# Patient Record
Sex: MALE | Race: WHITE | NOT HISPANIC OR LATINO | Employment: OTHER | URBAN - METROPOLITAN AREA
[De-identification: names, ages, dates, MRNs, and addresses within clinical notes are randomized per-mention and may not be internally consistent; named-entity substitution may affect disease eponyms.]

---

## 2017-07-28 RX ORDER — LEVETIRACETAM 1000 MG/1
TABLET ORAL
Qty: 90 TABLET | Refills: 0 | Status: ON HOLD | OUTPATIENT
Start: 2017-07-28 | End: 2018-02-19

## 2018-02-18 ENCOUNTER — APPOINTMENT (OUTPATIENT)
Dept: GENERAL RADIOLOGY | Facility: HOSPITAL | Age: 28
End: 2018-02-18

## 2018-02-18 ENCOUNTER — APPOINTMENT (OUTPATIENT)
Dept: CT IMAGING | Facility: HOSPITAL | Age: 28
End: 2018-02-18

## 2018-02-18 ENCOUNTER — HOSPITAL ENCOUNTER (INPATIENT)
Facility: HOSPITAL | Age: 28
LOS: 4 days | Discharge: HOME | End: 2018-02-22
Attending: EMERGENCY MEDICINE | Admitting: INTERNAL MEDICINE

## 2018-02-18 DIAGNOSIS — R10.84 GENERALIZED ABDOMINAL PAIN: Primary | ICD-10-CM

## 2018-02-18 LAB
ALBUMIN SERPL-MCNC: 3.8 G/DL (ref 3.5–5.2)
ALBUMIN/GLOB SERPL: 1.2 G/DL
ALP SERPL-CCNC: 73 U/L (ref 39–117)
ALT SERPL W P-5'-P-CCNC: 18 U/L (ref 1–41)
ANION GAP SERPL CALCULATED.3IONS-SCNC: 10.4 MMOL/L
AST SERPL-CCNC: 15 U/L (ref 1–40)
BASOPHILS # BLD AUTO: 0.02 10*3/MM3 (ref 0–0.2)
BASOPHILS NFR BLD AUTO: 0.2 % (ref 0–1.5)
BILIRUB SERPL-MCNC: 0.2 MG/DL (ref 0.1–1.2)
BILIRUB UR QL STRIP: NEGATIVE
BUN BLD-MCNC: 9 MG/DL (ref 6–20)
BUN/CREAT SERPL: 15 (ref 7–25)
CALCIUM SPEC-SCNC: 9.1 MG/DL (ref 8.6–10.5)
CHLORIDE SERPL-SCNC: 101 MMOL/L (ref 98–107)
CLARITY UR: CLEAR
CO2 SERPL-SCNC: 27.6 MMOL/L (ref 22–29)
COLOR UR: YELLOW
CREAT BLD-MCNC: 0.6 MG/DL (ref 0.76–1.27)
D-LACTATE SERPL-SCNC: 0.8 MMOL/L (ref 0.5–2)
DEPRECATED RDW RBC AUTO: 47 FL (ref 37–54)
EOSINOPHIL # BLD AUTO: 0.22 10*3/MM3 (ref 0–0.7)
EOSINOPHIL NFR BLD AUTO: 1.7 % (ref 0.3–6.2)
ERYTHROCYTE [DISTWIDTH] IN BLOOD BY AUTOMATED COUNT: 13.7 % (ref 11.5–14.5)
FLUAV AG NPH QL: NEGATIVE
FLUBV AG NPH QL IA: NEGATIVE
GFR SERPL CREATININE-BSD FRML MDRD: >150 ML/MIN/1.73
GLOBULIN UR ELPH-MCNC: 3.1 GM/DL
GLUCOSE BLD-MCNC: 108 MG/DL (ref 65–99)
GLUCOSE UR STRIP-MCNC: NEGATIVE MG/DL
HCT VFR BLD AUTO: 40.9 % (ref 40.4–52.2)
HGB BLD-MCNC: 13.5 G/DL (ref 13.7–17.6)
HGB UR QL STRIP.AUTO: NEGATIVE
IMM GRANULOCYTES # BLD: 0.03 10*3/MM3 (ref 0–0.03)
IMM GRANULOCYTES NFR BLD: 0.2 % (ref 0–0.5)
KETONES UR QL STRIP: NEGATIVE
LEUKOCYTE ESTERASE UR QL STRIP.AUTO: NEGATIVE
LYMPHOCYTES # BLD AUTO: 2.18 10*3/MM3 (ref 0.9–4.8)
LYMPHOCYTES NFR BLD AUTO: 16.8 % (ref 19.6–45.3)
MCH RBC QN AUTO: 31 PG (ref 27–32.7)
MCHC RBC AUTO-ENTMCNC: 33 G/DL (ref 32.6–36.4)
MCV RBC AUTO: 93.8 FL (ref 79.8–96.2)
MONOCYTES # BLD AUTO: 0.88 10*3/MM3 (ref 0.2–1.2)
MONOCYTES NFR BLD AUTO: 6.8 % (ref 5–12)
NEUTROPHILS # BLD AUTO: 9.62 10*3/MM3 (ref 1.9–8.1)
NEUTROPHILS NFR BLD AUTO: 74.3 % (ref 42.7–76)
NITRITE UR QL STRIP: NEGATIVE
PH UR STRIP.AUTO: 6 [PH] (ref 5–8)
PLATELET # BLD AUTO: 279 10*3/MM3 (ref 140–500)
PMV BLD AUTO: 10.3 FL (ref 6–12)
POTASSIUM BLD-SCNC: 4 MMOL/L (ref 3.5–5.2)
PROT SERPL-MCNC: 6.9 G/DL (ref 6–8.5)
PROT UR QL STRIP: NEGATIVE
RBC # BLD AUTO: 4.36 10*6/MM3 (ref 4.6–6)
SODIUM BLD-SCNC: 139 MMOL/L (ref 136–145)
SP GR UR STRIP: 1.02 (ref 1–1.03)
UROBILINOGEN UR QL STRIP: NORMAL
WBC NRBC COR # BLD: 12.95 10*3/MM3 (ref 4.5–10.7)

## 2018-02-18 PROCEDURE — 25010000002 ONDANSETRON PER 1 MG: Performed by: NURSE PRACTITIONER

## 2018-02-18 PROCEDURE — 74177 CT ABD & PELVIS W/CONTRAST: CPT

## 2018-02-18 PROCEDURE — 81003 URINALYSIS AUTO W/O SCOPE: CPT | Performed by: NURSE PRACTITIONER

## 2018-02-18 PROCEDURE — 87804 INFLUENZA ASSAY W/OPTIC: CPT | Performed by: NURSE PRACTITIONER

## 2018-02-18 PROCEDURE — 84145 PROCALCITONIN (PCT): CPT | Performed by: INTERNAL MEDICINE

## 2018-02-18 PROCEDURE — 25010000002 MORPHINE PER 10 MG: Performed by: NURSE PRACTITIONER

## 2018-02-18 PROCEDURE — 80053 COMPREHEN METABOLIC PANEL: CPT | Performed by: NURSE PRACTITIONER

## 2018-02-18 PROCEDURE — 0 IOPAMIDOL 61 % SOLUTION: Performed by: EMERGENCY MEDICINE

## 2018-02-18 PROCEDURE — 99284 EMERGENCY DEPT VISIT MOD MDM: CPT

## 2018-02-18 PROCEDURE — 83605 ASSAY OF LACTIC ACID: CPT | Performed by: NURSE PRACTITIONER

## 2018-02-18 PROCEDURE — 71046 X-RAY EXAM CHEST 2 VIEWS: CPT

## 2018-02-18 PROCEDURE — 87040 BLOOD CULTURE FOR BACTERIA: CPT | Performed by: NURSE PRACTITIONER

## 2018-02-18 PROCEDURE — 85025 COMPLETE CBC W/AUTO DIFF WBC: CPT | Performed by: NURSE PRACTITIONER

## 2018-02-18 RX ORDER — MORPHINE SULFATE 2 MG/ML
1 INJECTION, SOLUTION INTRAMUSCULAR; INTRAVENOUS ONCE
Status: COMPLETED | OUTPATIENT
Start: 2018-02-18 | End: 2018-02-18

## 2018-02-18 RX ORDER — ONDANSETRON 2 MG/ML
4 INJECTION INTRAMUSCULAR; INTRAVENOUS EVERY 6 HOURS PRN
Status: DISCONTINUED | OUTPATIENT
Start: 2018-02-18 | End: 2018-02-22 | Stop reason: HOSPADM

## 2018-02-18 RX ORDER — CALCIUM CARBONATE 200(500)MG
2 TABLET,CHEWABLE ORAL 2 TIMES DAILY PRN
Status: DISCONTINUED | OUTPATIENT
Start: 2018-02-18 | End: 2018-02-22 | Stop reason: HOSPADM

## 2018-02-18 RX ORDER — ONDANSETRON 4 MG/1
4 TABLET, ORALLY DISINTEGRATING ORAL EVERY 6 HOURS PRN
Status: DISCONTINUED | OUTPATIENT
Start: 2018-02-18 | End: 2018-02-22 | Stop reason: HOSPADM

## 2018-02-18 RX ORDER — ACETAMINOPHEN 325 MG/1
650 TABLET ORAL EVERY 4 HOURS PRN
Status: DISCONTINUED | OUTPATIENT
Start: 2018-02-18 | End: 2018-02-22 | Stop reason: HOSPADM

## 2018-02-18 RX ORDER — SODIUM CHLORIDE 0.9 % (FLUSH) 0.9 %
1-10 SYRINGE (ML) INJECTION AS NEEDED
Status: DISCONTINUED | OUTPATIENT
Start: 2018-02-18 | End: 2018-02-22 | Stop reason: HOSPADM

## 2018-02-18 RX ORDER — NALOXONE HCL 0.4 MG/ML
0.4 VIAL (ML) INJECTION
Status: DISCONTINUED | OUTPATIENT
Start: 2018-02-18 | End: 2018-02-22 | Stop reason: HOSPADM

## 2018-02-18 RX ORDER — SODIUM CHLORIDE 9 MG/ML
75 INJECTION, SOLUTION INTRAVENOUS CONTINUOUS
Status: DISCONTINUED | OUTPATIENT
Start: 2018-02-18 | End: 2018-02-19

## 2018-02-18 RX ORDER — ONDANSETRON 2 MG/ML
4 INJECTION INTRAMUSCULAR; INTRAVENOUS ONCE
Status: COMPLETED | OUTPATIENT
Start: 2018-02-18 | End: 2018-02-18

## 2018-02-18 RX ORDER — ONDANSETRON 4 MG/1
4 TABLET, FILM COATED ORAL EVERY 6 HOURS PRN
Status: DISCONTINUED | OUTPATIENT
Start: 2018-02-18 | End: 2018-02-22 | Stop reason: HOSPADM

## 2018-02-18 RX ORDER — MORPHINE SULFATE 2 MG/ML
2 INJECTION, SOLUTION INTRAMUSCULAR; INTRAVENOUS
Status: DISCONTINUED | OUTPATIENT
Start: 2018-02-18 | End: 2018-02-22 | Stop reason: HOSPADM

## 2018-02-18 RX ADMIN — SODIUM CHLORIDE 1000 ML: 9 INJECTION, SOLUTION INTRAVENOUS at 18:32

## 2018-02-18 RX ADMIN — MORPHINE SULFATE 1 MG: 2 INJECTION, SOLUTION INTRAMUSCULAR; INTRAVENOUS at 18:34

## 2018-02-18 RX ADMIN — IOPAMIDOL 85 ML: 612 INJECTION, SOLUTION INTRAVENOUS at 19:41

## 2018-02-18 RX ADMIN — MORPHINE SULFATE 1 MG: 2 INJECTION, SOLUTION INTRAMUSCULAR; INTRAVENOUS at 20:39

## 2018-02-18 RX ADMIN — ONDANSETRON 4 MG: 2 INJECTION INTRAMUSCULAR; INTRAVENOUS at 18:32

## 2018-02-18 RX ADMIN — ACETAMINOPHEN 650 MG: 325 TABLET, FILM COATED ORAL at 23:55

## 2018-02-18 NOTE — ED TRIAGE NOTES
Pt with brain anoxia. Non-verbal, wheelchair bound. Has colostomy in 2012. Bowel resection in 2015. Today pt passed blood and mucus from abdomen. Pt reluctant to void and ques for not wanting to sit on his bottom. Abdomen tender. Info from parents.

## 2018-02-18 NOTE — ED PROVIDER NOTES
EMERGENCY DEPARTMENT ENCOUNTER    Room Number:  12/12  Date seen:  2/18/2018  Time seen: 6:09 PM  PCP: Kira Almaraz MD    HPI:  Chief complaint: diarrhea with blood present  Context:Alex Brown is a 27 y.o. male who presents to the ED with reports of dark red, foul smelling diarrhea since earlier today. Per the pt's family, the pt has had increasing abd tenderness and reluctance to urinate for the past several days, and appears to be in pain, per his mother. Pt's mother denies decreased urine volume, N/V, or fever. Pt's mother states a Hx of colostomy bag for ulcerative colitis, bowel resection, and rectal abscess. Pt's mother states the pt is chronically incontinent, and does still pass some material in BMs. Pt's Hx is limited due to a Hx of brain anoxia and that the pt is non-verbal. Pt's mother gave the pt tylenol at 1100 today for pain.     Timing:episodic   Duration: earlier today   Quality: diarrhea   Intensity/Severity: moderate   Associated Symptoms: blood in stool, abd tenderness, reluctance to urinate   Aggravating Factors: none stated   Alleviating Factors: none stated   Previous Episodes:Hx of bowel resection, ulcerative colitis, rectal abscess, and incontinence   Treatment before arrival: tylenol    MEDICAL RECORD REVIEW    ALLERGIES  Chlorpromazine; Dilaudid [hydromorphone hcl]; Diphenhydramine; Diphenhydramine hcl; Reglan [metoclopramide]; Cefaclor; and Hydromorphone    PAST MEDICAL HISTORY  Active Ambulatory Problems     Diagnosis Date Noted   • Perirectal cellulitis 04/08/2016   • Attack, epileptiform 11/07/2016     Resolved Ambulatory Problems     Diagnosis Date Noted   • No Resolved Ambulatory Problems     Past Medical History:   Diagnosis Date   • Anoxic brain damage    • Colitis    • GERD (gastroesophageal reflux disease)    • Seizures        PAST SURGICAL HISTORY  Past Surgical History:   Procedure Laterality Date   • NO PAST SURGERIES         FAMILY HISTORY  History reviewed.  No pertinent family history.    SOCIAL HISTORY  Social History     Social History   • Marital status: Single     Spouse name: N/A   • Number of children: N/A   • Years of education: N/A     Occupational History   • Not on file.     Social History Main Topics   • Smoking status: Never Smoker   • Smokeless tobacco: Not on file   • Alcohol use No   • Drug use: No   • Sexual activity: Defer     Other Topics Concern   • Not on file     Social History Narrative       REVIEW OF SYSTEMS  Review of Systems   Unable to perform ROS: Patient nonverbal       PHYSICAL EXAM  ED Triage Vitals   Temp Heart Rate Resp BP SpO2   02/18/18 1759 02/18/18 1754 02/18/18 1754 02/18/18 1754 02/18/18 1754   99.3 °F (37.4 °C) 107 14 109/68 99 %      Temp src Heart Rate Source Patient Position BP Location FiO2 (%)   02/18/18 1759 02/18/18 1754 02/18/18 1754 02/18/18 1754 --   Tympanic Monitor Sitting Right arm      Physical Exam   Constitutional: He is well-developed, well-nourished, and in no distress. No distress.   Pt's exam is limited to to Hx of anoxic brain injury.    HENT:   Head: Normocephalic and atraumatic.   Eyes: Pupils are equal, round, and reactive to light.   Neck: Normal range of motion. Neck supple.   Cardiovascular: S1 normal, S2 normal and normal heart sounds.  Tachycardia present.  Exam reveals no gallop and no friction rub.    No murmur heard.  Pulmonary/Chest: Effort normal. No respiratory distress. He has no wheezes. He has no rales. He exhibits no tenderness.   Abdominal: Soft. He exhibits no distension. Bowel sounds are hyperactive. There is tenderness (pt grimaces with palpation). There is no rebound and no guarding.   Ostomy bag in place   Musculoskeletal: Normal range of motion. He exhibits no deformity.   Lymphadenopathy:     He has no cervical adenopathy.   Neurological: He is alert.   Pt smiles and reacts to stimuli   Skin: Skin is warm and dry.   Psychiatric: Affect normal.   Nursing note and vitals  reviewed.      LAB RESULTS  Recent Results (from the past 24 hour(s))   Comprehensive Metabolic Panel    Collection Time: 02/18/18  6:29 PM   Result Value Ref Range    Glucose 108 (H) 65 - 99 mg/dL    BUN 9 6 - 20 mg/dL    Creatinine 0.60 (L) 0.76 - 1.27 mg/dL    Sodium 139 136 - 145 mmol/L    Potassium 4.0 3.5 - 5.2 mmol/L    Chloride 101 98 - 107 mmol/L    CO2 27.6 22.0 - 29.0 mmol/L    Calcium 9.1 8.6 - 10.5 mg/dL    Total Protein 6.9 6.0 - 8.5 g/dL    Albumin 3.80 3.50 - 5.20 g/dL    ALT (SGPT) 18 1 - 41 U/L    AST (SGOT) 15 1 - 40 U/L    Alkaline Phosphatase 73 39 - 117 U/L    Total Bilirubin 0.2 0.1 - 1.2 mg/dL    eGFR Non African Amer >150 >60 mL/min/1.73    Globulin 3.1 gm/dL    A/G Ratio 1.2 g/dL    BUN/Creatinine Ratio 15.0 7.0 - 25.0    Anion Gap 10.4 mmol/L   CBC Auto Differential    Collection Time: 02/18/18  6:29 PM   Result Value Ref Range    WBC 12.95 (H) 4.50 - 10.70 10*3/mm3    RBC 4.36 (L) 4.60 - 6.00 10*6/mm3    Hemoglobin 13.5 (L) 13.7 - 17.6 g/dL    Hematocrit 40.9 40.4 - 52.2 %    MCV 93.8 79.8 - 96.2 fL    MCH 31.0 27.0 - 32.7 pg    MCHC 33.0 32.6 - 36.4 g/dL    RDW 13.7 11.5 - 14.5 %    RDW-SD 47.0 37.0 - 54.0 fl    MPV 10.3 6.0 - 12.0 fL    Platelets 279 140 - 500 10*3/mm3    Neutrophil % 74.3 42.7 - 76.0 %    Lymphocyte % 16.8 (L) 19.6 - 45.3 %    Monocyte % 6.8 5.0 - 12.0 %    Eosinophil % 1.7 0.3 - 6.2 %    Basophil % 0.2 0.0 - 1.5 %    Immature Grans % 0.2 0.0 - 0.5 %    Neutrophils, Absolute 9.62 (H) 1.90 - 8.10 10*3/mm3    Lymphocytes, Absolute 2.18 0.90 - 4.80 10*3/mm3    Monocytes, Absolute 0.88 0.20 - 1.20 10*3/mm3    Eosinophils, Absolute 0.22 0.00 - 0.70 10*3/mm3    Basophils, Absolute 0.02 0.00 - 0.20 10*3/mm3    Immature Grans, Absolute 0.03 0.00 - 0.03 10*3/mm3   Urinalysis - Urine, Catheter    Collection Time: 02/18/18  6:52 PM   Result Value Ref Range    Color, UA Yellow Yellow, Straw    Appearance, UA Clear Clear    pH, UA 6.0 5.0 - 8.0    Specific Gravity, UA 1.023 1.005 -  1.030    Glucose, UA Negative Negative    Ketones, UA Negative Negative    Bilirubin, UA Negative Negative    Blood, UA Negative Negative    Protein, UA Negative Negative    Leuk Esterase, UA Negative Negative    Nitrite, UA Negative Negative    Urobilinogen, UA 0.2 E.U./dL 0.2 - 1.0 E.U./dL   Influenza Antigen, Rapid - Swab, Nasopharynx    Collection Time: 02/18/18  6:52 PM   Result Value Ref Range    Influenza A Ag, EIA Negative Negative    Influenza B Ag, EIA Negative Negative       I ordered the above labs and reviewed the results    RADIOLOGY  CT Abdomen Pelvis With Contrast   Final Result   IMPRESSION :    1. Stable small renal lesions, likely cysts.   2. Grossly unremarkable exam otherwise considering above-noted artifacts           This report was finalized on 2/18/2018 8:00 PM by Hong Soto MD.          XR Chest 2 View   Final Result   1. No active disease.       This report was finalized on 2/18/2018 7:33 PM by Hong Soto MD.              I ordered the above noted radiological studies and reviewed the images on the PACS system.  Spoke with Dr. Soto regarding CT/MRI scan results    MEDICATIONS GIVEN IN ER  Medications   morphine injection 1 mg (1 mg Intravenous Given 2/18/18 1834)   ondansetron (ZOFRAN) injection 4 mg (4 mg Intravenous Given 2/18/18 1832)   sodium chloride 0.9 % bolus 1,000 mL (0 mL Intravenous Stopped 2/18/18 1900)   iopamidol (ISOVUE-300) 61 % injection 100 mL (85 mL Intravenous Given 2/18/18 1941)   morphine injection 1 mg (1 mg Intravenous Given 2/18/18 2039)   morphine injection 1 mg (1 mg Intravenous Given 2/18/18 2039)       EKG  Interpreted by ED Physician    PROCEDURES  Procedures    COURSE & MEDICAL DECISION MAKING  Pertinent Labs and Imaging studies that were ordered and reviewed are noted above.  Results were reviewed/discussed with the patient and they were also made aware of online assess.  Pt also made aware that some labs, such as cultures, will not be resulted  "during ER visit and follow up with PMD is necessary.     PROGRESS AND CONSULTS    Progress Notes:    ED Course   1941  Discussed the pt's labs with the pt's mother, including an normal urinalysis and elevated WBC.    1950  Reviewed pt's history and workup with Dr. Jones.  After a bedside evaluation; Dr Jones agrees with the plan of care    2010   Rechecked pt, who is resting comfortably. Discussed the pts negative CT abd/pel. Plan to consult Dr. Zhu and admit the pt for further evaluation and management. Pt's family understands and agrees with the plan, and all questions were answered.     2016  Called Dr. Soto and discussed the pt's CT scan. Per Dr. Soto, the pt's CT abd/pel was negative acute.     2027  Received a call from Dr. Zhuand discussed pt's case. Dr. Zhu said to admit the pt to medicine, and will consult.    2045  Rechecked pt, who is resting comfortably. Discussed conversation with Dr. Zhu. Pt's family understands and agrees with the plan, and all questions were answered.    2057  Received a call from Dr. Juárez and discussed pt's case. Dr. Juárez agreed to admit the pt.    Based on the patient's lab findings and presenting symptoms, the doctor and I feel it is appropriate to admit the patient for further management, evaluation, and treatment.  I have discussed this with the admitting team.  I have also discussed this with the patient/family.  They are in agreement with admission.        Disposition vitals:  /59  Pulse 117  Temp 99.3 °F (37.4 °C) (Tympanic)   Resp 18  Ht 165.1 cm (65\")  Wt 45.4 kg (100 lb)  SpO2 96%  BMI 16.64 kg/m2      DIAGNOSIS  Final diagnoses:   Generalized abdominal pain       ADMISSION  ADMISSION by Dr. Juárez    Discussed treatment plan and reason for admission with pt/family and admitting physician.  Pt/family voiced understanding of the plan for admission for further testing/treatment as needed.     Documentation assistance provided by sharmaine " Derrick Brothers for [facility].  Information recorded by the scribe was done at my direction and has been verified and validated by me.  Electronically signed by Derrick Brothers on 2/18/2018 at time 9:19 PM          Derrick Brothers  02/18/18 2120       Zoe Krishna, [facility]  02/18/18 1197

## 2018-02-19 PROBLEM — D72.829 LEUKOCYTOSIS: Status: ACTIVE | Noted: 2018-02-19

## 2018-02-19 PROBLEM — K91.89: Status: ACTIVE | Noted: 2018-02-19

## 2018-02-19 PROBLEM — G40.909 SEIZURE DISORDER (HCC): Status: ACTIVE | Noted: 2018-02-19

## 2018-02-19 PROBLEM — G93.1 ANOXIC BRAIN DAMAGE: Status: ACTIVE | Noted: 2018-02-19

## 2018-02-19 LAB
ANION GAP SERPL CALCULATED.3IONS-SCNC: 10.8 MMOL/L
BASOPHILS # BLD AUTO: 0.02 10*3/MM3 (ref 0–0.2)
BASOPHILS NFR BLD AUTO: 0.2 % (ref 0–1.5)
BUN BLD-MCNC: 7 MG/DL (ref 6–20)
BUN/CREAT SERPL: 13.5 (ref 7–25)
CALCIUM SPEC-SCNC: 8.7 MG/DL (ref 8.6–10.5)
CHLORIDE SERPL-SCNC: 106 MMOL/L (ref 98–107)
CO2 SERPL-SCNC: 27.2 MMOL/L (ref 22–29)
CREAT BLD-MCNC: 0.52 MG/DL (ref 0.76–1.27)
DEPRECATED RDW RBC AUTO: 47.6 FL (ref 37–54)
EOSINOPHIL # BLD AUTO: 0.23 10*3/MM3 (ref 0–0.7)
EOSINOPHIL NFR BLD AUTO: 2.2 % (ref 0.3–6.2)
ERYTHROCYTE [DISTWIDTH] IN BLOOD BY AUTOMATED COUNT: 13.6 % (ref 11.5–14.5)
GFR SERPL CREATININE-BSD FRML MDRD: >150 ML/MIN/1.73
GLUCOSE BLD-MCNC: 84 MG/DL (ref 65–99)
HCT VFR BLD AUTO: 37.9 % (ref 40.4–52.2)
HGB BLD-MCNC: 12.1 G/DL (ref 13.7–17.6)
IMM GRANULOCYTES # BLD: 0.03 10*3/MM3 (ref 0–0.03)
IMM GRANULOCYTES NFR BLD: 0.3 % (ref 0–0.5)
LYMPHOCYTES # BLD AUTO: 2.41 10*3/MM3 (ref 0.9–4.8)
LYMPHOCYTES NFR BLD AUTO: 23 % (ref 19.6–45.3)
MCH RBC QN AUTO: 30.2 PG (ref 27–32.7)
MCHC RBC AUTO-ENTMCNC: 31.9 G/DL (ref 32.6–36.4)
MCV RBC AUTO: 94.5 FL (ref 79.8–96.2)
MONOCYTES # BLD AUTO: 1.16 10*3/MM3 (ref 0.2–1.2)
MONOCYTES NFR BLD AUTO: 11.1 % (ref 5–12)
NEUTROPHILS # BLD AUTO: 6.61 10*3/MM3 (ref 1.9–8.1)
NEUTROPHILS NFR BLD AUTO: 63.2 % (ref 42.7–76)
PLATELET # BLD AUTO: 261 10*3/MM3 (ref 140–500)
PMV BLD AUTO: 10.1 FL (ref 6–12)
POTASSIUM BLD-SCNC: 3.8 MMOL/L (ref 3.5–5.2)
PROCALCITONIN SERPL-MCNC: 0.05 NG/ML (ref 0.1–0.25)
RBC # BLD AUTO: 4.01 10*6/MM3 (ref 4.6–6)
SODIUM BLD-SCNC: 144 MMOL/L (ref 136–145)
WBC NRBC COR # BLD: 10.46 10*3/MM3 (ref 4.5–10.7)

## 2018-02-19 PROCEDURE — 80048 BASIC METABOLIC PNL TOTAL CA: CPT | Performed by: INTERNAL MEDICINE

## 2018-02-19 PROCEDURE — 85025 COMPLETE CBC W/AUTO DIFF WBC: CPT | Performed by: INTERNAL MEDICINE

## 2018-02-19 PROCEDURE — 25010000002 LEVOFLOXACIN PER 250 MG: Performed by: INTERNAL MEDICINE

## 2018-02-19 PROCEDURE — 99254 IP/OBS CNSLTJ NEW/EST MOD 60: CPT | Performed by: SURGERY

## 2018-02-19 PROCEDURE — 87070 CULTURE OTHR SPECIMN AEROBIC: CPT | Performed by: SURGERY

## 2018-02-19 PROCEDURE — 87076 CULTURE ANAEROBE IDENT EACH: CPT | Performed by: SURGERY

## 2018-02-19 PROCEDURE — 87205 SMEAR GRAM STAIN: CPT | Performed by: SURGERY

## 2018-02-19 RX ORDER — CETIRIZINE HYDROCHLORIDE, PSEUDOEPHEDRINE HYDROCHLORIDE 5; 120 MG/1; MG/1
1 TABLET, FILM COATED, EXTENDED RELEASE ORAL 2 TIMES DAILY
Status: DISCONTINUED | OUTPATIENT
Start: 2018-02-19 | End: 2018-02-19

## 2018-02-19 RX ORDER — LEVETIRACETAM 500 MG/1
1000 TABLET ORAL DAILY
Status: DISCONTINUED | OUTPATIENT
Start: 2018-02-19 | End: 2018-02-22 | Stop reason: HOSPADM

## 2018-02-19 RX ORDER — LEVOFLOXACIN 5 MG/ML
500 INJECTION, SOLUTION INTRAVENOUS EVERY 24 HOURS
Status: DISCONTINUED | OUTPATIENT
Start: 2018-02-19 | End: 2018-02-22 | Stop reason: HOSPADM

## 2018-02-19 RX ORDER — PANTOPRAZOLE SODIUM 40 MG/1
40 TABLET, DELAYED RELEASE ORAL
Status: DISCONTINUED | OUTPATIENT
Start: 2018-02-19 | End: 2018-02-22 | Stop reason: HOSPADM

## 2018-02-19 RX ORDER — CETIRIZINE HYDROCHLORIDE, PSEUDOEPHEDRINE HYDROCHLORIDE 5; 120 MG/1; MG/1
1 TABLET, FILM COATED, EXTENDED RELEASE ORAL 2 TIMES DAILY PRN
Status: DISCONTINUED | OUTPATIENT
Start: 2018-02-19 | End: 2018-02-22 | Stop reason: HOSPADM

## 2018-02-19 RX ADMIN — PANTOPRAZOLE SODIUM 40 MG: 40 TABLET, DELAYED RELEASE ORAL at 07:47

## 2018-02-19 RX ADMIN — METRONIDAZOLE 500 MG: 500 INJECTION, SOLUTION INTRAVENOUS at 18:11

## 2018-02-19 RX ADMIN — LEVETIRACETAM 1000 MG: 500 TABLET, FILM COATED ORAL at 20:44

## 2018-02-19 RX ADMIN — METRONIDAZOLE 500 MG: 500 INJECTION, SOLUTION INTRAVENOUS at 11:20

## 2018-02-19 RX ADMIN — METRONIDAZOLE 500 MG: 500 INJECTION, SOLUTION INTRAVENOUS at 02:58

## 2018-02-19 RX ADMIN — LEVOFLOXACIN 500 MG: 500 INJECTION, SOLUTION INTRAVENOUS at 04:13

## 2018-02-19 RX ADMIN — SODIUM CHLORIDE 75 ML/HR: 9 INJECTION, SOLUTION INTRAVENOUS at 02:57

## 2018-02-19 NOTE — CONSULTS
SUMMARY (A/P):    27-year-old gentleman with chronic pelvic abscesses and perineal drainage related to laparoscopic total proctocolectomy in 2012 for medically refractory ulcerative colitis.  Given that his abdominal pain has basically resolved and he is nontender on exam I would favor continued intravenous antibiotics of Levaquin and Flagyl and repeat CT scan of the pelvis in 2 days to reassess pelvic abscess.  Discussed with his parents options of treatment should significant fluid remain including CT-guided drainage, transperineal drainage and debridement, and transabdominal pelvic exploration and drainage.  Further recommendations will follow next CT scan.      CC:  Perineal drainage    HPI:  27-year-old gentleman with chronic perineal wound problems consisting of drainage and pelvic abscesses since laparoscopic total proctocolectomy with end ileostomy in January 2012 for medically refractory ulcerative colitis.  Mother reports that in the last 2 days he's had significant foul-smelling drainage from the perineal wound associated with mild to moderate lower abdominal pain and difficulty urinating.  Symptoms have improved since the more significant episodes of perineal drainage.    PHYSICAL EXAM:   Constitutional: Well-developed well-nourished, no acute distress  Vital signs: Afebrile since admission, tachycardia decreased, /63, height 65 inches, weight 102 pounds, BMI 17  Eyes: Conjunctiva normal, sclera nonicteric  ENMT: Hearing grossly normal, oral mucosa moist  Neck: Supple, no palpable mass, normal thyroid, trachea midline  Respiratory: Clear to auscultation, normal inspiratory effort  Cardiovascular: Regular rate, no murmur, no carotid bruit, no peripheral edema, no jugular venous distention  Gastrointestinal: Soft, nontender, no palpable mass, no hepatosplenomegaly, negative for hernia, bowel sounds normal  Lymphatics (palpable nodes):  cervical-negative, axillary-negative  Skin:  Warm, dry, no rash  on visualized skin surfaces  Musculoskeletal: Symmetric strength, normal gait  Psychiatric: Alert and oriented ×3, normal affect     ALLERGIES: reviewed, in Epic    MEDICATIONS: reviewed, in Epic    PMH:    Ulcerative colitis  Anoxic brain injury at birth  Seizure disorder  Cerebral palsy    PSH:    -Laparoscopic total proctocolectomy with end ileostomy 1/3/2012 for medically refractory ulcerative colitis, pathology consistent with ulcerative colitis  -Laparoscopic small bowel resection 7/17/2015 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  All other systems reviewed and negative other than presenting complaints.    RADIOLOGY/ENDOSCOPY:    CT abdomen pelvis 2/18/2018 read as no acute abnormality.  On my review of the images given his clinical history of laparoscopic total proctocolectomy the fluid collection in the pelvis is not his rectum but obviously a recurrent pelvic abscess    LABS:    BMP normal  CBC with white blood cell count of 10.46, hemoglobin 12.1, platelets 261  Blood cultures preliminarily negative    YOGI COATS M.D.

## 2018-02-19 NOTE — PLAN OF CARE
Problem: Patient Care Overview (Adult)  Goal: Plan of Care Review  Outcome: Ongoing (interventions implemented as appropriate)   02/19/18 2518   Coping/Psychosocial Response Interventions   Plan Of Care Reviewed With patient;father;mother   Patient Care Overview   Progress progress toward functional goals as expected   Outcome Evaluation   Outcome Summary/Follow up Plan ptn admitted for anal abscess, on IV flagyl and levaquin, culture taken, low grade fever, continue to monitor MD aware, ptn asymptomatic, no complaints of pain, ct on wed for followup, continue to monitor per POC      Goal: Adult Individualization and Mutuality  Outcome: Ongoing (interventions implemented as appropriate)    Goal: Discharge Needs Assessment  Outcome: Ongoing (interventions implemented as appropriate)      Problem: Pain, Acute (Adult)  Goal: Identify Related Risk Factors and Signs and Symptoms  Outcome: Ongoing (interventions implemented as appropriate)    Goal: Acceptable Pain Control/Comfort Level  Outcome: Ongoing (interventions implemented as appropriate)      Problem: Skin Integrity Impairment, Risk/Actual (Adult)  Goal: Identify Related Risk Factors and Signs and Symptoms  Outcome: Ongoing (interventions implemented as appropriate)    Goal: Skin Integrity/Wound Healing  Outcome: Ongoing (interventions implemented as appropriate)      Problem: Fall Risk (Adult)  Goal: Identify Related Risk Factors and Signs and Symptoms  Outcome: Ongoing (interventions implemented as appropriate)    Goal: Absence of Falls  Outcome: Ongoing (interventions implemented as appropriate)      Problem: Pressure Ulcer Risk (Enrico Scale) (Adult,Obstetrics,Pediatric)  Goal: Identify Related Risk Factors and Signs and Symptoms  Outcome: Ongoing (interventions implemented as appropriate)    Goal: Skin Integrity  Outcome: Ongoing (interventions implemented as appropriate)

## 2018-02-19 NOTE — PROGRESS NOTES
"Adult Nutrition  Assessment/PES    Patient Name:  Alex Brown  YOB: 1990  MRN: [id number]  Admit Date:  2/18/2018    Assessment Date:  2/19/2018    Comments: Identified at nutrition risk d/t low BMI 17.    Pt's weight is stable, he has a chronically low BMI. Skin intact. Diet just advanced from NPO - noted ground meats & lactose free needed. Will follow clinical course.           Reason for Assessment       02/19/18 1115    Reason for Assessment    Reason For Assessment/Visit identified at risk by screening criteria    Identified At Risk By Screening Criteria BMI    Gastrointestinal Ileostomy;Ulcerative colitis;Bowel resection    Neurological AMS;Seizure disorder;Other (comment)   brain injury as a child    Other diagnosis Leakage of rectal stump                Anthropometrics       02/19/18 1118    Anthropometrics    RD Documented Weight on Admission 46.3 kg (102 lb)    Anthropometrics (Special Considerations)    Height Used for Calculations 1.651 m (5' 5\")    RD Calculated BMI (kg/m2) 17    Usual Body Weight (UBW)    Usual Body Weight --   # over a 3 year period (stable)    Body Mass Index (BMI)    BMI Grade 17 - 19 low grade I            Labs/Tests/Procedures/Meds       02/19/18 1118    Labs/Tests/Procedures/Meds    Diagnostic Test/Procedure Review reviewed    Labs/Tests Review Reviewed;Hgb Hct    Procedure Review CT    Medication Review Reviewed, pertinent    Significant Vitals reviewed            Physical Findings       02/19/18 1120    Physical Findings/Assessment    Additional Documentation Physical Appearance (Group)    Physical Appearance    Gastrointestinal ileostomy;abdominal tenderness    Skin other (see comments)   no skin impairment, christ 14            Estimated/Assessed Needs       02/19/18 1121    Calculation Measurements    Weight Used For Calculations 46.3 kg (102 lb)    Estimated/Assessed Energy Needs    Energy Need Method Kcal/kg    kcal/kg 30    30 Kcal/Kg (kcal) " 1388.01    Estimated Kcal Range  9482-5329    Estimated/Assessed Protein Needs    Weight Used for Protein Calculation 46.3 kg (102 lb)    Protein (gm/kg) 1.0    1.0 Gm Protein (gm) 46.27    Estimated/Assessed Fluid Needs    Fluid Need Method RDA method    RDA Method (mL)  1388            Nutrition Prescription Ordered       02/19/18 1121    Nutrition Prescription PO    Current PO Diet Regular    Common Modifiers Lactose Restricted            Evaluation of Received Nutrient/Fluid Intake       02/19/18 1122    PO Evaluation    Number of Days PO Intake Evaluated Insufficient Data            Problem/Interventions:        Problem 1       02/19/18 1124    Nutrition Diagnoses Problem 1    Problem 1 Underweight    Signs/Symptoms (evidenced by) BMI    BMI 17 - 17.9                    Intervention Goal       02/19/18 1124    Intervention Goal    General Maintain nutrition    PO Establish PO;Tolerate PO;PO intake (%)    PO Intake % 75 %    Weight Maintain weight            Nutrition Intervention       02/19/18 1124    Nutrition Intervention    RD/Tech Action Follow Tx progress;Care plan reviewd;Interview for preference;Encourage intake            Nutrition Prescription       02/19/18 1128    Nutrition Prescription PO    PO Prescription Other (comment)   Lactaid milk TID already on file            Education/Evaluation       02/19/18 1128    Education    Education Will Instruct as appropriate    Monitor/Evaluation    Monitor Per protocol        Electronically signed by:  Mckayla Lee RD  02/19/18 11:29 AM

## 2018-02-19 NOTE — PLAN OF CARE
Problem: Patient Care Overview (Adult)  Goal: Plan of Care Review  Outcome: Ongoing (interventions implemented as appropriate)  Pt admitted tonight for bleeding from rectal stump and abdominal tenderness. Pt has hx of anoxic brain injury, CP since birth. He is non-verbal but gets restless when he is uncomfortable. Low grade temp <100.0, slightly elevated wbc, he is currently resting comfortably after admin of tylenol per his mother's request. Good output from ileostomy, some pink discharge noted in brief, skin intact. He has IVF infusing and is receiving antibiotics. NPO since midnight but had soup, crackers and juice around 11pm with no adverse effects. Mother does most of care. Will continue to monitor and report any changes as needed.  Goal: Adult Individualization and Mutuality  Outcome: Ongoing (interventions implemented as appropriate)    Goal: Discharge Needs Assessment  Outcome: Ongoing (interventions implemented as appropriate)      Problem: Pain, Acute (Adult)  Goal: Identify Related Risk Factors and Signs and Symptoms  Outcome: Ongoing (interventions implemented as appropriate)    Goal: Acceptable Pain Control/Comfort Level  Outcome: Ongoing (interventions implemented as appropriate)      Problem: Skin Integrity Impairment, Risk/Actual (Adult)  Goal: Identify Related Risk Factors and Signs and Symptoms  Outcome: Ongoing (interventions implemented as appropriate)    Goal: Skin Integrity/Wound Healing  Outcome: Ongoing (interventions implemented as appropriate)      Problem: Fall Risk (Adult)  Goal: Identify Related Risk Factors and Signs and Symptoms  Outcome: Ongoing (interventions implemented as appropriate)    Goal: Absence of Falls  Outcome: Ongoing (interventions implemented as appropriate)

## 2018-02-19 NOTE — ED PROVIDER NOTES
"Discussed pt's case with MONTEZ Krishna.   Pt presents to the ER c/o diarrhea with blood present in the stool that first began earlier today; family describes the stool as \"foul-smelling/ sour odor\". Pt also c/o abdominal pain. Plan to admit pt for IV antibiotics. Family states that pt has had previous abdominal abscesses and has previous abdominal surgeries. Pt has a hx of anoxic brain damage.     Exam:   Pt is resting comfortably and in no acute distress.   Abdomen is soft, non-tender.   There is purulent rectal drainage.   Pt is tachycardiac  No obvious abscess observed on CT  Plan to admit pt for IV antibiotics and Dr. Ballard will consult pt.    I supervised care provided by the midlevel provider.    We have discussed this patient's history, physical exam, and treatment plan.   I have reviewed the note and personally saw and examined the patient and agree with the plan of care.    Documentation assistance provided by sharmaine Alba for Dr. Jones.  Information recorded by the scribe was done at my direction and has been verified and validated by me.         Nathan Alba  02/18/18 7638       Valeriy Jones MD  02/18/18 2313    "

## 2018-02-19 NOTE — PROGRESS NOTES
"    DAILY PROGRESS NOTE  [facility]    Patient Identification:  Name: Alex Brown  Age: 27 y.o.  Sex: male  :  1990  MRN: [id number]         Primary Care Physician: Kira Almaraz MD    Subjective:  Interval History: no new issues and he is doing much better - no n/v/cp/sob and no longer tender but still far from baseline regarding activity. Good PO and Uop     Objective:d/w folks at      Scheduled Meds:  cetirizine-pseudoephedrine 1 tablet Oral BID   levETIRAcetam 1,000 mg Oral Daily   levoFLOXacin 500 mg Intravenous Q24H   metroNIDAZOLE 500 mg Intravenous Q8H   pantoprazole 40 mg Oral Q AM     Continuous Infusions:  sodium chloride 75 mL/hr Last Rate: 75 mL/hr (18 0257)       Vital signs in last 24 hours:  Temp:  [97.5 °F (36.4 °C)-99.6 °F (37.6 °C)] 98.4 °F (36.9 °C)  Heart Rate:  [] 99  Resp:  [14-18] 16  BP: (100-117)/(59-73) 111/63    Intake/Output:    Intake/Output Summary (Last 24 hours) at 18 1503  Last data filed at 18 0536   Gross per 24 hour   Intake             1240 ml   Output                0 ml   Net             1240 ml       Exam:  /63 (BP Location: Left arm, Patient Position: Lying)  Pulse 99  Temp 98.4 °F (36.9 °C) (Oral)   Resp 16  Ht 165.1 cm (65\")  Wt 46.4 kg (102 lb 6.4 oz)  SpO2 96%  BMI 17.04 kg/m2    General Appearance:    Alert, cooperative, no distress, not toxic appearing                          Throat:   Lips, tongue, gums normal; oral mucosa pink and moist                           Neck:   Supple, symmetrical, trachea midline, no JVD                         Lungs:    Clear to auscultation bilaterally, respirations unlabored                          Heart:    Regular rate and rhythm, S1 and S2 normal                  Abdomen:     Soft, non-tender, bowel sounds active, colostomy                 Extremities:   No cyanosis or edema                           Data Review:  Labs in chart were " reviewed.    Assessment:  Active Hospital Problems (** Indicates Principal Problem)    Diagnosis Date Noted   • **Leakage of rectal stump [K91.89] 02/19/2018   • Anoxic brain damage [G93.1] 02/19/2018   • Leukocytosis [D72.829] 02/19/2018   • Seizure disorder [G40.909] 02/19/2018   • Generalized abdominal pain [R10.84] 02/18/2018      Resolved Hospital Problems    Diagnosis Date Noted Date Resolved   No resolved problems to display.       Plan:  Agree w/ Dr Ballard - will continue IV LQN/Flagyl and reassess w/ another CT in a couple days (phx MRSA but responding well to current Tx so hold Vanc    -rectal swab pending    -BC NGTD   -SLIVF    Tachycardia resolving     SCDs for DVT prophylaxis     Santiago Abrams MD  2/19/2018  3:03 PM

## 2018-02-19 NOTE — ED NOTES
Reassurance given; call light in reach. Pts breathing even and unlabored. Pt appears in NAD at this time. Family at bedside.        Casandra Storey RN  02/18/18 2001

## 2018-02-19 NOTE — H&P
Name: Alex Brown ADMIT: 2018   : 1990  PCP: Kira Almaraz MD    MRN: [id number] LOS: 1 days   AGE/SEX: 27 y.o. male  ROOM: [facility]     Chief Complaint   Patient presents with   • Black or Bloody Stool       Subjective   Mr. Brown is a 27 y.o. male with a history of anoxic brain injury as a child and is mute as a result as well as ulcerative colitis s/p proctocolectomy and end ileostomy that presents to [facility] after being brought in by his parents with increased with dark red/brown, unusually foul smelling output from his rectum.  The parents have also noticed that he appears to grimace when having to sit upright, and has avoided sitting up in his wheelchair for the past 1-2 days.  They have additionally noticed that although he is able to void well, he seems to have been hesitating when urinating, seemingly trying to avoid this.  His eating habits have not changed, and his ostomy has had good output as usual.  His parents are his caretakers and healthcare surrogates, and are at bedside to help with the history as the patient himself cannot communicate.        History of Present Illness    Past Medical History:   Diagnosis Date   • Anoxic brain damage    • Colitis    • CP (cerebral palsy) 2018   • GERD (gastroesophageal reflux disease)    • Seizures      Past Surgical History:   Procedure Laterality Date   • ABDOMINAL SURGERY     • COLON SURGERY     • NO PAST SURGERIES       History reviewed. No pertinent family history.  Social History   Substance Use Topics   • Smoking status: Never Smoker   • Smokeless tobacco: None   • Alcohol use No     Prescriptions Prior to Admission   Medication Sig Dispense Refill Last Dose   • lansoprazole (PREVACID) 30 MG capsule Take 1 capsule by mouth Daily. (Patient taking differently: Take 15 mg by mouth Daily.) 90 capsule 0    • levETIRAcetam (KEPPRA) 1000 MG tablet Take 1 tablet by mouth Daily. 90 tablet 3    • loperamide  (IMODIUM) 1 MG/5ML solution Take  by mouth 4 (Four) Times a Day As Needed for diarrhea.   Taking   • Loratadine-Pseudoephedrine (CLARITIN-D 12 HOUR PO) Take  by mouth.   Taking   • MULTIPLE VITAMINS PO Take  by mouth daily.   Taking     Allergies:    Allergies   Allergen Reactions   • Chlorpromazine Other (See Comments)   • Dilaudid [Hydromorphone Hcl]    • Diphenhydramine    • Diphenhydramine Hcl Other (See Comments)   • Reglan [Metoclopramide]    • Cefaclor Rash   • Hydromorphone Rash       Review of Systems   Unable to perform ROS: Patient nonverbal    See HPI    Objective    Vital Signs  Temp:  [99.3 °F (37.4 °C)-99.6 °F (37.6 °C)] 99.6 °F (37.6 °C)  Heart Rate:  [101-118] 118  Resp:  [14-18] 18  BP: (100-117)/(59-73) 117/63  SpO2:  [93 %-99 %] 97 %  on   ;   O2 Device: (P) room air  Body mass index is 17.04 kg/(m^2).    Physical Exam   Constitutional: No distress.   HENT:   Head: Normocephalic and atraumatic.   Mouth/Throat: Oropharynx is clear and moist.   Eyes: Conjunctivae and EOM are normal. Pupils are equal, round, and reactive to light. No scleral icterus.   Neck: Normal range of motion. Neck supple. No JVD present.   Cardiovascular: Regular rhythm and intact distal pulses.  Tachycardia present.    Pulmonary/Chest: Effort normal and breath sounds normal.   Abdominal: Soft. Bowel sounds are normal.   Ileostomy in place, tissue appears healthy, has light-colored stool output of expected consistency    Genitourinary:   Genitourinary Comments: Dark brown output from rectal stump with mild erythema surrounding the rectum   Musculoskeletal: He exhibits no edema or tenderness.   Neurological: He is alert. No cranial nerve deficit.   Mute at baseline, makes eye contact and tracks  Moves all extremities   Skin: Skin is warm and dry. He is not diaphoretic.   Psychiatric: He has a normal mood and affect. His behavior is normal.   Nursing note and vitals reviewed.      Results Review:   I reviewed the patient's new  clinical results.    Lab Results (last 24 hours)     Procedure Component Value Units Date/Time    CBC & Differential [[id number]] Collected:  02/18/18 1829    Specimen:  Blood Updated:  02/18/18 1856    Narrative:       The following orders were created for panel order CBC & Differential.  Procedure                               Abnormality         Status                     ---------                               -----------         ------                     CBC Auto Differential[[id number]]        Abnormal            Final result                 Please view results for these tests on the individual orders.    Comprehensive Metabolic Panel [[id number]]  (Abnormal) Collected:  02/18/18 1829    Specimen:  Blood Updated:  02/18/18 1906     Glucose 108 (H) mg/dL      BUN 9 mg/dL      Creatinine 0.60 (L) mg/dL      Sodium 139 mmol/L      Potassium 4.0 mmol/L      Chloride 101 mmol/L      CO2 27.6 mmol/L      Calcium 9.1 mg/dL      Total Protein 6.9 g/dL      Albumin 3.80 g/dL      ALT (SGPT) 18 U/L      AST (SGOT) 15 U/L      Alkaline Phosphatase 73 U/L      Total Bilirubin 0.2 mg/dL      eGFR Non African Amer >150 mL/min/1.73      Globulin 3.1 gm/dL      A/G Ratio 1.2 g/dL      BUN/Creatinine Ratio 15.0     Anion Gap 10.4 mmol/L     CBC Auto Differential [[id number]]  (Abnormal) Collected:  02/18/18 1829    Specimen:  Blood Updated:  02/18/18 1856     WBC 12.95 (H) 10*3/mm3      RBC 4.36 (L) 10*6/mm3      Hemoglobin 13.5 (L) g/dL      Hematocrit 40.9 %      MCV 93.8 fL      MCH 31.0 pg      MCHC 33.0 g/dL      RDW 13.7 %      RDW-SD 47.0 fl      MPV 10.3 fL      Platelets 279 10*3/mm3      Neutrophil % 74.3 %      Lymphocyte % 16.8 (L) %      Monocyte % 6.8 %      Eosinophil % 1.7 %      Basophil % 0.2 %      Immature Grans % 0.2 %      Neutrophils, Absolute 9.62 (H) 10*3/mm3      Lymphocytes, Absolute 2.18 10*3/mm3      Monocytes, Absolute 0.88 10*3/mm3      Eosinophils, Absolute 0.22 10*3/mm3      Basophils, Absolute  0.02 10*3/mm3      Immature Grans, Absolute 0.03 10*3/mm3     Procalcitonin [[id number]] Collected:  02/18/18 1829    Specimen:  Blood Updated:  02/19/18 0203    Urinalysis - Urine, Catheter [[id number]]  (Normal) Collected:  02/18/18 1852    Specimen:  Urine from Urine, Catheter Updated:  02/18/18 1902     Color, UA Yellow     Appearance, UA Clear     pH, UA 6.0     Specific Gravity, UA 1.023     Glucose, UA Negative     Ketones, UA Negative     Bilirubin, UA Negative     Blood, UA Negative     Protein, UA Negative     Leuk Esterase, UA Negative     Nitrite, UA Negative     Urobilinogen, UA 0.2 E.U./dL    Influenza Antigen, Rapid - Swab, Nasopharynx [[id number]]  (Normal) Collected:  02/18/18 1852    Specimen:  Swab from Nasopharynx Updated:  02/18/18 1924     Influenza A Ag, EIA Negative     Influenza B Ag, EIA Negative    Blood Culture - Blood, [[id number]] Collected:  02/18/18 2053    Specimen:  Blood from Arm, Left Updated:  02/18/18 2058    Lactic Acid, Plasma [[id number]]  (Normal) Collected:  02/18/18 2053    Specimen:  Blood from Arm, Left Updated:  02/18/18 2132     Lactate 0.8 mmol/L     Blood Culture - Blood, [[id number]] Collected:  02/18/18 2121    Specimen:  Blood from Arm, Left Updated:  02/18/18 2125          CT Abdomen Pelvis With Contrast   Final Result   IMPRESSION :    1. Stable small renal lesions, likely cysts.   2. Grossly unremarkable exam otherwise considering above-noted artifacts           This report was finalized on 2/18/2018 8:00 PM by Hong Soto MD.          XR Chest 2 View   Final Result   1. No active disease.       This report was finalized on 2/18/2018 7:33 PM by Hong Soto MD.            Assessment/Plan   Active Hospital Problems (** Indicates Principal Problem)    Diagnosis Date Noted   • **Leakage of rectal stump [K91.89] 02/19/2018   • Anoxic brain damage [G93.1] 02/19/2018   • Leukocytosis [D72.829] 02/19/2018   • Seizure disorder [G40.909] 02/19/2018   • Generalized  abdominal pain [R10.84] 02/18/2018      Resolved Hospital Problems    Diagnosis Date Noted Date Resolved   No resolved problems to display.   Discomfort/rectal stump output  - patient has had history of perirectal abscess, cellulitis, and enteropelvic fistula but I see no definite evidence of these on CT scan, UA also looks completely normal  - with his tachycardia, leukocytosis and small amount of perirectal erythema I will go ahead and cover him with antibiotics-could have a developing proctitis or perirectal cellulitis but again physical exam and CT are not that impressive  - blood cx's sent  - will ask general surgery to evaluate    Anoxic Brain Damage  - occurred in childhood, mute  - at neurologic baseline per parents    DVT Prophylaxis  - SCDs      I discussed the patients findings and my recommendations with patient, family and nursing staff.      Hong Juárez MD  [facility]  02/19/18  2:19 AM     This patient was seen before midnight on 2/18/18, though this note was completed after midnight on 2/19 due to patient care issues.

## 2018-02-19 NOTE — PROGRESS NOTES
Discharge Planning Assessment  [facility]     Patient Name: Alex Brown  MRN: [id number]  Today's Date: 2/19/2018    Admit Date: 2/18/2018          Discharge Needs Assessment       02/19/18 1329    Living Environment    Lives With parent(s)    Living Arrangements house    Home Accessibility no concerns;bed and bath on same level;ramps present at home    Stair Railings at Home none    Type of Financial/Environmental Concern none    Transportation Available car;family or friend will provide    Living Environment    Primary Care Provided By parent(s)    Quality Of Family Relationships supportive    Able to Return to Prior Living Arrangements yes    Discharge Needs Assessment    Concerns To Be Addressed no discharge needs identified;denies needs/concerns at this time    Equipment Currently Used at Home shower chair;wheelchair;slide board;lift device    Equipment Needed After Discharge none            Discharge Plan       02/19/18 1330    Case Management/Social Work Plan    Plan Home with parents     Patient/Family In Agreement With Plan yes    Additional Comments CCP met with patient and patient's parents; Rosy and Jung Brown ([phone] and [phone]). CCP role explained and discharge planning discussed. Face sheet verified. Patient has CP and is nonverbal. Patient lives with his parents, in a house with a ramp into the entrance. Patient uses a wheelchair for mobility and has shower bench. Patient's parents transport him and have wheelchair lift device on their van.  Patient uses [facility] pharmacy in [place] and does not have trouble affording his medications. Patient has a casework through [facility] Waiver program. Waiver program provides in home caregivers through [employer] agency. Patient receives personal care 6 days a week and attends a day program Tuesday, Wednesday and Thursday. Patient has not been to SNF or had home health. Patient's PCP is Dr. Almaraz. Patient's parents deny any  discharge needs at this time. CCP will follow for IV antibiotics and assist as needed. Leanna Moreno CSW         Discharge Placement     No information found                Demographic Summary       02/19/18 1328    Referral Information    Admission Type inpatient    Arrived From admitted as an inpatient    Referral Source admission list    Record Reviewed history and physical;patient profile    Primary Care Physician Information    Name Kira Almaraz             Functional Status       02/19/18 1329    Functional Status Current    Ambulation 4-->completely dependent    Transferring 4-->completely dependent    Toileting 4-->completely dependent    Bathing 4-->completely dependent    Dressing 4-->completely dependent    Eating 4-->completely dependent    Functional Status Prior    Ambulation 4-->completely dependent    Transferring 4-->completely dependent    Toileting 4-->completely dependent    Bathing 4-->completely dependent    Dressing 4-->completely dependent    Eating 4-->completely dependent    IADL    Medications completely dependent    Meal Preparation completely dependent    Housekeeping completely dependent    Laundry completely dependent    Shopping completely dependent    Oral Care completely dependent    Cognitive/Perceptual/Developmental    Recent Changes in Mental Status/Cognitive Functioning no changes    Employment/Financial    Financial Concerns none            Psychosocial     None            Abuse/Neglect     None            Legal     None            Substance Abuse     None            Patient Forms     None          Yuliana Moreno, MELISSA

## 2018-02-20 PROCEDURE — 25010000002 LEVOFLOXACIN PER 250 MG: Performed by: INTERNAL MEDICINE

## 2018-02-20 RX ADMIN — METRONIDAZOLE 500 MG: 500 INJECTION, SOLUTION INTRAVENOUS at 10:35

## 2018-02-20 RX ADMIN — LEVOFLOXACIN 500 MG: 500 INJECTION, SOLUTION INTRAVENOUS at 02:06

## 2018-02-20 RX ADMIN — PANTOPRAZOLE SODIUM 40 MG: 40 TABLET, DELAYED RELEASE ORAL at 06:30

## 2018-02-20 RX ADMIN — LEVETIRACETAM 1000 MG: 500 TABLET, FILM COATED ORAL at 20:19

## 2018-02-20 RX ADMIN — METRONIDAZOLE 500 MG: 500 INJECTION, SOLUTION INTRAVENOUS at 02:05

## 2018-02-20 RX ADMIN — METRONIDAZOLE 500 MG: 500 INJECTION, SOLUTION INTRAVENOUS at 18:57

## 2018-02-20 NOTE — PLAN OF CARE
Problem: Patient Care Overview (Adult)  Goal: Plan of Care Review  Outcome: Ongoing (interventions implemented as appropriate)   02/20/18 0666   Coping/Psychosocial Response Interventions   Plan Of Care Reviewed With patient;family   Patient Care Overview   Progress improving   Outcome Evaluation   Outcome Summary/Follow up Plan No significant changes today. No fevers. Pt up in wheelchair in hallway with family. Will continue to monitor.     Goal: Adult Individualization and Mutuality  Outcome: Ongoing (interventions implemented as appropriate)    Goal: Discharge Needs Assessment  Outcome: Ongoing (interventions implemented as appropriate)      Problem: Pain, Acute (Adult)  Goal: Identify Related Risk Factors and Signs and Symptoms  Outcome: Outcome(s) achieved Date Met: 02/20/18    Goal: Acceptable Pain Control/Comfort Level  Outcome: Ongoing (interventions implemented as appropriate)      Problem: Skin Integrity Impairment, Risk/Actual (Adult)  Goal: Identify Related Risk Factors and Signs and Symptoms  Outcome: Outcome(s) achieved Date Met: 02/20/18    Goal: Skin Integrity/Wound Healing  Outcome: Ongoing (interventions implemented as appropriate)      Problem: Fall Risk (Adult)  Goal: Identify Related Risk Factors and Signs and Symptoms  Outcome: Outcome(s) achieved Date Met: 02/20/18    Goal: Absence of Falls  Outcome: Ongoing (interventions implemented as appropriate)      Problem: Pressure Ulcer Risk (Enrico Scale) (Adult,Obstetrics,Pediatric)  Goal: Identify Related Risk Factors and Signs and Symptoms  Outcome: Outcome(s) achieved Date Met: 02/20/18    Goal: Skin Integrity  Outcome: Ongoing (interventions implemented as appropriate)

## 2018-02-20 NOTE — PLAN OF CARE
Problem: Patient Care Overview (Adult)  Goal: Plan of Care Review  Outcome: Ongoing (interventions implemented as appropriate)  Continue symptom management and comfort care.   02/19/18 1749 02/19/18 2333 02/20/18 0417   Coping/Psychosocial Response Interventions   Plan Of Care Reviewed With --  patient;caregiver --    Patient Care Overview   Progress progress toward functional goals as expected --  --    Outcome Evaluation   Outcome Summary/Follow up Plan --  --  Patient rested well overnight. IV ABX continued. Afebrile with no S&S of pain. Caregiver at bedside.     Goal: Adult Individualization and Mutuality  Outcome: Ongoing (interventions implemented as appropriate)    Goal: Discharge Needs Assessment  Outcome: Ongoing (interventions implemented as appropriate)      Problem: Pain, Acute (Adult)  Goal: Identify Related Risk Factors and Signs and Symptoms  Outcome: Ongoing (interventions implemented as appropriate)    Goal: Acceptable Pain Control/Comfort Level  Outcome: Ongoing (interventions implemented as appropriate)      Problem: Skin Integrity Impairment, Risk/Actual (Adult)  Goal: Identify Related Risk Factors and Signs and Symptoms  Outcome: Ongoing (interventions implemented as appropriate)    Goal: Skin Integrity/Wound Healing  Outcome: Ongoing (interventions implemented as appropriate)      Problem: Fall Risk (Adult)  Goal: Identify Related Risk Factors and Signs and Symptoms  Outcome: Ongoing (interventions implemented as appropriate)    Goal: Absence of Falls  Outcome: Ongoing (interventions implemented as appropriate)      Problem: Pressure Ulcer Risk (Enrico Scale) (Adult,Obstetrics,Pediatric)  Goal: Identify Related Risk Factors and Signs and Symptoms  Outcome: Ongoing (interventions implemented as appropriate)    Goal: Skin Integrity  Outcome: Ongoing (interventions implemented as appropriate)

## 2018-02-20 NOTE — PROGRESS NOTES
"    DAILY PROGRESS NOTE  [facility]    Patient Identification:  Name: Alex Brown  Age: 27 y.o.  Sex: male  :  1990  MRN: [id number]         Primary Care Physician: Kira Almaraz MD    Subjective:  Interval History: acting back to baseline - good PO and no noted issues w./ abd discomfort. Discharge today resolved     Objective:parents at      Scheduled Meds:  levETIRAcetam 1,000 mg Oral Daily   levoFLOXacin 500 mg Intravenous Q24H   metroNIDAZOLE 500 mg Intravenous Q8H   pantoprazole 40 mg Oral Q AM     Continuous Infusions:     Vital signs in last 24 hours:  Temp:  [98 °F (36.7 °C)-100.8 °F (38.2 °C)] 98.1 °F (36.7 °C)  Heart Rate:  [] 99  Resp:  [16] 16  BP: ()/(57-68) 101/59    Intake/Output:    Intake/Output Summary (Last 24 hours) at 18 1415  Last data filed at 18 0335   Gross per 24 hour   Intake              340 ml   Output                0 ml   Net              340 ml       Exam:  /59 (BP Location: Left arm, Patient Position: Lying)  Pulse 99  Temp 98.1 °F (36.7 °C) (Oral)   Resp 16  Ht 165.1 cm (65\")  Wt 46.4 kg (102 lb 6.4 oz)  SpO2 99%  BMI 17.04 kg/m2    General Appearance:    Alert, cooperative, no distress, smiling and playful                          Throat:   Lips, tongue, gums normal; oral mucosa pink and moist                         Lungs:    Clear to auscultation bilaterally, respirations unlabored                          Heart:    Regular rate and rhythm, S1 and S2 normal                  Abdomen:     Soft, non-tender, bowel sounds active                 Extremities:   No cyanosis or edema                         Data Review:  Labs in chart were reviewed.    Assessment:  Active Hospital Problems (** Indicates Principal Problem)    Diagnosis Date Noted   • **Leakage of rectal stump [K91.89] 2018   • Anoxic brain damage [G93.1] 2018   • Leukocytosis [D72.829] 2018   • Seizure disorder [G40.909] " 02/19/2018   • Generalized abdominal pain [R10.84] 02/18/2018      Resolved Hospital Problems    Diagnosis Date Noted Date Resolved   No resolved problems to display.       Plan:  Clinically improving on current regimen - Cx grew Coryne    Await surgical repeat of CT and if further improved then ok by for GERMAINE Abrams MD  2/20/2018  2:15 PM

## 2018-02-21 ENCOUNTER — APPOINTMENT (OUTPATIENT)
Dept: CT IMAGING | Facility: HOSPITAL | Age: 28
End: 2018-02-21

## 2018-02-21 LAB
BACTERIA SPEC AEROBE CULT: ABNORMAL
GRAM STN SPEC: ABNORMAL
GRAM STN SPEC: ABNORMAL

## 2018-02-21 PROCEDURE — 72192 CT PELVIS W/O DYE: CPT

## 2018-02-21 PROCEDURE — 25010000002 LEVOFLOXACIN PER 250 MG: Performed by: INTERNAL MEDICINE

## 2018-02-21 RX ORDER — LOPERAMIDE HYDROCHLORIDE 2 MG/1
2 CAPSULE ORAL 4 TIMES DAILY PRN
Status: DISCONTINUED | OUTPATIENT
Start: 2018-02-21 | End: 2018-02-22 | Stop reason: HOSPADM

## 2018-02-21 RX ADMIN — METRONIDAZOLE 500 MG: 500 INJECTION, SOLUTION INTRAVENOUS at 01:58

## 2018-02-21 RX ADMIN — LOPERAMIDE HYDROCHLORIDE 2 MG: 2 CAPSULE ORAL at 21:33

## 2018-02-21 RX ADMIN — LEVOFLOXACIN 500 MG: 500 INJECTION, SOLUTION INTRAVENOUS at 03:10

## 2018-02-21 RX ADMIN — PANTOPRAZOLE SODIUM 40 MG: 40 TABLET, DELAYED RELEASE ORAL at 08:47

## 2018-02-21 RX ADMIN — ACETAMINOPHEN 650 MG: 325 TABLET, FILM COATED ORAL at 17:53

## 2018-02-21 RX ADMIN — METRONIDAZOLE 500 MG: 500 INJECTION, SOLUTION INTRAVENOUS at 10:50

## 2018-02-21 RX ADMIN — LEVETIRACETAM 1000 MG: 500 TABLET, FILM COATED ORAL at 21:25

## 2018-02-21 NOTE — PROGRESS NOTES
Adult Nutrition  Assessment/PES    Patient Name:  Alex Brown  YOB: 1990  MRN: [id number]  Admit Date:  2/18/2018    Assessment Date:  2/21/2018    Comments:  Tolerating current diet with adequate intake (50-75%). Will cont to monitor.           Reason for Assessment       02/21/18 1206    Reason for Assessment    Reason For Assessment/Visit follow up protocol              Nutrition/Diet History       02/21/18 1208    Nutrition/Diet History    Other may dc to home either today or tomorrow              Labs/Tests/Procedures/Meds       02/21/18 1207    Labs/Tests/Procedures/Meds    Diagnostic Test/Procedure Review reviewed    Labs/Tests Review Reviewed    Medication Review Reviewed, pertinent            Physical Findings       02/21/18 1207    Physical Appearance    Gastrointestinal other (see comments)   noted to have bright red discharge per rectum              Nutrition Prescription Ordered       02/21/18 1207    Nutrition Prescription PO    Current PO Diet Soft Texture    Texture Ground    Fluid Consistency Thin            Evaluation of Received Nutrient/Fluid Intake       02/21/18 1207    PO Evaluation    Number of Days PO Intake Evaluated 1 day    Number of Meals 3    % PO Intake 50-75%            Problem/Interventions:                  Intervention Goal       02/21/18 1208    Intervention Goal    General Maintain nutrition    PO Maintain intake;PO intake (%)    PO Intake % 75 %            Nutrition Intervention       02/21/18 1208    Nutrition Intervention    RD/Tech Action Follow Tx progress;Interview for preference              Education/Evaluation       02/21/18 1208    Monitor/Evaluation    Monitor Per protocol        Electronically signed by:  Mckayla Lee RD  02/21/18 12:08 PM

## 2018-02-21 NOTE — PLAN OF CARE
Problem: Patient Care Overview (Adult)  Goal: Plan of Care Review  Outcome: Ongoing (interventions implemented as appropriate)   02/21/18 5381   Coping/Psychosocial Response Interventions   Plan Of Care Reviewed With mother;father   Patient Care Overview   Progress no change   Outcome Evaluation   Outcome Summary/Follow up Plan abcess still present on CT scan. Dr Abrams informed Dr Ballard. possible DC home tomorrow. no surgical interventions at this time. will continue to monitor.       Problem: Pain, Acute (Adult)  Goal: Acceptable Pain Control/Comfort Level  Outcome: Ongoing (interventions implemented as appropriate)      Problem: Fall Risk (Adult)  Goal: Absence of Falls  Outcome: Ongoing (interventions implemented as appropriate)      Problem: Pressure Ulcer Risk (Enrico Scale) (Adult,Obstetrics,Pediatric)  Goal: Skin Integrity  Outcome: Ongoing (interventions implemented as appropriate)

## 2018-02-21 NOTE — PLAN OF CARE
Problem: Patient Care Overview (Adult)  Goal: Plan of Care Review  Outcome: Ongoing (interventions implemented as appropriate)   02/21/18 0427   Coping/Psychosocial Response Interventions   Plan Of Care Reviewed With mother   Patient Care Overview   Progress no change   Outcome Evaluation   Outcome Summary/Follow up Plan IV abx per orders. CT scan scheduled for am today for follow up. Mother at bsd through NOC. Continue to monitor and tx per POC and MD orders.      Goal: Adult Individualization and Mutuality  Outcome: Ongoing (interventions implemented as appropriate)    Goal: Discharge Needs Assessment  Outcome: Ongoing (interventions implemented as appropriate)      Problem: Pain, Acute (Adult)  Goal: Acceptable Pain Control/Comfort Level  Outcome: Ongoing (interventions implemented as appropriate)      Problem: Fall Risk (Adult)  Goal: Absence of Falls  Outcome: Ongoing (interventions implemented as appropriate)      Problem: Pressure Ulcer Risk (Enrico Scale) (Adult,Obstetrics,Pediatric)  Goal: Skin Integrity  Outcome: Ongoing (interventions implemented as appropriate)

## 2018-02-21 NOTE — PROGRESS NOTES
Continued Stay Note  [facility]     Patient Name: Alex Brown  MRN: [id number]  Today's Date: 2/21/2018    Admit Date: 2/18/2018          Discharge Plan       02/21/18 1101    Case Management/Social Work Plan    Plan Home with parents    Patient/Family In Agreement With Plan --    Additional Comments Met with patient and Mom Rosy Brown [phone] and Dad Jung Brown [phone] at bedside.  Per parents discharge plans is to return home with waiver program and adult day care services.  Parents will be able to transport.  Parents state that the plan is to go home on PO antibiotics.  Will continue to monitor for discharge needs.                Discharge Codes     None            Jaylyn Lynch RN

## 2018-02-21 NOTE — PROGRESS NOTES
"    DAILY PROGRESS NOTE  [facility]    Patient Identification:  Name: Alex Brown  Age: 27 y.o.  Sex: male  :  1990  MRN: [id number]         Primary Care Physician: Kira Almaraz MD    Subjective:  Interval History: additional discharge rectally that was bright red in color - o/w still feeling better clinically w/ good PO and no obvious c/o abd pains      Objective:d/w mother     Scheduled Meds:  levETIRAcetam 1,000 mg Oral Daily   levoFLOXacin 500 mg Intravenous Q24H   metroNIDAZOLE 500 mg Intravenous Q8H   pantoprazole 40 mg Oral Q AM     Continuous Infusions:     Vital signs in last 24 hours:  Temp:  [97.2 °F (36.2 °C)-98.7 °F (37.1 °C)] 97.2 °F (36.2 °C)  Heart Rate:  [] 92  Resp:  [16] 16  BP: ()/(54-66) 92/54    Intake/Output:    Intake/Output Summary (Last 24 hours) at 18 1156  Last data filed at 18 0158   Gross per 24 hour   Intake              100 ml   Output                0 ml   Net              100 ml       Exam:  BP 92/54 (BP Location: Left arm, Patient Position: Lying)  Pulse 92  Temp 97.2 °F (36.2 °C) (Oral)   Resp 16  Ht 165.1 cm (65\")  Wt 46.4 kg (102 lb 6.4 oz)  SpO2 96%  BMI 17.04 kg/m2    General Appearance:    Alert, cooperative, no distress                         Throat:   Lips, tongue, gums normal; oral mucosa pink and moist                         Lungs:    Clear to auscultation bilaterally, respirations unlabored                          Heart:    Regular rate and rhythm, S1 and S2 normal                  Abdomen:     Soft, non-tender, bowel sounds active                 Extremities:   No cyanosis or edema                             Data Review:  Labs in chart were reviewed.    Assessment:  Active Hospital Problems (** Indicates Principal Problem)    Diagnosis Date Noted   • **Leakage of rectal stump [K91.89] 2018   • Anoxic brain damage [G93.1] 2018   • Leukocytosis [D72.829] 2018   • Seizure disorder " [G40.909] 02/19/2018   • Generalized abdominal pain [R10.84] 02/18/2018      Resolved Hospital Problems    Diagnosis Date Noted Date Resolved   No resolved problems to display.       Plan:  D/w Dr Ballard - plans for extended LQN only and further surgical intervention as outpt pending repeat CT     Will be glad to discharge later today if mother would like to take home o/w plan for tomorrow      Santiago Abrams MD  2/21/2018  11:56 AM

## 2018-02-22 VITALS
HEIGHT: 65 IN | BODY MASS INDEX: 17.06 KG/M2 | TEMPERATURE: 98.6 F | RESPIRATION RATE: 16 BRPM | OXYGEN SATURATION: 95 % | HEART RATE: 105 BPM | DIASTOLIC BLOOD PRESSURE: 52 MMHG | WEIGHT: 102.4 LBS | SYSTOLIC BLOOD PRESSURE: 87 MMHG

## 2018-02-22 PROCEDURE — 25010000002 LEVOFLOXACIN PER 250 MG: Performed by: INTERNAL MEDICINE

## 2018-02-22 RX ORDER — LEVOFLOXACIN 500 MG/1
500 TABLET, FILM COATED ORAL DAILY
Qty: 14 TABLET | Refills: 0 | Status: SHIPPED | OUTPATIENT
Start: 2018-02-22 | End: 2018-03-12

## 2018-02-22 RX ORDER — ACETAMINOPHEN 325 MG/1
650 TABLET ORAL EVERY 4 HOURS PRN
Start: 2018-02-22 | End: 2018-03-12

## 2018-02-22 RX ADMIN — ACETAMINOPHEN 650 MG: 325 TABLET, FILM COATED ORAL at 08:27

## 2018-02-22 RX ADMIN — PANTOPRAZOLE SODIUM 40 MG: 40 TABLET, DELAYED RELEASE ORAL at 07:33

## 2018-02-22 RX ADMIN — LEVOFLOXACIN 500 MG: 500 INJECTION, SOLUTION INTRAVENOUS at 02:39

## 2018-02-22 RX ADMIN — LOPERAMIDE HYDROCHLORIDE 2 MG: 2 CAPSULE ORAL at 10:33

## 2018-02-22 NOTE — PROGRESS NOTES
Case Management Discharge Note    Final Note: Discharged to home on 2/22/18 by family yumiko Yi RN, CCP.    Discharge Placement     No information found             Discharge Codes: 01  Discharge to home

## 2018-02-22 NOTE — DISCHARGE SUMMARY
[facility]    Date of Discharge:  2/22/2018    PCP: Kira Almaraz MD    Discharge Diagnosis:   Active Hospital Problems (** Indicates Principal Problem)    Diagnosis Date Noted   • **Leakage of rectal stump [K91.89] 02/19/2018   • Anoxic brain damage [G93.1] 02/19/2018   • Leukocytosis [D72.829] 02/19/2018   • Seizure disorder [G40.909] 02/19/2018   • Generalized abdominal pain [R10.84] 02/18/2018      Resolved Hospital Problems    Diagnosis Date Noted Date Resolved   No resolved problems to display.     Procedures Performed       Consulting Physician(s)     Provider Relationship Specialty    Vinh Ballard MD Consulting Physician General Surgery        Hospital Course  Please see history and physical for details. Patient is a 27 y.o. male whose care is made a little more difficult secondary to past history of cerebral palsy and resulting mental retardation.  He has an extensive abdominal surgical history and also there were concerns about abscess formation around his rectal stump.  Patient had presented with some discharge as well as complaints of abdominal pain.  Patient was placed on Levaquin and Flagyl IV and has shown clinical improvement.  He did have a couple more bloody BM's though it has resolved over the last 24 hours.  At this juncture the case has been discussed with Dr. Ballard with surgery and the plan at this juncture is to do a prolonged anabiotic course over the next 2 weeks and then a repeat CT of the pelvis would be obtained Dr. Ballard is likely going to proceed with more of an anterior approach to hopefully eradicate this recurrent theme for this patient.  The parents are quite involved in the care and I have stayed in close contact with them on a daily basis.  They're very much amenable to the above plan.  Vital signs are stable as well as afebrile and the patient is significantly clinically improved at this juncture.  He is  behaving more close to his baseline.    Condition on Discharge: Improved.     Temp:  [97.1 °F (36.2 °C)-98.6 °F (37 °C)] 98.6 °F (37 °C)  Heart Rate:  [102-113] 105  Resp:  [16-18] 16  BP: ()/(52-76) 87/52  Body mass index is 17.04 kg/(m^2).    Physical Exam   Constitutional:   Back to baseline as he is smiling and does not appear in any discomfort   Cardiovascular: Normal rate and regular rhythm.    Pulmonary/Chest: Effort normal and breath sounds normal. No respiratory distress.   Abdominal: Soft. Bowel sounds are normal. He exhibits no distension. There is no tenderness. There is no rebound and no guarding.   Musculoskeletal: He exhibits no edema.   Neurological: He is alert.     Discharge Medications   Alex Brown   Home Medication Instructions ZAYRA:[id number]    Printed on:02/22/18 8228   Medication Information                      acetaminophen (TYLENOL) 325 MG tablet  Take 2 tablets by mouth Every 4 (Four) Hours As Needed for Mild Pain , Headache or Fever.             lansoprazole (PREVACID) 30 MG capsule  Take 1 capsule by mouth Daily.             levETIRAcetam (KEPPRA) 1000 MG tablet  Take 1 tablet by mouth Daily.             levoFLOXacin (LEVAQUIN) 500 MG tablet  Take 1 tablet by mouth Daily.             loperamide (IMODIUM) 1 MG/5ML solution  Take  by mouth 4 (Four) Times a Day As Needed for diarrhea.             Loratadine-Pseudoephedrine (CLARITIN-D 12 HOUR PO)  Take  by mouth.             MULTIPLE VITAMINS PO  Take  by mouth daily.                  Additional Instructions for the Follow-ups that You Need to Schedule     Discharge Follow-up with PCP    As directed    Follow Up Details:  pcp prn. have ccp setup repeat CT pelvis w/out contrast (PO or IV) in 2 weeks and have DR Ballard f/up scheduled afterwards                 Follow-up Information     Follow up with Kira Almaraz MD .    Specialty:  Family Medicine    Why:  pcp prn. have ccp setup repeat CT pelvis w/out contrast  (PO or IV) in 2 weeks and have DR Ballard f/up scheduled afterwards    Contact information:    [address]  [place] [address]  [phone]          Test Results Pending at Discharge   Order Current Status    Blood Culture - Blood, Preliminary result    Blood Culture - Blood, Preliminary result         Santiago Abrams MD  02/22/18  11:46 AM    Discharge time spent greater than 30 minutes.

## 2018-02-22 NOTE — PLAN OF CARE
Problem: Patient Care Overview (Adult)  Goal: Plan of Care Review  Outcome: Ongoing (interventions implemented as appropriate)  Continue symptom management and comfort care.   02/21/18 1659 02/21/18 2125 02/22/18 0442   Coping/Psychosocial Response Interventions   Plan Of Care Reviewed With --  mother --    Patient Care Overview   Progress no change --  --    Outcome Evaluation   Outcome Summary/Follow up Plan --  --  Patient rested well this evening. No S&S of pain or anxiety. Mother at bedside. Possible discharge today.     Goal: Adult Individualization and Mutuality  Outcome: Ongoing (interventions implemented as appropriate)    Goal: Discharge Needs Assessment  Outcome: Ongoing (interventions implemented as appropriate)      Problem: Pain, Acute (Adult)  Goal: Acceptable Pain Control/Comfort Level  Outcome: Ongoing (interventions implemented as appropriate)      Problem: Fall Risk (Adult)  Goal: Absence of Falls  Outcome: Ongoing (interventions implemented as appropriate)      Problem: Pressure Ulcer Risk (Enrico Scale) (Adult,Obstetrics,Pediatric)  Goal: Skin Integrity  Outcome: Ongoing (interventions implemented as appropriate)

## 2018-02-23 DIAGNOSIS — K61.1 PERIRECTAL ABSCESS: Primary | ICD-10-CM

## 2018-02-23 LAB
BACTERIA SPEC AEROBE CULT: NORMAL
BACTERIA SPEC AEROBE CULT: NORMAL

## 2018-03-07 ENCOUNTER — HOSPITAL ENCOUNTER (OUTPATIENT)
Dept: CT IMAGING | Facility: HOSPITAL | Age: 28
Discharge: HOME | End: 2018-03-07
Attending: SURGERY | Admitting: SURGERY

## 2018-03-07 DIAGNOSIS — K61.1 PERIRECTAL ABSCESS: ICD-10-CM

## 2018-03-07 PROCEDURE — 72192 CT PELVIS W/O DYE: CPT

## 2018-03-12 ENCOUNTER — OFFICE VISIT (OUTPATIENT)
Dept: SURGERY | Facility: CLINIC | Age: 28
End: 2018-03-12

## 2018-03-12 ENCOUNTER — OFFICE VISIT (OUTPATIENT)
Dept: NEUROLOGY | Facility: CLINIC | Age: 28
End: 2018-03-12

## 2018-03-12 VITALS — OXYGEN SATURATION: 98 % | WEIGHT: 100 LBS | BODY MASS INDEX: 16.07 KG/M2 | HEART RATE: 103 BPM | HEIGHT: 66 IN

## 2018-03-12 VITALS — WEIGHT: 103 LBS | HEIGHT: 66 IN | BODY MASS INDEX: 16.55 KG/M2

## 2018-03-12 DIAGNOSIS — K65.1 PELVIC ABSCESS IN MALE (HCC): Primary | ICD-10-CM

## 2018-03-12 DIAGNOSIS — R56.9 CONVULSIONS, UNSPECIFIED CONVULSION TYPE (HCC): ICD-10-CM

## 2018-03-12 DIAGNOSIS — G40.909 SEIZURE DISORDER (HCC): Primary | ICD-10-CM

## 2018-03-12 PROCEDURE — 99213 OFFICE O/P EST LOW 20 MIN: CPT | Performed by: PSYCHIATRY & NEUROLOGY

## 2018-03-12 PROCEDURE — 99213 OFFICE O/P EST LOW 20 MIN: CPT | Performed by: SURGERY

## 2018-03-12 RX ORDER — LEVETIRACETAM 500 MG/1
500 TABLET ORAL DAILY
Qty: 90 TABLET | Refills: 3 | Status: SHIPPED | OUTPATIENT
Start: 2018-03-12 | End: 2018-03-12 | Stop reason: SDUPTHER

## 2018-03-12 RX ORDER — LEVOFLOXACIN 500 MG/1
500 TABLET, FILM COATED ORAL DAILY
Qty: 21 TABLET | Refills: 0 | Status: SHIPPED | OUTPATIENT
Start: 2018-03-12 | End: 2018-04-02

## 2018-03-12 RX ORDER — LEVETIRACETAM 500 MG/1
500 TABLET ORAL DAILY
Qty: 90 TABLET | Refills: 3 | Status: SHIPPED | OUTPATIENT
Start: 2018-03-12 | End: 2019-03-11 | Stop reason: SDUPTHER

## 2018-03-12 NOTE — PROGRESS NOTES
Subjective:     Patient ID: Alex Brown is a 27 y.o. male.    History of Present Illness     Patient is had no seizures since last year.  He was having some posturing of his hand which is resolved since some other lower the dose of Keppra to 500 mg at night.  He still had no seizures.  No apparent side effects at this point he did have a video EEG done back in 2009 which was abnormal.  This was read by Dr. Rios.  History was also taken from both parents.  Video was reviewed.  The following portions of the patient's history were reviewed and updated as appropriate: allergies, current medications, past family history, past medical history, past social history, past surgical history and problem list.      Current Outpatient Prescriptions:   •  lansoprazole (PREVACID) 30 MG capsule, Take 1 capsule by mouth Daily. (Patient taking differently: Take 15 mg by mouth Daily.), Disp: 90 capsule, Rfl: 0  •  levETIRAcetam (KEPPRA) 500 MG tablet, Take 1 tablet by mouth Daily., Disp: 90 tablet, Rfl: 3  •  levoFLOXacin (LEVAQUIN) 500 MG tablet, Take 1 tablet by mouth Daily for 21 days., Disp: 21 tablet, Rfl: 0  •  loperamide (IMODIUM) 1 MG/5ML solution, Take  by mouth 4 (Four) Times a Day As Needed for diarrhea., Disp: , Rfl:   •  Loratadine-Pseudoephedrine (CLARITIN-D 12 HOUR PO), Take 1 tablet by mouth As Needed., Disp: , Rfl:   •  MULTIPLE VITAMINS PO, Take 1 tablet by mouth Daily., Disp: , Rfl:     Review of Systems   Constitutional: Negative.    Neurological: Negative.    Psychiatric/Behavioral: Negative.         Objective:    Neurologic Exam  The patient is mentally handicapped.   Funduscopy, eye movements and pupillary reflexes were normal.  Visual fields were symmetric to opticokinetic nystagmus tape  No facial weakness noted.  No pattern of focal weakness.  All chair bound.  Nonverbal.  Physical Exam    Assessment/Plan:     Alex was seen today for seizures.    Diagnoses and all orders for this visit:    Seizure  disorder    Convulsions, unspecified convulsion type    Other orders  -     Discontinue: levETIRAcetam (KEPPRA) 500 MG tablet; Take 1 tablet by mouth Daily.  -     levETIRAcetam (KEPPRA) 500 MG tablet; Take 1 tablet by mouth Daily.         Prescription drug management - meds as above    Follow-up in the office in one year. Thank you for allowing me to share in the care of this patient.  Chan Rosen M.D.

## 2018-03-13 NOTE — PROGRESS NOTES
SUMMARY (A/P):    27-year-old gentleman with chronic pelvic abscesses and perineal drainage related to laparoscopic total proctocolectomy in 2012 for medically refractory ulcerative colitis.  He was recently admitted to the hospital for significant perineal wound drainage and was treated with intravenous antibiotics.  Symptoms rapidly resolved and follow-up CT scans have shown improvement.  It is unlikely that this area will completely resolve without surgical intervention but given his overall situation his parents obviously would prefer to avoid this and I think it certainly reasonable to treat him with a more prolonged course of antibiotics and avoid surgical intervention as long as possible.  Along those lines I prescribed him Levaquin 500 mg by mouth daily for 3 more weeks and they will follow-up as needed.        CC:  Follow up on hospitalization for pelvic abscess     HPI:  27-year-old gentleman with chronic perineal wound problems consisting of drainage and pelvic abscesses since laparoscopic total proctocolectomy with end ileostomy in January 2012 for medically refractory ulcerative colitis.   he was admitted on 2/18/2018 with 2 day history of foul-smelling drainage from the perineal wound associated with mild to moderate lower abdominal pain and difficulty urinating.  Follow-up CT scans showed progressive improvement as did his clinical symptomatology and he was discharged on Levaquin which she recently finished.  He is currently back to baseline with minimal drainage and no significant abdominal symptoms     PHYSICAL EXAM:   Constitutional: Well-developed well-nourished, no acute distress  Eyes: Conjunctiva normal, sclera nonicteric  ENMT: Oral mucosa moist  Gastrointestinal: Soft, nontender  Skin:  Warm, dry, no rash on visualized skin surfaces  Musculoskeletal: Confined to wheelchair  Psychiatric: Alert and awake     ALLERGIES: reviewed, in Epic     MEDICATIONS: reviewed, in Epic     PMH:    Ulcerative  colitis  Anoxic brain injury at birth  Seizure disorder  Cerebral palsy     PSH:    -Laparoscopic total proctocolectomy with end ileostomy 1/3/2012 for medically refractory ulcerative colitis, pathology consistent with ulcerative colitis  -Laparoscopic small bowel resection 7/17/2015 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  On my review of the images given his clinical history of laparoscopic total proctocolectomy the fluid collection in the pelvis is not his rectum but obviously a recurrent pelvic abscess  -CT pelvis 3/7/2018 demonstrated decrease in size of the pre-coccygeal and presacral thick-walled fluid collection, I reviewed the images and concur     LABS:    Wound culture from perineum on 2/19/2018 showed light growth corynebacterium.     YOGI COATS M.D.

## 2019-03-08 RX ORDER — LEVETIRACETAM 500 MG/1
500 TABLET ORAL DAILY
Qty: 90 TABLET | Refills: 0 | Status: CANCELLED | OUTPATIENT
Start: 2019-03-08

## 2019-03-11 ENCOUNTER — OFFICE VISIT (OUTPATIENT)
Dept: NEUROLOGY | Facility: CLINIC | Age: 29
End: 2019-03-11

## 2019-03-11 VITALS — BODY MASS INDEX: 16.55 KG/M2 | WEIGHT: 103 LBS | HEIGHT: 66 IN

## 2019-03-11 DIAGNOSIS — G40.909 SEIZURE DISORDER (HCC): Primary | ICD-10-CM

## 2019-03-11 DIAGNOSIS — G93.1 ANOXIC BRAIN DAMAGE (HCC): ICD-10-CM

## 2019-03-11 PROCEDURE — 99213 OFFICE O/P EST LOW 20 MIN: CPT | Performed by: PSYCHIATRY & NEUROLOGY

## 2019-03-11 RX ORDER — LEVETIRACETAM 500 MG/1
500 TABLET ORAL NIGHTLY
Qty: 90 TABLET | Refills: 3 | Status: SHIPPED | OUTPATIENT
Start: 2019-03-11 | End: 2020-03-09 | Stop reason: SDUPTHER

## 2019-03-11 NOTE — PROGRESS NOTES
Subjective:     Patient ID: Alex Brown is a 28 y.o. male.    History of Present Illness    Seen for follow-up of seizures.  No seizures since his visit a year ago.  On Keppra 500 mg at night.  No side effects.  History taken from the parents.    The following portions of the patient's history were reviewed and updated as appropriate: allergies, current medications, past family history, past medical history, past social history, past surgical history and problem list.      Current Outpatient Medications:   •  lansoprazole (PREVACID) 30 MG capsule, Take 1 capsule by mouth Daily. (Patient taking differently: Take 15 mg by mouth Daily.), Disp: 90 capsule, Rfl: 0  •  levETIRAcetam (KEPPRA) 500 MG tablet, Take 1 tablet by mouth Every Night., Disp: 90 tablet, Rfl: 3  •  loperamide (IMODIUM) 1 MG/5ML solution, Take  by mouth 4 (Four) Times a Day As Needed for diarrhea., Disp: , Rfl:   •  Loratadine-Pseudoephedrine (CLARITIN-D 12 HOUR PO), Take 1 tablet by mouth As Needed., Disp: , Rfl:   •  MULTIPLE VITAMINS PO, Take 1 tablet by mouth Daily., Disp: , Rfl:     Review of Systems   Constitutional: Negative.    Neurological: Negative.    Psychiatric/Behavioral: Negative.           I have reviewed ROS completed by medical assistant.     Objective:    Neurologic Exam  The patient is mentally handicapped.   Funduscopy, eye movements and pupillary reflexes were normal.  Visual fields were symmetric to opticokinetic nystagmus tape  No facial weakness noted.  No pattern of focal weakness.  Gait was unremarkable.  Physical Exam    Assessment/Plan:     Alex was seen today for seizures.    Diagnoses and all orders for this visit:    Seizure disorder (CMS/HCC)    Anoxic brain damage (CMS/HCC)    Other orders  -     levETIRAcetam (KEPPRA) 500 MG tablet; Take 1 tablet by mouth Every Night.         Prescription drug management - meds as above    Follow-up in the office in one year. Thank you for allowing me to share in the care of  this patient.  Chan Rosen M.D.

## 2019-06-19 ENCOUNTER — TRANSCRIBE ORDERS (OUTPATIENT)
Dept: ADMINISTRATIVE | Facility: HOSPITAL | Age: 29
End: 2019-06-19

## 2019-06-19 DIAGNOSIS — R19.8 RECTAL DISCHARGE: Primary | ICD-10-CM

## 2019-06-21 ENCOUNTER — HOSPITAL ENCOUNTER (OUTPATIENT)
Dept: CT IMAGING | Facility: HOSPITAL | Age: 29
Discharge: HOME | End: 2019-06-21
Admitting: FAMILY MEDICINE

## 2019-06-21 DIAGNOSIS — R19.8 RECTAL DISCHARGE: ICD-10-CM

## 2019-06-21 PROCEDURE — 0 DIATRIZOATE MEGLUMINE & SODIUM PER 1 ML: Performed by: FAMILY MEDICINE

## 2019-06-21 PROCEDURE — 74177 CT ABD & PELVIS W/CONTRAST: CPT

## 2019-06-21 PROCEDURE — 25010000002 IOPAMIDOL 61 % SOLUTION: Performed by: FAMILY MEDICINE

## 2019-06-21 RX ADMIN — IOPAMIDOL 85 ML: 612 INJECTION, SOLUTION INTRAVENOUS at 10:12

## 2019-06-21 RX ADMIN — DIATRIZOATE MEGLUMINE AND DIATRIZOATE SODIUM 30 ML: 660; 100 LIQUID ORAL; RECTAL at 10:12

## 2019-06-27 ENCOUNTER — OFFICE VISIT (OUTPATIENT)
Dept: SURGERY | Facility: CLINIC | Age: 29
End: 2019-06-27

## 2019-06-27 VITALS — OXYGEN SATURATION: 98 % | HEART RATE: 89 BPM | HEIGHT: 66 IN | WEIGHT: 105 LBS | BODY MASS INDEX: 16.88 KG/M2

## 2019-06-27 DIAGNOSIS — T81.43XA POSTPROCEDURAL INTRAABDOMINAL ABSCESS: Primary | ICD-10-CM

## 2019-06-27 PROBLEM — K65.1 POSTPROCEDURAL INTRAABDOMINAL ABSCESS: Status: ACTIVE | Noted: 2019-06-27

## 2019-06-27 PROCEDURE — 87070 CULTURE OTHR SPECIMN AEROBIC: CPT | Performed by: SURGERY

## 2019-06-27 PROCEDURE — 99214 OFFICE O/P EST MOD 30 MIN: CPT | Performed by: SURGERY

## 2019-06-27 PROCEDURE — 87186 SC STD MICRODIL/AGAR DIL: CPT | Performed by: SURGERY

## 2019-06-27 PROCEDURE — 87205 SMEAR GRAM STAIN: CPT | Performed by: SURGERY

## 2019-06-27 RX ORDER — LEVOFLOXACIN 500 MG/1
500 TABLET, FILM COATED ORAL DAILY
Qty: 12 TABLET | Refills: 0 | Status: SHIPPED | OUTPATIENT
Start: 2019-06-27 | End: 2019-07-01 | Stop reason: ALTCHOICE

## 2019-06-27 NOTE — PROGRESS NOTES
SUMMARY (A/P):    29-year-old gentleman with recurrent perineal drainage related to recurrent pelvic abscess.  Discussed options with patient's parents regarding transperineal drainage versus transabdominal drainage.  I feel that the transabdominal approach would be safer and more effective at long-term resolution of the problem as well as more likely to avoid chronic nonhealing perineal wound.  They understand the nature of the procedure and the risks and wished to proceed as outlined.  I have prescribed oral Levaquin to last up until the day of the surgery.      CC:    Perineal drainage    HPI:    29-year-old gentleman with history of ulcerative colitis ultimately requiring total proctocolectomy with end ileostomy in 2012.  He developed a pelvic abscess with entero-pelvic fistula requiring surgery in 2015.  He presents now with increasing moderate foul-smelling drainage from the perineal wound since May of this year.  He was placed on a course of Levaquin which initially helped but now does not seem to be clearing up his symptoms.    PSH:    -Laparoscopic small bowel resection 7/17/2015 for entero-pelvic fistula with pelvic abscess, pathology consistent with small intestine full-thickness defect fistula site  -Laparoscopic total proctocolectomy with end ileostomy 1/3/2012 for medically refractory ulcerative colitis, pathology consistent with ulcerative colitis    PMH:    Ulcerative colitis  Anoxic brain injury at birth  Seizure disorder  Cerebral palsy    FAMILY HISTORY:    Reviewed and noncontributory to current presentation    SOCIAL HISTORY:   No tobacco use  No alcohol use    ALLERGIES: reviewed, in Epic    MEDICATIONS: reviewed, in Epic    ROS: Not obtainable from patient due to cognitive issues    RADIOLOGY/ENDOSCOPY:    CT abdomen pelvis 6/21/2019 with significant interval increase in complex thick-walled pre-coccygeal/presacral fluid collection measuring up to 9 cm.  Previously measured up to 5.5 cm.  I  reviewed the images from the current and previous CT scans and concur.  There is no obvious communication with the small bowel.    PHYSICAL EXAM:   Constitutional: Thin gentleman appearing younger than stated age with upper and lower extremity contractures  Vital signs: HR 89, weight 105 pounds, height 66 inches, BMI 17  Eyes: Conjunctiva normal, sclera nonicteric  ENMT: oral mucosa moist  Neck: Supple, no palpable mass, trachea midline  Respiratory: Clear to auscultation, normal inspiratory effort  Cardiovascular: Regular rate, no murmur, no jugular venous distention  Gastrointestinal: Soft, nontender, no palpable mass, no hepatosplenomegaly, ileostomy in good working order  Lymphatics (palpable nodes):  cervical-negative, inguinal-negative  Skin:  Warm, dry, no rash on visualized skin surfaces  Psychiatric: Awake, not verbally communicative    YOGI COATS M.D.

## 2019-06-29 LAB
BACTERIA SPEC AEROBE CULT: ABNORMAL
GRAM STN SPEC: ABNORMAL
GRAM STN SPEC: ABNORMAL

## 2019-07-01 ENCOUNTER — TELEPHONE (OUTPATIENT)
Dept: SURGERY | Facility: CLINIC | Age: 29
End: 2019-07-01

## 2019-07-01 DIAGNOSIS — Z22.322 MRSA (METHICILLIN RESISTANT STAPH AUREUS) CULTURE POSITIVE: Primary | ICD-10-CM

## 2019-07-01 RX ORDER — SULFAMETHOXAZOLE AND TRIMETHOPRIM 800; 160 MG/1; MG/1
1 TABLET ORAL 2 TIMES DAILY
Qty: 24 TABLET | Refills: 0 | Status: SHIPPED | OUTPATIENT
Start: 2019-07-01 | End: 2019-07-13

## 2019-07-01 NOTE — TELEPHONE ENCOUNTER
----- Message from Vinh Ballard MD sent at 6/30/2019  6:59 AM EDT -----  Please have him stop levaquin and start Bactrim DS 1 po bid, # [phone], no refill

## 2019-07-03 ENCOUNTER — APPOINTMENT (OUTPATIENT)
Dept: PREADMISSION TESTING | Facility: HOSPITAL | Age: 29
End: 2019-07-03

## 2019-07-03 VITALS
BODY MASS INDEX: 16.88 KG/M2 | WEIGHT: 105 LBS | OXYGEN SATURATION: 95 % | TEMPERATURE: 97.8 F | HEIGHT: 66 IN | HEART RATE: 112 BPM | SYSTOLIC BLOOD PRESSURE: 107 MMHG | RESPIRATION RATE: 20 BRPM | DIASTOLIC BLOOD PRESSURE: 73 MMHG

## 2019-07-03 LAB
ANION GAP SERPL CALCULATED.3IONS-SCNC: 15 MMOL/L (ref 5–15)
BUN BLD-MCNC: 15 MG/DL (ref 6–20)
BUN/CREAT SERPL: 19.5 (ref 7–25)
CALCIUM SPEC-SCNC: 10 MG/DL (ref 8.6–10.5)
CHLORIDE SERPL-SCNC: 103 MMOL/L (ref 98–107)
CO2 SERPL-SCNC: 23 MMOL/L (ref 22–29)
CREAT BLD-MCNC: 0.77 MG/DL (ref 0.76–1.27)
DEPRECATED RDW RBC AUTO: 44.5 FL (ref 37–54)
ERYTHROCYTE [DISTWIDTH] IN BLOOD BY AUTOMATED COUNT: 12.9 % (ref 12.3–15.4)
GFR SERPL CREATININE-BSD FRML MDRD: 118 ML/MIN/1.73
GLUCOSE BLD-MCNC: 95 MG/DL (ref 65–99)
HCT VFR BLD AUTO: 47.3 % (ref 37.5–51)
HGB BLD-MCNC: 15.2 G/DL (ref 13–17.7)
MCH RBC QN AUTO: 30.5 PG (ref 26.6–33)
MCHC RBC AUTO-ENTMCNC: 32.1 G/DL (ref 31.5–35.7)
MCV RBC AUTO: 94.8 FL (ref 79–97)
PLATELET # BLD AUTO: 300 10*3/MM3 (ref 140–450)
PMV BLD AUTO: 10.6 FL (ref 6–12)
POTASSIUM BLD-SCNC: 4.3 MMOL/L (ref 3.5–5.2)
RBC # BLD AUTO: 4.99 10*6/MM3 (ref 4.14–5.8)
SODIUM BLD-SCNC: 141 MMOL/L (ref 136–145)
WBC NRBC COR # BLD: 10.36 10*3/MM3 (ref 3.4–10.8)

## 2019-07-03 PROCEDURE — 85027 COMPLETE CBC AUTOMATED: CPT | Performed by: SURGERY

## 2019-07-03 PROCEDURE — 36415 COLL VENOUS BLD VENIPUNCTURE: CPT

## 2019-07-03 PROCEDURE — 80048 BASIC METABOLIC PNL TOTAL CA: CPT | Performed by: SURGERY

## 2019-07-03 NOTE — DISCHARGE INSTRUCTIONS
PLEASE ARRIVE AT 8:30AM ON 7/9/2019      Take the following medications the morning of surgery with a small sip of water:  PREVACID      General Instructions:  • Do not eat solid food after midnight the night before surgery.  • You may drink clear liquids day of surgery but must stop at least one hour before your hospital arrival time.  **STOP FLUIDS AT 7:30AM ON DAY OF SURGERY**  • It is beneficial for you to have a clear drink that contains carbohydrates the day of surgery.  We suggest a 12 to 20 ounce bottle of Gatorade or Powerade for non-diabetic patients or a 12 to 20 ounce bottle of G2 or Powerade Zero for diabetic patients. (Pediatric patients, are not advised to drink a 12 to 20 ounce carbohydrate drink)    Clear liquids are liquids you can see through.  Nothing red in color.     Plain water                               Sports drinks  Sodas                                   Gelatin (Jell-O)  Fruit juices without pulp such as white grape juice and apple juice  Popsicles that contain no fruit or yogurt  Tea or coffee (no cream or milk added)  Gatorade / Powerade  G2 / Powerade Zero    • Infants may have breast milk up to four hours before surgery.  • Infants drinking formula may drink formula up to six hours before surgery.   • Patients who avoid smoking, chewing tobacco and alcohol for 4 weeks prior to surgery have a reduced risk of post-operative complications.  Quit smoking as many days before surgery as you can.  • Do not smoke, use chewing tobacco or drink alcohol the day of surgery.   • If applicable bring your C-PAP/ BI-PAP machine.  • Bring any papers given to you in the doctor’s office.  • Wear clean comfortable clothes and socks.  • Do not wear contact lenses, false eyelashes or make-up.  Bring a case for your glasses.   • Bring crutches or walker if applicable.  • Remove all piercings.  Leave jewelry and any other valuables at home.  • Hair extensions with metal clips must be removed prior to  surgery.  • The Pre-Admission Testing nurse will instruct you to bring medications if unable to obtain an accurate list in Pre-Admission Testing.            Preventing a Surgical Site Infection:  • For 2 to 3 days before surgery, avoid shaving with a razor because the razor can irritate skin and make it easier to develop an infection.    • Any areas of open skin can increase the risk of a post-operative wound infection by allowing bacteria to enter and travel throughout the body.  Notify your surgeon if you have any skin wounds / rashes even if it is not near the expected surgical site.  The area will need assessed to determine if surgery should be delayed until it is healed.  • The night prior to surgery sleep in a clean bed with clean clothing.  Do not allow pets to sleep with you.  • Shower on the morning of surgery using a fresh bar of anti-bacterial soap (such as Dial) and clean washcloth.  Dry with a clean towel and dress in clean clothing.  • Ask your surgeon if you will be receiving antibiotics prior to surgery.  • Make sure you, your family, and all healthcare providers clean their hands with soap and water or an alcohol based hand  before caring for you or your wound.    Day of surgery:  Upon arrival, a Pre-op nurse and Anesthesiologist will review your health history, obtain vital signs, and answer questions you may have.  The only belongings needed at this time will be a list of your home medications and if applicable your C-PAP/BI-PAP machine.  If you are staying overnight your family can leave the rest of your belongings in the car and bring them to your room later.  A Pre-op nurse will start an IV and you may receive medication in preparation for surgery, including something to help you relax.  Your family will be able to see you in the Pre-op area.  While you are in surgery your family should notify the waiting room  if they leave the waiting room area and provide a contact phone  number.    Please be aware that surgery does come with discomfort.  We want to make every effort to control your discomfort so please discuss any uncontrolled symptoms with your nurse.   Your doctor will most likely have prescribed pain medications.      If you are going home after surgery you will receive individualized written care instructions before being discharged.  A responsible adult must drive you to and from the hospital on the day of your surgery and stay with you for 24 hours.    If you are staying overnight following surgery, you will be transported to your hospital room following the recovery period.  [facility] has all private rooms.    You have received a list of surgical assistants for your reference.  If you have any questions please call Pre-Admission Testing at [phone].  Deductibles and co-payments are collected on the day of service. Please be prepared to pay the required co-pay, deductible or deposit on the day of service as defined by your plan.

## 2019-07-09 ENCOUNTER — ANESTHESIA (OUTPATIENT)
Dept: PERIOP | Facility: HOSPITAL | Age: 29
End: 2019-07-09

## 2019-07-09 ENCOUNTER — ANESTHESIA EVENT (OUTPATIENT)
Dept: PERIOP | Facility: HOSPITAL | Age: 29
End: 2019-07-09

## 2019-07-09 ENCOUNTER — HOSPITAL ENCOUNTER (INPATIENT)
Facility: HOSPITAL | Age: 29
LOS: 4 days | Discharge: HOME | End: 2019-07-13
Attending: SURGERY | Admitting: SURGERY

## 2019-07-09 DIAGNOSIS — T81.43XA POSTPROCEDURAL INTRAABDOMINAL ABSCESS: ICD-10-CM

## 2019-07-09 PROCEDURE — 25010000002 PHENYLEPHRINE PER 1 ML: Performed by: ANESTHESIOLOGY

## 2019-07-09 PROCEDURE — 25010000002 ONDANSETRON PER 1 MG: Performed by: ANESTHESIOLOGY

## 2019-07-09 PROCEDURE — C9290 INJ, BUPIVACAINE LIPOSOME: HCPCS | Performed by: SURGERY

## 2019-07-09 PROCEDURE — 87075 CULTR BACTERIA EXCEPT BLOOD: CPT | Performed by: SURGERY

## 2019-07-09 PROCEDURE — 25010000003 BUPIVACAINE LIPOSOME 1.3 % SUSPENSION 20 ML VIAL: Performed by: SURGERY

## 2019-07-09 PROCEDURE — 87015 SPECIMEN INFECT AGNT CONCNTJ: CPT | Performed by: SURGERY

## 2019-07-09 PROCEDURE — 0J9C00Z DRAINAGE OF PELVIC REGION SUBCUTANEOUS TISSUE AND FASCIA WITH DRAINAGE DEVICE, OPEN APPROACH: ICD-10-PCS | Performed by: SURGERY

## 2019-07-09 PROCEDURE — P9041 ALBUMIN (HUMAN),5%, 50ML: HCPCS | Performed by: ANESTHESIOLOGY

## 2019-07-09 PROCEDURE — 87102 FUNGUS ISOLATION CULTURE: CPT | Performed by: SURGERY

## 2019-07-09 PROCEDURE — 25010000002 ALBUMIN HUMAN 5% PER 50 ML: Performed by: ANESTHESIOLOGY

## 2019-07-09 PROCEDURE — 87070 CULTURE OTHR SPECIMN AEROBIC: CPT | Performed by: SURGERY

## 2019-07-09 PROCEDURE — 25010000002 MIDAZOLAM PER 1 MG: Performed by: ANESTHESIOLOGY

## 2019-07-09 PROCEDURE — 49020 DRAINAGE ABDOM ABSCESS OPEN: CPT | Performed by: SPECIALIST/TECHNOLOGIST, OTHER

## 2019-07-09 PROCEDURE — 25010000002 PROPOFOL 10 MG/ML EMULSION: Performed by: ANESTHESIOLOGY

## 2019-07-09 PROCEDURE — 25010000002 FENTANYL CITRATE (PF) 100 MCG/2ML SOLUTION: Performed by: ANESTHESIOLOGY

## 2019-07-09 PROCEDURE — 87205 SMEAR GRAM STAIN: CPT | Performed by: SURGERY

## 2019-07-09 PROCEDURE — 49020 DRAINAGE ABDOM ABSCESS OPEN: CPT | Performed by: SURGERY

## 2019-07-09 PROCEDURE — 25010000002 VANCOMYCIN PER 500 MG: Performed by: SURGERY

## 2019-07-09 RX ORDER — NAPROXEN SODIUM 220 MG
220 TABLET ORAL 2 TIMES DAILY PRN
COMMUNITY
End: 2021-03-09

## 2019-07-09 RX ORDER — LEVETIRACETAM 500 MG/1
500 TABLET ORAL NIGHTLY
Status: DISCONTINUED | OUTPATIENT
Start: 2019-07-09 | End: 2019-07-13 | Stop reason: HOSPADM

## 2019-07-09 RX ORDER — HYDROCODONE BITARTRATE AND ACETAMINOPHEN 5; 325 MG/1; MG/1
1 TABLET ORAL EVERY 4 HOURS PRN
Status: DISCONTINUED | OUTPATIENT
Start: 2019-07-09 | End: 2019-07-12

## 2019-07-09 RX ORDER — SODIUM CHLORIDE 9 MG/ML
50 INJECTION, SOLUTION INTRAVENOUS CONTINUOUS
Status: DISCONTINUED | OUTPATIENT
Start: 2019-07-09 | End: 2019-07-13

## 2019-07-09 RX ORDER — SODIUM CHLORIDE 0.9 % (FLUSH) 0.9 %
1-10 SYRINGE (ML) INJECTION AS NEEDED
Status: DISCONTINUED | OUTPATIENT
Start: 2019-07-09 | End: 2019-07-09 | Stop reason: HOSPADM

## 2019-07-09 RX ORDER — EPHEDRINE SULFATE 50 MG/ML
5 INJECTION, SOLUTION INTRAVENOUS ONCE AS NEEDED
Status: DISCONTINUED | OUTPATIENT
Start: 2019-07-09 | End: 2019-07-09 | Stop reason: HOSPADM

## 2019-07-09 RX ORDER — ALBUMIN, HUMAN INJ 5% 5 %
SOLUTION INTRAVENOUS CONTINUOUS PRN
Status: DISCONTINUED | OUTPATIENT
Start: 2019-07-09 | End: 2019-07-09 | Stop reason: SURG

## 2019-07-09 RX ORDER — LANSOPRAZOLE 15 MG/1
15 CAPSULE, DELAYED RELEASE ORAL DAILY
COMMUNITY

## 2019-07-09 RX ORDER — HYDRALAZINE HYDROCHLORIDE 20 MG/ML
5 INJECTION INTRAMUSCULAR; INTRAVENOUS
Status: DISCONTINUED | OUTPATIENT
Start: 2019-07-09 | End: 2019-07-09 | Stop reason: HOSPADM

## 2019-07-09 RX ORDER — CEFAZOLIN SODIUM 2 G/100ML
2 INJECTION, SOLUTION INTRAVENOUS ONCE
Status: DISCONTINUED | OUTPATIENT
Start: 2019-07-09 | End: 2019-07-09

## 2019-07-09 RX ORDER — FENTANYL CITRATE 50 UG/ML
50 INJECTION, SOLUTION INTRAMUSCULAR; INTRAVENOUS
Status: DISCONTINUED | OUTPATIENT
Start: 2019-07-09 | End: 2019-07-09 | Stop reason: HOSPADM

## 2019-07-09 RX ORDER — FAMOTIDINE 10 MG/ML
20 INJECTION, SOLUTION INTRAVENOUS ONCE
Status: COMPLETED | OUTPATIENT
Start: 2019-07-09 | End: 2019-07-09

## 2019-07-09 RX ORDER — PANTOPRAZOLE SODIUM 40 MG/1
40 TABLET, DELAYED RELEASE ORAL EVERY MORNING
Status: DISCONTINUED | OUTPATIENT
Start: 2019-07-10 | End: 2019-07-13 | Stop reason: HOSPADM

## 2019-07-09 RX ORDER — GLYCOPYRROLATE 0.2 MG/ML
INJECTION INTRAMUSCULAR; INTRAVENOUS AS NEEDED
Status: DISCONTINUED | OUTPATIENT
Start: 2019-07-09 | End: 2019-07-09 | Stop reason: SURG

## 2019-07-09 RX ORDER — NALOXONE HCL 0.4 MG/ML
0.2 VIAL (ML) INJECTION AS NEEDED
Status: DISCONTINUED | OUTPATIENT
Start: 2019-07-09 | End: 2019-07-09 | Stop reason: HOSPADM

## 2019-07-09 RX ORDER — LIDOCAINE HYDROCHLORIDE 10 MG/ML
0.5 INJECTION, SOLUTION EPIDURAL; INFILTRATION; INTRACAUDAL; PERINEURAL ONCE AS NEEDED
Status: DISCONTINUED | OUTPATIENT
Start: 2019-07-09 | End: 2019-07-09 | Stop reason: HOSPADM

## 2019-07-09 RX ORDER — ROCURONIUM BROMIDE 10 MG/ML
INJECTION, SOLUTION INTRAVENOUS AS NEEDED
Status: DISCONTINUED | OUTPATIENT
Start: 2019-07-09 | End: 2019-07-09 | Stop reason: SURG

## 2019-07-09 RX ORDER — FENTANYL CITRATE 50 UG/ML
INJECTION, SOLUTION INTRAMUSCULAR; INTRAVENOUS AS NEEDED
Status: DISCONTINUED | OUTPATIENT
Start: 2019-07-09 | End: 2019-07-09 | Stop reason: SURG

## 2019-07-09 RX ORDER — BUPIVACAINE HYDROCHLORIDE AND EPINEPHRINE 5; 5 MG/ML; UG/ML
INJECTION, SOLUTION PERINEURAL AS NEEDED
Status: DISCONTINUED | OUTPATIENT
Start: 2019-07-09 | End: 2019-07-09 | Stop reason: HOSPADM

## 2019-07-09 RX ORDER — LIDOCAINE HYDROCHLORIDE 20 MG/ML
INJECTION, SOLUTION INFILTRATION; PERINEURAL AS NEEDED
Status: DISCONTINUED | OUTPATIENT
Start: 2019-07-09 | End: 2019-07-09 | Stop reason: SURG

## 2019-07-09 RX ORDER — SODIUM CHLORIDE, SODIUM LACTATE, POTASSIUM CHLORIDE, CALCIUM CHLORIDE 600; 310; 30; 20 MG/100ML; MG/100ML; MG/100ML; MG/100ML
9 INJECTION, SOLUTION INTRAVENOUS CONTINUOUS
Status: DISCONTINUED | OUTPATIENT
Start: 2019-07-09 | End: 2019-07-09 | Stop reason: HOSPADM

## 2019-07-09 RX ORDER — MAGNESIUM HYDROXIDE 1200 MG/15ML
LIQUID ORAL AS NEEDED
Status: DISCONTINUED | OUTPATIENT
Start: 2019-07-09 | End: 2019-07-09 | Stop reason: HOSPADM

## 2019-07-09 RX ORDER — ACETAMINOPHEN 325 MG/1
650 TABLET ORAL ONCE AS NEEDED
Status: DISCONTINUED | OUTPATIENT
Start: 2019-07-09 | End: 2019-07-09 | Stop reason: HOSPADM

## 2019-07-09 RX ORDER — MIDAZOLAM HYDROCHLORIDE 1 MG/ML
1 INJECTION INTRAMUSCULAR; INTRAVENOUS
Status: DISCONTINUED | OUTPATIENT
Start: 2019-07-09 | End: 2019-07-09 | Stop reason: HOSPADM

## 2019-07-09 RX ORDER — MIDAZOLAM HYDROCHLORIDE 1 MG/ML
2 INJECTION INTRAMUSCULAR; INTRAVENOUS
Status: DISCONTINUED | OUTPATIENT
Start: 2019-07-09 | End: 2019-07-09 | Stop reason: HOSPADM

## 2019-07-09 RX ORDER — VANCOMYCIN HYDROCHLORIDE 1 G/200ML
1000 INJECTION, SOLUTION INTRAVENOUS ONCE
Status: COMPLETED | OUTPATIENT
Start: 2019-07-09 | End: 2019-07-09

## 2019-07-09 RX ORDER — KETAMINE HYDROCHLORIDE 10 MG/ML
INJECTION INTRAMUSCULAR; INTRAVENOUS AS NEEDED
Status: DISCONTINUED | OUTPATIENT
Start: 2019-07-09 | End: 2019-07-09 | Stop reason: SURG

## 2019-07-09 RX ORDER — ONDANSETRON 2 MG/ML
4 INJECTION INTRAMUSCULAR; INTRAVENOUS EVERY 6 HOURS PRN
Status: DISCONTINUED | OUTPATIENT
Start: 2019-07-09 | End: 2019-07-13 | Stop reason: HOSPADM

## 2019-07-09 RX ORDER — PROPOFOL 10 MG/ML
VIAL (ML) INTRAVENOUS AS NEEDED
Status: DISCONTINUED | OUTPATIENT
Start: 2019-07-09 | End: 2019-07-09 | Stop reason: SURG

## 2019-07-09 RX ORDER — HYDROMORPHONE HYDROCHLORIDE 1 MG/ML
0.5 INJECTION, SOLUTION INTRAMUSCULAR; INTRAVENOUS; SUBCUTANEOUS
Status: CANCELLED | OUTPATIENT
Start: 2019-07-09

## 2019-07-09 RX ORDER — LABETALOL HYDROCHLORIDE 5 MG/ML
5 INJECTION, SOLUTION INTRAVENOUS
Status: DISCONTINUED | OUTPATIENT
Start: 2019-07-09 | End: 2019-07-09 | Stop reason: HOSPADM

## 2019-07-09 RX ORDER — ONDANSETRON 2 MG/ML
4 INJECTION INTRAMUSCULAR; INTRAVENOUS ONCE AS NEEDED
Status: COMPLETED | OUTPATIENT
Start: 2019-07-09 | End: 2019-07-09

## 2019-07-09 RX ORDER — FLUMAZENIL 0.1 MG/ML
0.2 INJECTION INTRAVENOUS AS NEEDED
Status: DISCONTINUED | OUTPATIENT
Start: 2019-07-09 | End: 2019-07-09 | Stop reason: HOSPADM

## 2019-07-09 RX ADMIN — SUGAMMADEX 100 MG: 100 INJECTION, SOLUTION INTRAVENOUS at 15:47

## 2019-07-09 RX ADMIN — MIDAZOLAM 1 MG: 1 INJECTION INTRAMUSCULAR; INTRAVENOUS at 14:06

## 2019-07-09 RX ADMIN — METRONIDAZOLE 500 MG: 500 INJECTION, SOLUTION INTRAVENOUS at 14:43

## 2019-07-09 RX ADMIN — FENTANYL CITRATE 50 MCG: 50 INJECTION INTRAMUSCULAR; INTRAVENOUS at 14:55

## 2019-07-09 RX ADMIN — ONDANSETRON 4 MG: 2 INJECTION INTRAMUSCULAR; INTRAVENOUS at 16:23

## 2019-07-09 RX ADMIN — MIDAZOLAM 1 MG: 1 INJECTION INTRAMUSCULAR; INTRAVENOUS at 13:00

## 2019-07-09 RX ADMIN — SODIUM CHLORIDE, POTASSIUM CHLORIDE, SODIUM LACTATE AND CALCIUM CHLORIDE: 600; 310; 30; 20 INJECTION, SOLUTION INTRAVENOUS at 15:12

## 2019-07-09 RX ADMIN — ROCURONIUM BROMIDE 50 MG: 10 INJECTION INTRAVENOUS at 14:58

## 2019-07-09 RX ADMIN — VANCOMYCIN HYDROCHLORIDE 1000 MG: 1 INJECTION, SOLUTION INTRAVENOUS at 14:47

## 2019-07-09 RX ADMIN — HYDROCODONE BITARTRATE AND ACETAMINOPHEN 1 TABLET: 5; 325 TABLET ORAL at 19:38

## 2019-07-09 RX ADMIN — SODIUM CHLORIDE, POTASSIUM CHLORIDE, SODIUM LACTATE AND CALCIUM CHLORIDE 9 ML/HR: 600; 310; 30; 20 INJECTION, SOLUTION INTRAVENOUS at 12:47

## 2019-07-09 RX ADMIN — FAMOTIDINE 20 MG: 10 INJECTION INTRAVENOUS at 13:00

## 2019-07-09 RX ADMIN — PHENYLEPHRINE HYDROCHLORIDE 200 MCG: 10 INJECTION INTRAVENOUS at 15:05

## 2019-07-09 RX ADMIN — PROPOFOL 150 MG: 10 INJECTION, EMULSION INTRAVENOUS at 14:58

## 2019-07-09 RX ADMIN — SODIUM CHLORIDE 75 ML/HR: 9 INJECTION, SOLUTION INTRAVENOUS at 18:42

## 2019-07-09 RX ADMIN — LIDOCAINE HYDROCHLORIDE 75 MG: 20 INJECTION, SOLUTION INFILTRATION; PERINEURAL at 14:58

## 2019-07-09 RX ADMIN — FENTANYL CITRATE 100 MCG: 50 INJECTION INTRAMUSCULAR; INTRAVENOUS at 15:24

## 2019-07-09 RX ADMIN — GLYCOPYRROLATE 0.2 MG: 0.2 INJECTION INTRAMUSCULAR; INTRAVENOUS at 14:55

## 2019-07-09 RX ADMIN — LEVETIRACETAM 500 MG: 500 TABLET, FILM COATED ORAL at 21:13

## 2019-07-09 RX ADMIN — ALBUMIN (HUMAN): 0.05 SOLUTION INTRAVENOUS at 15:28

## 2019-07-09 RX ADMIN — ALBUMIN (HUMAN): 0.05 SOLUTION INTRAVENOUS at 15:16

## 2019-07-09 RX ADMIN — KETAMINE HYDROCHLORIDE 25 MG: 10 INJECTION INTRAMUSCULAR; INTRAVENOUS at 15:28

## 2019-07-09 NOTE — ANESTHESIA POSTPROCEDURE EVALUATION
"Patient: Alex Brown    Procedure Summary     Date:  07/09/19 Room / Location:  [facility] OR  / [facility] MAIN OR    Anesthesia Start:  1452 Anesthesia Stop:  1616    Procedure:  LAPAROTOMY EXPLORATORY WITH DRAINAGE OF PELVIC ABSCESS (N/A Abdomen) Diagnosis:       Postprocedural intraabdominal abscess      (Postprocedural intraabdominal abscess [T81.49XA])    Surgeon:  Vinh Ballard MD Provider:  Mariano Hector MD    Anesthesia Type:  general ASA Status:  2          Anesthesia Type: general  Last vitals  BP   124/85 (07/09/19 1630)   Temp   36.4 °C (97.6 °F) (07/09/19 1610)   Pulse   98 (07/09/19 1630)   Resp   18 (07/09/19 1630)     SpO2   99 % (07/09/19 1630)     Post Anesthesia Care and Evaluation    Patient location during evaluation: bedside  Patient participation: complete - patient participated  Level of consciousness: awake and alert  Pain management: adequate  Airway patency: patent  Anesthetic complications: No anesthetic complications    Cardiovascular status: acceptable  Respiratory status: acceptable  Hydration status: acceptable    Comments: /85   Pulse 98   Temp 36.4 °C (97.6 °F) (Oral)   Resp 18   Ht 167.6 cm (66\")   Wt 44.5 kg (98 lb)   SpO2 99%   BMI 15.82 kg/m²       "

## 2019-07-09 NOTE — ANESTHESIA POSTPROCEDURE EVALUATION
"Patient: Alex Brown    Procedure Summary     Date:  07/09/19 Room / Location:  [facility] OR  / [facility] MAIN OR    Anesthesia Start:  1452 Anesthesia Stop:  1616    Procedure:  LAPAROTOMY EXPLORATORY WITH DRAINAGE OF PELVIC ABSCESS (N/A Abdomen) Diagnosis:       Postprocedural intraabdominal abscess      (Postprocedural intraabdominal abscess [T81.49XA])    Surgeon:  Vinh Ballard MD Provider:  Mariano Hector MD    Anesthesia Type:  general ASA Status:  2          Anesthesia Type: general  Last vitals  BP   105/65 (07/09/19 1720)   Temp   36 °C (96.8 °F)(inside cheek, pt refused to place under tongue) (07/09/19 1720)   Pulse   96 (07/09/19 1720)   Resp   16 (07/09/19 1720)     SpO2   96 % (07/09/19 1720)     Post Anesthesia Care and Evaluation    Patient location during evaluation: bedside  Patient participation: complete - patient participated  Level of consciousness: awake and alert  Pain management: adequate  Airway patency: patent  Anesthetic complications: No anesthetic complications    Cardiovascular status: acceptable  Respiratory status: acceptable  Hydration status: acceptable    Comments: /65 (BP Location: Left arm, Patient Position: Lying)   Pulse 96   Temp 36 °C (96.8 °F) (Oral) Comment: inside cheek, pt refused to place under tongue  Resp 16   Ht 167.6 cm (66\")   Wt 44.5 kg (98 lb)   SpO2 96%   BMI 15.82 kg/m²       "

## 2019-07-09 NOTE — ANESTHESIA PROCEDURE NOTES
Airway  Urgency: elective    Airway not difficult    General Information and Staff    Patient location during procedure: OR  Anesthesiologist: Juan Bond MD    Indications and Patient Condition  Indications for airway management: airway protection    Preoxygenated: yes  MILS not maintained throughout  Mask difficulty assessment: 2 - vent by mask + OA or adjuvant +/- NMBA (easy mask)    Final Airway Details  Final airway type: endotracheal airway      Successful airway: ETT  Cuffed: yes   Successful intubation technique: direct laryngoscopy  Endotracheal tube insertion site: oral  Blade: Heidy  Blade size: 4  ETT size (mm): 7.5  Cormack-Lehane Classification: grade I - full view of glottis  Placement verified by: chest auscultation and capnometry   Measured from: teeth  ETT to teeth (cm): 22  Number of attempts at approach: 1

## 2019-07-09 NOTE — PERIOPERATIVE NURSING NOTE
"DR COATS AT BEDSIDE. INFORMED OF PTS ALLERGY TO CECLOR AND KEFZOL ORDERED (PLUS PT HAS MRSA). KEFZOL DISCONTUNED AND VANCOMYCIN ORDERED.     PTS MOTHER IS CONCERNED WITH POSITIONING PT IN THE OR--STATED \"HIS LEGS DOES NOT GO STRIGHT.\" DR COATS AND ELI OR NURSE AT BEDSIDE AND ARE AWARE.   "

## 2019-07-09 NOTE — OP NOTE
PREOPERATIVE DIAGNOSIS:  Intra-abdominal/pelvic abscess    POSTOPERATIVE DIAGNOSIS (FINDINGS):  Same    PROCEDURE:  Open drainage pelvic abscess    SURGEON:  Vinh Ballard MD    ASSISTANT:  Sushila Lorenzo    ANESTHESIA:  General    EBL:  Minimal    SPECIMEN(S):  Culture    DESCRIPTION:  In supine position under general anesthetic prepped and draped usual sterile manner.  Midline laparotomy incision made below the umbilicus and peritoneal cavity entered.  20 mL of Exparel mixed with 30 mL half percent Marcaine with epinephrine were infiltrated in the subcutaneous and preperitoneal planes.  Large wound protector was inserted and small bowel was pulled from the pelvis.  There is no evidence of intrapelvic fistula as noted previously in 2015.  Using a Linnea clamp I followed the presacral plane and opened an abscess cavity.  Approximately 40 mL of purulent fluid were aspirated after culturing.  Irrigated with bacitracin solution, good hemostasis noted.  19 Upper sorbian Kyle drain placed in the abscess cavity and brought out through a separate stab incision.  Again, good hemostasis noted.  Fascia closed with running 0 PDS.  Skin with staples.  Tolerated well, stable to recovery room.    Vinh Ballard M.D.

## 2019-07-09 NOTE — PROGRESS NOTES
"Pharmacy to Dose Vancomycin    Patient: Alex Brown (P480/1, [id number]  LOS: 0 days )  Relevant clinical data and objective history reviewed:  29 y.o. male 167.6 cm (66\") 44.5 kg (98 lb)  Body mass index is 15.82 kg/m².  he has a past medical history of Anemia (), Anoxic encephalopathy (CMS/HCC), CP (cerebral palsy) (CMS/HCC) (2018), Epilepsy (CMS/HCC), GERD (gastroesophageal reflux disease), H/O ulcerative colitis (2011), History of aspiration pneumonia, History of MRSA infection (), Iron deficiency anemia, PONV (postoperative nausea and vomiting), Rectal bleeding (Ulcerative Colitis), Recurrent sinus infections, Seizures (CMS/HCC), and Seizures (CMS/HCC) ().    Day #1  Duration: 7 days  Consult for Dr Ballard  Indication: intra-abdominal infection (recurrent pelvic abscess)    Cultures:  Lab Results (last 72 hours)     Procedure Component Value Units Date/Time    Body Fluid Culture - Body Fluid, Pelvis [[id number]] Collected:  19 152    Specimen:  Body Fluid from Pelvis Updated:  19 1612    Anaerobic Culture - Body Fluid, Pelvis [[id number]] Collected:  19 152    Specimen:  Body Fluid from Pelvis Updated:  19 1539    Fungus Culture - Body Fluid, Pelvis [[id number]] Collected:  19 152    Specimen:  Body Fluid from Pelvis Updated:  19 1539        Other Abx: none    Vancomycin Dosing history/levels:   1447 vanc 1000 mg IV x1    Results from last 7 days   Lab Units 19  1500   WBC 10*3/mm3 10.36   HEMOGLOBIN g/dL 15.2   HEMATOCRIT % 47.3   PLATELETS 10*3/mm3 300     Temp (24hrs), Av.7 °F (36.5 °C), Min:96.8 °F (36 °C), Max:98.6 °F (37 °C)    Serum creatinine: 0.77 mg/dL 19 1500  Estimated creatinine clearance: 87.5 mL/min    Assessment/Plan:   Creatinine Clearance (CrCl)  Recommended Dose & Interval    70-99 mL/min  15-20 mg/kg IV Q12hr (30-40 mg/kg/day)      - Vancomycin 750 mg IV q12h.  - Trough prior to the 5th dose ( 1330). " Predicted 15-20.    - CBC/BMP in AM    Kaiser Santana, PharmD  07/09/19 5:42 PM

## 2019-07-09 NOTE — ANESTHESIA PREPROCEDURE EVALUATION
Anesthesia Evaluation     Patient summary reviewed and Nursing notes reviewed   history of anesthetic complications: PONV  NPO Solid Status: > 8 hours  NPO Liquid Status: > 4 hours           Airway   Dental      Pulmonary - negative pulmonary ROS and normal exam    breath sounds clear to auscultation  Cardiovascular - negative cardio ROS and normal exam        Neuro/Psych  (+) seizures,     GI/Hepatic/Renal/Endo    (+)  GERD,      Musculoskeletal (-) negative ROS    Abdominal    Substance History - negative use     OB/GYN          Other - negative ROS       ROS/Med Hx Other: Non verbal, anoxic brain injury                  Anesthesia Plan    ASA 2     general     intravenous induction   Anesthetic plan, all risks, benefits, and alternatives have been provided, discussed and informed consent has been obtained with: patient, mother and father.

## 2019-07-09 NOTE — PERIOPERATIVE NURSING NOTE
Call placed to PACU, spoke with Kiana, notified pt is nonverbal and mother requests to be present at bedside and pt is in isolation.

## 2019-07-10 LAB
ANION GAP SERPL CALCULATED.3IONS-SCNC: 12.4 MMOL/L (ref 5–15)
BASOPHILS # BLD AUTO: 0.01 10*3/MM3 (ref 0–0.2)
BASOPHILS NFR BLD AUTO: 0.1 % (ref 0–1.5)
BUN BLD-MCNC: 8 MG/DL (ref 6–20)
BUN/CREAT SERPL: 15.7 (ref 7–25)
CALCIUM SPEC-SCNC: 8.9 MG/DL (ref 8.6–10.5)
CHLORIDE SERPL-SCNC: 100 MMOL/L (ref 98–107)
CO2 SERPL-SCNC: 23.6 MMOL/L (ref 22–29)
CREAT BLD-MCNC: 0.51 MG/DL (ref 0.76–1.27)
DEPRECATED RDW RBC AUTO: 43.3 FL (ref 37–54)
EOSINOPHIL # BLD AUTO: 0 10*3/MM3 (ref 0–0.4)
EOSINOPHIL NFR BLD AUTO: 0 % (ref 0.3–6.2)
ERYTHROCYTE [DISTWIDTH] IN BLOOD BY AUTOMATED COUNT: 12.9 % (ref 12.3–15.4)
GFR SERPL CREATININE-BSD FRML MDRD: >150 ML/MIN/1.73
GLUCOSE BLD-MCNC: 107 MG/DL (ref 65–99)
HCT VFR BLD AUTO: 35.8 % (ref 37.5–51)
HGB BLD-MCNC: 11.8 G/DL (ref 13–17.7)
IMM GRANULOCYTES # BLD AUTO: 0.06 10*3/MM3 (ref 0–0.05)
IMM GRANULOCYTES NFR BLD AUTO: 0.5 % (ref 0–0.5)
LYMPHOCYTES # BLD AUTO: 1.32 10*3/MM3 (ref 0.7–3.1)
LYMPHOCYTES NFR BLD AUTO: 10.3 % (ref 19.6–45.3)
MCH RBC QN AUTO: 30.5 PG (ref 26.6–33)
MCHC RBC AUTO-ENTMCNC: 33 G/DL (ref 31.5–35.7)
MCV RBC AUTO: 92.5 FL (ref 79–97)
MONOCYTES # BLD AUTO: 0.67 10*3/MM3 (ref 0.1–0.9)
MONOCYTES NFR BLD AUTO: 5.2 % (ref 5–12)
NEUTROPHILS # BLD AUTO: 10.76 10*3/MM3 (ref 1.7–7)
NEUTROPHILS NFR BLD AUTO: 83.9 % (ref 42.7–76)
NRBC BLD AUTO-RTO: 0 /100 WBC (ref 0–0.2)
PLATELET # BLD AUTO: 212 10*3/MM3 (ref 140–450)
PMV BLD AUTO: 10.9 FL (ref 6–12)
POTASSIUM BLD-SCNC: 4.1 MMOL/L (ref 3.5–5.2)
RBC # BLD AUTO: 3.87 10*6/MM3 (ref 4.14–5.8)
SODIUM BLD-SCNC: 136 MMOL/L (ref 136–145)
WBC NRBC COR # BLD: 12.82 10*3/MM3 (ref 3.4–10.8)

## 2019-07-10 PROCEDURE — 25010000002 ENOXAPARIN PER 10 MG: Performed by: SURGERY

## 2019-07-10 PROCEDURE — 80048 BASIC METABOLIC PNL TOTAL CA: CPT | Performed by: SURGERY

## 2019-07-10 PROCEDURE — 85025 COMPLETE CBC W/AUTO DIFF WBC: CPT | Performed by: SURGERY

## 2019-07-10 PROCEDURE — 25010000002 VANCOMYCIN 750 MG RECONSTITUTED SOLUTION: Performed by: SURGERY

## 2019-07-10 RX ADMIN — ENOXAPARIN SODIUM 30 MG: 30 INJECTION SUBCUTANEOUS at 08:49

## 2019-07-10 RX ADMIN — LEVETIRACETAM 500 MG: 500 TABLET, FILM COATED ORAL at 20:39

## 2019-07-10 RX ADMIN — HYDROCODONE BITARTRATE AND ACETAMINOPHEN 1 TABLET: 5; 325 TABLET ORAL at 08:48

## 2019-07-10 RX ADMIN — VANCOMYCIN HYDROCHLORIDE 750 MG: 750 INJECTION, POWDER, LYOPHILIZED, FOR SOLUTION INTRAVENOUS at 01:52

## 2019-07-10 RX ADMIN — VANCOMYCIN HYDROCHLORIDE 750 MG: 750 INJECTION, POWDER, LYOPHILIZED, FOR SOLUTION INTRAVENOUS at 13:22

## 2019-07-10 RX ADMIN — VANCOMYCIN HYDROCHLORIDE 750 MG: 750 INJECTION, POWDER, LYOPHILIZED, FOR SOLUTION INTRAVENOUS at 19:53

## 2019-07-10 RX ADMIN — SODIUM CHLORIDE 75 ML/HR: 9 INJECTION, SOLUTION INTRAVENOUS at 10:08

## 2019-07-10 RX ADMIN — PANTOPRAZOLE SODIUM 40 MG: 40 TABLET, DELAYED RELEASE ORAL at 07:23

## 2019-07-10 RX ADMIN — HYDROCODONE BITARTRATE AND ACETAMINOPHEN 1 TABLET: 5; 325 TABLET ORAL at 00:20

## 2019-07-10 RX ADMIN — HYDROCODONE BITARTRATE AND ACETAMINOPHEN 1 TABLET: 5; 325 TABLET ORAL at 19:35

## 2019-07-10 NOTE — PROGRESS NOTES
Postoperative day 1 pelvic abscess drainage    Afebrile vital signs stable  Dressing dry, RITIKA serous    -Continue vancomycin while awaiting culture results  -Advance diet as tolerated

## 2019-07-10 NOTE — PROGRESS NOTES
"Pharmacokinetic Evaluation - Vancomycin    Alex Brown is a 29 y.o. male on vancomycin pharmacy to dose.  MRN: [id number]  : 1990    Day of vancomycin therapy: 2  Indication: intra-abdominal infection (recurrent pelvic abscess)  Consulted by: Dr. Ballard  Goal trough: 15-20    Current dose: 750 mg Q12  Other antimicrobials: None    Blood pressure 91/53, pulse 93, temperature 98.1 °F (36.7 °C), temperature source Oral, resp. rate 16, height 167.6 cm (66\"), weight 44.5 kg (98 lb), SpO2 95 %.  Results from last 7 days   Lab Units 07/10/19  0617 19  1500   CREATININE mg/dL 0.51* 0.77     Estimated Creatinine Clearance: 132.1 mL/min (A) (by C-G formula based on SCr of 0.51 mg/dL (L)).  Results from last 7 days   Lab Units 07/10/19  0617 19  1500   WBC 10*3/mm3 12.82* 10.36   HEMOGLOBIN g/dL 11.8* 15.2   HEMATOCRIT % 35.8* 47.3   PLATELETS 10*3/mm3 212 300     Other relevant labs/chart info: 30 YO M with hx of ulcerative colitis and cerebral palsy ultimately required total proctocolectomy with end ileostomy in . Pt now with recurrent perineal drainage s/p open drainage of abscess on .    Radiology:    CT Abd/Pel = \"Significant interval increase in the complex thick-walled precoccygeal/presacral fluid collection, currently measuring 9 x 4.5 x 4 cm.\"    Cultures:   : Wound cx (abdominal cavity) = MRSA (vanc COURTNEY = 1)  : Body fluid cx (pelvis) = pending      Dosing hx (include troughs if drawn):  : Vancomycin 1000 mg load at 1447  7/10: Vancomycin 750 mg Q12 (16.8 mg/kg) started at 0152.     Patient historically had been on vancomycin on  - 2016 dosed at 750 q8 with trough = 12.4    Assessment:  Based on historical dosing and young age, will increase to 750 q8. Next dose due now. Will check level on  at 0330 after 6 total doses.    Plan:  1) Increase vancomycin to 750 mg every 8 hours.  2) Next trough on  at 0330 after 6 total doses.  3) Monitor for " s/sxns of vancomycin toxicity including changes in UOP/SCr; encourage hydration as tolerated to decrease risk of toxic accumulation.    Bertha Koenig Pharm.D., [facility]  Oncology Pharmacy Specialist  [phone]

## 2019-07-10 NOTE — PROGRESS NOTES
Discharge Planning Assessment  [facility]     Patient Name: Alex Brown  MRN: [id number]  Today's Date: 7/10/2019    Admit Date: 7/9/2019    Discharge Needs Assessment     Row Name 07/10/19 1415       Living Environment    Lives With  parent(s)    Current Living Arrangements  home/apartment/condo    Primary Care Provided by  parent(s)    Provides Primary Care For  no one    Family Caregiver if Needed  parent(s)    Family Caregiver Names  Jung and Rosy Brown    Quality of Family Relationships  helpful;involved;supportive    Able to Return to Prior Arrangements  yes       Resource/Environmental Concerns    Resource/Environmental Concerns  none       Transition Planning    Patient/Family Anticipates Transition to  home with family    Patient/Family Anticipated Services at Transition  none    Transportation Anticipated  family or friend will provide       Discharge Needs Assessment    Readmission Within the Last 30 Days  no previous admission in last 30 days    Concerns to be Addressed  no discharge needs identified    Equipment Currently Used at Home  wheelchair;shower chair;ramp    Anticipated Changes Related to Illness  none    Equipment Needed After Discharge  none    Outpatient/Agency/Support Group Needs  adult     Offered/Gave Vendor List  no    Patient's Choice of Community Agency(s)  [facility]    Discharge Coordination/Progress  pt plans home with parents        Discharge Plan     Row Name 07/10/19 1417       Plan    Plan  home with parents    Patient/Family in Agreement with Plan  yes    Plan Comments  Checked IMM. Met with pt's mother and father Jung and Rosy [phone] bedside. Confirmed facesheet correct. Explained role of CCP. Pt lives with his parents who are his primary caretakers. Pt is wheelchair bound and uses a shower bench and ramp at home. Confirmed PCP and pharmacy ([facility]) correct. Edith mercado is pt's   with [employer]. Pt gets personal care 6 days a  week and attends day program 3 days a week. Pt has used [facility] in the past. Has no SNF history. Pt's mother reports they have all needed services set up at this time and denies needs. CCP will continue to follow….MELISSA Ronquillo        Destination      No service coordination in this encounter.      Durable Medical Equipment      No service coordination in this encounter.      Dialysis/Infusion      No service coordination in this encounter.      Home Medical Care      No service coordination in this encounter.      Therapy      No service coordination in this encounter.      Community Resources      No service coordination in this encounter.          Demographic Summary     Row Name 07/10/19 1414       General Information    Admission Type  inpatient    Arrived From  home    Required Notices Provided  Important Message from Medicare    Reason for Consult  discharge planning    Preferred Language  English     Used During This Interaction  no       Contact Information    Permission Granted to Share Info With  facility ;family/designee        Functional Status     Row Name 07/10/19 1418       Functional Status    Usual Activity Tolerance  fair    Current Activity Tolerance  fair       Functional Status, IADL    Medications  completely dependent    Meal Preparation  completely dependent    Housekeeping  completely dependent    Laundry  completely dependent    Shopping  completely dependent        Psychosocial    No documentation.       Abuse/Neglect    No documentation.       Legal    No documentation.       Substance Abuse    No documentation.       Patient Forms    No documentation.           Esperanza Zuniga

## 2019-07-10 NOTE — PLAN OF CARE
Problem: Patient Care Overview  Goal: Plan of Care Review  Outcome: Ongoing (interventions implemented as appropriate)   07/10/19 6447   Coping/Psychosocial   Plan of Care Reviewed With family;mother   Plan of Care Review   Progress no change   OTHER   Outcome Summary 1 Dose pain meds given today, discussed vanc dosing with mother. Mother provides care and declined staff assistance when offered. Pt appears comfortable, will monitor.      Goal: Individualization and Mutuality  Outcome: Ongoing (interventions implemented as appropriate)      Problem: Skin Injury Risk (Adult)  Goal: Identify Related Risk Factors and Signs and Symptoms  Outcome: Outcome(s) achieved Date Met: 07/10/19    Goal: Skin Health and Integrity  Outcome: Ongoing (interventions implemented as appropriate)      Problem: Pain, Acute (Adult)  Goal: Acceptable Pain Control/Comfort Level  Outcome: Ongoing (interventions implemented as appropriate)

## 2019-07-10 NOTE — PLAN OF CARE
Problem: Patient Care Overview  Goal: Plan of Care Review  Outcome: Ongoing (interventions implemented as appropriate)   07/10/19 0556   Coping/Psychosocial   Plan of Care Reviewed With patient   Plan of Care Review   Progress no change   OTHER   Outcome Summary Lortab x2 for pain. 25 cc out in selma. VSS. IVF and abx per orders. Mother refused staff turns, has done herself and also provided ostomy care. Will conitnue to monitor for needs       Problem: Skin Injury Risk (Adult)  Goal: Identify Related Risk Factors and Signs and Symptoms  Outcome: Ongoing (interventions implemented as appropriate)    Goal: Skin Health and Integrity  Outcome: Ongoing (interventions implemented as appropriate)      Problem: Pain, Acute (Adult)  Goal: Identify Related Risk Factors and Signs and Symptoms  Outcome: Outcome(s) achieved Date Met: 07/10/19   07/10/19 0556   Pain, Acute (Adult)   Related Risk Factors (Acute Pain) persistent pain;positioning;surgery   Signs and Symptoms (Acute Pain) facial mask of pain/grimace;sleep pattern alteration     Goal: Acceptable Pain Control/Comfort Level  Outcome: Ongoing (interventions implemented as appropriate)

## 2019-07-10 NOTE — PROGRESS NOTES
Adult Nutrition  Assessment/PES    Patient Name:  Alex Brown  YOB: 1990  MRN: [id number]  Admit Date:  7/9/2019    Assessment Date:  7/10/2019    Comments:  Assessment triggered by MST 2, indication of chew/swallow difficulty and low BMI.     Pt seen, assessed; spoke with his parents at bedside. Pt will need ground meats, soft cooked vegetables due to dysphagia & choking risk. Mother indicates they limit fresh produce & lactose also because of gas formation & his ostomy. He has a chronic low BMI - no recent weight change of significance. Skin intact except for abd incision (POD #1 pelvic abscess drainage). Will continue to follow. Will arrange for  to get meal selections via phone given pt's isolation status.     Reason for Assessment     Row Name 07/10/19 1005          Reason for Assessment    Reason For Assessment  identified at risk by screening criteria     Diagnosis  neurologic conditions;infection/sepsis;gastrointestinal disease Postprocedural intraabdominal abscess; hx anoxic brain injury, seizure disorder, pelvic abscess; total proctocolectomy with end ileostomy in 2012.      Identified At Risk by Screening Criteria  MST SCORE 2+;BMI;difficulty chewing/swallowing         Nutrition/Diet History     Row Name 07/10/19 1013          Nutrition/Diet History    Typical Food/Fluid Intake  Per mom and dad, they minimize a lot of fruit & veg due to gas formation (has ostomy). He needs ground meats, soft cooked veg and lactose free diet.      Factors Affecting Nutritional Intake  chewing difficulties/inability to chew food         Anthropometrics     Row Name 07/10/19 1016          Admit Weight    Admit Weight  44.5 kg (98 lb)        Usual Body Weight (UBW)    Usual Body Weight  -- # range        Body Mass Index (BMI)    BMI Assessment  BMI less than 16: protein-energy malnutrition grade III         Labs/Tests/Procedures/Meds     Row Name 07/10/19 1007           Labs/Procedures/Meds    Lab Results Reviewed  reviewed, pertinent     Lab Results Comments  wbc, h/h        Diagnostic Tests/Procedures    Diagnostic Test/Procedure Reviewed  reviewed, pertinent     Diagnostic Test/Procedures Comments  LAPAROTOMY EXPLORATORY WITH DRAINAGE OF PELVIC ABSCESS 7/9/19        Medications    Pertinent Medications Reviewed  reviewed, pertinent     Pertinent Medications Comments  abx, anticonvulsant, ppi         Physical Findings     Row Name 07/10/19 1016          Physical Findings    Overall Physical Appearance  underweight     Gastrointestinal  other (see comments) ileostomy     Skin  other (see comments) abd incision         Estimated/Assessed Needs     Row Name 07/10/19 1017          Calculation Measurements    Weight Used For Calculations  44.5 kg (98 lb 1.7 oz)        Estimated/Assessed Needs    Additional Documentation  Protein Requirements (Group);Fluid Requirements (Group);KCAL/KG (Group)        KCAL/KG    KCAL/KG  30 Kcal/Kg (kcal);35 Kcal/Kg (kcal)     30 Kcal/Kg (kcal)  1335     35 Kcal/Kg (kcal)  1557.5        Protein Requirements    Est Protein Requirement Amount (gms/kg)  1.2 gm protein     Estimated Protein Requirements (gms/day)  53.4        Fluid Requirements    Estimated Fluid Requirements (mL/day)  1557     Estimated Fluid Requirement Method  RDA Method     RDA Method (mL)  1557               Nutrition Prescription Ordered     Row Name 07/10/19 1017          Nutrition Prescription PO    Current PO Diet  Regular         Evaluation of Received Nutrient/Fluid Intake     Row Name 07/10/19 1017          Calculation Measurements    Weight Used For Calculations  44.5 kg (98 lb 1.7 oz)        PO Evaluation    Number of Days PO Intake Evaluated  Insufficient Data           Problem/Interventions:  Problem 1     Row Name 07/10/19 1018          Nutrition Diagnoses Problem 1    Problem 1  Biting/Chewing Difficulty     Etiology (related to)  Medical Diagnosis     Neurological  Anoxic  brain injury;Dysphagia     Signs/Symptoms (evidenced by)  Other (comment) per parents info         Problem 2     Row Name 07/10/19 1018          Nutrition Diagnoses Problem 2    Problem 2  Underweight     Signs/Symptoms (evidenced by)  BMI     BMI  Less than 16               Intervention Goal     Row Name 07/10/19 1019          Intervention Goal    General  Maintain nutrition;Disease management/therapy;Meet nutritional needs for age/condition;Reduce/improve symptoms     PO  Tolerate PO;Establish PO;PO intake (%)     PO Intake %  75 %     Weight  Maintain weight         Nutrition Intervention     Row Name 07/10/19 1019          Nutrition Intervention    RD/Tech Action  Follow Tx progress;Care plan reviewd;Advise alternate selection;Interview for preference;Menu adjusted         Nutrition Prescription     Row Name 07/10/19 1020          Nutrition Prescription PO    PO Prescription  Begin/change diet     Begin/Change Diet to  Soft Texture     Texture  Ground     Fluid Consistency  Thin     Common Modifiers  Lactose Restricted     New PO Prescription Ordered?  Yes         Education/Evaluation     Row Name 07/10/19 1020          Education    Education  Advised regarding habits/behavior     Advised Regarding Habits/Behavior  Food choices        Monitor/Evaluation    Monitor  Per protocol           Electronically signed by:  Mckayla Lee RD  07/10/19 10:21 AM

## 2019-07-11 LAB — VANCOMYCIN TROUGH SERPL-MCNC: 10.4 MCG/ML (ref 5–20)

## 2019-07-11 PROCEDURE — 80202 ASSAY OF VANCOMYCIN: CPT | Performed by: SURGERY

## 2019-07-11 PROCEDURE — 25010000002 ONDANSETRON PER 1 MG: Performed by: SURGERY

## 2019-07-11 PROCEDURE — 25010000002 ENOXAPARIN PER 10 MG: Performed by: SURGERY

## 2019-07-11 PROCEDURE — 25010000002 VANCOMYCIN 750 MG RECONSTITUTED SOLUTION: Performed by: SURGERY

## 2019-07-11 RX ORDER — ACETAMINOPHEN 325 MG/1
650 TABLET ORAL EVERY 6 HOURS PRN
Status: DISCONTINUED | OUTPATIENT
Start: 2019-07-11 | End: 2019-07-13 | Stop reason: HOSPADM

## 2019-07-11 RX ORDER — VANCOMYCIN HYDROCHLORIDE 1 G/200ML
1000 INJECTION, SOLUTION INTRAVENOUS EVERY 8 HOURS
Status: DISCONTINUED | OUTPATIENT
Start: 2019-07-12 | End: 2019-07-13 | Stop reason: HOSPADM

## 2019-07-11 RX ADMIN — ENOXAPARIN SODIUM 30 MG: 30 INJECTION SUBCUTANEOUS at 08:28

## 2019-07-11 RX ADMIN — SODIUM CHLORIDE 75 ML/HR: 9 INJECTION, SOLUTION INTRAVENOUS at 06:18

## 2019-07-11 RX ADMIN — VANCOMYCIN HYDROCHLORIDE 750 MG: 750 INJECTION, POWDER, LYOPHILIZED, FOR SOLUTION INTRAVENOUS at 13:26

## 2019-07-11 RX ADMIN — ONDANSETRON 4 MG: 2 INJECTION INTRAMUSCULAR; INTRAVENOUS at 17:38

## 2019-07-11 RX ADMIN — HYDROCODONE BITARTRATE AND ACETAMINOPHEN 1 TABLET: 5; 325 TABLET ORAL at 20:15

## 2019-07-11 RX ADMIN — HYDROCODONE BITARTRATE AND ACETAMINOPHEN 1 TABLET: 5; 325 TABLET ORAL at 13:25

## 2019-07-11 RX ADMIN — SODIUM CHLORIDE 75 ML/HR: 9 INJECTION, SOLUTION INTRAVENOUS at 04:10

## 2019-07-11 RX ADMIN — VANCOMYCIN HYDROCHLORIDE 750 MG: 750 INJECTION, POWDER, LYOPHILIZED, FOR SOLUTION INTRAVENOUS at 04:10

## 2019-07-11 RX ADMIN — LEVETIRACETAM 500 MG: 500 TABLET, FILM COATED ORAL at 20:15

## 2019-07-11 RX ADMIN — SODIUM CHLORIDE 50 ML/HR: 9 INJECTION, SOLUTION INTRAVENOUS at 23:02

## 2019-07-11 RX ADMIN — VANCOMYCIN HYDROCHLORIDE 750 MG: 750 INJECTION, POWDER, LYOPHILIZED, FOR SOLUTION INTRAVENOUS at 20:15

## 2019-07-11 RX ADMIN — PANTOPRAZOLE SODIUM 40 MG: 40 TABLET, DELAYED RELEASE ORAL at 08:28

## 2019-07-11 RX ADMIN — HYDROCODONE BITARTRATE AND ACETAMINOPHEN 1 TABLET: 5; 325 TABLET ORAL at 08:28

## 2019-07-11 NOTE — PROGRESS NOTES
"Pharmacokinetic Evaluation - Vancomycin    Alex Brown is a 29 y.o. male on vancomycin pharmacy to dose.  MRN: [id number]  : 1990    Day of vancomycin therapy: 3  Indication: intra-abdominal infection (recurrent pelvic abscess)  Consulted by: Dr. Ballard  Goal trough: 15-20    Current dose: 750 mg Q8H  Other antimicrobials: None    Blood pressure 103/59, pulse 90, temperature 98.6 °F (37 °C), temperature source Oral, resp. rate 16, height 167.6 cm (66\"), weight 44.5 kg (98 lb), SpO2 96 %.  Results from last 7 days   Lab Units 07/10/19  0617   CREATININE mg/dL 0.51*     Estimated Creatinine Clearance: 132.1 mL/min (A) (by C-G formula based on SCr of 0.51 mg/dL (L)).  Results from last 7 days   Lab Units 07/10/19  0617   WBC 10*3/mm3 12.82*   HEMOGLOBIN g/dL 11.8*   HEMATOCRIT % 35.8*   PLATELETS 10*3/mm3 212     Other relevant labs/chart info: 28 YO M with hx of ulcerative colitis and cerebral palsy ultimately required total proctocolectomy with end ileostomy in . Pt now with recurrent perineal drainage s/p open drainage of abscess on .    Radiology:    CT Abd/Pel = \"Significant interval increase in the complex thick-walled precoccygeal/presacral fluid collection, currently measuring 9 x 4.5 x 4 cm.\"    Cultures:   : Wound cx (abdominal cavity) = MRSA (vanc COURTNEY = 1)  : Body fluid cx (pelvis) = pending      Dosing hx (include troughs if drawn):  : Vancomycin 1000 mg load at 1447  7/10: Vancomycin 750 mg Q12 (16.8 mg/kg) started at 0152.   7/10: Vancomycin 750 mg Q8 (16.8 mg/kg) started at 1322, 1953  : Vancomycin 750 mg Q8 at 0410    Patient historically had been on vancomycin on  - 2016 dosed at 750 q8 with trough = 12.4    Assessment:  Based on historical dosing and young age, will continue 750 q8. Will check level on  at 2030 after 4 total doses on current dosing regimen.    Plan:  1) Continue vancomycin 750 mg every 8 hours.  2) Next trough on  at 1930 " after 4 total doses on the current dosing regimen.  3) Monitor for s/sxns of vancomycin toxicity including changes in UOP/SCr; encourage hydration as tolerated to decrease risk of toxic accumulation.    Nick Quiros, PharmD, BCPS  Clinical Staff Pharmacist  Ext. [phone]

## 2019-07-11 NOTE — PROGRESS NOTES
Postoperative day 2 pelvic abscess drainage    Afebrile vital signs stable  Incision clean, RITIKA serous  Ostomy functioning well    -Operative culture pending  -Diet as tolerated

## 2019-07-11 NOTE — PLAN OF CARE
Problem: Patient Care Overview  Goal: Plan of Care Review  Outcome: Ongoing (interventions implemented as appropriate)   07/11/19 1946   Coping/Psychosocial   Plan of Care Reviewed With family;father;mother   Plan of Care Review   Progress no change   OTHER   Outcome Summary Pain meds given 2x today, zofran 1x today. [place] removed per order. mom and dad at bedside consistently throughout day. mom is caregiver and performs all of patient care. RITIKA dressing changed and abd binder removed. patient appears to be comfortable and will continue to monitor.      Goal: Individualization and Mutuality  Outcome: Ongoing (interventions implemented as appropriate)      Problem: Skin Injury Risk (Adult)  Goal: Skin Health and Integrity  Outcome: Ongoing (interventions implemented as appropriate)      Problem: Pain, Acute (Adult)  Goal: Acceptable Pain Control/Comfort Level  Outcome: Ongoing (interventions implemented as appropriate)

## 2019-07-12 LAB
BACTERIA FLD CULT: NORMAL
GRAM STN SPEC: NORMAL
GRAM STN SPEC: NORMAL

## 2019-07-12 PROCEDURE — 25010000002 ENOXAPARIN PER 10 MG: Performed by: SURGERY

## 2019-07-12 PROCEDURE — 25010000002 ONDANSETRON PER 1 MG: Performed by: SURGERY

## 2019-07-12 PROCEDURE — 25010000002 VANCOMYCIN PER 500 MG: Performed by: SURGERY

## 2019-07-12 RX ORDER — HYDROCODONE BITARTRATE AND ACETAMINOPHEN 5; 325 MG/1; MG/1
1 TABLET ORAL EVERY 4 HOURS PRN
Status: DISCONTINUED | OUTPATIENT
Start: 2019-07-12 | End: 2019-07-13 | Stop reason: HOSPADM

## 2019-07-12 RX ADMIN — ENOXAPARIN SODIUM 30 MG: 30 INJECTION SUBCUTANEOUS at 09:42

## 2019-07-12 RX ADMIN — PANTOPRAZOLE SODIUM 40 MG: 40 TABLET, DELAYED RELEASE ORAL at 06:36

## 2019-07-12 RX ADMIN — HYDROCODONE BITARTRATE AND ACETAMINOPHEN 10 ML: 7.5; 325 SOLUTION ORAL at 21:17

## 2019-07-12 RX ADMIN — ONDANSETRON 4 MG: 2 INJECTION INTRAMUSCULAR; INTRAVENOUS at 18:29

## 2019-07-12 RX ADMIN — ACETAMINOPHEN 650 MG: 325 TABLET, FILM COATED ORAL at 06:36

## 2019-07-12 RX ADMIN — LEVETIRACETAM 500 MG: 500 TABLET, FILM COATED ORAL at 21:17

## 2019-07-12 RX ADMIN — ACETAMINOPHEN 650 MG: 325 TABLET, FILM COATED ORAL at 17:24

## 2019-07-12 RX ADMIN — VANCOMYCIN HYDROCHLORIDE 1000 MG: 1 INJECTION, SOLUTION INTRAVENOUS at 12:15

## 2019-07-12 RX ADMIN — VANCOMYCIN HYDROCHLORIDE 1000 MG: 1 INJECTION, SOLUTION INTRAVENOUS at 21:17

## 2019-07-12 RX ADMIN — VANCOMYCIN HYDROCHLORIDE 1000 MG: 1 INJECTION, SOLUTION INTRAVENOUS at 04:27

## 2019-07-12 RX ADMIN — HYDROCODONE BITARTRATE AND ACETAMINOPHEN 1 TABLET: 5; 325 TABLET ORAL at 09:42

## 2019-07-12 NOTE — PROGRESS NOTES
Continued Stay Note  [facility]     Patient Name: Alex Brown  MRN: [id number]  Today's Date: 7/12/2019    Admit Date: 7/9/2019    Discharge Plan     Row Name 07/12/19 1214       Plan    Plan  Home with parents     Patient/Family in Agreement with Plan  yes    Plan Comments  CCP met with patient and patient's parents at bedside; confirmed plan is to reutrn home. Patient's mother provides all personal care to patient. Patient's mother states they have needed DME at home and services in place. Patient's mother denies any discharge needs. CCP will follow for any needs to arise. Leanna TORRES         Discharge Codes    No documentation.             MELISSA Tavera

## 2019-07-12 NOTE — PLAN OF CARE
Problem: Patient Care Overview  Goal: Plan of Care Review  Outcome: Ongoing (interventions implemented as appropriate)   07/11/19 1946 07/11/19 2251 07/12/19 4818   Coping/Psychosocial   Plan of Care Reviewed With --  patient;mother --    Plan of Care Review   Progress no change --  --    OTHER   Outcome Summary --  --  Pt rested well with mother at bedside. Medicated for pain X1. Pt had large wet brief this AM. RITIKA drained 30ml's tonight. Mother denies staff for turns and turns herself. Continue IV fluids, antibiotics and monitor.      Goal: Individualization and Mutuality  Outcome: Ongoing (interventions implemented as appropriate)    Goal: Discharge Needs Assessment  Outcome: Ongoing (interventions implemented as appropriate)      Problem: Skin Injury Risk (Adult)  Goal: Skin Health and Integrity  Outcome: Ongoing (interventions implemented as appropriate)      Problem: Pain, Acute (Adult)  Goal: Acceptable Pain Control/Comfort Level  Outcome: Ongoing (interventions implemented as appropriate)

## 2019-07-12 NOTE — PROGRESS NOTES
Postoperative day 3 pelvic abscess drainage    Afebrile vital signs stable  Incision healing well  RITIKA serous  Ostomy functioning well    Continue IV vancomycin  Home soon on Bactrim

## 2019-07-12 NOTE — PROGRESS NOTES
"Pharmacokinetic Consult - Vancomycin Dosing (Follow-up Note)    Alex Brown is on day 3 pharmacy to dose vancomycin for intra-abdominal infection.  Pharmacy dosing vancomycin per Dr. Ballard's request.   Goal trough: 15-20 mg/L     Relevant clinical data and objective history reviewed:  29 y.o. male 167.6 cm (66\") 44.5 kg (98 lb)      Creatinine   Date Value Ref Range Status   07/10/2019 0.51 (L) 0.76 - 1.27 mg/dL Final     BUN   Date Value Ref Range Status   07/10/2019 8 6 - 20 mg/dL Final     Estimated Creatinine Clearance: 132.1 mL/min (A) (by C-G formula based on SCr of 0.51 mg/dL (L)).    Lab Results   Component Value Date    WBC 12.82 (H) 07/10/2019     Temp Readings from Last 3 Encounters:   07/11/19 98.2 °F (36.8 °C) (Oral)   07/03/19 97.8 °F (36.6 °C) (Axillary)   02/22/18 98.6 °F (37 °C) (Axillary)        Lab Results   Component Value Date    VANCOTROUGH 10.40 07/11/2019     No results found for: VANCORANDOM    Assessment/Plan  Trough 10.4 mcg/ml. Adjusting dose to 1gm IV q8h. Vancomycin level 0330 7/13. Pharmacy will continue to follow daily while on vancomycin and adjust as needed.     Esperanza oMrse, [facility]  "

## 2019-07-12 NOTE — PROGRESS NOTES
"Pharmacokinetic Evaluation - Vancomycin    Alex Brown is a 29 y.o. male on vancomycin pharmacy to dose.  MRN: [id number]  : 1990    Day of vancomycin therapy: 2  Indication: intra-abdominal infection (recurrent pelvic abscess)  Consulted by: Dr. Ballard  Goal trough: 15-20    Current dose: 750 mg Q12  Other antimicrobials: None    Blood pressure 115/72, pulse 105, temperature 97.8 °F (36.6 °C), temperature source Oral, resp. rate 18, height 167.6 cm (66\"), weight 44.5 kg (98 lb), SpO2 94 %.  Results from last 7 days   Lab Units 07/10/19  0617   CREATININE mg/dL 0.51*     Estimated Creatinine Clearance: 132.1 mL/min (A) (by C-G formula based on SCr of 0.51 mg/dL (L)).  Results from last 7 days   Lab Units 07/10/19  0617   WBC 10*3/mm3 12.82*   HEMOGLOBIN g/dL 11.8*   HEMATOCRIT % 35.8*   PLATELETS 10*3/mm3 212     Other relevant labs/chart info: 28 YO M with hx of ulcerative colitis and cerebral palsy ultimately required total proctocolectomy with end ileostomy in . Pt now with recurrent perineal drainage s/p open drainage of abscess on .    Radiology:    CT Abd/Pel = \"Significant interval increase in the complex thick-walled precoccygeal/presacral fluid collection, currently measuring 9 x 4.5 x 4 cm.\"    Cultures:   : Wound cx (abdominal cavity) = MRSA (vanc COURTNEY = 1)  : Body fluid cx (pelvis) = No organisms seen    Dosing hx (include troughs if drawn):  : Vancomycin 1000 mg load at 1447  7/10: Vancomycin 750 mg Q12 (16.8 mg/kg) started at 0152; changed to 750 q8 at 1322.  : Vancomycin level = 10.4 @ 1929 (drawn after 5 total doses).  : Vancomycin 1000 mg IV q8 started at 0427    Patient historically had been on vancomycin on  - 2016 dosed at 750 q8 with trough = 12.4    Assessment:  Dose was increased to 1000 mg q8 - agree with increase. Will continue with current dose and plan to recheck level on Sat  at 1130 after 4 total doses (changed to accommodate " staffing)     Plan:  1) Continue with vancomycin to 1000 mg every 8 hours.  2) Next trough on Saturday 7/13 at 1130 after 4 total doses.  3) Monitor for s/sxns of vancomycin toxicity including changes in UOP/SCr; encourage hydration as tolerated to decrease risk of toxic accumulation.    Bertha Koenig, Pharm.D., [facility]  Oncology Pharmacy Specialist  [phone]

## 2019-07-12 NOTE — PLAN OF CARE
Problem: Patient Care Overview  Goal: Plan of Care Review  Outcome: Ongoing (interventions implemented as appropriate)   07/12/19 7576   Coping/Psychosocial   Plan of Care Reviewed With patient;father;mother   Plan of Care Review   Progress improving   OTHER   Outcome Summary Pt has denied nausea but complained of pain x1, medicated with norco. IV abx and IVF continued. Eating most of meals. Staples c/d/i sergei. Encouraged to sit up in chair, mother and father wants to move pt to chair later. Mother and father wish to handle most of care (turning, feeding, placing pills in mouth). Possible d/c home tomorrow per MD. WIll continue to monitor.      Goal: Individualization and Mutuality  Outcome: Ongoing (interventions implemented as appropriate)    Goal: Discharge Needs Assessment  Outcome: Ongoing (interventions implemented as appropriate)      Problem: Skin Injury Risk (Adult)  Goal: Skin Health and Integrity  Outcome: Ongoing (interventions implemented as appropriate)      Problem: Pain, Acute (Adult)  Goal: Acceptable Pain Control/Comfort Level  Outcome: Ongoing (interventions implemented as appropriate)

## 2019-07-13 VITALS
WEIGHT: 98 LBS | HEIGHT: 66 IN | TEMPERATURE: 98.2 F | HEART RATE: 104 BPM | OXYGEN SATURATION: 95 % | RESPIRATION RATE: 20 BRPM | DIASTOLIC BLOOD PRESSURE: 64 MMHG | SYSTOLIC BLOOD PRESSURE: 113 MMHG | BODY MASS INDEX: 15.75 KG/M2

## 2019-07-13 LAB
ANION GAP SERPL CALCULATED.3IONS-SCNC: 9.4 MMOL/L (ref 5–15)
BUN BLD-MCNC: 7 MG/DL (ref 6–20)
BUN/CREAT SERPL: 14 (ref 7–25)
CALCIUM SPEC-SCNC: 9.4 MG/DL (ref 8.6–10.5)
CHLORIDE SERPL-SCNC: 101 MMOL/L (ref 98–107)
CO2 SERPL-SCNC: 26.6 MMOL/L (ref 22–29)
CREAT BLD-MCNC: 0.5 MG/DL (ref 0.76–1.27)
DEPRECATED RDW RBC AUTO: 42.3 FL (ref 37–54)
ERYTHROCYTE [DISTWIDTH] IN BLOOD BY AUTOMATED COUNT: 12.8 % (ref 12.3–15.4)
GFR SERPL CREATININE-BSD FRML MDRD: >150 ML/MIN/1.73
GLUCOSE BLD-MCNC: 113 MG/DL (ref 65–99)
HCT VFR BLD AUTO: 41.2 % (ref 37.5–51)
HGB BLD-MCNC: 13.7 G/DL (ref 13–17.7)
MCH RBC QN AUTO: 30.4 PG (ref 26.6–33)
MCHC RBC AUTO-ENTMCNC: 33.3 G/DL (ref 31.5–35.7)
MCV RBC AUTO: 91.4 FL (ref 79–97)
PLATELET # BLD AUTO: 222 10*3/MM3 (ref 140–450)
PMV BLD AUTO: 11 FL (ref 6–12)
POTASSIUM BLD-SCNC: 3.9 MMOL/L (ref 3.5–5.2)
RBC # BLD AUTO: 4.51 10*6/MM3 (ref 4.14–5.8)
SODIUM BLD-SCNC: 137 MMOL/L (ref 136–145)
WBC NRBC COR # BLD: 9.02 10*3/MM3 (ref 3.4–10.8)

## 2019-07-13 PROCEDURE — 80048 BASIC METABOLIC PNL TOTAL CA: CPT | Performed by: SURGERY

## 2019-07-13 PROCEDURE — 25010000002 VANCOMYCIN PER 500 MG: Performed by: SURGERY

## 2019-07-13 PROCEDURE — 25010000002 ONDANSETRON PER 1 MG: Performed by: SURGERY

## 2019-07-13 PROCEDURE — 85027 COMPLETE CBC AUTOMATED: CPT | Performed by: SURGERY

## 2019-07-13 PROCEDURE — 25010000002 ENOXAPARIN PER 10 MG: Performed by: SURGERY

## 2019-07-13 RX ORDER — SULFAMETHOXAZOLE AND TRIMETHOPRIM 800; 160 MG/1; MG/1
1 TABLET ORAL 2 TIMES DAILY
Qty: 14 TABLET | Refills: 0 | Status: SHIPPED | OUTPATIENT
Start: 2019-07-13 | End: 2019-07-20

## 2019-07-13 RX ORDER — ONDANSETRON 4 MG/1
4 TABLET, ORALLY DISINTEGRATING ORAL
Qty: 30 TABLET | Refills: 1 | Status: SHIPPED | OUTPATIENT
Start: 2019-07-13 | End: 2019-07-20

## 2019-07-13 RX ADMIN — ENOXAPARIN SODIUM 30 MG: 30 INJECTION SUBCUTANEOUS at 08:24

## 2019-07-13 RX ADMIN — HYDROCODONE BITARTRATE AND ACETAMINOPHEN 10 ML: 7.5; 325 SOLUTION ORAL at 04:07

## 2019-07-13 RX ADMIN — PANTOPRAZOLE SODIUM 40 MG: 40 TABLET, DELAYED RELEASE ORAL at 08:24

## 2019-07-13 RX ADMIN — HYDROCODONE BITARTRATE AND ACETAMINOPHEN 10 ML: 7.5; 325 SOLUTION ORAL at 10:54

## 2019-07-13 RX ADMIN — ONDANSETRON 4 MG: 2 INJECTION INTRAMUSCULAR; INTRAVENOUS at 08:23

## 2019-07-13 RX ADMIN — SODIUM CHLORIDE 50 ML/HR: 9 INJECTION, SOLUTION INTRAVENOUS at 00:39

## 2019-07-13 RX ADMIN — ONDANSETRON 4 MG: 2 INJECTION INTRAMUSCULAR; INTRAVENOUS at 00:39

## 2019-07-13 RX ADMIN — VANCOMYCIN HYDROCHLORIDE 1000 MG: 1 INJECTION, SOLUTION INTRAVENOUS at 04:06

## 2019-07-13 NOTE — PLAN OF CARE
Problem: Patient Care Overview  Goal: Plan of Care Review  Outcome: Ongoing (interventions implemented as appropriate)   07/12/19 2255 07/13/19 0536   Coping/Psychosocial   Plan of Care Reviewed With patient;mother;family --    Plan of Care Review   Progress --  no change   OTHER   Outcome Summary --  Pt rested much better after changing to liquid Hycet (given X2). Barron had 100ml's total output tonight. Waiting on AM lab results. Continue IV fluids, antibiotics, pain control and monitor.     Goal: Individualization and Mutuality  Outcome: Ongoing (interventions implemented as appropriate)    Goal: Discharge Needs Assessment  Outcome: Ongoing (interventions implemented as appropriate)      Problem: Skin Injury Risk (Adult)  Goal: Skin Health and Integrity  Outcome: Ongoing (interventions implemented as appropriate)      Problem: Pain, Acute (Adult)  Goal: Acceptable Pain Control/Comfort Level  Outcome: Ongoing (interventions implemented as appropriate)

## 2019-07-13 NOTE — DISCHARGE SUMMARY
DATE OF ADMIT: 7/9/2019    DATE OF DISCHARGE: 7/13/2019    DIAGNOSIS: Pelvic abscess    PROCEDURES: Exploratory laparotomy with drainage of pelvic abscess 7/9/2019    SUMMARY OF HOSPITAL COURSE:   Uneventful postop course. Tolerating diet, incision in good order and afebrile with stable vital signs at discharge.  RITIKA drain was putting out serous fluid.  Operative cultures demonstrated no anaerobes and scant growth of normal urogenital sepideh.  Previous perineal wound cultures from 6/27/2019 demonstrated MRSA.  Prescribed hydrocodone for pain.  Prescribed Bactrim for 1 more week.    DIET: Regular    ACTIVITY: Return to normal    MEDICATIONS: Refer to MAR    FOLLOW-UP: To call office and schedule 1 week follow-up appointment    Vinh Ballard M.D.

## 2019-07-14 ENCOUNTER — READMISSION MANAGEMENT (OUTPATIENT)
Dept: CALL CENTER | Facility: HOSPITAL | Age: 29
End: 2019-07-14

## 2019-07-14 LAB — BACTERIA SPEC ANAEROBE CULT: NORMAL

## 2019-07-14 NOTE — OUTREACH NOTE
Prep Survey      Responses   Facility patient discharged from?  [facility]   Is patient eligible?  Yes   Discharge diagnosis  Postprocedural intraabdominal abscess, S/P exploratory laparotomy with drainage of pelvic abscess   Does the patient have one of the following disease processes/diagnoses(primary or secondary)?  General Surgery   Does the patient have Home health ordered?  No   Is there a DME ordered?  No   Comments regarding appointments  Pt to schedule follow up appointments   Prep survey completed?  Yes          Cassie Gomez RN

## 2019-07-15 ENCOUNTER — READMISSION MANAGEMENT (OUTPATIENT)
Dept: CALL CENTER | Facility: HOSPITAL | Age: 29
End: 2019-07-15

## 2019-07-15 NOTE — PROGRESS NOTES
Case Management Discharge Note    Final Note: Discharged to home on 7/13/19 @ 10:46. B. SHAYY Yi, CCP.     Destination      No service has been selected for the patient.      Durable Medical Equipment      No service has been selected for the patient.      Dialysis/Infusion      No service has been selected for the patient.      Home Medical Care      No service has been selected for the patient.      Therapy      No service has been selected for the patient.      Community Resources      No service has been selected for the patient.             Final Discharge Disposition Code: 01 - home or self-care

## 2019-07-15 NOTE — OUTREACH NOTE
General Surgery Week 1 Survey      Responses   Facility patient discharged from?  [place]   Does the patient have one of the following disease processes/diagnoses(primary or secondary)?  General Surgery   Is there a successful TCM telephone encounter documented?  No   Week 1 attempt successful?  Yes   Call start time  1645   Call end time  1651   Discharge diagnosis  Postprocedural intraabdominal abscess, S/P exploratory laparotomy with drainage of pelvic abscess   Person spoke with today (if not patient) and relationship  niecy Gallegos   Meds reviewed with patient/caregiver?  Yes   Is the patient having any side effects they believe may be caused by any medication additions or changes?  No   Does the patient have all medications related to this admission filled (includes all antibiotics, pain medications, etc.)  Yes   Is the patient taking all medications as directed (includes completed medication regime)?  Yes   Does the patient have a follow up appointment scheduled with their surgeon?  Yes   Has the patient kept scheduled appointments due by today?  N/A   Has home health visited the patient within 72 hours of discharge?  N/A   Psychosocial issues?  No   Comments  Rosy states RITIKA drain patent, draining approx 1 ounce/day, pt is special needs    Did the patient receive a copy of their discharge instructions?  Yes   Nursing interventions  Reviewed instructions with patient   What is the patient's perception of their health status since discharge?  Improving   Nursing interventions  Nurse provided patient education   Is the patient /caregiver able to teach back basic post-op care?  Drive as instructed by MD in discharge instructions, Take showers only when approved by MD-sponge bathe until then, No tub bath, swimming, or hot tub until instructed by MD, Keep incision areas clean,dry and protected, Do not remove steri-strips, Lifting as instructed by MD in discharge instructions [patient not capable of using  incentive spirometer due to mental capacity]   Is the patient/caregiver able to teach back signs and symptoms of incisional infection?  Increased redness, swelling or pain at the incisonal site, Increased drainage or bleeding, Incisional warmth, Pus or odor from incision, Fever   Is the patient/caregiver able to teach back steps to recovery at home?  Set small, achievable goals for return to baseline health, Rest and rebuild strength, gradually increase activity   Is the patient/caregiver able to teach back the hierarchy of who to call/visit for symptoms/problems? PCP, Specialist, Home health nurse, Urgent Care, ED, 911  Yes   Week 1 call completed?  Yes          Delma Wright RN

## 2019-07-22 ENCOUNTER — OFFICE VISIT (OUTPATIENT)
Dept: SURGERY | Facility: CLINIC | Age: 29
End: 2019-07-22

## 2019-07-22 DIAGNOSIS — Z09 FOLLOW UP: Primary | ICD-10-CM

## 2019-07-22 PROCEDURE — 99024 POSTOP FOLLOW-UP VISIT: CPT | Performed by: SURGERY

## 2019-07-23 ENCOUNTER — READMISSION MANAGEMENT (OUTPATIENT)
Dept: CALL CENTER | Facility: HOSPITAL | Age: 29
End: 2019-07-23

## 2019-07-23 NOTE — OUTREACH NOTE
General Surgery Week 2 Survey      Responses   Facility patient discharged from?  [place]   Does the patient have one of the following disease processes/diagnoses(primary or secondary)?  General Surgery   Week 2 attempt successful?  Yes   Call start time  0916   Call end time  0918   Discharge diagnosis  Postprocedural intraabdominal abscess, S/P exploratory laparotomy with drainage of pelvic abscess   Is patient permission given to speak with other caregiver?  Yes   List who call center can speak with  mother   Person spoke with today (if not patient) and relationship  Rosy, guardian   Meds reviewed with patient/caregiver?  Yes   Is the patient taking all medications as directed (includes completed medication regime)?  Yes   Does the patient have a follow up appointment scheduled with their surgeon?  Yes   Has the patient kept scheduled appointments due by today?  Yes   Psychosocial issues?  No   Comments  Drain and staples removed yesterday. Appetite is slowly returning. Denies issues.    What is the patient's perception of their health status since discharge?  Improving   Nursing interventions  Nurse provided patient education   Is the patient /caregiver able to teach back basic post-op care?  Drive as instructed by MD in discharge instructions, Continue use of incentive spirometry at least 1 week post discharge, Do not remove steri-strips, Keep incision areas clean,dry and protected   Is the patient/caregiver able to teach back signs and symptoms of incisional infection?  Increased redness, swelling or pain at the incisonal site, Incisional warmth   Is the patient/caregiver able to teach back steps to recovery at home?  Weigh daily, Rest and rebuild strength, gradually increase activity   Is the patient/caregiver able to teach back the hierarchy of who to call/visit for symptoms/problems? PCP, Specialist, Home health nurse, Urgent Care, ED, 911  Yes   Week 2 call completed?  Yes   Revoked  No further  contact(revokes)-requires comment   Graduated/Revoked comments  feels no further calls necessary          Armida Jenkins RN

## 2019-07-24 NOTE — PROGRESS NOTES
Postoperative visit    Exploratory laparotomy with drainage of pelvic abscess 7/9/2019    Office visit: Incision is healing well and RITIKA drain is putting out minimal serous fluid.  RITIKA drain was removed today.  They will follow-up as needed.

## 2019-08-06 LAB — FUNGUS WND CULT: NORMAL

## 2019-11-20 ENCOUNTER — APPOINTMENT (OUTPATIENT)
Dept: CT IMAGING | Facility: HOSPITAL | Age: 29
End: 2019-11-20

## 2019-11-20 ENCOUNTER — HOSPITAL ENCOUNTER (EMERGENCY)
Facility: HOSPITAL | Age: 29
Discharge: HOME | End: 2019-11-20
Attending: EMERGENCY MEDICINE | Admitting: EMERGENCY MEDICINE

## 2019-11-20 VITALS
WEIGHT: 94.2 LBS | SYSTOLIC BLOOD PRESSURE: 102 MMHG | TEMPERATURE: 97.8 F | DIASTOLIC BLOOD PRESSURE: 66 MMHG | HEIGHT: 66 IN | OXYGEN SATURATION: 97 % | BODY MASS INDEX: 15.14 KG/M2 | HEART RATE: 106 BPM | RESPIRATION RATE: 16 BRPM

## 2019-11-20 DIAGNOSIS — R19.7 DIARRHEA, UNSPECIFIED TYPE: Primary | ICD-10-CM

## 2019-11-20 LAB
ALBUMIN SERPL-MCNC: 5.8 G/DL (ref 3.5–5.2)
ALBUMIN/GLOB SERPL: 1.4 G/DL
ALP SERPL-CCNC: 96 U/L (ref 39–117)
ALT SERPL W P-5'-P-CCNC: 19 U/L (ref 1–41)
ANION GAP SERPL CALCULATED.3IONS-SCNC: 19.4 MMOL/L (ref 5–15)
AST SERPL-CCNC: 17 U/L (ref 1–40)
BACTERIA UR QL AUTO: NORMAL /HPF
BILIRUB SERPL-MCNC: 0.6 MG/DL (ref 0.2–1.2)
BILIRUB UR QL STRIP: NEGATIVE
BUN BLD-MCNC: 23 MG/DL (ref 6–20)
BUN/CREAT SERPL: 19.8 (ref 7–25)
CALCIUM SPEC-SCNC: 10.9 MG/DL (ref 8.6–10.5)
CHLORIDE SERPL-SCNC: 101 MMOL/L (ref 98–107)
CLARITY UR: CLEAR
CO2 SERPL-SCNC: 21.6 MMOL/L (ref 22–29)
COLOR UR: YELLOW
CREAT BLD-MCNC: 1.16 MG/DL (ref 0.76–1.27)
D-LACTATE SERPL-SCNC: 1.8 MMOL/L (ref 0.5–2)
DEPRECATED RDW RBC AUTO: 45 FL (ref 37–54)
ERYTHROCYTE [DISTWIDTH] IN BLOOD BY AUTOMATED COUNT: 13.6 % (ref 12.3–15.4)
GFR SERPL CREATININE-BSD FRML MDRD: 73 ML/MIN/1.73
GLOBULIN UR ELPH-MCNC: 4.1 GM/DL
GLUCOSE BLD-MCNC: 147 MG/DL (ref 65–99)
GLUCOSE UR STRIP-MCNC: NEGATIVE MG/DL
HCT VFR BLD AUTO: 56.9 % (ref 37.5–51)
HGB BLD-MCNC: 19.2 G/DL (ref 13–17.7)
HGB UR QL STRIP.AUTO: NEGATIVE
HOLD SPECIMEN: NORMAL
HOLD SPECIMEN: NORMAL
HYALINE CASTS UR QL AUTO: NORMAL /LPF
KETONES UR QL STRIP: ABNORMAL
LEUKOCYTE ESTERASE UR QL STRIP.AUTO: NEGATIVE
LIPASE SERPL-CCNC: 10 U/L (ref 13–60)
LYMPHOCYTES # BLD MANUAL: 0.82 10*3/MM3 (ref 0.7–3.1)
LYMPHOCYTES NFR BLD MANUAL: 10 % (ref 19.6–45.3)
LYMPHOCYTES NFR BLD MANUAL: 11 % (ref 5–12)
MCH RBC QN AUTO: 31 PG (ref 26.6–33)
MCHC RBC AUTO-ENTMCNC: 33.7 G/DL (ref 31.5–35.7)
MCV RBC AUTO: 91.9 FL (ref 79–97)
MONOCYTES # BLD AUTO: 0.9 10*3/MM3 (ref 0.1–0.9)
NEUTROPHILS # BLD AUTO: 6.44 10*3/MM3 (ref 1.7–7)
NEUTROPHILS NFR BLD MANUAL: 79 % (ref 42.7–76)
NITRITE UR QL STRIP: NEGATIVE
PH UR STRIP.AUTO: 5.5 [PH] (ref 5–8)
PLAT MORPH BLD: NORMAL
PLATELET # BLD AUTO: 252 10*3/MM3 (ref 140–450)
PMV BLD AUTO: 10.9 FL (ref 6–12)
POLYCHROMASIA BLD QL SMEAR: ABNORMAL
POTASSIUM BLD-SCNC: 4.9 MMOL/L (ref 3.5–5.2)
PROT SERPL-MCNC: 9.9 G/DL (ref 6–8.5)
PROT UR QL STRIP: ABNORMAL
RBC # BLD AUTO: 6.19 10*6/MM3 (ref 4.14–5.8)
RBC # UR: NORMAL /HPF
REF LAB TEST METHOD: NORMAL
SODIUM BLD-SCNC: 142 MMOL/L (ref 136–145)
SP GR UR STRIP: >=1.03 (ref 1–1.03)
SQUAMOUS #/AREA URNS HPF: NORMAL /HPF
UROBILINOGEN UR QL STRIP: ABNORMAL
WBC MORPH BLD: NORMAL
WBC NRBC COR # BLD: 8.15 10*3/MM3 (ref 3.4–10.8)
WBC UR QL AUTO: NORMAL /HPF
WHOLE BLOOD HOLD SPECIMEN: NORMAL
WHOLE BLOOD HOLD SPECIMEN: NORMAL

## 2019-11-20 PROCEDURE — 80053 COMPREHEN METABOLIC PANEL: CPT | Performed by: PHYSICIAN ASSISTANT

## 2019-11-20 PROCEDURE — 85007 BL SMEAR W/DIFF WBC COUNT: CPT | Performed by: PHYSICIAN ASSISTANT

## 2019-11-20 PROCEDURE — 83605 ASSAY OF LACTIC ACID: CPT | Performed by: PHYSICIAN ASSISTANT

## 2019-11-20 PROCEDURE — 25010000002 IOPAMIDOL 61 % SOLUTION: Performed by: EMERGENCY MEDICINE

## 2019-11-20 PROCEDURE — 83690 ASSAY OF LIPASE: CPT | Performed by: PHYSICIAN ASSISTANT

## 2019-11-20 PROCEDURE — 81001 URINALYSIS AUTO W/SCOPE: CPT | Performed by: PHYSICIAN ASSISTANT

## 2019-11-20 PROCEDURE — 74177 CT ABD & PELVIS W/CONTRAST: CPT

## 2019-11-20 PROCEDURE — 99283 EMERGENCY DEPT VISIT LOW MDM: CPT

## 2019-11-20 PROCEDURE — 85025 COMPLETE CBC W/AUTO DIFF WBC: CPT | Performed by: PHYSICIAN ASSISTANT

## 2019-11-20 RX ORDER — SODIUM CHLORIDE 0.9 % (FLUSH) 0.9 %
10 SYRINGE (ML) INJECTION AS NEEDED
Status: DISCONTINUED | OUTPATIENT
Start: 2019-11-20 | End: 2019-11-20 | Stop reason: HOSPADM

## 2019-11-20 RX ADMIN — SODIUM CHLORIDE 1000 ML: 9 INJECTION, SOLUTION INTRAVENOUS at 10:18

## 2019-11-20 RX ADMIN — IOPAMIDOL 85 ML: 612 INJECTION, SOLUTION INTRAVENOUS at 10:50

## 2019-11-20 RX ADMIN — SODIUM CHLORIDE 1000 ML: 9 INJECTION, SOLUTION INTRAVENOUS at 12:19

## 2019-11-20 NOTE — ED PROVIDER NOTES
EMERGENCY DEPARTMENT ENCOUNTER    Room Number:  20/20  PCP: Kira Almaraz MD  Historian: parents  History/ROS limited: pt is nonverbal       HPI:  Chief Complaint: Diarrhea   Context: Alex Brown is a 29 y.o. male who presents to the ED with report of liquid stool output in his colostomy bag for 3 days. Pt's mother states appears uncomfortable and in pain. Pt has had decreased appetite, but has not had vomiting, fever, or hematochezia. He has received Zofran and Imodium PTA.  Pt is s/p colectomy and has a h/o ileus. Pt has a h/o cerebral palsy, anoxic encephalopahty at birth, and epilepsy.     Location: stool  Character: liquid stool output   Duration: 3 days  Severity: moderate   Progression: worsening         MEDICAL RECORD REVIEW    Pt was admitted from 7/9-7/13/19 for a postprocedural intraabdominal abscess.       ALLERGIES  Benadryl [diphenhydramine]; Ceclor [cefaclor]; Dilaudid [hydromorphone hcl]; Lactose intolerance (gi); Milk-related compounds; Reglan [metoclopramide]; and Thorazine [chlorpromazine]    PAST MEDICAL HISTORY  Active Ambulatory Problems     Diagnosis Date Noted   • Perirectal cellulitis 04/08/2016   • Attack, epileptiform (CMS/[facility]) 11/07/2016   • Generalized abdominal pain 02/18/2018   • Anoxic brain damage (CMS/[facility]) 02/19/2018   • Leukocytosis 02/19/2018   • Seizure disorder (CMS/[facility]) 02/19/2018   • Pelvic abscess in male (CMS/[facility]) 02/19/2018   • Postprocedural intraabdominal abscess 06/27/2019     Resolved Ambulatory Problems     Diagnosis Date Noted   • No Resolved Ambulatory Problems     Past Medical History:   Diagnosis Date   • Anemia 2011   • Anoxic encephalopathy (CMS/[facility])    • CP (cerebral palsy) (CMS/[facility]) 02/18/2018   • Epilepsy (CMS/[facility])    • GERD (gastroesophageal reflux disease)    • H/O ulcerative colitis 12/01/2011   • History of aspiration pneumonia    • History of MRSA infection 2012   • Iron deficiency anemia    • PONV (postoperative nausea and vomiting)    •  Rectal bleeding Ulcerative Colitis   • Recurrent sinus infections    • Seizures (CMS/HCC)    • Seizures (CMS/HCC) 2009       PAST SURGICAL HISTORY  Past Surgical History:   Procedure Laterality Date   • ABDOMINOPERINEAL PROCTOCOLECTOMY N/A 01/03/2012    Laparoscopic total proctocolectomy with end ileostomy-Dr. Vinh Ballard [facility]   • CIRCUMCISION N/A    • COLON RESECTION SMALL BOWEL N/A 07/17/2015    Dr. Vinh Ballard [facility]   • COLONOSCOPY N/A 12/01/2011    Severe ulcerative colitits to the mid tranverse colon-Dr. Pop Baez [facility]   • CYSTIC HYGROMA EXCISION     • ENDOSCOPY N/A 02/27/2015    No gross lesions in esophagus, erythematous mucosa in the stomach: biopsied, no gross lesions in the duodenum: biopsied-Dr. Jsoé Manuel Mayberry [facility]   • EXPLORATORY LAPAROTOMY N/A 7/9/2019    Procedure: LAPAROTOMY EXPLORATORY WITH DRAINAGE OF PELVIC ABSCESS;  Surgeon: Vinh Ballard MD;  Location: [facility];  Service: General   • ILEOSCOPY N/A 02/27/2015    Normal ileoscopy with the exception of some erosions at the distal ileum: biopsied-Dr. José Manuel Mayberry [facility]   • MOLE REMOVAL     • PELVIC ABCESS DRAINAGE N/A 07/11/2015    Irrigation and drainage of a pelvic abscess-Dr. Kali Wilcox [facility]   • TESTICLE UNDESCENDED REPAIR         FAMILY HISTORY  Family History   Problem Relation Age of Onset   • Prostate cancer Father    • Malig Hyperthermia Neg Hx        SOCIAL HISTORY  Social History     Socioeconomic History   • Marital status: Single     Spouse name: Not on file   • Number of children: Not on file   • Years of education: Not on file   • Highest education level: Not on file   Tobacco Use   • Smoking status: Never Smoker   • Smokeless tobacco: Never Used   Substance and Sexual Activity   • Alcohol use: No   • Drug use: No   • Sexual activity: No           REVIEW OF SYSTEMS  Review of Systems   Unable to perform ROS: Patient nonverbal   Constitutional: Positive for appetite change. Negative for fever.   Gastrointestinal:  Positive for diarrhea. Negative for blood in stool and nausea.           PHYSICAL EXAM  ED Triage Vitals [11/20/19 0937]   Temp Heart Rate Resp BP SpO2   97.8 °F (36.6 °C) 120 16 -- 96 %      Temp src Heart Rate Source Patient Position BP Location FiO2 (%)   Tympanic -- -- -- --     Physical Exam   Constitutional: No distress.   HENT:   Head: Normocephalic and atraumatic.   Eyes: EOM are normal. Pupils are equal, round, and reactive to light.   Neck: Normal range of motion. Neck supple.   Cardiovascular: Normal rate, regular rhythm and normal heart sounds.   Pulmonary/Chest: Effort normal and breath sounds normal. No respiratory distress.   Abdominal: Soft. There is no tenderness. There is no rebound and no guarding.   Ostomy bag in LLG filled with watery diarrhea    Musculoskeletal: Normal range of motion. He exhibits no edema.   all 4 extremities are contracted    Neurological: He is alert. He has normal sensation and normal strength.   Pt is nonverbal   Skin: Skin is warm and dry.   Nursing note and vitals reviewed.          LAB RESULTS  Recent Results (from the past 24 hour(s))   Light Blue Top    Collection Time: 11/20/19  9:59 AM   Result Value Ref Range    Extra Tube hold for add-on    Green Top (Gel)    Collection Time: 11/20/19  9:59 AM   Result Value Ref Range    Extra Tube Hold for add-ons.    Lavender Top    Collection Time: 11/20/19  9:59 AM   Result Value Ref Range    Extra Tube hold for add-on    Gold Top - SST    Collection Time: 11/20/19  9:59 AM   Result Value Ref Range    Extra Tube Hold for add-ons.    Comprehensive Metabolic Panel    Collection Time: 11/20/19  9:59 AM   Result Value Ref Range    Glucose 147 (H) 65 - 99 mg/dL    BUN 23 (H) 6 - 20 mg/dL    Creatinine 1.16 0.76 - 1.27 mg/dL    Sodium 142 136 - 145 mmol/L    Potassium 4.9 3.5 - 5.2 mmol/L    Chloride 101 98 - 107 mmol/L    CO2 21.6 (L) 22.0 - 29.0 mmol/L    Calcium 10.9 (H) 8.6 - 10.5 mg/dL    Total Protein 9.9 (H) 6.0 - 8.5 g/dL     Albumin 5.80 (H) 3.50 - 5.20 g/dL    ALT (SGPT) 19 1 - 41 U/L    AST (SGOT) 17 1 - 40 U/L    Alkaline Phosphatase 96 39 - 117 U/L    Total Bilirubin 0.6 0.2 - 1.2 mg/dL    eGFR Non African Amer 73 >60 mL/min/1.73    Globulin 4.1 gm/dL    A/G Ratio 1.4 g/dL    BUN/Creatinine Ratio 19.8 7.0 - 25.0    Anion Gap 19.4 (H) 5.0 - 15.0 mmol/L   Lipase    Collection Time: 11/20/19  9:59 AM   Result Value Ref Range    Lipase 10 (L) 13 - 60 U/L   CBC Auto Differential    Collection Time: 11/20/19  9:59 AM   Result Value Ref Range    WBC 8.15 3.40 - 10.80 10*3/mm3    RBC 6.19 (H) 4.14 - 5.80 10*6/mm3    Hemoglobin 19.2 (H) 13.0 - 17.7 g/dL    Hematocrit 56.9 (H) 37.5 - 51.0 %    MCV 91.9 79.0 - 97.0 fL    MCH 31.0 26.6 - 33.0 pg    MCHC 33.7 31.5 - 35.7 g/dL    RDW 13.6 12.3 - 15.4 %    RDW-SD 45.0 37.0 - 54.0 fl    MPV 10.9 6.0 - 12.0 fL    Platelets 252 140 - 450 10*3/mm3   Manual Differential    Collection Time: 11/20/19  9:59 AM   Result Value Ref Range    Neutrophil % 79.0 (H) 42.7 - 76.0 %    Lymphocyte % 10.0 (L) 19.6 - 45.3 %    Monocyte % 11.0 5.0 - 12.0 %    Neutrophils Absolute 6.44 1.70 - 7.00 10*3/mm3    Lymphocytes Absolute 0.82 0.70 - 3.10 10*3/mm3    Monocytes Absolute 0.90 0.10 - 0.90 10*3/mm3    Polychromasia Slight/1+ None Seen    WBC Morphology Normal Normal    Platelet Morphology Normal Normal   Lactic Acid, Plasma    Collection Time: 11/20/19 10:22 AM   Result Value Ref Range    Lactate 1.8 0.5 - 2.0 mmol/L   Urinalysis With Microscopic If Indicated (No Culture) - Urine, Catheter    Collection Time: 11/20/19 11:09 AM   Result Value Ref Range    Color, UA Yellow Yellow, Straw    Appearance, UA Clear Clear    pH, UA 5.5 5.0 - 8.0    Specific Gravity, UA >=1.030 1.005 - 1.030    Glucose, UA Negative Negative    Ketones, UA Trace (A) Negative    Bilirubin, UA Negative Negative    Blood, UA Negative Negative    Protein, UA 30 mg/dL (1+) (A) Negative    Leuk Esterase, UA Negative Negative    Nitrite, UA Negative  Negative    Urobilinogen, UA 0.2 E.U./dL 0.2 - 1.0 E.U./dL   Urinalysis, Microscopic Only - Urine, Catheter    Collection Time: 11/20/19 11:09 AM   Result Value Ref Range    RBC, UA 0-2 None Seen, 0-2 /HPF    WBC, UA 0-2 None Seen, 0-2 /HPF    Bacteria, UA None Seen None Seen /HPF    Squamous Epithelial Cells, UA 0-2 None Seen, 0-2 /HPF    Hyaline Casts, UA 3-6 None Seen /LPF    Methodology Automated Microscopy        I ordered the above labs and reviewed the results        RADIOLOGY  CT Abdomen Pelvis With Contrast   Preliminary Result   1. Marked interval decrease in the size of the presacral fluid   collection, currently approximately 4.6 x 1.5 cm.   2. No acute bowel abnormality is seen.       Discussed with MINDY Branch.              I ordered the above noted radiological studies and reviewed the images on the PACS system.  Spoke with Dr. Perry (radiologist) regarding CT/MRI scan results        MEDICATIONS GIVEN IN ER  Medications   sodium chloride 0.9 % flush 10 mL (not administered)   sodium chloride 0.9 % bolus 1,000 mL (0 mL Intravenous Stopped 11/20/19 1200)   iopamidol (ISOVUE-300) 61 % injection 100 mL (85 mL Intravenous Given by Other 11/20/19 1050)   sodium chloride 0.9 % bolus 1,000 mL (1,000 mL Intravenous New Bag 11/20/19 1219)       PROCEDURES  Procedures          PROGRESS AND CONSULTS    Progress Notes:       11:27 AM  Dr. Perry, radiology, reports CT abd pelvis shows no ileus or obstruction.     11:35 AM  Reviewed pt's history and workup with Dr. Luque (ER physician). After a bedside evaluation, Dr. Luque agrees with the plan of care.  Second bag of IV fluids ordered.     12:49 PM  Rechecked pt who is resting in NAD. Informed pt and parents that CT abd pelvis shows nothing acute. Pt has Zofran at home. Pt's mother is concerned about caring for pt at home since he has not been eating and is having large amounts of watery diarrhea. I suggested she continue to try giving Imodium and  "multiple small snacks instead or large meals. Pt understands and agrees with the plan, all questions answered.        Disposition vitals:  /72 (BP Location: Left arm, Patient Position: Lying)   Pulse 113   Temp 97.8 °F (36.6 °C) (Tympanic)   Resp 16   Ht 167.6 cm (66\")   Wt 42.7 kg (94 lb 3.2 oz)   SpO2 95%   BMI 15.20 kg/m²           DIAGNOSIS  Final diagnoses:   Diarrhea, unspecified type           DISPOSITION  DISCHARGE    Patient discharged in stable condition.    Reviewed implications of results, diagnosis, meds, responsibility to follow up, warning signs and symptoms of possible worsening, potential complications and reasons to return to ER.    Patient/Family voiced understanding of above instructions.    Discussed plan for discharge, as there is no emergent indication for admission. Patient referred to primary care provider for BP management due to today's BP. Pt/family is agreeable and understands need for follow up and repeat testing.  Pt is aware that discharge does not mean that nothing is wrong but it indicates no emergency is present that requires admission and they must continue care with follow-up as given below or physician of their choice.     FOLLOW-UP  Kira Almaraz MD  [address]  [place] [address]  [phone]    Schedule an appointment as soon as possible for a visit       [facility] Emergency Department  [address] [place] [id number] [phone]    As needed, If symptoms worsen         Medication List      No changes were made to your prescriptions during this visit.               Documentation assistance provided by sharmaine Mendez for Ms. Sena Mcgrath PA-C.  Information recorded by the sharmaine was done at my direction and has been verified and validated by me.            Gali Mendez  11/20/19 1312       Sena Mcgrath PA-C  11/20/19 1619    "

## 2019-11-20 NOTE — ED PROVIDER NOTES
"Pt presents to ED with family at bedside who state pt has had \"watery\" diarrhea in his colostomy bag that began 3 days ago. Family states he has had a decrease in appetite and became nauseous. Pt has hx of ileus and family states concern for another ileus. No other complaints at this time.     Upon exam, pt is awake and non-distressed  Mild tachycardia  Lungs CTAB  L sided ostomy, bag filled with fluid and small particulate matter    Labs were reviewed   CT did not show any evidence or obstruction     Discussed with pt plan to administer IV fluids for further treatment. Pt understands and agrees with the plan, all questions answered.    MD ATTESTATION NOTE    The ELENA and I have discussed this patient's history, physical exam, and treatment plan.  I have reviewed the documentation and personally had a face to face interaction with the patient. I affirm the documentation and agree with the treatment and plan.  The attached note describes my personal findings.    Documentation assistance provided by sharmaine Gandhi for Dr. Luque.  Information recorded by the scribe was done at my direction and has been verified and validated by me.     Jessica Gandhi  11/20/19 1201       Mariano Luque MD  11/20/19 1615    "

## 2019-11-20 NOTE — DISCHARGE INSTRUCTIONS
Please follow up with Dr. Martin if symptoms continue. Return to the ER with any concerning or worsening symptoms.

## 2020-03-09 ENCOUNTER — OFFICE VISIT (OUTPATIENT)
Dept: NEUROLOGY | Facility: CLINIC | Age: 30
End: 2020-03-09

## 2020-03-09 VITALS — BODY MASS INDEX: 15.2 KG/M2 | HEIGHT: 66 IN

## 2020-03-09 DIAGNOSIS — R56.9 CONVULSIONS, UNSPECIFIED CONVULSION TYPE (HCC): Primary | ICD-10-CM

## 2020-03-09 DIAGNOSIS — G93.1 ANOXIC BRAIN DAMAGE (HCC): ICD-10-CM

## 2020-03-09 PROCEDURE — 99213 OFFICE O/P EST LOW 20 MIN: CPT | Performed by: PSYCHIATRY & NEUROLOGY

## 2020-03-09 RX ORDER — LEVETIRACETAM 500 MG/1
500 TABLET ORAL NIGHTLY
Qty: 90 TABLET | Refills: 3 | Status: SHIPPED | OUTPATIENT
Start: 2020-03-09 | End: 2021-03-09

## 2020-03-09 NOTE — PROGRESS NOTES
Subjective:     Patient ID: Alex Brown is a 29 y.o. male.    History of Present Illness  The patient has had no seizures over the past year.  He is on a low-dose of generic Keppra 500 mg at night.  History taken with patient's parents.  No side effects from the medication.    The following portions of the patient's history were reviewed and updated as appropriate: allergies, current medications, past family history, past medical history, past social history, past surgical history and problem list.      Current Outpatient Medications:   •  lansoprazole (PREVACID) 15 MG capsule, Take 15 mg by mouth Daily., Disp: , Rfl:   •  levETIRAcetam (KEPPRA) 500 MG tablet, Take 1 tablet by mouth Every Night., Disp: 90 tablet, Rfl: 3  •  Loperamide HCl (IMODIUM PO), Take 1 tablet by mouth 2 (Two) Times a Day As Needed., Disp: , Rfl:   •  Loratadine-Pseudoephedrine (CLARITIN-D 12 HOUR PO), Take 1 tablet by mouth As Needed., Disp: , Rfl:   •  MULTIPLE VITAMINS PO, Take 1 tablet by mouth Daily., Disp: , Rfl:   •  naproxen sodium (ALEVE) 220 MG tablet, Take 220 mg by mouth 2 (Two) Times a Day As Needed., Disp: , Rfl:   •  HYDROcodone-acetaminophen (HYCET) 7.5-325 MG/15ML solution, Take 15 mL by mouth Every 4 (Four) Hours As Needed for Moderate Pain ., Disp: 473 mL, Rfl: 0    Review of Systems   Musculoskeletal: Positive for gait problem. Negative for back pain and neck pain.   Skin: Negative for color change, rash and wound.   Allergic/Immunologic: Negative for environmental allergies, food allergies and immunocompromised state.   Neurological: Negative for dizziness, tremors, seizures, syncope, facial asymmetry, speech difficulty, weakness, light-headedness, numbness and headaches.   Hematological: Negative for adenopathy. Does not bruise/bleed easily.   Psychiatric/Behavioral: Negative for confusion and sleep disturbance. The patient is not nervous/anxious.           I have reviewed ROS completed by medical assistant.      Objective:    Neurologic Exam  The patient is mentally handicapped.   Funduscopy, eye movements and pupillary reflexes were normal.  Visual fields were symmetric to opticokinetic nystagmus tape  No facial weakness noted.  No pattern of focal weakness.  Wheelchair-bound.  Physical Exam    Assessment/Plan:     Alex was seen today for seizures.    Diagnoses and all orders for this visit:    Convulsions, unspecified convulsion type (CMS/HCC)    Anoxic brain damage (CMS/HCC)    Other orders  -     levETIRAcetam (KEPPRA) 500 MG tablet; Take 1 tablet by mouth Every Night.         Prescription drug management - meds as above    Follow-up in the office in one year. Thank you for allowing me to share in the care of this patient.  Chan Rosen M.D.

## 2020-03-25 ENCOUNTER — ANESTHESIA (OUTPATIENT)
Dept: PERIOP | Facility: HOSPITAL | Age: 30
End: 2020-03-25

## 2020-03-25 ENCOUNTER — ANESTHESIA EVENT (OUTPATIENT)
Dept: PERIOP | Facility: HOSPITAL | Age: 30
End: 2020-03-25

## 2020-03-25 ENCOUNTER — APPOINTMENT (OUTPATIENT)
Dept: CT IMAGING | Facility: HOSPITAL | Age: 30
End: 2020-03-25

## 2020-03-25 ENCOUNTER — HOSPITAL ENCOUNTER (INPATIENT)
Facility: HOSPITAL | Age: 30
LOS: 4 days | Discharge: HOME | End: 2020-03-29
Attending: EMERGENCY MEDICINE | Admitting: INTERNAL MEDICINE

## 2020-03-25 DIAGNOSIS — K56.609 SBO (SMALL BOWEL OBSTRUCTION) (HCC): Primary | ICD-10-CM

## 2020-03-25 LAB
ALBUMIN SERPL-MCNC: 5.2 G/DL (ref 3.5–5.2)
ALBUMIN/GLOB SERPL: 1.7 G/DL
ALP SERPL-CCNC: 77 U/L (ref 39–117)
ALT SERPL W P-5'-P-CCNC: 17 U/L (ref 1–41)
ANION GAP SERPL CALCULATED.3IONS-SCNC: 16.9 MMOL/L (ref 5–15)
AST SERPL-CCNC: 16 U/L (ref 1–40)
BASOPHILS # BLD AUTO: 0.04 10*3/MM3 (ref 0–0.2)
BASOPHILS NFR BLD AUTO: 0.3 % (ref 0–1.5)
BILIRUB SERPL-MCNC: 0.5 MG/DL (ref 0.2–1.2)
BUN BLD-MCNC: 18 MG/DL (ref 6–20)
BUN/CREAT SERPL: 20.9 (ref 7–25)
CALCIUM SPEC-SCNC: 10.3 MG/DL (ref 8.6–10.5)
CHLORIDE SERPL-SCNC: 97 MMOL/L (ref 98–107)
CO2 SERPL-SCNC: 27.1 MMOL/L (ref 22–29)
CREAT BLD-MCNC: 0.86 MG/DL (ref 0.76–1.27)
D-LACTATE SERPL-SCNC: 2.4 MMOL/L (ref 0.5–2)
DEPRECATED RDW RBC AUTO: 42.1 FL (ref 37–54)
EOSINOPHIL # BLD AUTO: 0 10*3/MM3 (ref 0–0.4)
EOSINOPHIL NFR BLD AUTO: 0 % (ref 0.3–6.2)
ERYTHROCYTE [DISTWIDTH] IN BLOOD BY AUTOMATED COUNT: 12.6 % (ref 12.3–15.4)
GFR SERPL CREATININE-BSD FRML MDRD: 104 ML/MIN/1.73
GLOBULIN UR ELPH-MCNC: 3.1 GM/DL
GLUCOSE BLD-MCNC: 143 MG/DL (ref 65–99)
HCT VFR BLD AUTO: 50.7 % (ref 37.5–51)
HGB BLD-MCNC: 17.2 G/DL (ref 13–17.7)
HOLD SPECIMEN: NORMAL
HOLD SPECIMEN: NORMAL
IMM GRANULOCYTES # BLD AUTO: 0.03 10*3/MM3 (ref 0–0.05)
IMM GRANULOCYTES NFR BLD AUTO: 0.2 % (ref 0–0.5)
LACTATE HOLD SPECIMEN: NORMAL
LIPASE SERPL-CCNC: 11 U/L (ref 13–60)
LYMPHOCYTES # BLD AUTO: 0.84 10*3/MM3 (ref 0.7–3.1)
LYMPHOCYTES NFR BLD AUTO: 6.7 % (ref 19.6–45.3)
MCH RBC QN AUTO: 31.1 PG (ref 26.6–33)
MCHC RBC AUTO-ENTMCNC: 33.9 G/DL (ref 31.5–35.7)
MCV RBC AUTO: 91.7 FL (ref 79–97)
MONOCYTES # BLD AUTO: 0.97 10*3/MM3 (ref 0.1–0.9)
MONOCYTES NFR BLD AUTO: 7.8 % (ref 5–12)
NEUTROPHILS # BLD AUTO: 10.61 10*3/MM3 (ref 1.7–7)
NEUTROPHILS NFR BLD AUTO: 85 % (ref 42.7–76)
NRBC BLD AUTO-RTO: 0 /100 WBC (ref 0–0.2)
PLATELET # BLD AUTO: 254 10*3/MM3 (ref 140–450)
PMV BLD AUTO: 10.4 FL (ref 6–12)
POTASSIUM BLD-SCNC: 4.4 MMOL/L (ref 3.5–5.2)
PROT SERPL-MCNC: 8.3 G/DL (ref 6–8.5)
RBC # BLD AUTO: 5.53 10*6/MM3 (ref 4.14–5.8)
SODIUM BLD-SCNC: 141 MMOL/L (ref 136–145)
WBC NRBC COR # BLD: 12.49 10*3/MM3 (ref 3.4–10.8)
WHOLE BLOOD HOLD SPECIMEN: NORMAL
WHOLE BLOOD HOLD SPECIMEN: NORMAL

## 2020-03-25 PROCEDURE — 25010000003 BUPIVACAINE LIPOSOME 1.3 % SUSPENSION 20 ML VIAL: Performed by: SURGERY

## 2020-03-25 PROCEDURE — 99223 1ST HOSP IP/OBS HIGH 75: CPT | Performed by: SURGERY

## 2020-03-25 PROCEDURE — 0WQF0ZZ REPAIR ABDOMINAL WALL, OPEN APPROACH: ICD-10-PCS | Performed by: SURGERY

## 2020-03-25 PROCEDURE — 0DN80ZZ RELEASE SMALL INTESTINE, OPEN APPROACH: ICD-10-PCS | Performed by: SURGERY

## 2020-03-25 PROCEDURE — 25010000002 MORPHINE PER 10 MG: Performed by: EMERGENCY MEDICINE

## 2020-03-25 PROCEDURE — 25010000002 NEOSTIGMINE PER 0.5 MG: Performed by: ANESTHESIOLOGY

## 2020-03-25 PROCEDURE — C9290 INJ, BUPIVACAINE LIPOSOME: HCPCS | Performed by: SURGERY

## 2020-03-25 PROCEDURE — 25010000002 ONDANSETRON PER 1 MG: Performed by: EMERGENCY MEDICINE

## 2020-03-25 PROCEDURE — 25010000002 ONDANSETRON PER 1 MG: Performed by: NURSE PRACTITIONER

## 2020-03-25 PROCEDURE — 80053 COMPREHEN METABOLIC PANEL: CPT | Performed by: EMERGENCY MEDICINE

## 2020-03-25 PROCEDURE — 83605 ASSAY OF LACTIC ACID: CPT | Performed by: NURSE PRACTITIONER

## 2020-03-25 PROCEDURE — 85025 COMPLETE CBC W/AUTO DIFF WBC: CPT

## 2020-03-25 PROCEDURE — 25010000002 VANCOMYCIN: Performed by: SURGERY

## 2020-03-25 PROCEDURE — 99284 EMERGENCY DEPT VISIT MOD MDM: CPT

## 2020-03-25 PROCEDURE — 44050 REDUCE BOWEL OBSTRUCTION: CPT | Performed by: SURGERY

## 2020-03-25 PROCEDURE — 74177 CT ABD & PELVIS W/CONTRAST: CPT

## 2020-03-25 PROCEDURE — 25010000002 IOPAMIDOL 61 % SOLUTION: Performed by: EMERGENCY MEDICINE

## 2020-03-25 PROCEDURE — 83690 ASSAY OF LIPASE: CPT | Performed by: EMERGENCY MEDICINE

## 2020-03-25 PROCEDURE — 25010000002 PROPOFOL 10 MG/ML EMULSION: Performed by: ANESTHESIOLOGY

## 2020-03-25 PROCEDURE — 25010000002 FENTANYL CITRATE (PF) 100 MCG/2ML SOLUTION: Performed by: ANESTHESIOLOGY

## 2020-03-25 RX ORDER — CLINDAMYCIN PHOSPHATE 900 MG/50ML
900 INJECTION INTRAVENOUS ONCE
Status: COMPLETED | OUTPATIENT
Start: 2020-03-25 | End: 2020-03-25

## 2020-03-25 RX ORDER — LABETALOL HYDROCHLORIDE 5 MG/ML
5 INJECTION, SOLUTION INTRAVENOUS
Status: DISCONTINUED | OUTPATIENT
Start: 2020-03-25 | End: 2020-03-26 | Stop reason: HOSPADM

## 2020-03-25 RX ORDER — NALOXONE HCL 0.4 MG/ML
0.2 VIAL (ML) INJECTION AS NEEDED
Status: DISCONTINUED | OUTPATIENT
Start: 2020-03-25 | End: 2020-03-26 | Stop reason: HOSPADM

## 2020-03-25 RX ORDER — PROMETHAZINE HYDROCHLORIDE 25 MG/ML
6.25 INJECTION, SOLUTION INTRAMUSCULAR; INTRAVENOUS
Status: DISCONTINUED | OUTPATIENT
Start: 2020-03-25 | End: 2020-03-26 | Stop reason: HOSPADM

## 2020-03-25 RX ORDER — ONDANSETRON 2 MG/ML
4 INJECTION INTRAMUSCULAR; INTRAVENOUS ONCE AS NEEDED
Status: DISCONTINUED | OUTPATIENT
Start: 2020-03-25 | End: 2020-03-26 | Stop reason: HOSPADM

## 2020-03-25 RX ORDER — SODIUM CHLORIDE, SODIUM LACTATE, POTASSIUM CHLORIDE, CALCIUM CHLORIDE 600; 310; 30; 20 MG/100ML; MG/100ML; MG/100ML; MG/100ML
9 INJECTION, SOLUTION INTRAVENOUS CONTINUOUS
Status: DISCONTINUED | OUTPATIENT
Start: 2020-03-25 | End: 2020-03-26

## 2020-03-25 RX ORDER — FENTANYL CITRATE 50 UG/ML
INJECTION, SOLUTION INTRAMUSCULAR; INTRAVENOUS AS NEEDED
Status: DISCONTINUED | OUTPATIENT
Start: 2020-03-25 | End: 2020-03-25 | Stop reason: SURG

## 2020-03-25 RX ORDER — PROMETHAZINE HYDROCHLORIDE 25 MG/1
25 SUPPOSITORY RECTAL ONCE AS NEEDED
Status: DISCONTINUED | OUTPATIENT
Start: 2020-03-25 | End: 2020-03-26 | Stop reason: HOSPADM

## 2020-03-25 RX ORDER — MAGNESIUM HYDROXIDE 1200 MG/15ML
LIQUID ORAL AS NEEDED
Status: DISCONTINUED | OUTPATIENT
Start: 2020-03-25 | End: 2020-03-25 | Stop reason: HOSPADM

## 2020-03-25 RX ORDER — HYDRALAZINE HYDROCHLORIDE 20 MG/ML
5 INJECTION INTRAMUSCULAR; INTRAVENOUS
Status: DISCONTINUED | OUTPATIENT
Start: 2020-03-25 | End: 2020-03-26 | Stop reason: HOSPADM

## 2020-03-25 RX ORDER — ONDANSETRON 2 MG/ML
4 INJECTION INTRAMUSCULAR; INTRAVENOUS ONCE
Status: COMPLETED | OUTPATIENT
Start: 2020-03-25 | End: 2020-03-25

## 2020-03-25 RX ORDER — LIDOCAINE HYDROCHLORIDE 10 MG/ML
0.5 INJECTION, SOLUTION EPIDURAL; INFILTRATION; INTRACAUDAL; PERINEURAL ONCE AS NEEDED
Status: DISCONTINUED | OUTPATIENT
Start: 2020-03-25 | End: 2020-03-26 | Stop reason: HOSPADM

## 2020-03-25 RX ORDER — MORPHINE SULFATE 2 MG/ML
2 INJECTION, SOLUTION INTRAMUSCULAR; INTRAVENOUS ONCE
Status: COMPLETED | OUTPATIENT
Start: 2020-03-25 | End: 2020-03-25

## 2020-03-25 RX ORDER — FLUMAZENIL 0.1 MG/ML
0.2 INJECTION INTRAVENOUS AS NEEDED
Status: DISCONTINUED | OUTPATIENT
Start: 2020-03-25 | End: 2020-03-26 | Stop reason: HOSPADM

## 2020-03-25 RX ORDER — FAMOTIDINE 10 MG/ML
20 INJECTION, SOLUTION INTRAVENOUS ONCE
Status: COMPLETED | OUTPATIENT
Start: 2020-03-25 | End: 2020-03-25

## 2020-03-25 RX ORDER — PROPOFOL 10 MG/ML
VIAL (ML) INTRAVENOUS AS NEEDED
Status: DISCONTINUED | OUTPATIENT
Start: 2020-03-25 | End: 2020-03-25 | Stop reason: SURG

## 2020-03-25 RX ORDER — OXYCODONE AND ACETAMINOPHEN 7.5; 325 MG/1; MG/1
1 TABLET ORAL ONCE AS NEEDED
Status: DISCONTINUED | OUTPATIENT
Start: 2020-03-25 | End: 2020-03-26 | Stop reason: HOSPADM

## 2020-03-25 RX ORDER — DIPHENHYDRAMINE HYDROCHLORIDE 50 MG/ML
12.5 INJECTION INTRAMUSCULAR; INTRAVENOUS
Status: DISCONTINUED | OUTPATIENT
Start: 2020-03-25 | End: 2020-03-26 | Stop reason: HOSPADM

## 2020-03-25 RX ORDER — SODIUM CHLORIDE 9 MG/ML
INJECTION, SOLUTION INTRAVENOUS CONTINUOUS PRN
Status: DISCONTINUED | OUTPATIENT
Start: 2020-03-25 | End: 2020-03-25 | Stop reason: SURG

## 2020-03-25 RX ORDER — HYDROMORPHONE HYDROCHLORIDE 1 MG/ML
0.5 INJECTION, SOLUTION INTRAMUSCULAR; INTRAVENOUS; SUBCUTANEOUS
Status: DISCONTINUED | OUTPATIENT
Start: 2020-03-25 | End: 2020-03-25

## 2020-03-25 RX ORDER — ACETAMINOPHEN 325 MG/1
650 TABLET ORAL ONCE AS NEEDED
Status: DISCONTINUED | OUTPATIENT
Start: 2020-03-25 | End: 2020-03-26 | Stop reason: HOSPADM

## 2020-03-25 RX ORDER — ROCURONIUM BROMIDE 10 MG/ML
INJECTION, SOLUTION INTRAVENOUS AS NEEDED
Status: DISCONTINUED | OUTPATIENT
Start: 2020-03-25 | End: 2020-03-25 | Stop reason: SURG

## 2020-03-25 RX ORDER — SODIUM CHLORIDE 0.9 % (FLUSH) 0.9 %
3-10 SYRINGE (ML) INJECTION AS NEEDED
Status: DISCONTINUED | OUTPATIENT
Start: 2020-03-25 | End: 2020-03-26 | Stop reason: HOSPADM

## 2020-03-25 RX ORDER — FENTANYL CITRATE 50 UG/ML
50 INJECTION, SOLUTION INTRAMUSCULAR; INTRAVENOUS
Status: DISCONTINUED | OUTPATIENT
Start: 2020-03-25 | End: 2020-03-26 | Stop reason: HOSPADM

## 2020-03-25 RX ORDER — DIPHENHYDRAMINE HCL 25 MG
25 CAPSULE ORAL
Status: DISCONTINUED | OUTPATIENT
Start: 2020-03-25 | End: 2020-03-26 | Stop reason: HOSPADM

## 2020-03-25 RX ORDER — HYDROCODONE BITARTRATE AND ACETAMINOPHEN 7.5; 325 MG/1; MG/1
1 TABLET ORAL ONCE AS NEEDED
Status: DISCONTINUED | OUTPATIENT
Start: 2020-03-25 | End: 2020-03-26 | Stop reason: HOSPADM

## 2020-03-25 RX ORDER — GLYCOPYRROLATE 0.2 MG/ML
INJECTION INTRAMUSCULAR; INTRAVENOUS AS NEEDED
Status: DISCONTINUED | OUTPATIENT
Start: 2020-03-25 | End: 2020-03-25 | Stop reason: SURG

## 2020-03-25 RX ORDER — LIDOCAINE HYDROCHLORIDE 20 MG/ML
INJECTION, SOLUTION INFILTRATION; PERINEURAL AS NEEDED
Status: DISCONTINUED | OUTPATIENT
Start: 2020-03-25 | End: 2020-03-25 | Stop reason: SURG

## 2020-03-25 RX ORDER — SODIUM CHLORIDE 0.9 % (FLUSH) 0.9 %
3 SYRINGE (ML) INJECTION EVERY 12 HOURS SCHEDULED
Status: DISCONTINUED | OUTPATIENT
Start: 2020-03-25 | End: 2020-03-26 | Stop reason: HOSPADM

## 2020-03-25 RX ORDER — EPHEDRINE SULFATE 50 MG/ML
5 INJECTION, SOLUTION INTRAVENOUS ONCE AS NEEDED
Status: DISCONTINUED | OUTPATIENT
Start: 2020-03-25 | End: 2020-03-26 | Stop reason: HOSPADM

## 2020-03-25 RX ORDER — PROMETHAZINE HYDROCHLORIDE 25 MG/ML
12.5 INJECTION, SOLUTION INTRAMUSCULAR; INTRAVENOUS ONCE AS NEEDED
Status: DISCONTINUED | OUTPATIENT
Start: 2020-03-25 | End: 2020-03-26 | Stop reason: HOSPADM

## 2020-03-25 RX ORDER — SODIUM CHLORIDE 0.9 % (FLUSH) 0.9 %
10 SYRINGE (ML) INJECTION AS NEEDED
Status: DISCONTINUED | OUTPATIENT
Start: 2020-03-25 | End: 2020-03-29 | Stop reason: HOSPADM

## 2020-03-25 RX ORDER — PROMETHAZINE HYDROCHLORIDE 25 MG/1
25 TABLET ORAL ONCE AS NEEDED
Status: DISCONTINUED | OUTPATIENT
Start: 2020-03-25 | End: 2020-03-26 | Stop reason: HOSPADM

## 2020-03-25 RX ADMIN — SODIUM CHLORIDE: 9 INJECTION, SOLUTION INTRAVENOUS at 23:21

## 2020-03-25 RX ADMIN — ONDANSETRON 4 MG: 2 INJECTION INTRAMUSCULAR; INTRAVENOUS at 20:57

## 2020-03-25 RX ADMIN — PROPOFOL 100 MG: 10 INJECTION, EMULSION INTRAVENOUS at 22:03

## 2020-03-25 RX ADMIN — FENTANYL CITRATE 50 MCG: 50 INJECTION, SOLUTION INTRAMUSCULAR; INTRAVENOUS at 22:34

## 2020-03-25 RX ADMIN — GLYCOPYRROLATE 0.6 MG: 0.2 INJECTION INTRAMUSCULAR; INTRAVENOUS at 23:22

## 2020-03-25 RX ADMIN — IOPAMIDOL 85 ML: 612 INJECTION, SOLUTION INTRAVENOUS at 19:11

## 2020-03-25 RX ADMIN — SODIUM CHLORIDE 1000 ML: 9 INJECTION, SOLUTION INTRAVENOUS at 20:57

## 2020-03-25 RX ADMIN — ROCURONIUM BROMIDE 50 MG: 10 INJECTION, SOLUTION INTRAVENOUS at 22:03

## 2020-03-25 RX ADMIN — LIDOCAINE HYDROCHLORIDE 50 MG: 20 INJECTION, SOLUTION INFILTRATION; PERINEURAL at 22:03

## 2020-03-25 RX ADMIN — ONDANSETRON 4 MG: 2 INJECTION INTRAMUSCULAR; INTRAVENOUS at 18:37

## 2020-03-25 RX ADMIN — FAMOTIDINE 20 MG: 10 INJECTION INTRAVENOUS at 21:47

## 2020-03-25 RX ADMIN — CLINDAMYCIN PHOSPHATE 900 MG: 900 INJECTION, SOLUTION INTRAVENOUS at 21:47

## 2020-03-25 RX ADMIN — SODIUM CHLORIDE 500 ML: 9 INJECTION, SOLUTION INTRAVENOUS at 18:36

## 2020-03-25 RX ADMIN — NEOSTIGMINE METHYLSULFATE 3 MG: 1 INJECTION INTRAMUSCULAR; INTRAVENOUS; SUBCUTANEOUS at 23:22

## 2020-03-25 RX ADMIN — VANCOMYCIN HYDROCHLORIDE 750 MG: 1 INJECTION, POWDER, LYOPHILIZED, FOR SOLUTION INTRAVENOUS at 22:33

## 2020-03-25 RX ADMIN — MORPHINE SULFATE 2 MG: 2 INJECTION, SOLUTION INTRAMUSCULAR; INTRAVENOUS at 20:57

## 2020-03-25 RX ADMIN — SODIUM CHLORIDE: 9 INJECTION, SOLUTION INTRAVENOUS at 21:55

## 2020-03-25 RX ADMIN — FENTANYL CITRATE 50 MCG: 50 INJECTION, SOLUTION INTRAMUSCULAR; INTRAVENOUS at 22:07

## 2020-03-25 RX ADMIN — FENTANYL CITRATE 50 MCG: 50 INJECTION, SOLUTION INTRAMUSCULAR; INTRAVENOUS at 23:06

## 2020-03-25 RX ADMIN — FENTANYL CITRATE 50 MCG: 50 INJECTION, SOLUTION INTRAMUSCULAR; INTRAVENOUS at 22:48

## 2020-03-25 RX ADMIN — FENTANYL CITRATE 50 MCG: 50 INJECTION, SOLUTION INTRAMUSCULAR; INTRAVENOUS at 23:21

## 2020-03-26 ENCOUNTER — APPOINTMENT (OUTPATIENT)
Dept: CARDIOLOGY | Facility: HOSPITAL | Age: 30
End: 2020-03-26

## 2020-03-26 PROBLEM — S35.338A: Status: ACTIVE | Noted: 2020-03-26

## 2020-03-26 PROBLEM — D50.9 IDA (IRON DEFICIENCY ANEMIA): Status: ACTIVE | Noted: 2020-03-26

## 2020-03-26 PROBLEM — G80.9 CP (CEREBRAL PALSY): Status: ACTIVE | Noted: 2020-03-26

## 2020-03-26 LAB
ANION GAP SERPL CALCULATED.3IONS-SCNC: 12.2 MMOL/L (ref 5–15)
APTT PPP: 29.6 SECONDS (ref 22.7–35.4)
BH CV VAS SMA 1ST PP TIME: 15 MIN
BH CV VAS SMA 2ND PP TIME: 30 MIN
BH CV VAS SMA 3RD PP TIME: 45 MIN
BH CV VAS SMA AORTA PSV: 105 CM/S
BH CV VAS SMA CELIAC DIST EDV: 24.2 CM/S
BH CV VAS SMA CELIAC DIST PSV: 98 CM/S
BH CV VAS SMA CELIAC PROX EDV: 28 CM/S
BH CV VAS SMA CELIAC PROX PSV: 103 CM/S
BH CV VAS SMA HEPATIC EDV: 23.3 CM/S
BH CV VAS SMA HEPATIC PSV: 80.1 CM/S
BH CV VAS SMA IMA EDV: 17.1 CM/S
BH CV VAS SMA IMA PSV: 73.4 CM/S
BH CV VAS SMA ORIGIN PSV: 87.1 CM/S
BH CV VAS SMA SMA DIST EDV: 16.1 CM/S
BH CV VAS SMA SMA DIST PSV: 81 CM/S
BH CV VAS SMA SMA MID EDV: 16.4 CM/S
BH CV VAS SMA SMA MID PSV: 96.4 CM/S
BH CV VAS SMA SMA PROX EDV: 25 CM/S
BH CV VAS SMA SMA PROX PSV: 151 CM/S
BH CV VAS SMA SPLENIC EDV: 20 CM/S
BH CV VAS SMA SPLENIC PSV: 75 CM/S
BUN BLD-MCNC: 15 MG/DL (ref 6–20)
BUN/CREAT SERPL: 23.1 (ref 7–25)
CALCIUM SPEC-SCNC: 8.2 MG/DL (ref 8.6–10.5)
CHLORIDE SERPL-SCNC: 107 MMOL/L (ref 98–107)
CO2 SERPL-SCNC: 21.8 MMOL/L (ref 22–29)
CREAT BLD-MCNC: 0.65 MG/DL (ref 0.76–1.27)
D-LACTATE SERPL-SCNC: 1.1 MMOL/L (ref 0.5–2)
DEPRECATED RDW RBC AUTO: 43 FL (ref 37–54)
ERYTHROCYTE [DISTWIDTH] IN BLOOD BY AUTOMATED COUNT: 12.8 % (ref 12.3–15.4)
GFR SERPL CREATININE-BSD FRML MDRD: 143 ML/MIN/1.73
GLUCOSE BLD-MCNC: 134 MG/DL (ref 65–99)
HCT VFR BLD AUTO: 44.2 % (ref 37.5–51)
HGB BLD-MCNC: 15 G/DL (ref 13–17.7)
INR PPP: 1.21 (ref 0.9–1.1)
LYMPHOCYTES # BLD MANUAL: 0.32 10*3/MM3 (ref 0.7–3.1)
LYMPHOCYTES NFR BLD MANUAL: 3 % (ref 19.6–45.3)
LYMPHOCYTES NFR BLD MANUAL: 4 % (ref 5–12)
MCH RBC QN AUTO: 31.5 PG (ref 26.6–33)
MCHC RBC AUTO-ENTMCNC: 33.9 G/DL (ref 31.5–35.7)
MCV RBC AUTO: 92.9 FL (ref 79–97)
MONOCYTES # BLD AUTO: 0.43 10*3/MM3 (ref 0.1–0.9)
NEUTROPHILS # BLD AUTO: 9.96 10*3/MM3 (ref 1.7–7)
NEUTROPHILS NFR BLD MANUAL: 92.9 % (ref 42.7–76)
PLAT MORPH BLD: NORMAL
PLATELET # BLD AUTO: 222 10*3/MM3 (ref 140–450)
PMV BLD AUTO: 10.8 FL (ref 6–12)
POTASSIUM BLD-SCNC: 4 MMOL/L (ref 3.5–5.2)
PROTHROMBIN TIME: 15 SECONDS (ref 11.7–14.2)
RBC # BLD AUTO: 4.76 10*6/MM3 (ref 4.14–5.8)
RBC MORPH BLD: NORMAL
SODIUM BLD-SCNC: 141 MMOL/L (ref 136–145)
WBC MORPH BLD: NORMAL
WBC NRBC COR # BLD: 10.72 10*3/MM3 (ref 3.4–10.8)

## 2020-03-26 PROCEDURE — 85025 COMPLETE CBC W/AUTO DIFF WBC: CPT | Performed by: SURGERY

## 2020-03-26 PROCEDURE — 85730 THROMBOPLASTIN TIME PARTIAL: CPT | Performed by: INTERNAL MEDICINE

## 2020-03-26 PROCEDURE — 83605 ASSAY OF LACTIC ACID: CPT | Performed by: NURSE PRACTITIONER

## 2020-03-26 PROCEDURE — 25010000002 MORPHINE PER 10 MG: Performed by: SURGERY

## 2020-03-26 PROCEDURE — 85610 PROTHROMBIN TIME: CPT | Performed by: INTERNAL MEDICINE

## 2020-03-26 PROCEDURE — 25010000002 LEVETIRACETAM IN NACL 0.82% 500 MG/100ML SOLUTION: Performed by: INTERNAL MEDICINE

## 2020-03-26 PROCEDURE — 25010000002 VANCOMYCIN 750 MG RECONSTITUTED SOLUTION: Performed by: SURGERY

## 2020-03-26 PROCEDURE — 25010000002 KETOROLAC TROMETHAMINE PER 15 MG: Performed by: SURGERY

## 2020-03-26 PROCEDURE — 99024 POSTOP FOLLOW-UP VISIT: CPT | Performed by: SURGERY

## 2020-03-26 PROCEDURE — 25010000002 FENTANYL CITRATE (PF) 100 MCG/2ML SOLUTION: Performed by: ANESTHESIOLOGY

## 2020-03-26 PROCEDURE — 25010000002 ENOXAPARIN PER 10 MG: Performed by: SURGERY

## 2020-03-26 PROCEDURE — 80048 BASIC METABOLIC PNL TOTAL CA: CPT | Performed by: SURGERY

## 2020-03-26 PROCEDURE — 85007 BL SMEAR W/DIFF WBC COUNT: CPT | Performed by: SURGERY

## 2020-03-26 PROCEDURE — 25010000002 ONDANSETRON PER 1 MG: Performed by: SURGERY

## 2020-03-26 PROCEDURE — 93975 VASCULAR STUDY: CPT

## 2020-03-26 RX ORDER — MORPHINE SULFATE 2 MG/ML
2 INJECTION, SOLUTION INTRAMUSCULAR; INTRAVENOUS
Status: DISCONTINUED | OUTPATIENT
Start: 2020-03-26 | End: 2020-03-29 | Stop reason: HOSPADM

## 2020-03-26 RX ORDER — ACETAMINOPHEN 650 MG/1
650 SUPPOSITORY RECTAL EVERY 4 HOURS PRN
Status: DISCONTINUED | OUTPATIENT
Start: 2020-03-26 | End: 2020-03-29 | Stop reason: HOSPADM

## 2020-03-26 RX ORDER — ACETAMINOPHEN 325 MG/1
650 TABLET ORAL EVERY 4 HOURS PRN
Status: DISCONTINUED | OUTPATIENT
Start: 2020-03-26 | End: 2020-03-29 | Stop reason: HOSPADM

## 2020-03-26 RX ORDER — LEVETIRACETAM 5 MG/ML
500 INJECTION INTRAVASCULAR NIGHTLY
Status: DISCONTINUED | OUTPATIENT
Start: 2020-03-26 | End: 2020-03-29

## 2020-03-26 RX ORDER — FAMOTIDINE 10 MG/ML
20 INJECTION, SOLUTION INTRAVENOUS EVERY 12 HOURS SCHEDULED
Status: DISCONTINUED | OUTPATIENT
Start: 2020-03-26 | End: 2020-03-29

## 2020-03-26 RX ORDER — ACETAMINOPHEN 160 MG/5ML
650 SOLUTION ORAL EVERY 4 HOURS PRN
Status: DISCONTINUED | OUTPATIENT
Start: 2020-03-26 | End: 2020-03-29 | Stop reason: HOSPADM

## 2020-03-26 RX ORDER — KETOROLAC TROMETHAMINE 30 MG/ML
15 INJECTION, SOLUTION INTRAMUSCULAR; INTRAVENOUS EVERY 6 HOURS
Status: DISCONTINUED | OUTPATIENT
Start: 2020-03-26 | End: 2020-03-29 | Stop reason: HOSPADM

## 2020-03-26 RX ORDER — LEVETIRACETAM 5 MG/ML
500 INJECTION INTRAVASCULAR EVERY 24 HOURS
Status: DISCONTINUED | OUTPATIENT
Start: 2020-03-26 | End: 2020-03-26

## 2020-03-26 RX ORDER — NALOXONE HCL 0.4 MG/ML
0.4 VIAL (ML) INJECTION
Status: DISCONTINUED | OUTPATIENT
Start: 2020-03-26 | End: 2020-03-29 | Stop reason: HOSPADM

## 2020-03-26 RX ORDER — SODIUM CHLORIDE 9 MG/ML
100 INJECTION, SOLUTION INTRAVENOUS CONTINUOUS
Status: DISCONTINUED | OUTPATIENT
Start: 2020-03-26 | End: 2020-03-27

## 2020-03-26 RX ORDER — ONDANSETRON 2 MG/ML
4 INJECTION INTRAMUSCULAR; INTRAVENOUS EVERY 6 HOURS PRN
Status: DISCONTINUED | OUTPATIENT
Start: 2020-03-26 | End: 2020-03-29 | Stop reason: HOSPADM

## 2020-03-26 RX ADMIN — KETOROLAC TROMETHAMINE 15 MG: 30 INJECTION, SOLUTION INTRAMUSCULAR at 20:44

## 2020-03-26 RX ADMIN — FENTANYL CITRATE 50 MCG: 50 INJECTION, SOLUTION INTRAMUSCULAR; INTRAVENOUS at 00:33

## 2020-03-26 RX ADMIN — FAMOTIDINE 20 MG: 10 INJECTION INTRAVENOUS at 09:01

## 2020-03-26 RX ADMIN — MORPHINE SULFATE 2 MG: 2 INJECTION, SOLUTION INTRAMUSCULAR; INTRAVENOUS at 06:32

## 2020-03-26 RX ADMIN — SODIUM CHLORIDE 750 MG: 900 INJECTION, SOLUTION INTRAVENOUS at 16:31

## 2020-03-26 RX ADMIN — ENOXAPARIN SODIUM 40 MG: 40 INJECTION SUBCUTANEOUS at 20:07

## 2020-03-26 RX ADMIN — KETOROLAC TROMETHAMINE 15 MG: 30 INJECTION, SOLUTION INTRAMUSCULAR at 15:26

## 2020-03-26 RX ADMIN — SODIUM CHLORIDE 750 MG: 900 INJECTION, SOLUTION INTRAVENOUS at 06:32

## 2020-03-26 RX ADMIN — SODIUM CHLORIDE 100 ML/HR: 9 INJECTION, SOLUTION INTRAVENOUS at 05:16

## 2020-03-26 RX ADMIN — FAMOTIDINE 20 MG: 10 INJECTION INTRAVENOUS at 20:04

## 2020-03-26 RX ADMIN — LEVETIRACETAM 500 MG: 5 INJECTION INTRAVENOUS at 20:02

## 2020-03-26 RX ADMIN — ONDANSETRON 4 MG: 2 INJECTION INTRAMUSCULAR; INTRAVENOUS at 15:27

## 2020-03-26 RX ADMIN — MORPHINE SULFATE 2 MG: 2 INJECTION, SOLUTION INTRAMUSCULAR; INTRAVENOUS at 09:15

## 2020-03-26 RX ADMIN — SODIUM CHLORIDE 750 MG: 900 INJECTION, SOLUTION INTRAVENOUS at 23:50

## 2020-03-26 NOTE — ANESTHESIA PREPROCEDURE EVALUATION
Anesthesia Evaluation     Patient summary reviewed and Nursing notes reviewed   history of anesthetic complications: PONV  NPO Solid Status: > 8 hours  NPO Liquid Status: > 4 hours           Airway   Dental      Pulmonary - negative pulmonary ROS and normal exam    breath sounds clear to auscultation  Cardiovascular - negative cardio ROS and normal exam        Neuro/Psych  (+) seizures,     GI/Hepatic/Renal/Endo    (+)  GERD,      Musculoskeletal (-) negative ROS    Abdominal    Substance History - negative use     OB/GYN          Other - negative ROS       ROS/Med Hx Other: Non verbal, anoxic brain injury                    Anesthesia Plan    ASA 2 - emergent     general     intravenous induction     Anesthetic plan, all risks, benefits, and alternatives have been provided, discussed and informed consent has been obtained with: patient, mother and father.

## 2020-03-26 NOTE — ANESTHESIA PROCEDURE NOTES
Airway  Urgency: elective    Date/Time: 3/25/2020 10:05 PM  End Time:3/25/2020 10:05 PM  Airway not difficult    General Information and Staff    Patient location during procedure: OR  Anesthesiologist: Sabino Guy MD    Indications and Patient Condition  Indications for airway management: airway protection    Preoxygenated: yes  MILS maintained throughout  Mask difficulty assessment: 1 - vent by mask    Final Airway Details  Final airway type: endotracheal airway      Successful airway: ETT  Cuffed: yes   Successful intubation technique: direct laryngoscopy  Facilitating devices/methods: cricoid pressure  Endotracheal tube insertion site: oral  Blade: Heidy  Blade size: 3  ETT size (mm): 7.5  Cormack-Lehane Classification: grade IIa - partial view of glottis  Placement verified by: chest auscultation and capnometry   Inital cuff pressure (cm H2O): 5  Cuff volume (mL): 5  Measured from: gums  ETT/EBT to gums (cm): 22  Number of attempts at approach: 1

## 2020-03-27 LAB
ALBUMIN SERPL-MCNC: 3.1 G/DL (ref 3.5–5.2)
ALBUMIN/GLOB SERPL: 1.4 G/DL
ALP SERPL-CCNC: 43 U/L (ref 39–117)
ALT SERPL W P-5'-P-CCNC: 10 U/L (ref 1–41)
ANION GAP SERPL CALCULATED.3IONS-SCNC: 9.6 MMOL/L (ref 5–15)
AST SERPL-CCNC: 20 U/L (ref 1–40)
BASOPHILS # BLD AUTO: 0.02 10*3/MM3 (ref 0–0.2)
BASOPHILS NFR BLD AUTO: 0.3 % (ref 0–1.5)
BILIRUB SERPL-MCNC: 0.5 MG/DL (ref 0.2–1.2)
BUN BLD-MCNC: 11 MG/DL (ref 6–20)
BUN/CREAT SERPL: 20.8 (ref 7–25)
CALCIUM SPEC-SCNC: 7.9 MG/DL (ref 8.6–10.5)
CHLORIDE SERPL-SCNC: 112 MMOL/L (ref 98–107)
CO2 SERPL-SCNC: 22.4 MMOL/L (ref 22–29)
CREAT BLD-MCNC: 0.53 MG/DL (ref 0.76–1.27)
DEPRECATED RDW RBC AUTO: 43.3 FL (ref 37–54)
EOSINOPHIL # BLD AUTO: 0.08 10*3/MM3 (ref 0–0.4)
EOSINOPHIL NFR BLD AUTO: 1.3 % (ref 0.3–6.2)
ERYTHROCYTE [DISTWIDTH] IN BLOOD BY AUTOMATED COUNT: 12.7 % (ref 12.3–15.4)
GFR SERPL CREATININE-BSD FRML MDRD: >150 ML/MIN/1.73
GLOBULIN UR ELPH-MCNC: 2.2 GM/DL
GLUCOSE BLD-MCNC: 93 MG/DL (ref 65–99)
HCT VFR BLD AUTO: 34.6 % (ref 37.5–51)
HGB BLD-MCNC: 11.8 G/DL (ref 13–17.7)
IMM GRANULOCYTES # BLD AUTO: 0.01 10*3/MM3 (ref 0–0.05)
IMM GRANULOCYTES NFR BLD AUTO: 0.2 % (ref 0–0.5)
LYMPHOCYTES # BLD AUTO: 1.29 10*3/MM3 (ref 0.7–3.1)
LYMPHOCYTES NFR BLD AUTO: 20.5 % (ref 19.6–45.3)
MAGNESIUM SERPL-MCNC: 1.7 MG/DL (ref 1.6–2.6)
MCH RBC QN AUTO: 31.8 PG (ref 26.6–33)
MCHC RBC AUTO-ENTMCNC: 34.1 G/DL (ref 31.5–35.7)
MCV RBC AUTO: 93.3 FL (ref 79–97)
MONOCYTES # BLD AUTO: 0.78 10*3/MM3 (ref 0.1–0.9)
MONOCYTES NFR BLD AUTO: 12.4 % (ref 5–12)
NEUTROPHILS # BLD AUTO: 4.12 10*3/MM3 (ref 1.7–7)
NEUTROPHILS NFR BLD AUTO: 65.3 % (ref 42.7–76)
NRBC BLD AUTO-RTO: 0 /100 WBC (ref 0–0.2)
PLATELET # BLD AUTO: 162 10*3/MM3 (ref 140–450)
PMV BLD AUTO: 10.9 FL (ref 6–12)
POTASSIUM BLD-SCNC: 3.4 MMOL/L (ref 3.5–5.2)
PROT SERPL-MCNC: 5.3 G/DL (ref 6–8.5)
RBC # BLD AUTO: 3.71 10*6/MM3 (ref 4.14–5.8)
SODIUM BLD-SCNC: 144 MMOL/L (ref 136–145)
WBC NRBC COR # BLD: 6.3 10*3/MM3 (ref 3.4–10.8)

## 2020-03-27 PROCEDURE — 25010000003 POTASSIUM CHLORIDE 10 MEQ/100ML SOLUTION: Performed by: SURGERY

## 2020-03-27 PROCEDURE — 80053 COMPREHEN METABOLIC PANEL: CPT | Performed by: INTERNAL MEDICINE

## 2020-03-27 PROCEDURE — 25010000002 LEVETIRACETAM IN NACL 0.82% 500 MG/100ML SOLUTION: Performed by: INTERNAL MEDICINE

## 2020-03-27 PROCEDURE — 25010000002 ENOXAPARIN PER 10 MG: Performed by: SURGERY

## 2020-03-27 PROCEDURE — 25010000003 POTASSIUM CHLORIDE 10 MEQ/100ML SOLUTION: Performed by: INTERNAL MEDICINE

## 2020-03-27 PROCEDURE — 25810000003 SODIUM CHLORIDE 0.9 % WITH KCL 20 MEQ 20-0.9 MEQ/L-% SOLUTION: Performed by: INTERNAL MEDICINE

## 2020-03-27 PROCEDURE — 25010000002 KETOROLAC TROMETHAMINE PER 15 MG: Performed by: SURGERY

## 2020-03-27 PROCEDURE — 85025 COMPLETE CBC W/AUTO DIFF WBC: CPT | Performed by: INTERNAL MEDICINE

## 2020-03-27 PROCEDURE — 99024 POSTOP FOLLOW-UP VISIT: CPT | Performed by: SURGERY

## 2020-03-27 PROCEDURE — 83735 ASSAY OF MAGNESIUM: CPT | Performed by: INTERNAL MEDICINE

## 2020-03-27 PROCEDURE — 25010000002 ONDANSETRON PER 1 MG: Performed by: SURGERY

## 2020-03-27 RX ORDER — MAGNESIUM SULFATE HEPTAHYDRATE 40 MG/ML
4 INJECTION, SOLUTION INTRAVENOUS AS NEEDED
Status: DISCONTINUED | OUTPATIENT
Start: 2020-03-27 | End: 2020-03-29 | Stop reason: HOSPADM

## 2020-03-27 RX ORDER — MAGNESIUM SULFATE HEPTAHYDRATE 40 MG/ML
2 INJECTION, SOLUTION INTRAVENOUS AS NEEDED
Status: DISCONTINUED | OUTPATIENT
Start: 2020-03-27 | End: 2020-03-29 | Stop reason: HOSPADM

## 2020-03-27 RX ORDER — SODIUM CHLORIDE AND POTASSIUM CHLORIDE 150; 900 MG/100ML; MG/100ML
50 INJECTION, SOLUTION INTRAVENOUS CONTINUOUS
Status: DISCONTINUED | OUTPATIENT
Start: 2020-03-27 | End: 2020-03-28

## 2020-03-27 RX ORDER — POTASSIUM CHLORIDE 1.5 G/1.77G
40 POWDER, FOR SOLUTION ORAL AS NEEDED
Status: DISCONTINUED | OUTPATIENT
Start: 2020-03-27 | End: 2020-03-29 | Stop reason: HOSPADM

## 2020-03-27 RX ORDER — POTASSIUM CHLORIDE 7.45 MG/ML
10 INJECTION INTRAVENOUS
Status: DISCONTINUED | OUTPATIENT
Start: 2020-03-27 | End: 2020-03-29 | Stop reason: HOSPADM

## 2020-03-27 RX ORDER — POTASSIUM CHLORIDE 750 MG/1
40 CAPSULE, EXTENDED RELEASE ORAL AS NEEDED
Status: DISCONTINUED | OUTPATIENT
Start: 2020-03-27 | End: 2020-03-29 | Stop reason: HOSPADM

## 2020-03-27 RX ADMIN — KETOROLAC TROMETHAMINE 15 MG: 30 INJECTION, SOLUTION INTRAMUSCULAR at 08:46

## 2020-03-27 RX ADMIN — KETOROLAC TROMETHAMINE 15 MG: 30 INJECTION, SOLUTION INTRAMUSCULAR at 03:47

## 2020-03-27 RX ADMIN — POTASSIUM CHLORIDE 10 MEQ: 7.46 INJECTION, SOLUTION INTRAVENOUS at 15:19

## 2020-03-27 RX ADMIN — POTASSIUM CHLORIDE 10 MEQ: 7.46 INJECTION, SOLUTION INTRAVENOUS at 12:41

## 2020-03-27 RX ADMIN — ONDANSETRON 4 MG: 2 INJECTION INTRAMUSCULAR; INTRAVENOUS at 06:52

## 2020-03-27 RX ADMIN — FAMOTIDINE 20 MG: 10 INJECTION INTRAVENOUS at 21:28

## 2020-03-27 RX ADMIN — KETOROLAC TROMETHAMINE 15 MG: 30 INJECTION, SOLUTION INTRAMUSCULAR at 21:28

## 2020-03-27 RX ADMIN — ENOXAPARIN SODIUM 40 MG: 40 INJECTION SUBCUTANEOUS at 21:27

## 2020-03-27 RX ADMIN — FAMOTIDINE 20 MG: 10 INJECTION INTRAVENOUS at 08:46

## 2020-03-27 RX ADMIN — POTASSIUM CHLORIDE 10 MEQ: 7.46 INJECTION, SOLUTION INTRAVENOUS at 08:46

## 2020-03-27 RX ADMIN — POTASSIUM CHLORIDE 10 MEQ: 7.46 INJECTION, SOLUTION INTRAVENOUS at 10:35

## 2020-03-27 RX ADMIN — KETOROLAC TROMETHAMINE 15 MG: 30 INJECTION, SOLUTION INTRAMUSCULAR at 15:19

## 2020-03-27 RX ADMIN — POTASSIUM CHLORIDE AND SODIUM CHLORIDE 50 ML/HR: 900; 150 INJECTION, SOLUTION INTRAVENOUS at 10:35

## 2020-03-27 RX ADMIN — SODIUM CHLORIDE 100 ML/HR: 9 INJECTION, SOLUTION INTRAVENOUS at 06:02

## 2020-03-27 RX ADMIN — LEVETIRACETAM 500 MG: 5 INJECTION INTRAVENOUS at 21:53

## 2020-03-28 LAB
ANION GAP SERPL CALCULATED.3IONS-SCNC: 15.3 MMOL/L (ref 5–15)
BUN BLD-MCNC: 9 MG/DL (ref 6–20)
BUN/CREAT SERPL: 19.1 (ref 7–25)
CALCIUM SPEC-SCNC: 8.4 MG/DL (ref 8.6–10.5)
CHLORIDE SERPL-SCNC: 106 MMOL/L (ref 98–107)
CO2 SERPL-SCNC: 18.7 MMOL/L (ref 22–29)
CREAT BLD-MCNC: 0.47 MG/DL (ref 0.76–1.27)
GFR SERPL CREATININE-BSD FRML MDRD: >150 ML/MIN/1.73
GLUCOSE BLD-MCNC: 66 MG/DL (ref 65–99)
HCT VFR BLD AUTO: 39.1 % (ref 37.5–51)
HGB BLD-MCNC: 12.4 G/DL (ref 13–17.7)
POTASSIUM BLD-SCNC: 4.3 MMOL/L (ref 3.5–5.2)
SODIUM BLD-SCNC: 140 MMOL/L (ref 136–145)

## 2020-03-28 PROCEDURE — 80048 BASIC METABOLIC PNL TOTAL CA: CPT | Performed by: INTERNAL MEDICINE

## 2020-03-28 PROCEDURE — 85018 HEMOGLOBIN: CPT | Performed by: INTERNAL MEDICINE

## 2020-03-28 PROCEDURE — 85014 HEMATOCRIT: CPT | Performed by: INTERNAL MEDICINE

## 2020-03-28 PROCEDURE — 25010000002 ENOXAPARIN PER 10 MG: Performed by: INTERNAL MEDICINE

## 2020-03-28 PROCEDURE — 25010000002 LEVETIRACETAM IN NACL 0.82% 500 MG/100ML SOLUTION: Performed by: INTERNAL MEDICINE

## 2020-03-28 PROCEDURE — 25810000003 SODIUM CHLORIDE 0.9 % WITH KCL 20 MEQ 20-0.9 MEQ/L-% SOLUTION: Performed by: INTERNAL MEDICINE

## 2020-03-28 PROCEDURE — 99024 POSTOP FOLLOW-UP VISIT: CPT | Performed by: SURGERY

## 2020-03-28 PROCEDURE — 25010000002 KETOROLAC TROMETHAMINE PER 15 MG: Performed by: SURGERY

## 2020-03-28 RX ADMIN — KETOROLAC TROMETHAMINE 15 MG: 30 INJECTION, SOLUTION INTRAMUSCULAR at 20:55

## 2020-03-28 RX ADMIN — FAMOTIDINE 20 MG: 10 INJECTION INTRAVENOUS at 20:55

## 2020-03-28 RX ADMIN — SODIUM CHLORIDE, PRESERVATIVE FREE 10 ML: 5 INJECTION INTRAVENOUS at 20:55

## 2020-03-28 RX ADMIN — FAMOTIDINE 20 MG: 10 INJECTION INTRAVENOUS at 08:58

## 2020-03-28 RX ADMIN — POTASSIUM CHLORIDE AND SODIUM CHLORIDE 50 ML/HR: 900; 150 INJECTION, SOLUTION INTRAVENOUS at 06:23

## 2020-03-28 RX ADMIN — KETOROLAC TROMETHAMINE 15 MG: 30 INJECTION, SOLUTION INTRAMUSCULAR at 15:13

## 2020-03-28 RX ADMIN — LEVETIRACETAM 500 MG: 5 INJECTION INTRAVENOUS at 20:55

## 2020-03-28 RX ADMIN — KETOROLAC TROMETHAMINE 15 MG: 30 INJECTION, SOLUTION INTRAMUSCULAR at 08:58

## 2020-03-28 RX ADMIN — KETOROLAC TROMETHAMINE 15 MG: 30 INJECTION, SOLUTION INTRAMUSCULAR at 03:09

## 2020-03-28 RX ADMIN — ENOXAPARIN SODIUM 30 MG: 30 INJECTION SUBCUTANEOUS at 20:55

## 2020-03-29 VITALS
WEIGHT: 99 LBS | HEIGHT: 66 IN | SYSTOLIC BLOOD PRESSURE: 112 MMHG | DIASTOLIC BLOOD PRESSURE: 68 MMHG | BODY MASS INDEX: 15.91 KG/M2 | HEART RATE: 112 BPM | TEMPERATURE: 98.9 F | RESPIRATION RATE: 16 BRPM | OXYGEN SATURATION: 92 %

## 2020-03-29 PROBLEM — K56.609 SBO (SMALL BOWEL OBSTRUCTION): Status: RESOLVED | Noted: 2020-03-25 | Resolved: 2020-03-29

## 2020-03-29 PROBLEM — S35.338A: Status: RESOLVED | Noted: 2020-03-26 | Resolved: 2020-03-29

## 2020-03-29 LAB
ANION GAP SERPL CALCULATED.3IONS-SCNC: 18.2 MMOL/L (ref 5–15)
BASOPHILS # BLD AUTO: 0.01 10*3/MM3 (ref 0–0.2)
BASOPHILS NFR BLD AUTO: 0.1 % (ref 0–1.5)
BUN BLD-MCNC: 7 MG/DL (ref 6–20)
BUN/CREAT SERPL: 13.7 (ref 7–25)
CALCIUM SPEC-SCNC: 8.5 MG/DL (ref 8.6–10.5)
CHLORIDE SERPL-SCNC: 101 MMOL/L (ref 98–107)
CO2 SERPL-SCNC: 14.8 MMOL/L (ref 22–29)
CREAT BLD-MCNC: 0.51 MG/DL (ref 0.76–1.27)
DEPRECATED RDW RBC AUTO: 42.1 FL (ref 37–54)
EOSINOPHIL # BLD AUTO: 0.13 10*3/MM3 (ref 0–0.4)
EOSINOPHIL NFR BLD AUTO: 1.6 % (ref 0.3–6.2)
ERYTHROCYTE [DISTWIDTH] IN BLOOD BY AUTOMATED COUNT: 12.3 % (ref 12.3–15.4)
GFR SERPL CREATININE-BSD FRML MDRD: >150 ML/MIN/1.73
GLUCOSE BLD-MCNC: 96 MG/DL (ref 65–99)
HCT VFR BLD AUTO: 38.1 % (ref 37.5–51)
HGB BLD-MCNC: 12.7 G/DL (ref 13–17.7)
IMM GRANULOCYTES # BLD AUTO: 0.04 10*3/MM3 (ref 0–0.05)
IMM GRANULOCYTES NFR BLD AUTO: 0.5 % (ref 0–0.5)
LYMPHOCYTES # BLD AUTO: 1.1 10*3/MM3 (ref 0.7–3.1)
LYMPHOCYTES NFR BLD AUTO: 13.7 % (ref 19.6–45.3)
MCH RBC QN AUTO: 31 PG (ref 26.6–33)
MCHC RBC AUTO-ENTMCNC: 33.3 G/DL (ref 31.5–35.7)
MCV RBC AUTO: 92.9 FL (ref 79–97)
MONOCYTES # BLD AUTO: 0.59 10*3/MM3 (ref 0.1–0.9)
MONOCYTES NFR BLD AUTO: 7.3 % (ref 5–12)
NEUTROPHILS # BLD AUTO: 6.16 10*3/MM3 (ref 1.7–7)
NEUTROPHILS NFR BLD AUTO: 76.8 % (ref 42.7–76)
NRBC BLD AUTO-RTO: 0 /100 WBC (ref 0–0.2)
PLATELET # BLD AUTO: 163 10*3/MM3 (ref 140–450)
PMV BLD AUTO: 10.7 FL (ref 6–12)
POTASSIUM BLD-SCNC: 3.6 MMOL/L (ref 3.5–5.2)
RBC # BLD AUTO: 4.1 10*6/MM3 (ref 4.14–5.8)
SODIUM BLD-SCNC: 134 MMOL/L (ref 136–145)
WBC NRBC COR # BLD: 8.03 10*3/MM3 (ref 3.4–10.8)

## 2020-03-29 PROCEDURE — 80048 BASIC METABOLIC PNL TOTAL CA: CPT | Performed by: INTERNAL MEDICINE

## 2020-03-29 PROCEDURE — 99024 POSTOP FOLLOW-UP VISIT: CPT | Performed by: SURGERY

## 2020-03-29 PROCEDURE — 85025 COMPLETE CBC W/AUTO DIFF WBC: CPT | Performed by: INTERNAL MEDICINE

## 2020-03-29 PROCEDURE — 25010000002 KETOROLAC TROMETHAMINE PER 15 MG: Performed by: SURGERY

## 2020-03-29 RX ORDER — FAMOTIDINE 20 MG/1
20 TABLET, FILM COATED ORAL
Status: DISCONTINUED | OUTPATIENT
Start: 2020-03-29 | End: 2020-03-29 | Stop reason: HOSPADM

## 2020-03-29 RX ORDER — LEVETIRACETAM 500 MG/1
500 TABLET ORAL DAILY
Status: DISCONTINUED | OUTPATIENT
Start: 2020-03-29 | End: 2020-03-29 | Stop reason: HOSPADM

## 2020-03-29 RX ORDER — ONDANSETRON 4 MG/1
4 TABLET, ORALLY DISINTEGRATING ORAL EVERY 8 HOURS PRN
Qty: 60 TABLET | Refills: 0 | Status: SHIPPED | OUTPATIENT
Start: 2020-03-29 | End: 2021-03-09

## 2020-03-29 RX ADMIN — FAMOTIDINE 20 MG: 10 INJECTION INTRAVENOUS at 08:45

## 2020-03-29 RX ADMIN — KETOROLAC TROMETHAMINE 15 MG: 30 INJECTION, SOLUTION INTRAMUSCULAR at 08:45

## 2020-03-29 RX ADMIN — KETOROLAC TROMETHAMINE 15 MG: 30 INJECTION, SOLUTION INTRAMUSCULAR at 04:37

## 2020-03-30 ENCOUNTER — APPOINTMENT (OUTPATIENT)
Dept: GENERAL RADIOLOGY | Facility: HOSPITAL | Age: 30
End: 2020-03-30

## 2020-03-30 ENCOUNTER — READMISSION MANAGEMENT (OUTPATIENT)
Dept: CALL CENTER | Facility: HOSPITAL | Age: 30
End: 2020-03-30

## 2020-03-30 ENCOUNTER — APPOINTMENT (OUTPATIENT)
Dept: CT IMAGING | Facility: HOSPITAL | Age: 30
End: 2020-03-30

## 2020-03-30 ENCOUNTER — TELEPHONE (OUTPATIENT)
Dept: SURGERY | Facility: CLINIC | Age: 30
End: 2020-03-30

## 2020-03-30 ENCOUNTER — HOSPITAL ENCOUNTER (INPATIENT)
Facility: HOSPITAL | Age: 30
LOS: 5 days | Discharge: HOME | End: 2020-04-06
Attending: EMERGENCY MEDICINE | Admitting: SURGERY

## 2020-03-30 ENCOUNTER — NURSE TRIAGE (OUTPATIENT)
Dept: CALL CENTER | Facility: HOSPITAL | Age: 30
End: 2020-03-30

## 2020-03-30 DIAGNOSIS — K56.600 PARTIAL SMALL BOWEL OBSTRUCTION (HCC): Primary | ICD-10-CM

## 2020-03-30 LAB
ALBUMIN SERPL-MCNC: 4.4 G/DL (ref 3.5–5.2)
ALBUMIN/GLOB SERPL: 1.4 G/DL
ALP SERPL-CCNC: 64 U/L (ref 39–117)
ALT SERPL W P-5'-P-CCNC: 15 U/L (ref 1–41)
ANION GAP SERPL CALCULATED.3IONS-SCNC: 19.1 MMOL/L (ref 5–15)
AST SERPL-CCNC: 16 U/L (ref 1–40)
BASOPHILS # BLD AUTO: 0.02 10*3/MM3 (ref 0–0.2)
BASOPHILS NFR BLD AUTO: 0.2 % (ref 0–1.5)
BILIRUB SERPL-MCNC: 0.4 MG/DL (ref 0.2–1.2)
BUN BLD-MCNC: 10 MG/DL (ref 6–20)
BUN/CREAT SERPL: 18.2 (ref 7–25)
CALCIUM SPEC-SCNC: 9.7 MG/DL (ref 8.6–10.5)
CHLORIDE SERPL-SCNC: 100 MMOL/L (ref 98–107)
CO2 SERPL-SCNC: 21.9 MMOL/L (ref 22–29)
CREAT BLD-MCNC: 0.55 MG/DL (ref 0.76–1.27)
DEPRECATED RDW RBC AUTO: 43.4 FL (ref 37–54)
EOSINOPHIL # BLD AUTO: 0.04 10*3/MM3 (ref 0–0.4)
EOSINOPHIL NFR BLD AUTO: 0.4 % (ref 0.3–6.2)
ERYTHROCYTE [DISTWIDTH] IN BLOOD BY AUTOMATED COUNT: 12.5 % (ref 12.3–15.4)
GFR SERPL CREATININE-BSD FRML MDRD: >150 ML/MIN/1.73
GLOBULIN UR ELPH-MCNC: 3.2 GM/DL
GLUCOSE BLD-MCNC: 119 MG/DL (ref 65–99)
HCT VFR BLD AUTO: 48.3 % (ref 37.5–51)
HGB BLD-MCNC: 15.8 G/DL (ref 13–17.7)
IMM GRANULOCYTES # BLD AUTO: 0.05 10*3/MM3 (ref 0–0.05)
IMM GRANULOCYTES NFR BLD AUTO: 0.5 % (ref 0–0.5)
LIPASE SERPL-CCNC: 19 U/L (ref 13–60)
LYMPHOCYTES # BLD AUTO: 1.07 10*3/MM3 (ref 0.7–3.1)
LYMPHOCYTES NFR BLD AUTO: 10.6 % (ref 19.6–45.3)
MCH RBC QN AUTO: 30.7 PG (ref 26.6–33)
MCHC RBC AUTO-ENTMCNC: 32.7 G/DL (ref 31.5–35.7)
MCV RBC AUTO: 93.8 FL (ref 79–97)
MONOCYTES # BLD AUTO: 1.04 10*3/MM3 (ref 0.1–0.9)
MONOCYTES NFR BLD AUTO: 10.3 % (ref 5–12)
NEUTROPHILS # BLD AUTO: 7.83 10*3/MM3 (ref 1.7–7)
NEUTROPHILS NFR BLD AUTO: 78 % (ref 42.7–76)
NRBC BLD AUTO-RTO: 0 /100 WBC (ref 0–0.2)
PLATELET # BLD AUTO: 228 10*3/MM3 (ref 140–450)
PMV BLD AUTO: 10.4 FL (ref 6–12)
POTASSIUM BLD-SCNC: 4 MMOL/L (ref 3.5–5.2)
PROT SERPL-MCNC: 7.6 G/DL (ref 6–8.5)
RBC # BLD AUTO: 5.15 10*6/MM3 (ref 4.14–5.8)
SODIUM BLD-SCNC: 141 MMOL/L (ref 136–145)
WBC NRBC COR # BLD: 10.05 10*3/MM3 (ref 3.4–10.8)

## 2020-03-30 PROCEDURE — G0378 HOSPITAL OBSERVATION PER HR: HCPCS

## 2020-03-30 PROCEDURE — 80053 COMPREHEN METABOLIC PANEL: CPT | Performed by: NURSE PRACTITIONER

## 2020-03-30 PROCEDURE — 74018 RADEX ABDOMEN 1 VIEW: CPT

## 2020-03-30 PROCEDURE — 25010000002 IOPAMIDOL 61 % SOLUTION: Performed by: EMERGENCY MEDICINE

## 2020-03-30 PROCEDURE — 74177 CT ABD & PELVIS W/CONTRAST: CPT

## 2020-03-30 PROCEDURE — 25010000002 LEVETIRACETAM IN NACL 0.82% 500 MG/100ML SOLUTION: Performed by: SURGERY

## 2020-03-30 PROCEDURE — 25010000002 ENOXAPARIN PER 10 MG: Performed by: SURGERY

## 2020-03-30 PROCEDURE — 83690 ASSAY OF LIPASE: CPT | Performed by: EMERGENCY MEDICINE

## 2020-03-30 PROCEDURE — 25010000002 ONDANSETRON PER 1 MG: Performed by: NURSE PRACTITIONER

## 2020-03-30 PROCEDURE — 85025 COMPLETE CBC W/AUTO DIFF WBC: CPT | Performed by: NURSE PRACTITIONER

## 2020-03-30 PROCEDURE — 99284 EMERGENCY DEPT VISIT MOD MDM: CPT

## 2020-03-30 PROCEDURE — 71045 X-RAY EXAM CHEST 1 VIEW: CPT

## 2020-03-30 RX ORDER — ONDANSETRON 2 MG/ML
4 INJECTION INTRAMUSCULAR; INTRAVENOUS EVERY 6 HOURS PRN
Status: DISCONTINUED | OUTPATIENT
Start: 2020-03-30 | End: 2020-04-06 | Stop reason: HOSPADM

## 2020-03-30 RX ORDER — LEVETIRACETAM 5 MG/ML
500 INJECTION INTRAVASCULAR NIGHTLY
Status: DISCONTINUED | OUTPATIENT
Start: 2020-03-30 | End: 2020-04-05

## 2020-03-30 RX ORDER — SODIUM CHLORIDE 0.9 % (FLUSH) 0.9 %
10 SYRINGE (ML) INJECTION AS NEEDED
Status: DISCONTINUED | OUTPATIENT
Start: 2020-03-30 | End: 2020-04-06 | Stop reason: HOSPADM

## 2020-03-30 RX ORDER — NALOXONE HCL 0.4 MG/ML
0.4 VIAL (ML) INJECTION
Status: DISCONTINUED | OUTPATIENT
Start: 2020-03-30 | End: 2020-04-06 | Stop reason: HOSPADM

## 2020-03-30 RX ORDER — SODIUM CHLORIDE 0.9 % (FLUSH) 0.9 %
10 SYRINGE (ML) INJECTION EVERY 12 HOURS SCHEDULED
Status: DISCONTINUED | OUTPATIENT
Start: 2020-03-30 | End: 2020-04-06 | Stop reason: HOSPADM

## 2020-03-30 RX ORDER — MORPHINE SULFATE 2 MG/ML
1 INJECTION, SOLUTION INTRAMUSCULAR; INTRAVENOUS EVERY 4 HOURS PRN
Status: DISCONTINUED | OUTPATIENT
Start: 2020-03-30 | End: 2020-04-06 | Stop reason: HOSPADM

## 2020-03-30 RX ORDER — DEXTROSE, SODIUM CHLORIDE, AND POTASSIUM CHLORIDE 5; .9; .15 G/100ML; G/100ML; G/100ML
50 INJECTION INTRAVENOUS CONTINUOUS
Status: DISCONTINUED | OUTPATIENT
Start: 2020-03-30 | End: 2020-04-05

## 2020-03-30 RX ORDER — LEVETIRACETAM 500 MG/1
500 TABLET ORAL NIGHTLY
Status: DISCONTINUED | OUTPATIENT
Start: 2020-03-30 | End: 2020-03-30

## 2020-03-30 RX ORDER — LEVETIRACETAM 5 MG/ML
500 INJECTION INTRAVASCULAR EVERY 12 HOURS SCHEDULED
Status: DISCONTINUED | OUTPATIENT
Start: 2020-03-30 | End: 2020-03-30

## 2020-03-30 RX ORDER — ACETAMINOPHEN 160 MG/5ML
650 SOLUTION ORAL EVERY 4 HOURS PRN
COMMUNITY
End: 2021-03-09

## 2020-03-30 RX ORDER — PROMETHAZINE HYDROCHLORIDE 25 MG/ML
6.25 INJECTION, SOLUTION INTRAMUSCULAR; INTRAVENOUS EVERY 4 HOURS PRN
Status: DISCONTINUED | OUTPATIENT
Start: 2020-03-30 | End: 2020-04-06 | Stop reason: HOSPADM

## 2020-03-30 RX ORDER — ONDANSETRON 2 MG/ML
4 INJECTION INTRAMUSCULAR; INTRAVENOUS ONCE
Status: COMPLETED | OUTPATIENT
Start: 2020-03-30 | End: 2020-03-30

## 2020-03-30 RX ADMIN — SODIUM CHLORIDE 1000 ML: 9 INJECTION, SOLUTION INTRAVENOUS at 13:24

## 2020-03-30 RX ADMIN — ENOXAPARIN SODIUM 30 MG: 30 INJECTION SUBCUTANEOUS at 20:41

## 2020-03-30 RX ADMIN — IOPAMIDOL 85 ML: 612 INJECTION, SOLUTION INTRAVENOUS at 15:04

## 2020-03-30 RX ADMIN — LEVETIRACETAM 500 MG: 5 INJECTION INTRAVENOUS at 23:40

## 2020-03-30 RX ADMIN — ONDANSETRON 4 MG: 2 INJECTION INTRAMUSCULAR; INTRAVENOUS at 13:23

## 2020-03-30 RX ADMIN — POTASSIUM CHLORIDE, DEXTROSE MONOHYDRATE AND SODIUM CHLORIDE 100 ML/HR: 150; 5; 900 INJECTION, SOLUTION INTRAVENOUS at 17:24

## 2020-03-30 NOTE — TELEPHONE ENCOUNTER
"Toan states that her son is special needs.  He was discharged yesterday from [facility] after having an Exploratory Lap and lysis of adhesions.  She states that he was doing well prior to discharge but began vomiting around 5 pm.  He refuses anything po but did have a wet brief this am.  He has not had anything po since 5 pm.  He has a hx of multiple illeus.  He has continued to vomit during the night and this am.    Reason for Disposition  • [1] Vomiting AND [2] persists > 4 hours    Additional Information  • Negative: Sounds like a life-threatening emergency to the triager  • Negative: Chest pain  • Negative: Difficulty breathing  • Negative: Surgical incision symptoms and questions  • Negative: [1] Discomfort (pain, burning or stinging) when passing urine AND [2] male  • Negative: [1] Discomfort (pain, burning or stinging) when passing urine AND [2] female  • Negative: Constipation  • Negative: New or worsening leg (calf, thigh) pain  • Negative: New or worsening leg swelling  • Negative: Dizziness is severe, or persists > 24 hours after surgery  • Negative: Pain, redness, swelling, or pus at IV Site  • Negative: Symptoms arising from use of a urinary catheter (Trevino or Coude)  • Negative: Cast problems or questions  • Negative: Medication question  • Negative: [1] Widespread rash AND [2] bright red, sunburn-like  • Negative: [1] SEVERE headache AND [2] after spinal (epidural) anesthesia  • Negative: [1] Vomiting AND [2] abdomen looks much more swollen than usual  • Negative: [1] Drinking very little AND [2] dehydration suspected (e.g., no urine > 12 hours, very dry mouth, very lightheaded)  • Negative: Patient sounds very sick or weak to the triager  • Negative: Sounds like a serious complication to the triager    Answer Assessment - Initial Assessment Questions  1. SYMPTOM: \"What's the main symptom you're concerned about?\" (e.g., pain, fever, vomiting)      vomiting  2. ONSET: \"When did vomiting  start?\"      " "yesterday afternoon  3. SURGERY: \"What surgery was performed?\"      *No Answer*exp lap  4. DATE of SURGERY: \"When was surgery performed?\"       03/25  5. ANESTHESIA: \" What type of anesthesia did you have?\" (e.g., general, spinal, epidural, local)      general  6. PAIN: \"Is there any pain?\" If so, ask: \"How bad is it?\"  (Scale 1-10; or mild, moderate, severe)      mild  7. FEVER: \"Do you have a fever?\" If so, ask: \"What is your temperature, how was it measured, and when did it start?\"      no  8. VOMITING: \"Is there any vomiting?\" If yes, ask: \"How many times?\"      Yes at least 6 times  9. BLEEDING: \"Is there any bleeding?\" If so, ask: \"How much?\" and \"Where?\"      no  10. OTHER SYMPTOMS: \"Do you have any other symptoms?\" (e.g., drainage from wound, painful urination, constipation)        no    Protocols used: POST-OP SYMPTOMS AND QUESTIONS-ADULT-      "

## 2020-03-30 NOTE — TELEPHONE ENCOUNTER
Patient's mother and guardian, Rosy, called the office and left a vm on the clinical line reporting that the patient has developed N/V s/p lysis of adhesions, repair of internal hernia on 3/25/20 by Dr. Wilcox due to SBO. Called Rosy back. Reports patient began vomiting last night and has lost interest in any oral/fluid intake. Ostomy output has decreased significantly. They have tried taking the Zofran which seemed to help for a short time, but Rosy is concerned that he is becoming severely dehydrated and having the same issue that initially caused his recent admission/surgery. She has already spoken with one of the outreach RNs at [facility] regarding her concerns and is currently driving Alex to the [facility] ED. Informed Rosy that Dr. Wilcox is currently out of the office today, but I will send to the [facility] MD Dr. Ballard to let him know that the patient is headed to the ED.

## 2020-03-30 NOTE — OUTREACH NOTE
Prep Survey      Responses   [facility] facility patient discharged from?  [place]   Is LACE score < 7 ?  No   Eligibility  Readm Mgmt   Discharge diagnosis  (small bowel obstruction   Does the patient have one of the following disease processes/diagnoses(primary or secondary)?  Other   Does the patient have Home health ordered?  No   Is there a DME ordered?  No   Prep survey completed?  Yes          Jaylyn Chapman RN

## 2020-03-31 ENCOUNTER — APPOINTMENT (OUTPATIENT)
Dept: GENERAL RADIOLOGY | Facility: HOSPITAL | Age: 30
End: 2020-03-31

## 2020-03-31 PROCEDURE — 25010000002 ENOXAPARIN PER 10 MG: Performed by: SURGERY

## 2020-03-31 PROCEDURE — 25010000002 LEVETIRACETAM IN NACL 0.82% 500 MG/100ML SOLUTION: Performed by: SURGERY

## 2020-03-31 PROCEDURE — G0378 HOSPITAL OBSERVATION PER HR: HCPCS

## 2020-03-31 PROCEDURE — 74018 RADEX ABDOMEN 1 VIEW: CPT

## 2020-03-31 PROCEDURE — 99024 POSTOP FOLLOW-UP VISIT: CPT | Performed by: SURGERY

## 2020-03-31 RX ADMIN — ENOXAPARIN SODIUM 30 MG: 30 INJECTION SUBCUTANEOUS at 21:13

## 2020-03-31 RX ADMIN — POTASSIUM CHLORIDE, DEXTROSE MONOHYDRATE AND SODIUM CHLORIDE 100 ML/HR: 150; 5; 900 INJECTION, SOLUTION INTRAVENOUS at 13:32

## 2020-03-31 RX ADMIN — SODIUM CHLORIDE, PRESERVATIVE FREE 10 ML: 5 INJECTION INTRAVENOUS at 09:12

## 2020-03-31 RX ADMIN — POTASSIUM CHLORIDE, DEXTROSE MONOHYDRATE AND SODIUM CHLORIDE 100 ML/HR: 150; 5; 900 INJECTION, SOLUTION INTRAVENOUS at 23:35

## 2020-03-31 RX ADMIN — POTASSIUM CHLORIDE, DEXTROSE MONOHYDRATE AND SODIUM CHLORIDE 100 ML/HR: 150; 5; 900 INJECTION, SOLUTION INTRAVENOUS at 03:46

## 2020-03-31 RX ADMIN — LEVETIRACETAM 500 MG: 5 INJECTION INTRAVENOUS at 21:13

## 2020-03-31 RX ADMIN — SODIUM CHLORIDE, PRESERVATIVE FREE 10 ML: 5 INJECTION INTRAVENOUS at 21:13

## 2020-03-31 NOTE — OUTREACH NOTE
Medical Week 1 Survey      Responses   [facility] patient discharged from?  [place]   Does the patient have one of the following disease processes/diagnoses(primary or secondary)?  Other   Is there a successful TCM telephone encounter documented?  No   Week 1 attempt successful?  No   Revoke  Readmitted          Marcy Mckinley RN

## 2020-04-01 ENCOUNTER — APPOINTMENT (OUTPATIENT)
Dept: GENERAL RADIOLOGY | Facility: HOSPITAL | Age: 30
End: 2020-04-01

## 2020-04-01 LAB
ANION GAP SERPL CALCULATED.3IONS-SCNC: 11.2 MMOL/L (ref 5–15)
BUN BLD-MCNC: 5 MG/DL (ref 6–20)
BUN/CREAT SERPL: 11.1 (ref 7–25)
CALCIUM SPEC-SCNC: 9.2 MG/DL (ref 8.6–10.5)
CHLORIDE SERPL-SCNC: 108 MMOL/L (ref 98–107)
CO2 SERPL-SCNC: 26.8 MMOL/L (ref 22–29)
CREAT BLD-MCNC: 0.45 MG/DL (ref 0.76–1.27)
GFR SERPL CREATININE-BSD FRML MDRD: >150 ML/MIN/1.73
GLUCOSE BLD-MCNC: 130 MG/DL (ref 65–99)
POTASSIUM BLD-SCNC: 3.5 MMOL/L (ref 3.5–5.2)
SODIUM BLD-SCNC: 146 MMOL/L (ref 136–145)

## 2020-04-01 PROCEDURE — 25010000002 ENOXAPARIN PER 10 MG: Performed by: SURGERY

## 2020-04-01 PROCEDURE — 74018 RADEX ABDOMEN 1 VIEW: CPT

## 2020-04-01 PROCEDURE — 80048 BASIC METABOLIC PNL TOTAL CA: CPT | Performed by: SURGERY

## 2020-04-01 PROCEDURE — 25010000002 LEVETIRACETAM IN NACL 0.82% 500 MG/100ML SOLUTION: Performed by: SURGERY

## 2020-04-01 PROCEDURE — 99024 POSTOP FOLLOW-UP VISIT: CPT | Performed by: SURGERY

## 2020-04-01 RX ADMIN — SODIUM CHLORIDE, PRESERVATIVE FREE 10 ML: 5 INJECTION INTRAVENOUS at 09:14

## 2020-04-01 RX ADMIN — POTASSIUM CHLORIDE, DEXTROSE MONOHYDRATE AND SODIUM CHLORIDE 100 ML/HR: 150; 5; 900 INJECTION, SOLUTION INTRAVENOUS at 09:13

## 2020-04-01 RX ADMIN — LEVETIRACETAM 500 MG: 5 INJECTION INTRAVENOUS at 21:32

## 2020-04-01 RX ADMIN — ENOXAPARIN SODIUM 30 MG: 30 INJECTION SUBCUTANEOUS at 21:17

## 2020-04-01 RX ADMIN — POTASSIUM CHLORIDE, DEXTROSE MONOHYDRATE AND SODIUM CHLORIDE 100 ML/HR: 150; 5; 900 INJECTION, SOLUTION INTRAVENOUS at 19:08

## 2020-04-01 NOTE — PROGRESS NOTES
Discharge Planning Assessment  [facility]     Patient Name: Alex Brown  MRN: [id number]  Today's Date: 4/1/2020    Admit Date: 3/30/2020    Discharge Needs Assessment     Row Name 04/01/20 1502       Discharge Needs Assessment    Patient's Choice of Community Agency(s)  Current with [employer] waiver program/[employer] ([place], [place]) and [employer] day program.     Discharge Coordination/Progress  Spoke with the mother to verify the facesheet    Row Name 04/01/20 7922       Living Environment    Lives With  parent(s)    Name(s) of Who Lives With Patient  Nora Brown/parents [phone]    Current Living Arrangements  home/apartment/condo    Primary Care Provided by  parent(s)    Family Caregiver if Needed  parent(s)    Family Caregiver Names  Nora Brown/parents [phone]    Able to Return to Prior Arrangements  yes       Resource/Environmental Concerns    Resource/Environmental Concerns  none       Transition Planning    Patient/Family Anticipates Transition to  home with family;home    Patient/Family Anticipated Services at Transition  none       Discharge Needs Assessment    Concerns to be Addressed  no discharge needs identified    Equipment Currently Used at Home  shower chair;slide board;wheelchair;ramp    Anticipated Changes Related to Illness  none    Equipment Needed After Discharge  none    Outpatient/Agency/Support Group Needs  adult     Provided Post Acute Provider List?  N/A    N/A Provider List Comment  The patient's mother currently denies any D/c needs therefore was not provided with a printout of HH/Nursing home compare lists from medicare.gov.         Discharge Plan     Row Name 04/01/20 1500       Plan    Plan Comments  Spoke with the patient's mother by phone to review facesheet and discharge needs. The patient's mother at this time denies needing any DME or discharge planning. The patient is current with [employer] waiver program/[employer]  ([place], [place]) personal assistants and [employer] [facility] day program. Discharge plan is home and the family will transport per private auto(w/c adapted van). CCP will follow for any needs that may arise. GURINDER Yi RN, CCP.     Row Name 04/01/20 1507       Plan    Plan  home with family to assist and provide the transportation     Provided Post Acute Provider Quality & Resource List?  N/A    N/A Quality & Resource List Comment  The patient's mother currently denies any D/c needs therefore was not provided with a printout of HH/Nursing home compare lists from medicare.gov.     Patient/Family in Agreement with Plan  yes        Destination      Coordination has not been started for this encounter.      Durable Medical Equipment      Coordination has not been started for this encounter.      Dialysis/Infusion      Coordination has not been started for this encounter.      Home Medical Care      Coordination has not been started for this encounter.      Therapy      Coordination has not been started for this encounter.      Community Resources      Coordination has not been started for this encounter.          Demographic Summary     Row Name 04/01/20 0215       General Information    Admission Type  inpatient    Arrived From  emergency department    Referral Source  admission list    Reason for Consult  discharge planning    Preferred Language  English     Used During This Interaction  no       Contact Information    Permission Granted to Share Info With      Contact Information Obtained for          Functional Status     Row Name 04/01/20 1502       Functional Status    Usual Activity Tolerance  fair    Current Activity Tolerance  fair       Functional Status, IADL    Medications  completely dependent    Meal Preparation  completely dependent    Housekeeping  completely dependent    Laundry  completely dependent    Shopping  completely dependent       Mental Status Summary     Recent Changes in Mental Status/Cognitive Functioning  unable to assess       Employment/    Employment Status  disabled        Psychosocial    No documentation.       Abuse/Neglect    No documentation.       Legal    No documentation.       Substance Abuse    No documentation.       Patient Forms    No documentation.           Teresa Yi RN

## 2020-04-01 NOTE — PLAN OF CARE
Problem: Patient Care Overview  Goal: Plan of Care Review  Outcome: Ongoing (interventions implemented as appropriate)  Flowsheets  Taken 4/1/2020 9691  Progress: improving  Outcome Summary: NGT removed by MD, pt appears more alert and comfortable since removed. Turns q2 and incontinence/daily care performed by mother. She declined specialty bed and heel offloading boots. IVF per MAR. So far no s/sx of pain or nausea this shift.  Taken 4/1/2020 0916  Plan of Care Reviewed With: patient;mother

## 2020-04-01 NOTE — PROGRESS NOTES
Chief Complaint:    POD 7, S/P adhesiolysis    Subjective:    Looks more comfortable today.    Objective:    Vitals:    03/31/20 1406 03/31/20 2218 04/01/20 0448 04/01/20 1046   BP: 100/65 103/68 100/65 117/72   BP Location: Right arm Right arm Right arm Left arm   Patient Position: Lying Lying Lying Sitting   Pulse: 76 92 68 110   Resp: 16 18 18 20   Temp: 97.1 °F (36.2 °C) 97.7 °F (36.5 °C)  98.9 °F (37.2 °C)   TempSrc: Oral Oral Oral Oral   SpO2:  100% 95% 93%   Weight:       Height:         Nasogastric tube output 300 mL yesterday.    Lungs: Clear  Heart: Regular  Abdomen: Incision looks okay. BS present. Gas and liquid stool in the ileostomy appliance.   Extremities: Warm    Labs reviewed.  Creat 0.45    I reviewed the images and report of a KUB performed today.  I compared the images to studies performed yesterday and on 3/30/2020.  There is improvement in bowel gas pattern.  There is reduction in small bowel gas.    Assessment:    POD 7, S/P adhesiolysis  Small bowel obstruction  Prior total proctocolectomy and end ileostomy for ulcerative colitis.  Prior surgeries for pelvic abscess.  Cerebral palsy    Plan:    I remove the nasogastric tube today.  Output was somewhat reduced yesterday.  I think he will fare better from the standpoint of his mental status without the NG tube.  Continue n.p.o.  Plan for KUB tomorrow morning.  Enoxaparin for DVT prophylaxis

## 2020-04-01 NOTE — PLAN OF CARE
Pt has been calm and appeared comfortable in bed this shift. Sleeping at intervals. Mother remains at bedside and is very active in pt care. Mother states that she feels pt is comfortable as he has not displayed any of his typical signs of pain. No n/v. NG tube in place, draining brown liquid. Colostomy. IVF. Midline abd incision intact with staples, open to air. Remains NPO. Will monitor

## 2020-04-02 ENCOUNTER — APPOINTMENT (OUTPATIENT)
Dept: GENERAL RADIOLOGY | Facility: HOSPITAL | Age: 30
End: 2020-04-02

## 2020-04-02 PROCEDURE — 25010000002 ENOXAPARIN PER 10 MG: Performed by: SURGERY

## 2020-04-02 PROCEDURE — 74018 RADEX ABDOMEN 1 VIEW: CPT

## 2020-04-02 PROCEDURE — 99024 POSTOP FOLLOW-UP VISIT: CPT | Performed by: SURGERY

## 2020-04-02 PROCEDURE — 25010000002 LEVETIRACETAM IN NACL 0.82% 500 MG/100ML SOLUTION: Performed by: SURGERY

## 2020-04-02 RX ADMIN — ENOXAPARIN SODIUM 30 MG: 30 INJECTION SUBCUTANEOUS at 20:56

## 2020-04-02 RX ADMIN — LEVETIRACETAM 500 MG: 5 INJECTION INTRAVENOUS at 20:55

## 2020-04-02 RX ADMIN — SODIUM CHLORIDE, PRESERVATIVE FREE 10 ML: 5 INJECTION INTRAVENOUS at 20:56

## 2020-04-02 RX ADMIN — POTASSIUM CHLORIDE, DEXTROSE MONOHYDRATE AND SODIUM CHLORIDE 100 ML/HR: 150; 5; 900 INJECTION, SOLUTION INTRAVENOUS at 05:23

## 2020-04-02 NOTE — PLAN OF CARE
Problem: Patient Care Overview  Goal: Plan of Care Review  Outcome: Ongoing (interventions implemented as appropriate)  Flowsheets  Taken 4/1/2020 2042  Plan of Care Reviewed With: patient  Taken 4/2/2020 7332  Outcome Summary: Pt has done well without presence of NG tube-slept most of the night- no s/s of pain and no episodes of emesis. Mother provides incontinence care and turns him- refuses offers of help. Colostomy producing stool-midline incision with approximated edges. Will continue to monitor and follow current POC.

## 2020-04-02 NOTE — PLAN OF CARE
Problem: Patient Care Overview  Goal: Plan of Care Review  Flowsheets  Taken 4/1/2020 1559 by Lupe Alarcon, RN  Progress: improving  Taken 4/1/2020 2042 by Jaylyn Meadows RN  Plan of Care Reviewed With: patient  Note:   Pt resting comfortably no s/s of pain or vomiting. Mother at bedside providing total care for patient, refuses help at this time. Will continue to monitor

## 2020-04-02 NOTE — PROGRESS NOTES
"Adult Nutrition  Assessment/PES    Patient Name:  Alex Brown  YOB: 1990  MRN: [id number]  Admit Date:  3/30/2020    Assessment Date:  4/2/2020    Comments:  EMR reviewed - pt remains NPO. NG tube removed yesterday. No emesis reported since. KUB today notes \"small bowel caliber continues to decrease.\" Hopeful diet can advance soon. RD to continue following.     Reason for Assessment     Row Name 04/02/20 1204          Reason for Assessment    Reason For Assessment  follow-up protocol         Nutrition/Diet History     Row Name 04/02/20 1204          Nutrition/Diet History    Typical Food/Fluid Intake  Still NPO. NG tube removed yesterday by surgeon. No vomiting. KUB improved; another pending for today.            Labs/Tests/Procedures/Meds     Row Name 04/02/20 1205          Labs/Procedures/Meds    Lab Results Reviewed  reviewed     Lab Results Comments  Na, Cl, GLu, BUN, Cr        Diagnostic Tests/Procedures    Diagnostic Test/Procedure Reviewed  reviewed     Diagnostic Test/Procedures Comments  KUB        Medications    Pertinent Medications Reviewed  reviewed     Pertinent Medications Comments   cc/hr (D5 NaCl w/ KCl)         Physical Findings     Row Name 04/02/20 1206          Physical Findings    Overall Physical Appearance  tetraplegia (quadriplegia);underweight     Gastrointestinal  ileostomy           Nutrition Prescription Ordered     Row Name 04/02/20 1206          Nutrition Prescription PO    Current PO Diet  NPO         Evaluation of Received Nutrient/Fluid Intake     Row Name 04/02/20 1206          Fluid Intake Evaluation    IV Fluid (mL)  2400         Problem/Interventions:      Intervention Goal     Row Name 04/02/20 1206          Intervention Goal    General  Reduce/improve symptoms;Disease management/therapy     PO  Initiate feeding     Weight  No significant weight loss         Nutrition Intervention     Row Name 04/02/20 1206          Nutrition Intervention    RD/Tech " Action  Await begin PO;Follow Tx progress;Care plan reviewd           Education/Evaluation     Row Name 04/02/20 1206          Monitor/Evaluation    Monitor  Per protocol           Electronically signed by:  Mckayla Lee RD  04/02/20 12:06

## 2020-04-02 NOTE — PROGRESS NOTES
Chief Complaint:    POD 8, S/P adhesiolysis    Subjective:    Looks okay today.    Objective:    Vitals:    04/01/20 2052 04/02/20 0602 04/02/20 1344 04/02/20 1358   BP: 126/62 100/80 (!) 86/55 102/60   BP Location: Right arm Right arm Left arm Right arm   Patient Position: Lying  Lying Lying   Pulse: 100 98 95 94   Resp: 16 16 18    Temp: 99.8 °F (37.7 °C) 98.8 °F (37.1 °C) 97.1 °F (36.2 °C)    TempSrc: Axillary  Oral    SpO2: 96% 97% 95%    Weight:       Height:         Ileostomy output 425mL yesterday.    Lungs: Clear  Heart: Regular  Abdomen: Incision looks okay. BS present. Gas and liquid stool in the ileostomy appliance.   Extremities: Warm    Labs reviewed.  Creat 0.45    I reviewed the images and report of a KUB performed today.  I compared the images to studies performed yesterday and on 3/30/2020.  There is further improvement in bowel gas pattern.  There is reduction in small bowel gas.    Assessment:    POD 8, S/P adhesiolysis  Small bowel obstruction  Prior total proctocolectomy and end ileostomy for ulcerative colitis.  Prior surgeries for pelvic abscess.  Cerebral palsy    Plan:    Try clear liquid diet.  Plan for KUB tomorrow morning.  Enoxaparin for DVT prophylaxis.

## 2020-04-03 ENCOUNTER — APPOINTMENT (OUTPATIENT)
Dept: GENERAL RADIOLOGY | Facility: HOSPITAL | Age: 30
End: 2020-04-03

## 2020-04-03 LAB
ANION GAP SERPL CALCULATED.3IONS-SCNC: 13.6 MMOL/L (ref 5–15)
BUN BLD-MCNC: 6 MG/DL (ref 6–20)
BUN/CREAT SERPL: 12.5 (ref 7–25)
CALCIUM SPEC-SCNC: 8.9 MG/DL (ref 8.6–10.5)
CHLORIDE SERPL-SCNC: 104 MMOL/L (ref 98–107)
CO2 SERPL-SCNC: 24.4 MMOL/L (ref 22–29)
CREAT BLD-MCNC: 0.48 MG/DL (ref 0.76–1.27)
GFR SERPL CREATININE-BSD FRML MDRD: >150 ML/MIN/1.73
GLUCOSE BLD-MCNC: 106 MG/DL (ref 65–99)
POTASSIUM BLD-SCNC: 3.5 MMOL/L (ref 3.5–5.2)
SODIUM BLD-SCNC: 142 MMOL/L (ref 136–145)

## 2020-04-03 PROCEDURE — 80048 BASIC METABOLIC PNL TOTAL CA: CPT | Performed by: SURGERY

## 2020-04-03 PROCEDURE — 74018 RADEX ABDOMEN 1 VIEW: CPT

## 2020-04-03 PROCEDURE — 25010000002 ENOXAPARIN PER 10 MG: Performed by: SURGERY

## 2020-04-03 PROCEDURE — 99024 POSTOP FOLLOW-UP VISIT: CPT | Performed by: SURGERY

## 2020-04-03 PROCEDURE — 25010000002 LEVETIRACETAM IN NACL 0.82% 500 MG/100ML SOLUTION: Performed by: SURGERY

## 2020-04-03 RX ORDER — PANTOPRAZOLE SODIUM 40 MG/10ML
40 INJECTION, POWDER, LYOPHILIZED, FOR SOLUTION INTRAVENOUS
Status: DISCONTINUED | OUTPATIENT
Start: 2020-04-04 | End: 2020-04-04

## 2020-04-03 RX ADMIN — SODIUM CHLORIDE, PRESERVATIVE FREE 10 ML: 5 INJECTION INTRAVENOUS at 20:09

## 2020-04-03 RX ADMIN — LEVETIRACETAM 500 MG: 5 INJECTION INTRAVENOUS at 20:08

## 2020-04-03 RX ADMIN — ENOXAPARIN SODIUM 30 MG: 30 INJECTION SUBCUTANEOUS at 20:08

## 2020-04-03 RX ADMIN — POTASSIUM CHLORIDE, DEXTROSE MONOHYDRATE AND SODIUM CHLORIDE 50 ML/HR: 150; 5; 900 INJECTION, SOLUTION INTRAVENOUS at 11:57

## 2020-04-03 NOTE — PROGRESS NOTES
Adult Nutrition  Assessment/PES    Patient Name:  Alex Brown  YOB: 1990  MRN: [id number]  Admit Date:  3/30/2020    Assessment Date:  4/3/2020    Comments:  Diet advanced to Clears. IVF decreased to 50 cc/hr. Making progress. Will continue to follow.     Reason for Assessment     Row Name 04/03/20 1054          Reason for Assessment    Reason For Assessment  follow-up protocol         Nutrition/Diet History     Row Name 04/03/20 1054          Nutrition/Diet History    Typical Food/Fluid Intake  Diet adv to clears. 25%, 120 cc intake. Mother stays here around the clock.            Labs/Tests/Procedures/Meds     Row Name 04/03/20 1054          Labs/Procedures/Meds    Lab Results Reviewed  reviewed        Diagnostic Tests/Procedures    Diagnostic Test/Procedure Reviewed  reviewed     Diagnostic Test/Procedures Comments  repeat KUB        Medications    Pertinent Medications Reviewed  reviewed     Pertinent Medications Comments  IVF 50 cc/hr         Physical Findings     Row Name 04/03/20 1055          Physical Findings    Overall Physical Appearance  tetraplegia (quadriplegia);underweight     Gastrointestinal  ileostomy           Nutrition Prescription Ordered     Row Name 04/03/20 1055          Nutrition Prescription PO    Current PO Diet  Clear Liquid                 Problem/Interventions:    Problem 2     Row Name 04/03/20 1055          Nutrition Diagnoses Problem 2    Problem 2  Altered GI Function     Etiology (related to)  Medical Diagnosis     Gastrointestinal  SBO     Signs/Symptoms (evidenced by)  Clear Liquid Diet             Intervention Goal     Row Name 04/03/20 1055          Intervention Goal    General  Reduce/improve symptoms     PO  Continue positive trend;Tolerate PO;Advance diet;PO intake (%)     PO Intake %  75 %     Weight  No significant weight loss         Nutrition Intervention     Row Name 04/03/20 1056          Nutrition Intervention    RD/Tech Action  Follow Tx  progress;Care plan reviewd;Encourage intake           Education/Evaluation     Row Name 04/03/20 1056          Monitor/Evaluation    Monitor  Per protocol           Electronically signed by:  Mckayla Lee RD  04/03/20 10:57

## 2020-04-03 NOTE — PLAN OF CARE
Problem: Patient Care Overview  Goal: Plan of Care Review  Outcome: Ongoing (interventions implemented as appropriate)  Flowsheets (Taken 4/3/2020 0111)  Progress: improving  Plan of Care Reviewed With: patient; mother  Outcome Summary: Patient is non verbal. Patient's mother is the caregiver. Patient is on clear liquid diet. No complaints this shift. Patient's mother is taking care of his turns and incontinence care. Patient is IV fluids. Will continue to monitor vital signs, labs and comfort.

## 2020-04-03 NOTE — PROGRESS NOTES
Chief Complaint:    POD 9, S/P adhesiolysis    Subjective:    Looks okay today.    Objective:    Vitals:    04/02/20 1344 04/02/20 1358 04/02/20 2218 04/03/20 0449   BP: (!) 86/55 102/60 106/67 97/61   BP Location: Left arm Right arm Left arm Left arm   Patient Position: Lying Lying Lying Lying   Pulse: 95 94 94 93   Resp: 18  16 16   Temp: 97.1 °F (36.2 °C)  98.3 °F (36.8 °C) 96.7 °F (35.9 °C)   TempSrc: Oral  Oral Oral   SpO2: 95%  95% 98%   Weight:       Height:         Ileostomy output 500 mL yesterday.    Lungs: Clear  Heart: Regular  Abdomen: Incision looks okay. BS present. Gas and liquid stool in the ileostomy appliance.   Extremities: Warm    Labs reviewed.  Creat 0.48    I reviewed the images and report of a KUB performed today.  I compared the images to studies performed yesterday and on 3/30/2020.  There is an increase in bowel gas as compared to yesterday, but I don't think that it looks obstructive    Assessment:    POD 9, S/P adhesiolysis  Small bowel obstruction  Prior total proctocolectomy and end ileostomy for ulcerative colitis.  Prior surgeries for pelvic abscess.  Cerebral palsy    Plan:    Abdomen is not distended.  He continues to have good ileostomy output.  I am going to try full liquid diet today.    Enoxaparin for DVT prophylaxis.

## 2020-04-04 PROCEDURE — 25010000002 ENOXAPARIN PER 10 MG: Performed by: SURGERY

## 2020-04-04 PROCEDURE — 99024 POSTOP FOLLOW-UP VISIT: CPT | Performed by: SURGERY

## 2020-04-04 PROCEDURE — 25010000002 LEVETIRACETAM IN NACL 0.82% 500 MG/100ML SOLUTION: Performed by: SURGERY

## 2020-04-04 RX ORDER — LANSOPRAZOLE
15 KIT EVERY MORNING
Status: DISCONTINUED | OUTPATIENT
Start: 2020-04-05 | End: 2020-04-04

## 2020-04-04 RX ORDER — PANTOPRAZOLE SODIUM 40 MG/10ML
40 INJECTION, POWDER, LYOPHILIZED, FOR SOLUTION INTRAVENOUS
Status: DISCONTINUED | OUTPATIENT
Start: 2020-04-05 | End: 2020-04-05

## 2020-04-04 RX ADMIN — SODIUM CHLORIDE, PRESERVATIVE FREE 10 ML: 5 INJECTION INTRAVENOUS at 20:56

## 2020-04-04 RX ADMIN — PANTOPRAZOLE SODIUM 40 MG: 40 INJECTION, POWDER, FOR SOLUTION INTRAVENOUS at 06:15

## 2020-04-04 RX ADMIN — POTASSIUM CHLORIDE, DEXTROSE MONOHYDRATE AND SODIUM CHLORIDE 50 ML/HR: 150; 5; 900 INJECTION, SOLUTION INTRAVENOUS at 08:35

## 2020-04-04 RX ADMIN — SODIUM CHLORIDE, PRESERVATIVE FREE 10 ML: 5 INJECTION INTRAVENOUS at 08:35

## 2020-04-04 RX ADMIN — LEVETIRACETAM 500 MG: 5 INJECTION INTRAVENOUS at 20:56

## 2020-04-04 RX ADMIN — ENOXAPARIN SODIUM 30 MG: 30 INJECTION SUBCUTANEOUS at 20:56

## 2020-04-04 NOTE — PLAN OF CARE
Tolerating full liq diet. No N/V. Moderate amount of dark brown liquid stool from colostomy. Pt seems more alert, smiling more, appears comfortable, no s/s of pain. IVF. IV keppra, no sz activity. Mother at bedside assisting with care.

## 2020-04-04 NOTE — PROGRESS NOTES
Chief Complaint:    POD 10, S/P adhesiolysis    Subjective:    Looks okay today.  Tolerating full liquids.  Ostomy output still loose.    Objective:    Vitals:    04/03/20 0449 04/03/20 1505 04/03/20 2220 04/04/20 0528   BP: 97/61 97/57 95/57 99/64   BP Location: Left arm Left arm Left arm    Patient Position: Lying Lying Lying    Pulse: 93 93 85 87   Resp: 16 16 16 16   Temp: 96.7 °F (35.9 °C) 97.8 °F (36.6 °C) 97.8 °F (36.6 °C) 96.9 °F (36.1 °C)   TempSrc: Oral Oral Axillary Axillary   SpO2: 98% 98% 96% 99%   Weight:       Height:         Ileostomy output 300 mL yesterday.    Lungs: Clear  Heart: Regular  Abdomen: Incision looks okay. Gas and liquid stool in the ileostomy appliance.   Extremities: Warm    KUB not obvious for obstruction 4/3/2020    Assessment:    POD 10, S/P adhesiolysis  Small bowel obstruction  Prior total proctocolectomy and end ileostomy for ulcerative colitis.  Prior surgeries for pelvic abscess.  Cerebral palsy    Plan:    Advance to gi soft lactose restricted for dinner.    Enoxaparin for DVT prophylaxis.

## 2020-04-04 NOTE — PLAN OF CARE
Problem: Patient Care Overview  Goal: Plan of Care Review  Outcome: Ongoing (interventions implemented as appropriate)  Flowsheets (Taken 4/4/2020 1948)  Progress: improving  Plan of Care Reviewed With: patient; mother  Outcome Summary: Diet increased to GI soft regular, pt tolerating. Pt having good output from colostomy. Pt's mother at d assisting with care. IVF continue. Pt appears comfortable, no complaints. Will monitor.

## 2020-04-05 LAB — PLATELET # BLD AUTO: 283 10*3/MM3 (ref 140–450)

## 2020-04-05 PROCEDURE — 85049 AUTOMATED PLATELET COUNT: CPT | Performed by: SURGERY

## 2020-04-05 PROCEDURE — 99024 POSTOP FOLLOW-UP VISIT: CPT | Performed by: SURGERY

## 2020-04-05 PROCEDURE — 25010000002 ENOXAPARIN PER 10 MG: Performed by: SURGERY

## 2020-04-05 RX ORDER — LEVETIRACETAM 100 MG/ML
500 SOLUTION ORAL NIGHTLY
Status: DISCONTINUED | OUTPATIENT
Start: 2020-04-05 | End: 2020-04-06 | Stop reason: HOSPADM

## 2020-04-05 RX ORDER — LANSOPRAZOLE
15 KIT EVERY MORNING
Status: DISCONTINUED | OUTPATIENT
Start: 2020-04-06 | End: 2020-04-06 | Stop reason: HOSPADM

## 2020-04-05 RX ADMIN — POTASSIUM CHLORIDE, DEXTROSE MONOHYDRATE AND SODIUM CHLORIDE 50 ML/HR: 150; 5; 900 INJECTION, SOLUTION INTRAVENOUS at 04:59

## 2020-04-05 RX ADMIN — SODIUM CHLORIDE, PRESERVATIVE FREE 10 ML: 5 INJECTION INTRAVENOUS at 09:41

## 2020-04-05 RX ADMIN — ENOXAPARIN SODIUM 30 MG: 30 INJECTION SUBCUTANEOUS at 20:30

## 2020-04-05 RX ADMIN — LEVETIRACETAM 500 MG: 100 SOLUTION ORAL at 20:30

## 2020-04-05 RX ADMIN — SODIUM CHLORIDE, PRESERVATIVE FREE 10 ML: 5 INJECTION INTRAVENOUS at 20:37

## 2020-04-05 RX ADMIN — PANTOPRAZOLE SODIUM 40 MG: 40 INJECTION, POWDER, FOR SOLUTION INTRAVENOUS at 05:03

## 2020-04-05 NOTE — PROGRESS NOTES
Chief Complaint:    POD 11, S/P adhesiolysis    Subjective:    Looks okay today.  Tolerating regular diet.  Ostomy output good.    Objective:    Vitals:    04/04/20 0528 04/04/20 1427 04/04/20 2148 04/05/20 0534   BP: 99/64 98/60 105/63 94/67   BP Location:  Left arm Left arm Left arm   Patient Position:  Lying Lying Lying   Pulse: 87 90 85 94   Resp: 16 16 16 16   Temp: 96.9 °F (36.1 °C) 97.1 °F (36.2 °C) 98.1 °F (36.7 °C) 98 °F (36.7 °C)   TempSrc: Axillary Axillary Oral Oral   SpO2: 99% 95% 97% 99%   Weight:       Height:         Ileostomy output 325 mL yesterday.    Lungs: Clear  Heart: Regular  Abdomen: Incision looks okay. Gas and liquid stool in the ileostomy appliance.   Extremities: Warm    Assessment:    POD 11, S/P adhesiolysis  Small bowel obstruction, resolved  Prior total proctocolectomy and end ileostomy for ulcerative colitis.  Prior surgeries for pelvic abscess.  Cerebral palsy    Plan:    Anticipate DC tomorrow.  Mother agreeable to that today.  Enoxaparin for DVT prophylaxis.

## 2020-04-05 NOTE — PLAN OF CARE
Problem: Patient Care Overview  Goal: Plan of Care Review  Outcome: Ongoing (interventions implemented as appropriate)  Flowsheets  Taken 4/5/2020 0629  Progress: improving  Taken 4/4/2020 2056  Plan of Care Reviewed With: patient;mother  Note:   Pt rested well with mother (caregiver) at bedside. Pt's colostomy has good stool and gas output. Tolerating diet. Continue IV fluids and monitor.

## 2020-04-05 NOTE — PLAN OF CARE
Problem: Patient Care Overview  Goal: Plan of Care Review  Outcome: Ongoing (interventions implemented as appropriate)  Flowsheets (Taken 4/5/2020 1954)  Progress: improving  Plan of Care Reviewed With: patient  Outcome Summary: Pt tolerating regular GI soft diet, having good output per colostomy. Output thin liquid at start of shift, starting to have thicker consistency by end of shift. Pt's mother at d assisting with care. Plan home tomorrow. Will monitor.

## 2020-04-06 VITALS
DIASTOLIC BLOOD PRESSURE: 72 MMHG | WEIGHT: 95 LBS | HEART RATE: 98 BPM | OXYGEN SATURATION: 99 % | HEIGHT: 66 IN | SYSTOLIC BLOOD PRESSURE: 104 MMHG | TEMPERATURE: 97.9 F | RESPIRATION RATE: 18 BRPM | BODY MASS INDEX: 15.27 KG/M2

## 2020-04-06 PROCEDURE — 99024 POSTOP FOLLOW-UP VISIT: CPT | Performed by: SURGERY

## 2020-04-06 RX ADMIN — LANSOPRAZOLE 15 MG: KIT at 06:01

## 2020-04-06 RX ADMIN — SODIUM CHLORIDE, PRESERVATIVE FREE 10 ML: 5 INJECTION INTRAVENOUS at 10:25

## 2020-04-06 NOTE — DISCHARGE SUMMARY
DATE OF ADMISSION:  3/30/2020  DATE OF DISCHARGE:  4/6/2020    ATTENDING:        Kali Wilcox M.D.       GENERAL SURGERY    PRIMARY CARE PHYSICIAN: Kira Almaraz M.D.     CONSULTS:    None    PRINCIPAL DIAGNOSIS:     Small bowel obstruction    DISCHARGE DIAGNOSES:     Prior total proctocolectomy and end ileostomy for ulcerative colitis.  Prior surgeries for pelvic abscess.  Cerebral palsy    HOSPITAL PROCEDURES:     None    HOSPITALCOURSE:   Patient is a 29-year-old man known to the surgery service from prior operations.  He has cerebral palsy. His mother and father are very attentive caregivers.  He underwent a laparoscopic total proctocolectomy with end ileostomy on 1/3/2012 by Dr. Ballard for ulcerative colitis.  He required a couple operations over the years for management of a pelvic abscess.  The most recent of these procedures was on 7/9/2019. He was admitted to the hospital on 3/25/2020 for small bowel obstruction, and during that admission, I performed exploratory laparotomy with lysis of adhesions.  He was discharged from the hospital to return 2 days later with recurrent vomiting.  KUB showed potential small bowel obstruction versus postoperative ileus.  A nasogastric tube was placed for 2 days.  Abdominal films showed improvement in bowel gas pattern and ostomy function resumed.  The nasogastric tube was removed,  and an oral dIet was slowly restarted.    He was discharged to home on 4/6/2020 in stable condition.  Skin staples were removed prior to his discharge from the hospital.  He usually takes loperamide twice a day.  His family is instructed to hold that for now.  They will resume loperamide if the ileostomy appliance needs to be emptied more than 3 times a day.    ACTIVITY:  Okay to shower.  No lifting over 10 lbs for 4 weeks.    DIET:  Resume usual diet    FOLLOW UP:  With Dr Wilcox in 4 weeks. He is instructed to call [phone] for an appointment.

## 2020-04-06 NOTE — PROGRESS NOTES
Discharge Planning Assessment  [facility]     Patient Name: Alex Brown  MRN: [id number]  Today's Date: 4/6/2020    Admit Date: 3/30/2020    Discharge Needs Assessment    No documentation.       Discharge Plan     Row Name 04/06/20 1156       Plan    Final Discharge Disposition Code  01 - home or self-care    Final Note  Discharged to home with his mother and she provided the transportation to home. GURINDER Yi RN, CCP.         Destination      Coordination has not been started for this encounter.      Durable Medical Equipment      Coordination has not been started for this encounter.      Dialysis/Infusion      Coordination has not been started for this encounter.      Home Medical Care      Coordination has not been started for this encounter.      Therapy      Coordination has not been started for this encounter.      Community Resources      Coordination has not been started for this encounter.        Expected Discharge Date and Time     Expected Discharge Date Expected Discharge Time    Apr 6, 2020         Demographic Summary    No documentation.       Functional Status    No documentation.       Psychosocial    No documentation.       Abuse/Neglect    No documentation.       Legal    No documentation.       Substance Abuse    No documentation.       Patient Forms    No documentation.           Teresa Yi RN

## 2020-04-06 NOTE — PLAN OF CARE
Problem: Patient Care Overview  Goal: Plan of Care Review  Flowsheets  Taken 4/5/2020 2030  Plan of Care Reviewed With: patient;mother  Taken 4/6/2020 9743  Outcome Summary: Pt has rested well tonight and is much more interactive than last week. He is tolerating his diet and there is some solid stool in his colostomy. Abdomen is less tender and BS are present. Incision with approximated edges and staples SANTIAGO. Mother at bedside and provides all his care. Plan is to discharge today. Will continue to monitor and follow current POC.

## 2020-04-06 NOTE — DISCHARGE INSTR - APPOINTMENTS
ACTIVITY:  Okay to shower.  No lifting over 10 lbs for 4 weeks.     DIET:  Resume usual diet     FOLLOW UP:  With Dr Wilcox in 4 weeks. He is instructed to call [phone] for an appointment.

## 2020-04-07 ENCOUNTER — READMISSION MANAGEMENT (OUTPATIENT)
Dept: CALL CENTER | Facility: HOSPITAL | Age: 30
End: 2020-04-07

## 2020-04-07 NOTE — OUTREACH NOTE
Prep Survey      Responses   [facility] patient discharged from?  [place]   Is LACE score < 7 ?  No   Eligibility  Readm Mgmt   Discharge diagnosis  SBO, hx total proctocolectomy and end ileostomy for ulcerative colitis, hx prior surgeries for pelvic abscess, cerebral palsy   COVID-19 Test Status  Not tested   Does the patient have one of the following disease processes/diagnoses(primary or secondary)?  Other   Does the patient have Home health ordered?  No   Is there a DME ordered?  No   Comments regarding appointments  Family to schedule F/U with PCP   Prep survey completed?  Yes          Cassie Gomez RN

## 2020-04-08 ENCOUNTER — READMISSION MANAGEMENT (OUTPATIENT)
Dept: CALL CENTER | Facility: HOSPITAL | Age: 30
End: 2020-04-08

## 2020-04-08 NOTE — OUTREACH NOTE
Medical Week 1 Survey      Responses   [facility]   COVID-19 Test Status  Not tested   Does the patient have one of the following disease processes/diagnoses(primary or secondary)?  Other   Is there a successful TCM telephone encounter documented?  No   Week 1 attempt successful?  Yes   Call start time  1409   Call end time  1411   Discharge diagnosis  SBO, hx total proctocolectomy and end ileostomy for ulcerative colitis, hx prior surgeries for pelvic abscess, cerebral palsy   Is patient permission given to speak with other caregiver?  Yes   List who call center can speak with  Rosy espinoza   Person spoke with today (if not patient) and relationship  mother, Rosy   Meds reviewed with patient/caregiver?  Yes   Is the patient having any side effects they believe may be caused by any medication additions or changes?  No   Does the patient have all medications ordered at discharge?  N/A [No new meds ordered at discharge. ]   Is the patient taking all medications as directed (includes completed medication regime)?  Yes   Does the patient have a primary care provider?   Yes   Does the patient have an appointment with their PCP within 7 days of discharge?  No   Comments regarding PCP  PCP Dr Kira Almaraz   What is preventing the patient from scheduling follow up appointments within 7 days of discharge?  -- [Will follow up as needed. ]   Has the patient kept scheduled appointments due by today?  N/A   Comments  Appt with Dr Wilcox canceled today due to COVID-19 exposure prevention    Has home health visited the patient within 72 hours of discharge?  N/A   Psychosocial issues?  No   Did the patient receive a copy of their discharge instructions?  Yes   Nursing interventions  Reviewed instructions with patient   What is the patient's perception of their health status since discharge?  Improving   Is the patient/caregiver able to teach back the hierarchy of who to  call/visit for symptoms/problems? PCP, Specialist, Home health nurse, Urgent Care, ED, 911  Yes   Week 1 call completed?  Yes          Cassie Cobb RN

## 2020-04-14 ENCOUNTER — READMISSION MANAGEMENT (OUTPATIENT)
Dept: CALL CENTER | Facility: HOSPITAL | Age: 30
End: 2020-04-14

## 2020-04-14 NOTE — OUTREACH NOTE
Medical Week 2 Survey      Responses   [facility] patient discharged from?  [place]   COVID-19 Test Status  Not tested   Does the patient have one of the following disease processes/diagnoses(primary or secondary)?  Other   Week 2 attempt successful?  No   Unsuccessful attempts  Attempt 1          Cassie Sainz RN

## 2020-04-15 ENCOUNTER — READMISSION MANAGEMENT (OUTPATIENT)
Dept: CALL CENTER | Facility: HOSPITAL | Age: 30
End: 2020-04-15

## 2020-04-15 NOTE — OUTREACH NOTE
Medical Week 2 Survey      Responses   [facility] patient discharged from?  [place]   COVID-19 Test Status  Not tested   Does the patient have one of the following disease processes/diagnoses(primary or secondary)?  Other   Week 2 attempt successful?  Yes   Call start time  1705   Discharge diagnosis  SBO, hx total proctocolectomy and end ileostomy for ulcerative colitis, hx prior surgeries for pelvic abscess, cerebral palsy   Call end time  1708   Person spoke with today (if not patient) and relationship  motherRosy reviewed with patient/caregiver?  Yes   Is the patient taking all medications as directed (includes completed medication regime)?  Yes   Has the patient kept scheduled appointments due by today?  N/A   Psychosocial issues?  No   Comments  Appetite has returned. No issues voiding. Incision is healed well. He is doing well.    What is the patient's perception of their health status since discharge?  Returned to baseline/stable   Is the patient/caregiver able to teach back signs and symptoms related to disease process for when to call PCP?  Yes   Is the patient/caregiver able to teach back the hierarchy of who to call/visit for symptoms/problems? PCP, Specialist, Home health nurse, Urgent Care, ED, 911  Yes   Week 2 Call Completed?  Yes   Revoked  No further contact(revokes)-requires comment   Graduated/Revoked comments  baseline          Armida Jenkins RN

## 2020-08-03 DIAGNOSIS — K61.2 ABSCESS OF ANAL AND RECTAL REGIONS: Primary | ICD-10-CM

## 2020-08-04 ENCOUNTER — HOSPITAL ENCOUNTER (OUTPATIENT)
Dept: CT IMAGING | Facility: HOSPITAL | Age: 30
Discharge: HOME | End: 2020-08-04
Admitting: SURGERY

## 2020-08-04 DIAGNOSIS — K61.2 ABSCESS OF ANAL AND RECTAL REGIONS: ICD-10-CM

## 2020-08-04 PROCEDURE — 25010000002 IOPAMIDOL 61 % SOLUTION: Performed by: SURGERY

## 2020-08-04 PROCEDURE — 74177 CT ABD & PELVIS W/CONTRAST: CPT

## 2020-08-04 RX ADMIN — IOPAMIDOL 85 ML: 612 INJECTION, SOLUTION INTRAVENOUS at 16:24

## 2020-08-04 NOTE — NURSING NOTE
Pt and his parents at chairside. Pt here for a hold n call, and Dr. Ballard called mother phone and per her report they are to be seen in his office and they may leave triage area.  IV d/c'd and family escorted pt to main entrance.

## 2020-08-04 NOTE — NURSING NOTE
Patient here for outpatient CT Scan.  Here with Mom and Dad, they signed everything for him and educated them.

## 2020-08-05 ENCOUNTER — OFFICE VISIT (OUTPATIENT)
Dept: SURGERY | Facility: CLINIC | Age: 30
End: 2020-08-05

## 2020-08-05 VITALS — WEIGHT: 95 LBS | HEIGHT: 66 IN | BODY MASS INDEX: 15.27 KG/M2

## 2020-08-05 DIAGNOSIS — K61.1 RECTAL ABSCESS: Primary | ICD-10-CM

## 2020-08-05 PROCEDURE — 87147 CULTURE TYPE IMMUNOLOGIC: CPT | Performed by: SURGERY

## 2020-08-05 PROCEDURE — 87070 CULTURE OTHR SPECIMN AEROBIC: CPT | Performed by: SURGERY

## 2020-08-05 PROCEDURE — 87205 SMEAR GRAM STAIN: CPT | Performed by: SURGERY

## 2020-08-05 PROCEDURE — 99212 OFFICE O/P EST SF 10 MIN: CPT | Performed by: SURGERY

## 2020-08-05 RX ORDER — SULFAMETHOXAZOLE AND TRIMETHOPRIM 800; 160 MG/1; MG/1
1 TABLET ORAL 2 TIMES DAILY
Qty: 20 TABLET | Refills: 0 | Status: SHIPPED | OUTPATIENT
Start: 2020-08-05 | End: 2020-08-07 | Stop reason: ALTCHOICE

## 2020-08-05 NOTE — PROGRESS NOTES
IMPRESSION & PLAN:  30-year-old gentleman with history of ulcerative colitis status post total proctocolectomy with end ileostomy.  He has had a persistent pelvic fluid collection which has resisted efforts both at percutaneous drainage and even surgical drainage in the past.  CT shows some increase in the amount of fluid in the collection and I suspect this is why the drainage from the perineal wound has increased.  I have cultured the fluid and put him on Bactrim for 10-day course and we will see if this helps.    CC: Perineal pain    HPI: 30-year-old gentleman with history of ulcerative colitis status post total proctocolectomy with end ileostomy who has had trouble with drainage from his perineal wound off and on for many years.  The drainage is increased somewhat of late and perineal discomfort seems to have increased as well.      PE:    Awake, alert  Abdomen: Soft and nontender.  Perineal wound indents significantly as it has for some time.  There is no erythema or crepitance.  No drainage at the time of office visit.    RADIOLOGY:  CT abdomen pelvis done yesterday shows some increase in the chronic refractory pelvic fluid collection.

## 2020-08-07 LAB
BACTERIA SPEC AEROBE CULT: ABNORMAL
BACTERIA SPEC AEROBE CULT: ABNORMAL
GRAM STN SPEC: ABNORMAL
GRAM STN SPEC: ABNORMAL
STREP GROUPING: ABNORMAL

## 2020-08-07 RX ORDER — AMOXICILLIN AND CLAVULANATE POTASSIUM 875; 125 MG/1; MG/1
1 TABLET, FILM COATED ORAL EVERY 12 HOURS
Qty: 20 TABLET | Refills: 0 | Status: SHIPPED | OUTPATIENT
Start: 2020-08-07 | End: 2020-08-17

## 2020-08-14 ENCOUNTER — TELEPHONE (OUTPATIENT)
Dept: NEUROLOGY | Facility: CLINIC | Age: 30
End: 2020-08-14

## 2020-08-17 NOTE — TELEPHONE ENCOUNTER
Tried to call patients mother, phone hung up. Could not leave a message.    Patient saw Dr Pitts last on 3-9-2020 for seizure and brain damage. Needs 1 yr follow up with Dr Wilson.

## 2020-08-17 NOTE — TELEPHONE ENCOUNTER
Patient's mother called back and I spoke with her gave her all information. Patient is doing fine.

## 2021-03-09 ENCOUNTER — OFFICE VISIT (OUTPATIENT)
Dept: NEUROLOGY | Facility: CLINIC | Age: 31
End: 2021-03-09

## 2021-03-09 VITALS — HEIGHT: 66 IN | BODY MASS INDEX: 15.33 KG/M2

## 2021-03-09 DIAGNOSIS — G40.909 SEIZURE DISORDER (HCC): ICD-10-CM

## 2021-03-09 DIAGNOSIS — G80.9 CEREBRAL PALSY, UNSPECIFIED TYPE (HCC): Primary | ICD-10-CM

## 2021-03-09 PROCEDURE — 99213 OFFICE O/P EST LOW 20 MIN: CPT | Performed by: NURSE PRACTITIONER

## 2021-03-09 RX ORDER — AMOXICILLIN AND CLAVULANATE POTASSIUM 875; 125 MG/1; MG/1
TABLET, FILM COATED ORAL
COMMUNITY
Start: 2021-03-01 | End: 2021-05-13

## 2021-03-09 NOTE — PROGRESS NOTES
"Subjective:     Patient ID: Alex Brown is a 30 y.o. male presenting for follow up. He is a previous patient of Dr. Rosen. He is mentally handicapped. He is nonverbal and his mother is with him today. She provides all of the history.     He has a history of seizure. She states he had seizures as a child but around age 8 he was taken off of the seizure medication and she says he did well without any known seizures. About 10 years later he was not sleeping well so she requested he have a sleep study. His EEG was abnormal at that time. He began seeing Dr. Rosen and was put on Keppra. He was on 1000 mg daily initially, but his mother reduced this dose to 500 mg daily over one year ago. Today she tells me she has discontinued the medication completely. Dr. Rosen advised her against this given the abnormal EEG. She states \"I know my son\" and \"he is not having seizures\". She also felt his mood and behavior were changing and that this may have been due to the medication. He has been off of the Keppra since April 2020. She states he is sleeping well. She denies any known seizures. She says his behavior has improved and he acting like his normal self again.     The patient's mother states that Dr. Rosen recommended a repeat EEG before deciding whether or not to taper the medication but she declined. She states today that there is no way he could tolerate the leads on his head and that she is not interested in any further testing.     He has a history of ulcerative colitis. He had surgery last March to remove his large intestine. He now has an ostomy. He has been doing well otherwise over the last year.     History of Present Illness  The following portions of the patient's history were reviewed and updated as appropriate: allergies, current medications, past family history, past medical history, past social history, past surgical history and problem list.    Review of Systems   Constitutional: Negative " for chills, fatigue and fever.   HENT: Negative for hearing loss, tinnitus and trouble swallowing.    Eyes: Negative for photophobia, redness and visual disturbance.   Respiratory: Negative for cough, shortness of breath and wheezing.    Cardiovascular: Negative for chest pain, palpitations and leg swelling.   Gastrointestinal: Negative for diarrhea, nausea and vomiting.   Endocrine: Negative for cold intolerance, heat intolerance and polydipsia.   Genitourinary: Negative for decreased urine volume, difficulty urinating and urgency.   Musculoskeletal: Positive for gait problem. Negative for back pain, neck pain and neck stiffness.   Skin: Negative for color change, rash and wound.   Allergic/Immunologic: Negative for environmental allergies, food allergies and immunocompromised state.   Neurological: Positive for speech difficulty. Negative for dizziness, tremors, seizures, syncope, facial asymmetry, weakness, light-headedness, numbness and headaches.   Hematological: Negative for adenopathy. Does not bruise/bleed easily.   Psychiatric/Behavioral: Negative for confusion and sleep disturbance. The patient is not nervous/anxious.         Objective:    Neurologic Exam    MENTAL STATUS: Awake, alert. Nonverbal.   CRANIAL NERVES 2-12: Eye movements intact without nystagmus or ptosis. PERRLA. Facial muscles symmetrical.  REFLEXES: Upper and lower extremities symmetric, 2+.   GAIT AND STATION: Unable to test.     Physical Exam  GENERAL: Thin white male, in no acute distress.  HEENT: Normocephalic, no evidence of trauma. Throat negative.   HEART: RRR, no murmurs.  RESPIRATORY: CTA bilaterally.  EXTREMITIES: No pedal edema    Assessment/Plan:     Diagnoses and all orders for this visit:    1. Cerebral palsy, unspecified type (CMS/HCC) (Primary)    2. Seizure disorder (CMS/HCC)          The patient' smother has taken him off of the Keppra. I did explain to her that he could be having seizures that she does not notice. She  "disagrees with this and states that \"I know my son\". I discussed with her the abnormal EEG and that I highly recommend he remain on Keppra. At the very least I advised repeating an EEG before making the decision of whether he could safely discontinue the medication. She declined this. States she would like to follow up on an as needed basis only. While this is against my recommendations it is ultimately her decision. She states she will call with any problems.         "

## 2021-04-16 ENCOUNTER — BULK ORDERING (OUTPATIENT)
Dept: CASE MANAGEMENT | Facility: OTHER | Age: 31
End: 2021-04-16

## 2021-04-16 DIAGNOSIS — Z23 IMMUNIZATION DUE: ICD-10-CM

## 2021-05-13 ENCOUNTER — APPOINTMENT (OUTPATIENT)
Dept: CT IMAGING | Facility: HOSPITAL | Age: 31
End: 2021-05-13

## 2021-05-13 ENCOUNTER — APPOINTMENT (OUTPATIENT)
Dept: GENERAL RADIOLOGY | Facility: HOSPITAL | Age: 31
End: 2021-05-13

## 2021-05-13 ENCOUNTER — HOSPITAL ENCOUNTER (INPATIENT)
Facility: HOSPITAL | Age: 31
LOS: 3 days | Discharge: HOME | End: 2021-05-16
Attending: EMERGENCY MEDICINE | Admitting: INTERNAL MEDICINE

## 2021-05-13 DIAGNOSIS — K56.609 SBO (SMALL BOWEL OBSTRUCTION) (HCC): Primary | ICD-10-CM

## 2021-05-13 LAB
ALBUMIN SERPL-MCNC: 5 G/DL (ref 3.5–5.2)
ALBUMIN/GLOB SERPL: 1.5 G/DL
ALP SERPL-CCNC: 76 U/L (ref 39–117)
ALT SERPL W P-5'-P-CCNC: 14 U/L (ref 1–41)
ANION GAP SERPL CALCULATED.3IONS-SCNC: 17.3 MMOL/L (ref 5–15)
AST SERPL-CCNC: 23 U/L (ref 1–40)
BASOPHILS # BLD AUTO: 0.07 10*3/MM3 (ref 0–0.2)
BASOPHILS NFR BLD AUTO: 0.4 % (ref 0–1.5)
BILIRUB SERPL-MCNC: 0.4 MG/DL (ref 0–1.2)
BUN SERPL-MCNC: 12 MG/DL (ref 6–20)
BUN/CREAT SERPL: 16 (ref 7–25)
CALCIUM SPEC-SCNC: 10.2 MG/DL (ref 8.6–10.5)
CHLORIDE SERPL-SCNC: 100 MMOL/L (ref 98–107)
CO2 SERPL-SCNC: 22.7 MMOL/L (ref 22–29)
CREAT SERPL-MCNC: 0.75 MG/DL (ref 0.76–1.27)
DEPRECATED RDW RBC AUTO: 47.1 FL (ref 37–54)
EOSINOPHIL # BLD AUTO: 0.01 10*3/MM3 (ref 0–0.4)
EOSINOPHIL NFR BLD AUTO: 0.1 % (ref 0.3–6.2)
ERYTHROCYTE [DISTWIDTH] IN BLOOD BY AUTOMATED COUNT: 13.7 % (ref 12.3–15.4)
GFR SERPL CREATININE-BSD FRML MDRD: 121 ML/MIN/1.73
GLOBULIN UR ELPH-MCNC: 3.3 GM/DL
GLUCOSE SERPL-MCNC: 122 MG/DL (ref 65–99)
HCT VFR BLD AUTO: 47.6 % (ref 37.5–51)
HGB BLD-MCNC: 15.5 G/DL (ref 13–17.7)
IMM GRANULOCYTES # BLD AUTO: 0.05 10*3/MM3 (ref 0–0.05)
IMM GRANULOCYTES NFR BLD AUTO: 0.3 % (ref 0–0.5)
LIPASE SERPL-CCNC: 17 U/L (ref 13–60)
LYMPHOCYTES # BLD AUTO: 1.16 10*3/MM3 (ref 0.7–3.1)
LYMPHOCYTES NFR BLD AUTO: 7.2 % (ref 19.6–45.3)
MCH RBC QN AUTO: 30.6 PG (ref 26.6–33)
MCHC RBC AUTO-ENTMCNC: 32.6 G/DL (ref 31.5–35.7)
MCV RBC AUTO: 93.9 FL (ref 79–97)
MONOCYTES # BLD AUTO: 0.68 10*3/MM3 (ref 0.1–0.9)
MONOCYTES NFR BLD AUTO: 4.2 % (ref 5–12)
NEUTROPHILS NFR BLD AUTO: 14.21 10*3/MM3 (ref 1.7–7)
NEUTROPHILS NFR BLD AUTO: 87.8 % (ref 42.7–76)
NRBC BLD AUTO-RTO: 0 /100 WBC (ref 0–0.2)
PLATELET # BLD AUTO: 247 10*3/MM3 (ref 140–450)
PMV BLD AUTO: 10.5 FL (ref 6–12)
POTASSIUM SERPL-SCNC: 4.1 MMOL/L (ref 3.5–5.2)
PROT SERPL-MCNC: 8.3 G/DL (ref 6–8.5)
QT INTERVAL: 345 MS
RBC # BLD AUTO: 5.07 10*6/MM3 (ref 4.14–5.8)
SARS-COV-2 ORF1AB RESP QL NAA+PROBE: NOT DETECTED
SODIUM SERPL-SCNC: 140 MMOL/L (ref 136–145)
TROPONIN T SERPL-MCNC: <0.01 NG/ML (ref 0–0.03)
WBC # BLD AUTO: 16.18 10*3/MM3 (ref 3.4–10.8)

## 2021-05-13 PROCEDURE — 85025 COMPLETE CBC W/AUTO DIFF WBC: CPT | Performed by: PHYSICIAN ASSISTANT

## 2021-05-13 PROCEDURE — 99284 EMERGENCY DEPT VISIT MOD MDM: CPT

## 2021-05-13 PROCEDURE — 93005 ELECTROCARDIOGRAM TRACING: CPT | Performed by: PHYSICIAN ASSISTANT

## 2021-05-13 PROCEDURE — 83690 ASSAY OF LIPASE: CPT | Performed by: PHYSICIAN ASSISTANT

## 2021-05-13 PROCEDURE — 80053 COMPREHEN METABOLIC PANEL: CPT | Performed by: PHYSICIAN ASSISTANT

## 2021-05-13 PROCEDURE — U0004 COV-19 TEST NON-CDC HGH THRU: HCPCS | Performed by: PHYSICIAN ASSISTANT

## 2021-05-13 PROCEDURE — 74018 RADEX ABDOMEN 1 VIEW: CPT

## 2021-05-13 PROCEDURE — 25810000003 SODIUM CHLORIDE 0.9 % WITH KCL 20 MEQ 20-0.9 MEQ/L-% SOLUTION: Performed by: INTERNAL MEDICINE

## 2021-05-13 PROCEDURE — 25010000002 IOPAMIDOL 61 % SOLUTION: Performed by: EMERGENCY MEDICINE

## 2021-05-13 PROCEDURE — 25010000002 ONDANSETRON PER 1 MG: Performed by: INTERNAL MEDICINE

## 2021-05-13 PROCEDURE — 93010 ELECTROCARDIOGRAM REPORT: CPT | Performed by: INTERNAL MEDICINE

## 2021-05-13 PROCEDURE — 25010000002 ONDANSETRON PER 1 MG: Performed by: PHYSICIAN ASSISTANT

## 2021-05-13 PROCEDURE — 84484 ASSAY OF TROPONIN QUANT: CPT | Performed by: PHYSICIAN ASSISTANT

## 2021-05-13 PROCEDURE — 74177 CT ABD & PELVIS W/CONTRAST: CPT

## 2021-05-13 RX ORDER — SODIUM CHLORIDE 0.9 % (FLUSH) 0.9 %
10 SYRINGE (ML) INJECTION AS NEEDED
Status: DISCONTINUED | OUTPATIENT
Start: 2021-05-13 | End: 2021-05-16 | Stop reason: HOSPADM

## 2021-05-13 RX ORDER — POTASSIUM CHLORIDE 7.45 MG/ML
10 INJECTION INTRAVENOUS
Status: DISCONTINUED | OUTPATIENT
Start: 2021-05-13 | End: 2021-05-16 | Stop reason: HOSPADM

## 2021-05-13 RX ORDER — SODIUM CHLORIDE AND POTASSIUM CHLORIDE 150; 900 MG/100ML; MG/100ML
100 INJECTION, SOLUTION INTRAVENOUS CONTINUOUS
Status: DISCONTINUED | OUTPATIENT
Start: 2021-05-13 | End: 2021-05-15

## 2021-05-13 RX ORDER — POTASSIUM CHLORIDE 1.5 G/1.77G
40 POWDER, FOR SOLUTION ORAL AS NEEDED
Status: DISCONTINUED | OUTPATIENT
Start: 2021-05-13 | End: 2021-05-16 | Stop reason: HOSPADM

## 2021-05-13 RX ORDER — MAGNESIUM SULFATE HEPTAHYDRATE 40 MG/ML
2 INJECTION, SOLUTION INTRAVENOUS AS NEEDED
Status: DISCONTINUED | OUTPATIENT
Start: 2021-05-13 | End: 2021-05-16 | Stop reason: HOSPADM

## 2021-05-13 RX ORDER — ONDANSETRON 2 MG/ML
4 INJECTION INTRAMUSCULAR; INTRAVENOUS ONCE
Status: COMPLETED | OUTPATIENT
Start: 2021-05-13 | End: 2021-05-13

## 2021-05-13 RX ORDER — POTASSIUM CHLORIDE 750 MG/1
40 TABLET, FILM COATED, EXTENDED RELEASE ORAL AS NEEDED
Status: DISCONTINUED | OUTPATIENT
Start: 2021-05-13 | End: 2021-05-16 | Stop reason: HOSPADM

## 2021-05-13 RX ORDER — ONDANSETRON 2 MG/ML
4 INJECTION INTRAMUSCULAR; INTRAVENOUS EVERY 6 HOURS PRN
Status: DISCONTINUED | OUTPATIENT
Start: 2021-05-13 | End: 2021-05-16 | Stop reason: HOSPADM

## 2021-05-13 RX ORDER — SODIUM CHLORIDE 0.9 % (FLUSH) 0.9 %
10 SYRINGE (ML) INJECTION EVERY 12 HOURS SCHEDULED
Status: DISCONTINUED | OUTPATIENT
Start: 2021-05-13 | End: 2021-05-16 | Stop reason: HOSPADM

## 2021-05-13 RX ORDER — MAGNESIUM SULFATE HEPTAHYDRATE 40 MG/ML
4 INJECTION, SOLUTION INTRAVENOUS AS NEEDED
Status: DISCONTINUED | OUTPATIENT
Start: 2021-05-13 | End: 2021-05-16 | Stop reason: HOSPADM

## 2021-05-13 RX ADMIN — IOPAMIDOL 85 ML: 612 INJECTION, SOLUTION INTRAVENOUS at 12:42

## 2021-05-13 RX ADMIN — ONDANSETRON 4 MG: 2 INJECTION INTRAMUSCULAR; INTRAVENOUS at 19:51

## 2021-05-13 RX ADMIN — SODIUM CHLORIDE 1000 ML: 9 INJECTION, SOLUTION INTRAVENOUS at 11:44

## 2021-05-13 RX ADMIN — SODIUM CHLORIDE, PRESERVATIVE FREE 10 ML: 5 INJECTION INTRAVENOUS at 22:52

## 2021-05-13 RX ADMIN — ONDANSETRON 4 MG: 2 INJECTION INTRAMUSCULAR; INTRAVENOUS at 11:44

## 2021-05-13 RX ADMIN — POTASSIUM CHLORIDE AND SODIUM CHLORIDE 100 ML/HR: 900; 150 INJECTION, SOLUTION INTRAVENOUS at 19:52

## 2021-05-14 ENCOUNTER — APPOINTMENT (OUTPATIENT)
Dept: GENERAL RADIOLOGY | Facility: HOSPITAL | Age: 31
End: 2021-05-14

## 2021-05-14 LAB
ALBUMIN SERPL-MCNC: 4 G/DL (ref 3.5–5.2)
ALBUMIN/GLOB SERPL: 1.5 G/DL
ALP SERPL-CCNC: 58 U/L (ref 39–117)
ALT SERPL W P-5'-P-CCNC: 10 U/L (ref 1–41)
ANION GAP SERPL CALCULATED.3IONS-SCNC: 13.6 MMOL/L (ref 5–15)
AST SERPL-CCNC: 13 U/L (ref 1–40)
BASOPHILS # BLD AUTO: 0.05 10*3/MM3 (ref 0–0.2)
BASOPHILS NFR BLD AUTO: 0.4 % (ref 0–1.5)
BILIRUB SERPL-MCNC: 0.4 MG/DL (ref 0–1.2)
BUN SERPL-MCNC: 9 MG/DL (ref 6–20)
BUN/CREAT SERPL: 18.8 (ref 7–25)
CALCIUM SPEC-SCNC: 9.1 MG/DL (ref 8.6–10.5)
CHLORIDE SERPL-SCNC: 107 MMOL/L (ref 98–107)
CO2 SERPL-SCNC: 20.4 MMOL/L (ref 22–29)
CREAT SERPL-MCNC: 0.48 MG/DL (ref 0.76–1.27)
D-LACTATE SERPL-SCNC: 1 MMOL/L (ref 0.5–2)
DEPRECATED RDW RBC AUTO: 45.6 FL (ref 37–54)
EOSINOPHIL # BLD AUTO: 0.05 10*3/MM3 (ref 0–0.4)
EOSINOPHIL NFR BLD AUTO: 0.4 % (ref 0.3–6.2)
ERYTHROCYTE [DISTWIDTH] IN BLOOD BY AUTOMATED COUNT: 13.3 % (ref 12.3–15.4)
GFR SERPL CREATININE-BSD FRML MDRD: >150 ML/MIN/1.73
GLOBULIN UR ELPH-MCNC: 2.6 GM/DL
GLUCOSE SERPL-MCNC: 85 MG/DL (ref 65–99)
HCT VFR BLD AUTO: 41.3 % (ref 37.5–51)
HGB BLD-MCNC: 13.9 G/DL (ref 13–17.7)
IMM GRANULOCYTES # BLD AUTO: 0.06 10*3/MM3 (ref 0–0.05)
IMM GRANULOCYTES NFR BLD AUTO: 0.5 % (ref 0–0.5)
LYMPHOCYTES # BLD AUTO: 1.4 10*3/MM3 (ref 0.7–3.1)
LYMPHOCYTES NFR BLD AUTO: 12.3 % (ref 19.6–45.3)
MCH RBC QN AUTO: 31.3 PG (ref 26.6–33)
MCHC RBC AUTO-ENTMCNC: 33.7 G/DL (ref 31.5–35.7)
MCV RBC AUTO: 93 FL (ref 79–97)
MONOCYTES # BLD AUTO: 0.96 10*3/MM3 (ref 0.1–0.9)
MONOCYTES NFR BLD AUTO: 8.5 % (ref 5–12)
NEUTROPHILS NFR BLD AUTO: 77.9 % (ref 42.7–76)
NEUTROPHILS NFR BLD AUTO: 8.84 10*3/MM3 (ref 1.7–7)
NRBC BLD AUTO-RTO: 0 /100 WBC (ref 0–0.2)
PLATELET # BLD AUTO: 210 10*3/MM3 (ref 140–450)
PMV BLD AUTO: 10.9 FL (ref 6–12)
POTASSIUM SERPL-SCNC: 4.2 MMOL/L (ref 3.5–5.2)
PROT SERPL-MCNC: 6.6 G/DL (ref 6–8.5)
RBC # BLD AUTO: 4.44 10*6/MM3 (ref 4.14–5.8)
SODIUM SERPL-SCNC: 141 MMOL/L (ref 136–145)
WBC # BLD AUTO: 11.36 10*3/MM3 (ref 3.4–10.8)

## 2021-05-14 PROCEDURE — 25010000002 ONDANSETRON PER 1 MG: Performed by: INTERNAL MEDICINE

## 2021-05-14 PROCEDURE — 80053 COMPREHEN METABOLIC PANEL: CPT | Performed by: INTERNAL MEDICINE

## 2021-05-14 PROCEDURE — 99253 IP/OBS CNSLTJ NEW/EST LOW 45: CPT | Performed by: SURGERY

## 2021-05-14 PROCEDURE — 83605 ASSAY OF LACTIC ACID: CPT | Performed by: INTERNAL MEDICINE

## 2021-05-14 PROCEDURE — 85025 COMPLETE CBC W/AUTO DIFF WBC: CPT | Performed by: INTERNAL MEDICINE

## 2021-05-14 PROCEDURE — 74018 RADEX ABDOMEN 1 VIEW: CPT

## 2021-05-14 PROCEDURE — 25810000003 SODIUM CHLORIDE 0.9 % WITH KCL 20 MEQ 20-0.9 MEQ/L-% SOLUTION: Performed by: INTERNAL MEDICINE

## 2021-05-14 RX ORDER — PANTOPRAZOLE SODIUM 40 MG/10ML
40 INJECTION, POWDER, LYOPHILIZED, FOR SOLUTION INTRAVENOUS
Status: DISCONTINUED | OUTPATIENT
Start: 2021-05-14 | End: 2021-05-15

## 2021-05-14 RX ADMIN — PANTOPRAZOLE SODIUM 40 MG: 40 INJECTION, POWDER, FOR SOLUTION INTRAVENOUS at 12:01

## 2021-05-14 RX ADMIN — ONDANSETRON 4 MG: 2 INJECTION INTRAMUSCULAR; INTRAVENOUS at 06:48

## 2021-05-14 RX ADMIN — POTASSIUM CHLORIDE AND SODIUM CHLORIDE 100 ML/HR: 900; 150 INJECTION, SOLUTION INTRAVENOUS at 06:48

## 2021-05-14 RX ADMIN — SODIUM CHLORIDE, PRESERVATIVE FREE 10 ML: 5 INJECTION INTRAVENOUS at 09:01

## 2021-05-14 RX ADMIN — SODIUM CHLORIDE, PRESERVATIVE FREE 10 ML: 5 INJECTION INTRAVENOUS at 21:28

## 2021-05-14 RX ADMIN — POTASSIUM CHLORIDE AND SODIUM CHLORIDE 100 ML/HR: 900; 150 INJECTION, SOLUTION INTRAVENOUS at 16:09

## 2021-05-15 ENCOUNTER — APPOINTMENT (OUTPATIENT)
Dept: GENERAL RADIOLOGY | Facility: HOSPITAL | Age: 31
End: 2021-05-15

## 2021-05-15 LAB
ANION GAP SERPL CALCULATED.3IONS-SCNC: 15.4 MMOL/L (ref 5–15)
BUN SERPL-MCNC: 8 MG/DL (ref 6–20)
BUN/CREAT SERPL: 16 (ref 7–25)
CALCIUM SPEC-SCNC: 9 MG/DL (ref 8.6–10.5)
CHLORIDE SERPL-SCNC: 103 MMOL/L (ref 98–107)
CO2 SERPL-SCNC: 18.6 MMOL/L (ref 22–29)
CREAT SERPL-MCNC: 0.5 MG/DL (ref 0.76–1.27)
DEPRECATED RDW RBC AUTO: 44.9 FL (ref 37–54)
ERYTHROCYTE [DISTWIDTH] IN BLOOD BY AUTOMATED COUNT: 13.3 % (ref 12.3–15.4)
GFR SERPL CREATININE-BSD FRML MDRD: >150 ML/MIN/1.73
GLUCOSE SERPL-MCNC: 58 MG/DL (ref 65–99)
HCT VFR BLD AUTO: 40.2 % (ref 37.5–51)
HGB BLD-MCNC: 13.4 G/DL (ref 13–17.7)
MCH RBC QN AUTO: 31.3 PG (ref 26.6–33)
MCHC RBC AUTO-ENTMCNC: 33.3 G/DL (ref 31.5–35.7)
MCV RBC AUTO: 93.9 FL (ref 79–97)
PLATELET # BLD AUTO: 187 10*3/MM3 (ref 140–450)
PMV BLD AUTO: 11.1 FL (ref 6–12)
POTASSIUM SERPL-SCNC: 4.5 MMOL/L (ref 3.5–5.2)
RBC # BLD AUTO: 4.28 10*6/MM3 (ref 4.14–5.8)
SODIUM SERPL-SCNC: 137 MMOL/L (ref 136–145)
WBC # BLD AUTO: 10.66 10*3/MM3 (ref 3.4–10.8)

## 2021-05-15 PROCEDURE — 80048 BASIC METABOLIC PNL TOTAL CA: CPT | Performed by: INTERNAL MEDICINE

## 2021-05-15 PROCEDURE — 85027 COMPLETE CBC AUTOMATED: CPT | Performed by: INTERNAL MEDICINE

## 2021-05-15 PROCEDURE — 25810000003 SODIUM CHLORIDE 0.9 % WITH KCL 20 MEQ 20-0.9 MEQ/L-% SOLUTION: Performed by: INTERNAL MEDICINE

## 2021-05-15 PROCEDURE — 74018 RADEX ABDOMEN 1 VIEW: CPT

## 2021-05-15 PROCEDURE — 99231 SBSQ HOSP IP/OBS SF/LOW 25: CPT | Performed by: SURGERY

## 2021-05-15 RX ORDER — DEXTROSE AND SODIUM CHLORIDE 5; .45 G/100ML; G/100ML
75 INJECTION, SOLUTION INTRAVENOUS CONTINUOUS
Status: DISCONTINUED | OUTPATIENT
Start: 2021-05-15 | End: 2021-05-15

## 2021-05-15 RX ORDER — PANTOPRAZOLE SODIUM 40 MG/1
40 TABLET, DELAYED RELEASE ORAL
Status: DISCONTINUED | OUTPATIENT
Start: 2021-05-15 | End: 2021-05-16 | Stop reason: HOSPADM

## 2021-05-15 RX ADMIN — POTASSIUM CHLORIDE AND SODIUM CHLORIDE 100 ML/HR: 900; 150 INJECTION, SOLUTION INTRAVENOUS at 01:50

## 2021-05-15 RX ADMIN — SODIUM CHLORIDE, PRESERVATIVE FREE 10 ML: 5 INJECTION INTRAVENOUS at 08:08

## 2021-05-15 RX ADMIN — PANTOPRAZOLE SODIUM 40 MG: 40 TABLET, DELAYED RELEASE ORAL at 08:08

## 2021-05-15 RX ADMIN — DEXTROSE AND SODIUM CHLORIDE 75 ML/HR: 5; 450 INJECTION, SOLUTION INTRAVENOUS at 09:23

## 2021-05-15 RX ADMIN — SODIUM CHLORIDE, PRESERVATIVE FREE 10 ML: 5 INJECTION INTRAVENOUS at 21:34

## 2021-05-15 NOTE — PROGRESS NOTES
" LOS: 2 days     Name: Alex Brown  Age: 30 y.o.  Sex: male  :  1990  MRN: [id number]         Primary Care Physician: Kira Almaraz MD    Subjective   Subjective  NG tube removed yesterday.  Mother at the bedside reports hearing him passed gas.  No nausea or vomiting.  Trying clear liquids this morning.    Objective   Vital Signs  Temp:  [97.1 °F (36.2 °C)-98.7 °F (37.1 °C)] 97.6 °F (36.4 °C)  Heart Rate:  [] 99  Resp:  [16] 16  BP: ()/(50-67) 86/50  Body mass index is 15.33 kg/m².    Objective:  General Appearance:  Comfortable and in no acute distress (Chronically ill-appearing, weak and frail).    Vital signs: (most recent): Blood pressure (!) 86/50, pulse 99, temperature 97.6 °F (36.4 °C), temperature source Axillary, resp. rate 16, height 167.6 cm (66\"), weight 43.1 kg (95 lb), SpO2 100 %.    Lungs:  Normal effort and normal respiratory rate.  He is not in respiratory distress.  There are decreased breath sounds.    Heart: Normal rate.  Regular rhythm.    Abdomen: Abdomen is soft.  (Ostomy in place with liquid output in the apparatus).  Bowel sounds are normal.   There is no abdominal tenderness.     Extremities: There is no dependent edema or local swelling.    Neurological: Patient is alert and oriented to person, place and time.    Skin:  Warm and dry.              Results Review:       I reviewed the patient's new clinical results.    Results from last 7 days   Lab Units 05/15/21  0638 21  0642 21  1115   WBC 10*3/mm3 10.66 11.36* 16.18*   HEMOGLOBIN g/dL 13.4 13.9 15.5   PLATELETS 10*3/mm3 187 210 247     Results from last 7 days   Lab Units 05/15/21  0638 21  0642 21  1115   SODIUM mmol/L 137 141 140   POTASSIUM mmol/L 4.5 4.2 4.1   CHLORIDE mmol/L 103 107 100   CO2 mmol/L 18.6* 20.4* 22.7   BUN mg/dL 8 9 12   CREATININE mg/dL 0.50* 0.48* 0.75*   CALCIUM mg/dL 9.0 9.1 10.2   GLUCOSE mg/dL 58* 85 122*                 Scheduled Meds: "   pantoprazole, 40 mg, Oral, Q AM  sodium chloride, 10 mL, Intravenous, Q12H      PRN Meds:   •  magnesium sulfate **OR** magnesium sulfate **OR** magnesium sulfate  •  ondansetron  •  potassium chloride **OR** potassium chloride **OR** potassium chloride  •  [COMPLETED] Insert peripheral IV **AND** sodium chloride  •  sodium chloride  Continuous Infusions:  sodium chloride 0.9 % with KCl 20 mEq, 100 mL/hr, Last Rate: 100 mL/hr (05/15/21 0150)        Assessment/Plan   Active Hospital Problems    Diagnosis  POA   • **SBO (small bowel obstruction) (CMS/[facility]) [K56.609]  Yes   • Anoxic encephalopathy (CMS/[facility]) [G93.1]  Unknown   • Vasovagal syncope [R55]  Unknown   • CP (cerebral palsy) (CMS/[facility]) [G80.9]  Yes   • Leukocytosis [D72.829]  Yes   • Seizure disorder (CMS/[facility]) [G40.909]  Yes   • Anoxic brain damage (CMS/[facility]) [G93.1]  Yes   • Generalized abdominal pain [R10.84]  Yes   • H/O ulcerative colitis [Z87.19]  Not Applicable      Resolved Hospital Problems   No resolved problems to display.       Assessment & Plan    -General surgery managing the small bowel obstruction.  Appreciate their assistance.  NG tube was removed yesterday.  Advance to clear liquids.  -Change fluids to D5 half-normal saline.  -Leukocytosis appears reactive and now resolved  -Syncope likely vasovagal.  Monitor for now.  -PPI  -Discussed with patient's mother at the bedside as well as RN.      I wore full protective equipment throughout the patient encounter including eye protection and facemask.  Hand hygiene was performed before donning protective equipment and after removal when leaving the room.    Kali Rubio MD  [facility]  05/15/21  08:31 EDT

## 2021-05-15 NOTE — PROGRESS NOTES
IMPRESSION & PLAN:  30-year-old with recurrent small bowel obstruction.  Clinically better.  Nasogastric tube was removed yesterday and he has had no nausea or vomiting.  He is tolerating clear liquids.  On my review of this morning's KUB the distended bowel loops are decreased in caliber although the radiologist interpretation states unchanged.  However, that report also states that nasogastric tube terminates in the body of the stomach, and this was removed yesterday.  I am going to advance him to full liquid diet and then as tolerated.  Hopefully this will continue to clinically resolve and we can avoid surgical intervention.    CC: Follow-up small bowel obstruction    HPI: Tolerated nasogastric tube removal yesterday with no nausea or vomiting since.  Has tolerated clear liquids.  His ileostomy output has picked up well.    PE:    Awake, alert  VS: Afebrile vital signs stable  Abdomen: Soft, nondistended, and completely nontender

## 2021-05-15 NOTE — PLAN OF CARE
Problem: Adult Inpatient Plan of Care  Goal: Plan of Care Review  Outcome: Ongoing, Progressing  Flowsheets (Taken 5/15/2021 0521)  Progress: no change  Plan of Care Reviewed With:   patient   mother  Outcome Summary: Patient slept between care. Mother at bedside. Patient is on IV fluids. No complaints this shift. Patient is non-verbal. For repeat x-ray this morning. Will continue to monitor vital signs, labs and comfort.   Goal Outcome Evaluation:  Plan of Care Reviewed With: patient, mother  Progress: no change  Outcome Summary: Patient slept between care. Mother at bedside. Patient is on IV fluids. No complaints this shift. Patient is non-verbal. For repeat x-ray this morning. Will continue to monitor vital signs, labs and comfort.

## 2021-05-15 NOTE — PLAN OF CARE
Goal Outcome Evaluation:  Plan of Care Reviewed With: patient, mother  Progress: improving  Outcome Summary: Pt showing improvement this shift. Diet changed to full liquid, fluids D/C, medications changed to PO. Pt up at bedside with assistance of mother who remains at the bedside, main caregiver for Pt. Bowel sounds positive X4, ostomy functioning without issues. Pt appears comforable at this time. Mom provided with updates, education, and support. Pt possibly to D/C home tomorrow.

## 2021-05-16 VITALS
RESPIRATION RATE: 16 BRPM | BODY MASS INDEX: 15.27 KG/M2 | DIASTOLIC BLOOD PRESSURE: 61 MMHG | HEART RATE: 97 BPM | WEIGHT: 95 LBS | OXYGEN SATURATION: 96 % | SYSTOLIC BLOOD PRESSURE: 101 MMHG | TEMPERATURE: 98.7 F | HEIGHT: 66 IN

## 2021-05-16 PROCEDURE — 99231 SBSQ HOSP IP/OBS SF/LOW 25: CPT | Performed by: SURGERY

## 2021-05-16 RX ADMIN — SODIUM CHLORIDE, PRESERVATIVE FREE 10 ML: 5 INJECTION INTRAVENOUS at 09:09

## 2021-05-16 RX ADMIN — PANTOPRAZOLE SODIUM 40 MG: 40 TABLET, DELAYED RELEASE ORAL at 05:49

## 2021-05-16 NOTE — PLAN OF CARE
Goal Outcome Evaluation:  Plan of Care Reviewed With: patient, mother  Progress: improving  Outcome Summary: Pt D/C Home

## 2021-05-16 NOTE — PLAN OF CARE
Goal Outcome Evaluation:        Outcome Summary: Pt rested well. Colostomy had 300+ Ml's output tonight. Denies pain. Pt's [birth year] is today. Possible D/C. Continue to monitor.

## 2021-05-16 NOTE — PROGRESS NOTES
IMPRESSION & PLAN:  Doing well.  Small bowel obstruction appears clinically resolved.  Okay to go home from my standpoint.    CC: Follow-up small bowel obstruction    HPI: Tolerated diet with no nausea or vomiting.  Ostomy is functioning well.    PE:    Awake, alert  VS: Isolated temp of 101, otherwise afebrile and vital signs stable  Abdomen: Soft and nontender

## 2021-05-16 NOTE — DISCHARGE SUMMARY
Date of Admission: 5/13/2021  Date of Discharge:  5/16/2021  Primary Care Physician: Kira lAmaraz MD     Discharge Diagnosis:  Active Hospital Problems    Diagnosis  POA   • **SBO (small bowel obstruction) (CMS/[facility]) [K56.609]  Yes   • Anoxic encephalopathy (CMS/[facility]) [G93.1]  Unknown   • Vasovagal syncope [R55]  Unknown   • CP (cerebral palsy) (CMS/[facility]) [G80.9]  Yes   • Leukocytosis [D72.829]  Yes   • Seizure disorder (CMS/[facility]) [G40.909]  Yes   • Anoxic brain damage (CMS/[facility]) [G93.1]  Yes   • Generalized abdominal pain [R10.84]  Yes   • H/O ulcerative colitis [Z87.19]  Not Applicable      Resolved Hospital Problems   No resolved problems to display.       DETAILS OF HOSPITAL STAY     Pertinent Test Results and Procedures Performed    CT scan of the abdomen and pelvis:  There is a long segment close loop of obstruction involving   the proximal and mid small bowel. Given the volume of air within the   dilated segment and only a minimal amount of free fluid, the close loop   obstruction may be intermittent.     Hospital course  This is a 31-year-old male with underlying cerebral palsy, history of ulcerative colitis and prior multiple abdominal surgeries for bowel obstruction including partial enterectomy and colostomy who presented to the emergency room with decreased ostomy output and abdominal discomfort.  Please see H&P for full details of admission.  He was found to have a small bowel obstruction likely due to adhesions.  He was managed conservatively with the assistance of general surgery.  His bowel obstruction has resolved and he will be discharged today.    Physical Exam at Discharge:  General: No acute distress, awake and alert sitting up on the edge of the bed at the time of my visit today  HEENT: EOMI, PERRL  Cardiovascular: +s1 and s2, RRR  Lungs: No rhonchi or wheezing  Abdomen: soft, nontender.  Ostomy in place with output in the appliance    Consults:   Consults     Date and Time Order Name  Status Description    5/13/2021  4:07 PM Inpatient General Surgery Consult      5/13/2021  1:17 PM [facility] (on-call MD unless specified) Details Completed     5/13/2021  1:07 PM Surgery (on-call MD unless specified) Completed             Condition on Discharge: Stable, improved    Discharge Disposition  Home or Self Care    Discharge Medications     Discharge Medications      Continue These Medications      Instructions Start Date   lansoprazole 15 MG capsule  Commonly known as: PREVACID   15 mg, Oral, Daily      multivitamin tablet tablet   1 tablet, Oral, Daily             Discharge Diet:   Diet Instructions     Diet: Regular      Discharge Diet: Regular    [place]/GI soft          Activity at Discharge:   Activity Instructions     Activity as Tolerated            Follow-up Appointments  No future appointments.  Additional Instructions for the Follow-ups that You Need to Schedule     Discharge Follow-up with PCP   As directed       Currently Documented PCP:    Kira Almaraz MD    PCP Phone Number:    [phone]     Follow Up Details: 1 week               I have examined and discussed discharge planning with the patient today.    I wore full protective equipment throughout the patient encounter including eye protection and facemask.  Hand hygiene was performed before donning protective equipment and after removal when leaving the room.     Kali Rubio MD  05/16/21  12:46 EDT    Time: Discharge greater than 30 min

## 2021-05-17 ENCOUNTER — READMISSION MANAGEMENT (OUTPATIENT)
Dept: CALL CENTER | Facility: HOSPITAL | Age: 31
End: 2021-05-17

## 2021-05-17 NOTE — CASE MANAGEMENT/SOCIAL WORK
Case Management Discharge Note      Final Note: DC'd home 5/16 with family assistance and services from [employer] [facility]    Provided Post Acute Provider List?: N/A  N/A Provider List Comment: The patient was not provided with a HH/SNF compare list from Medicare.gov because he already has services in place  Provided Post Acute Provider Quality & Resource List?: N/A  N/A Quality & Resource List Comment: The patient was not provided with a HH/SNF compare list from Medicare.gov because he already has services in place                Transportation Services  Private: Car    Final Discharge Disposition Code: 01 - home or self-care

## 2021-05-17 NOTE — OUTREACH NOTE
Prep Survey      Responses   [facility] patient discharged from?  [place]   Is LACE score < 7 ?  No   Emergency Room discharge w/ pulse ox?  No   Eligibility  Readm Mgmt   Discharge diagnosis   small bowel obstruction likely due to adhesions   Does the patient have one of the following disease processes/diagnoses(primary or secondary)?  Other   Does the patient have Home health ordered?  No   Is there a DME ordered?  No   General alerts for this patient  cerebral palsy nonverbal   Prep survey completed?  Yes          Jaylyn Chapman RN

## 2021-05-17 NOTE — PAYOR COMM NOTE
"DISCHARGED    CONTINUED STAY REVIEW    REF #[id number]    F: [phone]  P: [phone]        Elroy Brown (31 y.o. Male)     Date of Birth Social Security Number Address Home Phone MRN    1990  [address] [facility] [phone] [id number]    Sabianist Marital Status          [place] Single       Admission Date Admission Type Admitting Provider Attending Provider Department, Room/Bed    5/13/21 Emergency Juan Martines MD  [address], P492/1    Discharge Date Discharge Disposition Discharge Destination        5/16/2021 Home or Self Care              Attending Provider: (none)   Allergies: Benadryl [Diphenhydramine], Ceclor [Cefaclor], Dilaudid [Hydromorphone Hcl], Milk-related Compounds, Reglan [Metoclopramide], Thorazine [Chlorpromazine]    Isolation: None   Infection: MRSA/History Only (05/13/21)   Code Status: Prior    Ht: 167.6 cm (66\")   Wt: 43.1 kg (95 lb)    Admission Cmt: None   Principal Problem: SBO (small bowel obstruction) (CMS/[facility]) [K56.609]                 Active Insurance as of 5/13/2021     Primary Coverage     Payor Plan Insurance Group Employer/Plan Group    [facility] PPO [id number]     Payor Plan Address Payor Plan Phone Number Payor Plan Fax Number Effective Dates    [address] [phone]  1/1/2012 - None Entered    [facility] [id number]       Subscriber Name Subscriber Birth Date Member ID       AGUEDA BROWN 10/4/1949 [id number]           Secondary Coverage     Payor Plan Insurance Group Employer/Plan Group    MEDICARE MEDICARE A ONLY      Payor Plan Address Payor Plan Phone Number Payor Plan Fax Number Effective Dates    [address] [phone]  9/1/2012 - None Entered    [facility] [id number]       Subscriber Name Subscriber Birth Date Member ID       ELROY BROWN 1990 [id number]           Tertiary Coverage     Payor Plan Insurance Group Employer/Plan Group    [place] [place]      Payor Plan Address Payor Plan Phone Number Payor Plan Fax Number Effective Dates    [address] [phone]  4/8/2016 - None Entered    [facility] [id number]       Subscriber Name Subscriber Birth Date Member ID       ALEX BROWN 1990 [id number]                 Emergency Contacts      (Rel.) Home Phone Work Phone Mobile Phone    Rosy Brown (Mother) -- -- [phone]    Jung Brown (Father) [phone] -- [phone]            Vital Signs (last day) before discharge     Date/Time   Temp   Temp src   Pulse   Resp   BP   Patient Position   SpO2    05/16/21 0537   98.7 (37.1)   Axillary   97   16   101/61   Lying   96    05/16/21 0105   99.7 (37.6)   Tympanic   95   16   101/62   Lying   96    05/15/21 2046   99.8 (37.7)   Tympanic   88   16   101/61   Lying   100    05/15/21 1656   (!) 101 (38.3)   Oral   95   16   97/58   Lying   99    05/15/21 0604   97.6 (36.4)   Axillary   99   16   (!) 86/50   Lying   100    05/15/21 0118   97.1 (36.2)   Axillary   94   16   91/53   Lying   99              Oxygen Therapy (last day) before discharge     Date/Time   SpO2   Device (Oxygen Therapy)   Flow (L/min)   Oxygen Concentration (%)   ETCO2 (mmHg)    05/16/21 0754   --   room air   --   --   --    05/16/21 0537   96   --   --   --   --    05/16/21 0105   96   --   --   --   --    05/15/21 2046   100   --   --   --   --    05/15/21 1949   --   room air   --   --   --    05/15/21 1656   99   room air   --   --   --    05/15/21 0741   --   room air   --   --   --    05/15/21 0604   100   --   --   --   --    05/15/21 0118   99   --   --   --   --                Medication Administration Report for Alex Brown as of 05/17/21 0802    Legend:    Given Hold Not Given Due Canceled Entry Other Actions    Time Time (Time) Time  Time-Action       Discontinued     Completed     Future     MAR Hold     Linked           Medications 05/15/21 05/16/21   Completed Medications     iopamidol (ISOVUE-300) 61 % injection 100 mL  Dose: 100 mL  Freq: Once in Imaging Route: IV  Start: 05/13/21 1244   End: 05/13/21 1242         ondansetron (ZOFRAN) injection 4 mg  Dose: 4 mg  Freq: Once Route: IV  Start: 05/13/21 1101   End: 05/13/21 1144         sodium chloride 0.9 % bolus 1,000 mL  Dose: 1,000 mL  Freq: Once Route: IV  Start: 05/13/21 1101   End: 05/13/21 1328        Discontinued Medications  Medications 05/15/21 05/16/21       pantoprazole (PROTONIX) EC tablet 40 mg  Dose: 40 mg  Freq: Every Early Morning Route: PO  Start: 05/15/21 0745   End: 05/16/21 1343    Admin Instructions:   Swallow whole; do not crush, split, or chew.     0808            0549               pantoprazole (PROTONIX) injection 40 mg  Dose: 40 mg  Freq: Every Early Morning Route: IV  Indications of Use: GASTROESOPHAGEAL REFLUX DISEASE,STRESS ULCER PROPHYLAXIS  Start: 05/14/21 1045   End: 05/15/21 0651    Admin Instructions:   Dilute with 10 mL of 0.9% NaCl and give IV push over 2 minutes.     (0506)                sodium chloride 0.9 % flush 10 mL  Dose: 10 mL  Freq: Every 12 Hours Scheduled Route: IV  Start: 05/13/21 2100   End: 05/16/21 1343    0808     2134           0909                    ,   Medication Administration Report for Alex Brown as of 05/17/21 0802    Legend:    Given Hold Not Given Due Canceled Entry Other Actions    Time Time (Time) Time  Time-Action       Discontinued     Completed     Future     MAR Hold     Linked           Medications 05/15/21 05/16/21   Discontinued Medications    dextrose 5 % and sodium chloride 0.45 % infusion  Rate: 75 mL/hr Dose: 75 mL/hr  Freq: Continuous Route: IV  Start: 05/15/21 0930   End: 05/15/21 1135    0923                sodium chloride 0.9 % with KCl 20 mEq/L infusion  Rate: 100 mL/hr Dose: 100 mL/hr  Freq: Continuous Route: IV  Start: 05/13/21 1700   End: 05/15/21 0834    0150     0923                     and   Medication Administration Report for Alex Brown  as of 05/17/21 0802    Legend:    Given Hold Not Given Due Canceled Entry Other Actions    Time Time (Time) Time  Time-Action       Discontinued     Completed     Future     MAR Hold     Linked           Medications 05/15/21 05/16/21   Discontinued Medications    Magnesium Sulfate 2 gram Bolus, followed by 8 gram infusion (total Mg dose 10 grams)- Mg less than or equal to 1mg/dL  Dose: 2 g  Freq: As Needed Route: IV  PRN Comment: See Administration Instructions  Start: 05/13/21 1607   End: 05/16/21 1343    Admin Instructions:   Mg less than or equal to 1mg/dL. Give 2 gm over 30 minutes as bolus, then infuse 2 gm over 2 hours for 4 doses (8 grams) for total dose of 10 grams.  Recheck Mg levels in the AM.         Or  Magnesium Sulfate 2 gram / 50mL Infusion (GIVE X 3 BAGS TO EQUAL 6GM TOTAL DOSE) - Mg 1.1 - 1.5 mg/dl  Dose: 2 g  Freq: As Needed Route: IV  PRN Comment: See Administration Instructions  Start: 05/13/21 1607   End: 05/16/21 1343    Admin Instructions:   Mg 1.1 -1.5 mg/dL. Infuse 2 grams over 2 hours for 3 doses (for a total Mg dose of 6 grams).  Recheck Mg level in the AM.         Or  Magnesium Sulfate 4 gram infusion- Mg 1.6-1.9 mg/dL  Dose: 4 g  Freq: As Needed Route: IV  PRN Comment: See Administration Instructions  Start: 05/13/21 1607   End: 05/16/21 1343    Admin Instructions:   Mg 1.6-1.9 mg/dL. Recheck Mg level in the AM.          ondansetron (ZOFRAN) injection 4 mg  Dose: 4 mg  Freq: Every 6 Hours PRN Route: IV  PRN Reasons: Nausea,Vomiting  Start: 05/13/21 1603   End: 05/16/21 1343         potassium chloride (K-DUR,KLOR-CON) ER tablet 40 mEq  Dose: 40 mEq  Freq: As Needed Route: PO  PRN Comment: Potassium Replacement.  See Admin Instructions  Start: 05/13/21 1607   End: 05/16/21 1343    Admin Instructions:   Potassium 3.1 or Less Give KCl 40 mEq q4h x3 Doses   Potassium 3.2 - 3.6 Give KCl 40 mEq q4h x2 Doses     Check Potassium 4 Hours After Last Dose Given   Check Magnesium if Potassium Level  Remains Low After Replacement   DO NOT GIVE if CrCl is Less Than 30 mL/minute or Urine Output Less Than 30 mL/hr  Swallow whole; do not crush, split, or chew.         Or  potassium chloride (KLOR-CON) packet 40 mEq  Dose: 40 mEq  Freq: As Needed Route: PO  PRN Comment: potassium replacement, see admin instructions  Start: 05/13/21 1607   End: 05/16/21 1343    Admin Instructions:   Potassium 3.1 or Less Give KCl 40 mEq q4h x3 Doses   Potassium 3.2 - 3.6 Give KCl 40 mEq q4h x2 Doses     Check Potassium 4 Hours After Last Dose Given   Check Magnesium if Potassium Level Remains Low After Replacement   DO NOT GIVE if CrCl is Less Than 30 mL/minute or Urine Output Less Than 30 mL/hr         Or  potassium chloride 10 mEq in 100 mL IVPB  Dose: 10 mEq  Freq: Every 1 Hour PRN Route: IV  PRN Comment: Potassium Replacement - See Admin Instructions  Start: 05/13/21 1607   End: 05/16/21 1343    Admin Instructions:   Peripheral or Central IV  Potassium 3.1 or Less Give KCl 10 mEq/100 mL NS IV q1h x6 Doses  Potassium 3.2 - 3.6 Give KCl 10 mEq/100 mL NS q1h x4 Doses    Check Potassium 4 Hours After Last Dose Given  Check Magnesium if Potassium Remains Low After Replacement  DO NOT GIVE if CrCl is Less Than 30 mL/minute or Urine Output Less Than 30 mL/hr.     Rates Greater Than 10 mEq/hr Require ECG Monitoring.  OUTPATIENT/NON-MONITORED UNITS: Potassium Chloride standard bolus infusion rate is a maximum of 10 mEq/hr on unmonitored patients    MONITORED UNITS: Potassium Chloride standard bolus infusion rate is a maximum of 20 mEq/hr on ECG monitored patients ONLY            sodium chloride 0.9 % flush 10 mL  Dose: 10 mL  Freq: As Needed Route: IV  PRN Reason: Line Care  Start: 05/13/21 1603   End: 05/16/21 1343         sodium chloride 0.9 % flush 10 mL  Dose: 10 mL  Freq: As Needed Route: IV  PRN Reason: Line Care  Start: 05/13/21 1049   End: 05/16/21 1343                     Physician Progress Notes       Vinh Ballard MD at  21 1022        IMPRESSION & PLAN:  Doing well.  Small bowel obstruction appears clinically resolved.  Okay to go home from my standpoint.    CC: Follow-up small bowel obstruction    HPI: Tolerated diet with no nausea or vomiting.  Ostomy is functioning well.    PE:    Awake, alert  VS: Isolated temp of 101, otherwise afebrile and vital signs stable  Abdomen: Soft and nontender      Electronically signed by Vinh Ballard MD at 21 1023     Vinh Ballard MD at 05/15/21 1136        IMPRESSION & PLAN:  30-year-old with recurrent small bowel obstruction.  Clinically better.  Nasogastric tube was removed yesterday and he has had no nausea or vomiting.  He is tolerating clear liquids.  On my review of this morning's KUB the distended bowel loops are decreased in caliber although the radiologist interpretation states unchanged.  However, that report also states that nasogastric tube terminates in the body of the stomach, and this was removed yesterday.  I am going to advance him to full liquid diet and then as tolerated.  Hopefully this will continue to clinically resolve and we can avoid surgical intervention.    CC: Follow-up small bowel obstruction    HPI: Tolerated nasogastric tube removal yesterday with no nausea or vomiting since.  Has tolerated clear liquids.  His ileostomy output has picked up well.    PE:    Awake, alert  VS: Afebrile vital signs stable  Abdomen: Soft, nondistended, and completely nontender      Electronically signed by Vinh Ballard MD at 05/15/21 1140     Kali Rubio MD at 05/15/21 0831           LOS: 2 days     Name: Alex Brown  Age: 30 y.o.  Sex: male  :  1990  MRN: [id number]         Primary Care Physician: Kira Almaraz MD    Subjective   Subjective  NG tube removed yesterday.  Mother at the bedside reports hearing him passed gas.  No nausea or vomiting.  Trying clear liquids this morning.    Objective   Vital Signs  Temp:  [97.1  "°F (36.2 °C)-98.7 °F (37.1 °C)] 97.6 °F (36.4 °C)  Heart Rate:  [] 99  Resp:  [16] 16  BP: ()/(50-67) 86/50  Body mass index is 15.33 kg/m².    Objective:  General Appearance:  Comfortable and in no acute distress (Chronically ill-appearing, weak and frail).    Vital signs: (most recent): Blood pressure (!) 86/50, pulse 99, temperature 97.6 °F (36.4 °C), temperature source Axillary, resp. rate 16, height 167.6 cm (66\"), weight 43.1 kg (95 lb), SpO2 100 %.    Lungs:  Normal effort and normal respiratory rate.  He is not in respiratory distress.  There are decreased breath sounds.    Heart: Normal rate.  Regular rhythm.    Abdomen: Abdomen is soft.  (Ostomy in place with liquid output in the apparatus).  Bowel sounds are normal.   There is no abdominal tenderness.     Extremities: There is no dependent edema or local swelling.    Neurological: Patient is alert and oriented to person, place and time.    Skin:  Warm and dry.              Results Review:       I reviewed the patient's new clinical results.    Results from last 7 days   Lab Units 05/15/21  0638 05/14/21  0642 05/13/21  1115   WBC 10*3/mm3 10.66 11.36* 16.18*   HEMOGLOBIN g/dL 13.4 13.9 15.5   PLATELETS 10*3/mm3 187 210 247     Results from last 7 days   Lab Units 05/15/21  0638 05/14/21  0642 05/13/21  1115   SODIUM mmol/L 137 141 140   POTASSIUM mmol/L 4.5 4.2 4.1   CHLORIDE mmol/L 103 107 100   CO2 mmol/L 18.6* 20.4* 22.7   BUN mg/dL 8 9 12   CREATININE mg/dL 0.50* 0.48* 0.75*   CALCIUM mg/dL 9.0 9.1 10.2   GLUCOSE mg/dL 58* 85 122*                 Scheduled Meds:   pantoprazole, 40 mg, Oral, Q AM  sodium chloride, 10 mL, Intravenous, Q12H      PRN Meds:   •  magnesium sulfate **OR** magnesium sulfate **OR** magnesium sulfate  •  ondansetron  •  potassium chloride **OR** potassium chloride **OR** potassium chloride  •  [COMPLETED] Insert peripheral IV **AND** sodium chloride  •  sodium chloride  Continuous Infusions:  sodium chloride 0.9 % " with KCl 20 mEq, 100 mL/hr, Last Rate: 100 mL/hr (05/15/21 0150)        Assessment/Plan   Active Hospital Problems    Diagnosis  POA   • **SBO (small bowel obstruction) (CMS/HCC) [K56.609]  Yes   • Anoxic encephalopathy (CMS/HCC) [G93.1]  Unknown   • Vasovagal syncope [R55]  Unknown   • CP (cerebral palsy) (CMS/HCC) [G80.9]  Yes   • Leukocytosis [D72.829]  Yes   • Seizure disorder (CMS/HCC) [G40.909]  Yes   • Anoxic brain damage (CMS/HCC) [G93.1]  Yes   • Generalized abdominal pain [R10.84]  Yes   • H/O ulcerative colitis [Z87.19]  Not Applicable      Resolved Hospital Problems   No resolved problems to display.       Assessment & Plan    -General surgery managing the small bowel obstruction.  Appreciate their assistance.  NG tube was removed yesterday.  Advance to clear liquids.  -Change fluids to D5 half-normal saline.  -Leukocytosis appears reactive and now resolved  -Syncope likely vasovagal.  Monitor for now.  -PPI  -Discussed with patient's mother at the bedside as well as RN.      I wore full protective equipment throughout the patient encounter including eye protection and facemask.  Hand hygiene was performed before donning protective equipment and after removal when leaving the room.    Kali Rubio MD  [facility]  05/15/21  08:31 EDT      Electronically signed by Kali Rubio MD at 05/15/21 0873            Discharge Summary      Kali Rubio MD at 05/16/21 1143            Date of Admission: 5/13/2021  Date of Discharge:  5/16/2021  Primary Care Physician: Kira Almaraz MD     Discharge Diagnosis:  Active Hospital Problems    Diagnosis  POA   • **SBO (small bowel obstruction) (CMS/HCC) [K56.609]  Yes   • Anoxic encephalopathy (CMS/HCC) [G93.1]  Unknown   • Vasovagal syncope [R55]  Unknown   • CP (cerebral palsy) (CMS/HCC) [G80.9]  Yes   • Leukocytosis [D72.829]  Yes   • Seizure disorder (CMS/HCC) [G40.909]  Yes   • Anoxic brain  damage (CMS/HCC) [G93.1]  Yes   • Generalized abdominal pain [R10.84]  Yes   • H/O ulcerative colitis [Z87.19]  Not Applicable      Resolved Hospital Problems   No resolved problems to display.       DETAILS OF HOSPITAL STAY     Pertinent Test Results and Procedures Performed    CT scan of the abdomen and pelvis:  There is a long segment close loop of obstruction involving   the proximal and mid small bowel. Given the volume of air within the   dilated segment and only a minimal amount of free fluid, the close loop   obstruction may be intermittent.     Hospital course  This is a 31-year-old male with underlying cerebral palsy, history of ulcerative colitis and prior multiple abdominal surgeries for bowel obstruction including partial enterectomy and colostomy who presented to the emergency room with decreased ostomy output and abdominal discomfort.  Please see H&P for full details of admission.  He was found to have a small bowel obstruction likely due to adhesions.  He was managed conservatively with the assistance of general surgery.  His bowel obstruction has resolved and he will be discharged today.    Physical Exam at Discharge:  General: No acute distress, awake and alert sitting up on the edge of the bed at the time of my visit today  HEENT: EOMI, PERRL  Cardiovascular: +s1 and s2, RRR  Lungs: No rhonchi or wheezing  Abdomen: soft, nontender.  Ostomy in place with output in the appliance    Consults:   Consults     Date and Time Order Name Status Description    5/13/2021  4:07 PM Inpatient General Surgery Consult      5/13/2021  1:17 PM [facility] (on-call MD unless specified) Details Completed     5/13/2021  1:07 PM Surgery (on-call MD unless specified) Completed             Condition on Discharge: Stable, improved    Discharge Disposition  Home or Self Care    Discharge Medications     Discharge Medications      Continue These Medications      Instructions Start Date   lansoprazole 15 MG capsule  Commonly known  as: PREVACID   15 mg, Oral, Daily      multivitamin tablet tablet   1 tablet, Oral, Daily             Discharge Diet:   Diet Instructions     Diet: Regular      Discharge Diet: Regular    Phillips/GI soft          Activity at Discharge:   Activity Instructions     Activity as Tolerated            Follow-up Appointments  No future appointments.  Additional Instructions for the Follow-ups that You Need to Schedule     Discharge Follow-up with PCP   As directed       Currently Documented PCP:    Kira Almaraz MD    PCP Phone Number:    [phone]     Follow Up Details: 1 week               I have examined and discussed discharge planning with the patient today.    I wore full protective equipment throughout the patient encounter including eye protection and facemask.  Hand hygiene was performed before donning protective equipment and after removal when leaving the room.     Kali Moran MD  05/16/21  12:46 EDT    Time: Discharge greater than 30 min            Electronically signed by Kali Moran MD at 05/16/21 1247       Discharge Order (From admission, onward)     Start     Ordered    05/16/21 1131  Discharge patient  Once     Expected Discharge Date: 05/16/21    Discharge Disposition: Home or Self Care    Physician of Record for Attribution - Please select from Treatment Team: KALI MORAN [[id number]]    Review needed by CMO to determine Physician of Record: No       Question Answer Comment   Physician of Record for Attribution - Please select from Treatment Team KALI MORAN    Review needed by CMO to determine Physician of Record No        05/16/21 2174

## 2021-05-19 ENCOUNTER — READMISSION MANAGEMENT (OUTPATIENT)
Dept: CALL CENTER | Facility: HOSPITAL | Age: 31
End: 2021-05-19

## 2021-05-19 NOTE — OUTREACH NOTE
Medical Week 1 Survey      Responses   [facility]   Does the patient have one of the following disease processes/diagnoses(primary or secondary)?  Other   Week 1 attempt successful?  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  Yes   List who call center can speak with  Rosy Gamboa   Person spoke with today (if not patient) and relationship  Mother, Rosy   Meds reviewed with patient/caregiver?  Yes   Is the patient having any side effects they believe may be caused by any medication additions or changes?  No   Does the patient have all medications ordered at discharge?  Yes   Is the patient taking all medications as directed (includes completed medication regime)?  Yes   Does the patient have a primary care provider?   Yes   Does the patient have an appointment with their PCP within 7 days of discharge?  Yes   Has the patient kept scheduled appointments due by today?  N/A   Has home health visited the patient within 72 hours of discharge?  N/A   Psychosocial issues?  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  Yes   Nursing interventions  Reviewed instructions with patient   What is the patient's perception of their health status since discharge?  Improving   Is the patient/caregiver able to teach back signs and symptoms related to disease process for when to call PCP?  Yes   Is the patient/caregiver able to teach back signs and symptoms related to disease process for when to call 911?  Yes   Is the patient/caregiver able to teach back the hierarchy of who to call/visit for symptoms/problems? PCP, Specialist, Home health nurse, Urgent Care,  ED, 911  Yes   If the patient is a current smoker, are they able to teach back resources for cessation?  Not a smoker   Week 1 call completed?  Yes   Wrap up additional comments  Mother, Rosy Brown, states he is having output from his ostomy. No pain, no vomiting.  Is eating small meals without difficulty.            Ana Meredith RN

## 2021-05-26 ENCOUNTER — HOSPITAL ENCOUNTER (INPATIENT)
Facility: HOSPITAL | Age: 31
LOS: 9 days | Discharge: HOME | End: 2021-06-04
Attending: EMERGENCY MEDICINE | Admitting: HOSPITALIST

## 2021-05-26 ENCOUNTER — APPOINTMENT (OUTPATIENT)
Dept: CT IMAGING | Facility: HOSPITAL | Age: 31
End: 2021-05-26

## 2021-05-26 ENCOUNTER — READMISSION MANAGEMENT (OUTPATIENT)
Dept: CALL CENTER | Facility: HOSPITAL | Age: 31
End: 2021-05-26

## 2021-05-26 DIAGNOSIS — K56.609 SBO (SMALL BOWEL OBSTRUCTION) (HCC): Primary | ICD-10-CM

## 2021-05-26 LAB
ALBUMIN SERPL-MCNC: 4.6 G/DL (ref 3.5–5.2)
ALBUMIN/GLOB SERPL: 1.6 G/DL
ALP SERPL-CCNC: 67 U/L (ref 39–117)
ALT SERPL W P-5'-P-CCNC: 17 U/L (ref 1–41)
ANION GAP SERPL CALCULATED.3IONS-SCNC: 11.5 MMOL/L (ref 5–15)
AST SERPL-CCNC: 17 U/L (ref 1–40)
BASOPHILS # BLD AUTO: 0.02 10*3/MM3 (ref 0–0.2)
BASOPHILS NFR BLD AUTO: 0.2 % (ref 0–1.5)
BILIRUB SERPL-MCNC: 0.3 MG/DL (ref 0–1.2)
BUN SERPL-MCNC: 7 MG/DL (ref 6–20)
BUN/CREAT SERPL: 11.9 (ref 7–25)
CALCIUM SPEC-SCNC: 10 MG/DL (ref 8.6–10.5)
CHLORIDE SERPL-SCNC: 98 MMOL/L (ref 98–107)
CO2 SERPL-SCNC: 29.5 MMOL/L (ref 22–29)
CREAT SERPL-MCNC: 0.59 MG/DL (ref 0.76–1.27)
D-LACTATE SERPL-SCNC: 1.2 MMOL/L (ref 0.5–2)
DEPRECATED RDW RBC AUTO: 47.6 FL (ref 37–54)
EOSINOPHIL # BLD AUTO: 0 10*3/MM3 (ref 0–0.4)
EOSINOPHIL NFR BLD AUTO: 0 % (ref 0.3–6.2)
ERYTHROCYTE [DISTWIDTH] IN BLOOD BY AUTOMATED COUNT: 13.4 % (ref 12.3–15.4)
GFR SERPL CREATININE-BSD FRML MDRD: >150 ML/MIN/1.73
GLOBULIN UR ELPH-MCNC: 2.9 GM/DL
GLUCOSE SERPL-MCNC: 138 MG/DL (ref 65–99)
HCT VFR BLD AUTO: 46.4 % (ref 37.5–51)
HGB BLD-MCNC: 15 G/DL (ref 13–17.7)
IMM GRANULOCYTES # BLD AUTO: 0.04 10*3/MM3 (ref 0–0.05)
IMM GRANULOCYTES NFR BLD AUTO: 0.3 % (ref 0–0.5)
LIPASE SERPL-CCNC: 17 U/L (ref 13–60)
LYMPHOCYTES # BLD AUTO: 0.65 10*3/MM3 (ref 0.7–3.1)
LYMPHOCYTES NFR BLD AUTO: 5.7 % (ref 19.6–45.3)
MCH RBC QN AUTO: 30.8 PG (ref 26.6–33)
MCHC RBC AUTO-ENTMCNC: 32.3 G/DL (ref 31.5–35.7)
MCV RBC AUTO: 95.3 FL (ref 79–97)
MONOCYTES # BLD AUTO: 0.37 10*3/MM3 (ref 0.1–0.9)
MONOCYTES NFR BLD AUTO: 3.2 % (ref 5–12)
NEUTROPHILS NFR BLD AUTO: 10.36 10*3/MM3 (ref 1.7–7)
NEUTROPHILS NFR BLD AUTO: 90.6 % (ref 42.7–76)
NRBC BLD AUTO-RTO: 0 /100 WBC (ref 0–0.2)
PLATELET # BLD AUTO: 275 10*3/MM3 (ref 140–450)
PMV BLD AUTO: 10.3 FL (ref 6–12)
POTASSIUM SERPL-SCNC: 4.4 MMOL/L (ref 3.5–5.2)
PROCALCITONIN SERPL-MCNC: 0.06 NG/ML (ref 0–0.25)
PROT SERPL-MCNC: 7.5 G/DL (ref 6–8.5)
RBC # BLD AUTO: 4.87 10*6/MM3 (ref 4.14–5.8)
SARS-COV-2 RNA RESP QL NAA+PROBE: NOT DETECTED
SODIUM SERPL-SCNC: 139 MMOL/L (ref 136–145)
WBC # BLD AUTO: 11.44 10*3/MM3 (ref 3.4–10.8)

## 2021-05-26 PROCEDURE — 80053 COMPREHEN METABOLIC PANEL: CPT | Performed by: EMERGENCY MEDICINE

## 2021-05-26 PROCEDURE — 85025 COMPLETE CBC W/AUTO DIFF WBC: CPT | Performed by: EMERGENCY MEDICINE

## 2021-05-26 PROCEDURE — 74177 CT ABD & PELVIS W/CONTRAST: CPT

## 2021-05-26 PROCEDURE — 25010000002 ONDANSETRON PER 1 MG: Performed by: NURSE PRACTITIONER

## 2021-05-26 PROCEDURE — 25010000002 ONDANSETRON PER 1 MG: Performed by: EMERGENCY MEDICINE

## 2021-05-26 PROCEDURE — 25010000002 IOPAMIDOL 61 % SOLUTION: Performed by: EMERGENCY MEDICINE

## 2021-05-26 PROCEDURE — U0003 INFECTIOUS AGENT DETECTION BY NUCLEIC ACID (DNA OR RNA); SEVERE ACUTE RESPIRATORY SYNDROME CORONAVIRUS 2 (SARS-COV-2) (CORONAVIRUS DISEASE [COVID-19]), AMPLIFIED PROBE TECHNIQUE, MAKING USE OF HIGH THROUGHPUT TECHNOLOGIES AS DESCRIBED BY CMS-2020-01-R: HCPCS | Performed by: EMERGENCY MEDICINE

## 2021-05-26 PROCEDURE — 99231 SBSQ HOSP IP/OBS SF/LOW 25: CPT | Performed by: SURGERY

## 2021-05-26 PROCEDURE — 83690 ASSAY OF LIPASE: CPT | Performed by: EMERGENCY MEDICINE

## 2021-05-26 PROCEDURE — 84145 PROCALCITONIN (PCT): CPT | Performed by: NURSE PRACTITIONER

## 2021-05-26 PROCEDURE — 87040 BLOOD CULTURE FOR BACTERIA: CPT | Performed by: EMERGENCY MEDICINE

## 2021-05-26 PROCEDURE — 83605 ASSAY OF LACTIC ACID: CPT | Performed by: EMERGENCY MEDICINE

## 2021-05-26 PROCEDURE — 99285 EMERGENCY DEPT VISIT HI MDM: CPT

## 2021-05-26 RX ORDER — FAMOTIDINE 10 MG/ML
20 INJECTION, SOLUTION INTRAVENOUS EVERY 12 HOURS SCHEDULED
Status: DISCONTINUED | OUTPATIENT
Start: 2021-05-26 | End: 2021-06-04 | Stop reason: HOSPADM

## 2021-05-26 RX ORDER — ONDANSETRON 4 MG/1
4 TABLET, FILM COATED ORAL EVERY 6 HOURS PRN
Status: DISCONTINUED | OUTPATIENT
Start: 2021-05-26 | End: 2021-06-04 | Stop reason: HOSPADM

## 2021-05-26 RX ORDER — ONDANSETRON 2 MG/ML
4 INJECTION INTRAMUSCULAR; INTRAVENOUS EVERY 6 HOURS PRN
Status: DISCONTINUED | OUTPATIENT
Start: 2021-05-26 | End: 2021-06-04 | Stop reason: HOSPADM

## 2021-05-26 RX ORDER — ACETAMINOPHEN 650 MG/1
650 SUPPOSITORY RECTAL EVERY 4 HOURS PRN
Status: DISCONTINUED | OUTPATIENT
Start: 2021-05-26 | End: 2021-05-26

## 2021-05-26 RX ORDER — SODIUM CHLORIDE 0.9 % (FLUSH) 0.9 %
10 SYRINGE (ML) INJECTION EVERY 12 HOURS SCHEDULED
Status: DISCONTINUED | OUTPATIENT
Start: 2021-05-26 | End: 2021-06-04 | Stop reason: HOSPADM

## 2021-05-26 RX ORDER — ONDANSETRON 2 MG/ML
4 INJECTION INTRAMUSCULAR; INTRAVENOUS ONCE
Status: COMPLETED | OUTPATIENT
Start: 2021-05-26 | End: 2021-05-26

## 2021-05-26 RX ORDER — DEXTROSE AND SODIUM CHLORIDE 5; .45 G/100ML; G/100ML
100 INJECTION, SOLUTION INTRAVENOUS CONTINUOUS
Status: DISCONTINUED | OUTPATIENT
Start: 2021-05-26 | End: 2021-05-31

## 2021-05-26 RX ORDER — SODIUM CHLORIDE 0.9 % (FLUSH) 0.9 %
10 SYRINGE (ML) INJECTION AS NEEDED
Status: DISCONTINUED | OUTPATIENT
Start: 2021-05-26 | End: 2021-06-04 | Stop reason: HOSPADM

## 2021-05-26 RX ADMIN — SODIUM CHLORIDE 500 ML: 9 INJECTION, SOLUTION INTRAVENOUS at 07:55

## 2021-05-26 RX ADMIN — DEXTROSE AND SODIUM CHLORIDE 100 ML/HR: 5; 450 INJECTION, SOLUTION INTRAVENOUS at 22:22

## 2021-05-26 RX ADMIN — DEXTROSE AND SODIUM CHLORIDE 100 ML/HR: 5; 450 INJECTION, SOLUTION INTRAVENOUS at 13:17

## 2021-05-26 RX ADMIN — ONDANSETRON 4 MG: 2 INJECTION INTRAMUSCULAR; INTRAVENOUS at 22:49

## 2021-05-26 RX ADMIN — FAMOTIDINE 20 MG: 10 INJECTION INTRAVENOUS at 20:20

## 2021-05-26 RX ADMIN — ONDANSETRON 4 MG: 2 INJECTION INTRAMUSCULAR; INTRAVENOUS at 09:20

## 2021-05-26 RX ADMIN — ONDANSETRON 4 MG: 2 INJECTION INTRAMUSCULAR; INTRAVENOUS at 16:48

## 2021-05-26 RX ADMIN — IOPAMIDOL 85 ML: 612 INJECTION, SOLUTION INTRAVENOUS at 08:52

## 2021-05-27 ENCOUNTER — APPOINTMENT (OUTPATIENT)
Dept: GENERAL RADIOLOGY | Facility: HOSPITAL | Age: 31
End: 2021-05-27

## 2021-05-27 LAB
ANION GAP SERPL CALCULATED.3IONS-SCNC: 10.3 MMOL/L (ref 5–15)
BUN SERPL-MCNC: 6 MG/DL (ref 6–20)
BUN/CREAT SERPL: 12.2 (ref 7–25)
CALCIUM SPEC-SCNC: 9 MG/DL (ref 8.6–10.5)
CHLORIDE SERPL-SCNC: 99 MMOL/L (ref 98–107)
CO2 SERPL-SCNC: 26.7 MMOL/L (ref 22–29)
CREAT SERPL-MCNC: 0.49 MG/DL (ref 0.76–1.27)
DEPRECATED RDW RBC AUTO: 43.8 FL (ref 37–54)
ERYTHROCYTE [DISTWIDTH] IN BLOOD BY AUTOMATED COUNT: 13 % (ref 12.3–15.4)
GFR SERPL CREATININE-BSD FRML MDRD: >150 ML/MIN/1.73
GLUCOSE SERPL-MCNC: 112 MG/DL (ref 65–99)
HCT VFR BLD AUTO: 40.2 % (ref 37.5–51)
HGB BLD-MCNC: 13.9 G/DL (ref 13–17.7)
MCH RBC QN AUTO: 31.5 PG (ref 26.6–33)
MCHC RBC AUTO-ENTMCNC: 34.6 G/DL (ref 31.5–35.7)
MCV RBC AUTO: 91.2 FL (ref 79–97)
PLATELET # BLD AUTO: 248 10*3/MM3 (ref 140–450)
PMV BLD AUTO: 10.4 FL (ref 6–12)
POTASSIUM SERPL-SCNC: 3.5 MMOL/L (ref 3.5–5.2)
RBC # BLD AUTO: 4.41 10*6/MM3 (ref 4.14–5.8)
SODIUM SERPL-SCNC: 136 MMOL/L (ref 136–145)
WBC # BLD AUTO: 9.26 10*3/MM3 (ref 3.4–10.8)

## 2021-05-27 PROCEDURE — 25010000002 ONDANSETRON PER 1 MG: Performed by: NURSE PRACTITIONER

## 2021-05-27 PROCEDURE — 80048 BASIC METABOLIC PNL TOTAL CA: CPT | Performed by: NURSE PRACTITIONER

## 2021-05-27 PROCEDURE — 74018 RADEX ABDOMEN 1 VIEW: CPT

## 2021-05-27 PROCEDURE — 99231 SBSQ HOSP IP/OBS SF/LOW 25: CPT | Performed by: SURGERY

## 2021-05-27 PROCEDURE — 74250 X-RAY XM SM INT 1CNTRST STD: CPT

## 2021-05-27 PROCEDURE — 36415 COLL VENOUS BLD VENIPUNCTURE: CPT | Performed by: NURSE PRACTITIONER

## 2021-05-27 PROCEDURE — 85027 COMPLETE CBC AUTOMATED: CPT | Performed by: NURSE PRACTITIONER

## 2021-05-27 RX ORDER — SCOLOPAMINE TRANSDERMAL SYSTEM 1 MG/1
1 PATCH, EXTENDED RELEASE TRANSDERMAL ONCE
Status: COMPLETED | OUTPATIENT
Start: 2021-05-27 | End: 2021-05-30

## 2021-05-27 RX ADMIN — ONDANSETRON 4 MG: 2 INJECTION INTRAMUSCULAR; INTRAVENOUS at 16:55

## 2021-05-27 RX ADMIN — FAMOTIDINE 20 MG: 10 INJECTION INTRAVENOUS at 08:39

## 2021-05-27 RX ADMIN — DEXTROSE AND SODIUM CHLORIDE 100 ML/HR: 5; 450 INJECTION, SOLUTION INTRAVENOUS at 21:24

## 2021-05-27 RX ADMIN — SCOLOPAMINE TRANSDERMAL SYSTEM 1 PATCH: 1 PATCH, EXTENDED RELEASE TRANSDERMAL at 16:55

## 2021-05-27 RX ADMIN — DEXTROSE AND SODIUM CHLORIDE 100 ML/HR: 5; 450 INJECTION, SOLUTION INTRAVENOUS at 09:08

## 2021-05-27 RX ADMIN — ONDANSETRON 4 MG: 2 INJECTION INTRAMUSCULAR; INTRAVENOUS at 12:24

## 2021-05-27 RX ADMIN — SODIUM CHLORIDE, PRESERVATIVE FREE 10 ML: 5 INJECTION INTRAVENOUS at 08:41

## 2021-05-27 RX ADMIN — SODIUM CHLORIDE, PRESERVATIVE FREE 10 ML: 5 INJECTION INTRAVENOUS at 21:24

## 2021-05-27 RX ADMIN — FAMOTIDINE 20 MG: 10 INJECTION INTRAVENOUS at 21:24

## 2021-05-27 NOTE — OUTREACH NOTE
Medical Week 2 Survey      Responses   [facility] patient discharged from?  [place]   Does the patient have one of the following disease processes/diagnoses(primary or secondary)?  Other   Week 2 attempt successful?  No   Revoke  Readmitted          Val Goins RN

## 2021-05-28 ENCOUNTER — ANESTHESIA (OUTPATIENT)
Dept: PERIOP | Facility: HOSPITAL | Age: 31
End: 2021-05-28

## 2021-05-28 ENCOUNTER — ANESTHESIA EVENT (OUTPATIENT)
Dept: PERIOP | Facility: HOSPITAL | Age: 31
End: 2021-05-28

## 2021-05-28 ENCOUNTER — APPOINTMENT (OUTPATIENT)
Dept: GENERAL RADIOLOGY | Facility: HOSPITAL | Age: 31
End: 2021-05-28

## 2021-05-28 LAB
ANION GAP SERPL CALCULATED.3IONS-SCNC: 11.6 MMOL/L (ref 5–15)
BUN SERPL-MCNC: 4 MG/DL (ref 6–20)
BUN/CREAT SERPL: 6.9 (ref 7–25)
CALCIUM SPEC-SCNC: 9.5 MG/DL (ref 8.6–10.5)
CHLORIDE SERPL-SCNC: 96 MMOL/L (ref 98–107)
CO2 SERPL-SCNC: 27.4 MMOL/L (ref 22–29)
CREAT SERPL-MCNC: 0.58 MG/DL (ref 0.76–1.27)
DEPRECATED RDW RBC AUTO: 43.7 FL (ref 37–54)
ERYTHROCYTE [DISTWIDTH] IN BLOOD BY AUTOMATED COUNT: 13 % (ref 12.3–15.4)
GFR SERPL CREATININE-BSD FRML MDRD: >150 ML/MIN/1.73
GLUCOSE SERPL-MCNC: 116 MG/DL (ref 65–99)
HCT VFR BLD AUTO: 43.8 % (ref 37.5–51)
HGB BLD-MCNC: 15.3 G/DL (ref 13–17.7)
MCH RBC QN AUTO: 31.9 PG (ref 26.6–33)
MCHC RBC AUTO-ENTMCNC: 34.9 G/DL (ref 31.5–35.7)
MCV RBC AUTO: 91.4 FL (ref 79–97)
PLATELET # BLD AUTO: 250 10*3/MM3 (ref 140–450)
PMV BLD AUTO: 10.9 FL (ref 6–12)
POTASSIUM SERPL-SCNC: 3.7 MMOL/L (ref 3.5–5.2)
RBC # BLD AUTO: 4.79 10*6/MM3 (ref 4.14–5.8)
SODIUM SERPL-SCNC: 135 MMOL/L (ref 136–145)
WBC # BLD AUTO: 8.56 10*3/MM3 (ref 3.4–10.8)

## 2021-05-28 PROCEDURE — 44005 FREEING OF BOWEL ADHESION: CPT | Performed by: SURGERY

## 2021-05-28 PROCEDURE — 80048 BASIC METABOLIC PNL TOTAL CA: CPT | Performed by: NURSE PRACTITIONER

## 2021-05-28 PROCEDURE — 74018 RADEX ABDOMEN 1 VIEW: CPT

## 2021-05-28 PROCEDURE — 25010000002 DEXAMETHASONE PER 1 MG: Performed by: NURSE ANESTHETIST, CERTIFIED REGISTERED

## 2021-05-28 PROCEDURE — 25010000002 ONDANSETRON PER 1 MG: Performed by: NURSE ANESTHETIST, CERTIFIED REGISTERED

## 2021-05-28 PROCEDURE — 44005 FREEING OF BOWEL ADHESION: CPT | Performed by: SPECIALIST/TECHNOLOGIST, OTHER

## 2021-05-28 PROCEDURE — 0DN80ZZ RELEASE SMALL INTESTINE, OPEN APPROACH: ICD-10-PCS | Performed by: SURGERY

## 2021-05-28 PROCEDURE — 25010000003 BUPIVACAINE LIPOSOME 1.3 % SUSPENSION: Performed by: SURGERY

## 2021-05-28 PROCEDURE — 25010000002 MORPHINE PER 10 MG: Performed by: SURGERY

## 2021-05-28 PROCEDURE — 25010000002 NEOSTIGMINE 10 MG/10ML SOLUTION: Performed by: NURSE ANESTHETIST, CERTIFIED REGISTERED

## 2021-05-28 PROCEDURE — 25010000002 PROPOFOL 10 MG/ML EMULSION: Performed by: NURSE ANESTHETIST, CERTIFIED REGISTERED

## 2021-05-28 PROCEDURE — 25010000002 FENTANYL CITRATE (PF) 50 MCG/ML SOLUTION: Performed by: NURSE ANESTHETIST, CERTIFIED REGISTERED

## 2021-05-28 PROCEDURE — C9290 INJ, BUPIVACAINE LIPOSOME: HCPCS | Performed by: SURGERY

## 2021-05-28 PROCEDURE — 85027 COMPLETE CBC AUTOMATED: CPT | Performed by: NURSE PRACTITIONER

## 2021-05-28 PROCEDURE — 25010000002 SUCCINYLCHOLINE PER 20 MG: Performed by: NURSE ANESTHETIST, CERTIFIED REGISTERED

## 2021-05-28 PROCEDURE — C1765 ADHESION BARRIER: HCPCS | Performed by: SURGERY

## 2021-05-28 PROCEDURE — 25010000003 CEFAZOLIN IN DEXTROSE 2-4 GM/100ML-% SOLUTION: Performed by: SURGERY

## 2021-05-28 RX ORDER — PROPOFOL 10 MG/ML
VIAL (ML) INTRAVENOUS AS NEEDED
Status: DISCONTINUED | OUTPATIENT
Start: 2021-05-28 | End: 2021-05-28 | Stop reason: SURG

## 2021-05-28 RX ORDER — GLYCOPYRROLATE 0.2 MG/ML
INJECTION INTRAMUSCULAR; INTRAVENOUS AS NEEDED
Status: DISCONTINUED | OUTPATIENT
Start: 2021-05-28 | End: 2021-05-28 | Stop reason: SURG

## 2021-05-28 RX ORDER — DEXAMETHASONE SODIUM PHOSPHATE 10 MG/ML
INJECTION INTRAMUSCULAR; INTRAVENOUS AS NEEDED
Status: DISCONTINUED | OUTPATIENT
Start: 2021-05-28 | End: 2021-05-28 | Stop reason: SURG

## 2021-05-28 RX ORDER — MIDAZOLAM HYDROCHLORIDE 1 MG/ML
1 INJECTION INTRAMUSCULAR; INTRAVENOUS
Status: DISCONTINUED | OUTPATIENT
Start: 2021-05-28 | End: 2021-05-28 | Stop reason: HOSPADM

## 2021-05-28 RX ORDER — SODIUM CHLORIDE, SODIUM LACTATE, POTASSIUM CHLORIDE, CALCIUM CHLORIDE 600; 310; 30; 20 MG/100ML; MG/100ML; MG/100ML; MG/100ML
9 INJECTION, SOLUTION INTRAVENOUS CONTINUOUS
Status: DISCONTINUED | OUTPATIENT
Start: 2021-05-28 | End: 2021-05-28

## 2021-05-28 RX ORDER — BUPIVACAINE HYDROCHLORIDE AND EPINEPHRINE 5; 5 MG/ML; UG/ML
INJECTION, SOLUTION PERINEURAL AS NEEDED
Status: DISCONTINUED | OUTPATIENT
Start: 2021-05-28 | End: 2021-05-28 | Stop reason: HOSPADM

## 2021-05-28 RX ORDER — ONDANSETRON 2 MG/ML
INJECTION INTRAMUSCULAR; INTRAVENOUS AS NEEDED
Status: DISCONTINUED | OUTPATIENT
Start: 2021-05-28 | End: 2021-05-28 | Stop reason: SURG

## 2021-05-28 RX ORDER — MAGNESIUM HYDROXIDE 1200 MG/15ML
LIQUID ORAL AS NEEDED
Status: DISCONTINUED | OUTPATIENT
Start: 2021-05-28 | End: 2021-05-28 | Stop reason: HOSPADM

## 2021-05-28 RX ORDER — LABETALOL HYDROCHLORIDE 5 MG/ML
5 INJECTION, SOLUTION INTRAVENOUS
Status: DISCONTINUED | OUTPATIENT
Start: 2021-05-28 | End: 2021-05-28 | Stop reason: HOSPADM

## 2021-05-28 RX ORDER — FENTANYL CITRATE 50 UG/ML
50 INJECTION, SOLUTION INTRAMUSCULAR; INTRAVENOUS
Status: DISCONTINUED | OUTPATIENT
Start: 2021-05-28 | End: 2021-05-28 | Stop reason: HOSPADM

## 2021-05-28 RX ORDER — NEOSTIGMINE METHYLSULFATE 1 MG/ML
INJECTION, SOLUTION INTRAVENOUS AS NEEDED
Status: DISCONTINUED | OUTPATIENT
Start: 2021-05-28 | End: 2021-05-28 | Stop reason: SURG

## 2021-05-28 RX ORDER — FLUMAZENIL 0.1 MG/ML
0.2 INJECTION INTRAVENOUS AS NEEDED
Status: DISCONTINUED | OUTPATIENT
Start: 2021-05-28 | End: 2021-05-28 | Stop reason: HOSPADM

## 2021-05-28 RX ORDER — NALOXONE HCL 0.4 MG/ML
0.2 VIAL (ML) INJECTION AS NEEDED
Status: DISCONTINUED | OUTPATIENT
Start: 2021-05-28 | End: 2021-05-28 | Stop reason: HOSPADM

## 2021-05-28 RX ORDER — IBUPROFEN 600 MG/1
600 TABLET ORAL ONCE AS NEEDED
Status: DISCONTINUED | OUTPATIENT
Start: 2021-05-28 | End: 2021-05-28 | Stop reason: HOSPADM

## 2021-05-28 RX ORDER — SODIUM CHLORIDE 0.9 % (FLUSH) 0.9 %
3-10 SYRINGE (ML) INJECTION AS NEEDED
Status: DISCONTINUED | OUTPATIENT
Start: 2021-05-28 | End: 2021-05-28 | Stop reason: HOSPADM

## 2021-05-28 RX ORDER — FENTANYL CITRATE 50 UG/ML
INJECTION, SOLUTION INTRAMUSCULAR; INTRAVENOUS AS NEEDED
Status: DISCONTINUED | OUTPATIENT
Start: 2021-05-28 | End: 2021-05-28 | Stop reason: SURG

## 2021-05-28 RX ORDER — EPHEDRINE SULFATE 50 MG/ML
5 INJECTION, SOLUTION INTRAVENOUS ONCE AS NEEDED
Status: DISCONTINUED | OUTPATIENT
Start: 2021-05-28 | End: 2021-05-28 | Stop reason: HOSPADM

## 2021-05-28 RX ORDER — FAMOTIDINE 10 MG/ML
20 INJECTION, SOLUTION INTRAVENOUS ONCE
Status: COMPLETED | OUTPATIENT
Start: 2021-05-28 | End: 2021-05-28

## 2021-05-28 RX ORDER — SODIUM CHLORIDE 0.9 % (FLUSH) 0.9 %
3 SYRINGE (ML) INJECTION EVERY 12 HOURS SCHEDULED
Status: DISCONTINUED | OUTPATIENT
Start: 2021-05-28 | End: 2021-05-28 | Stop reason: HOSPADM

## 2021-05-28 RX ORDER — HYDRALAZINE HYDROCHLORIDE 20 MG/ML
5 INJECTION INTRAMUSCULAR; INTRAVENOUS
Status: DISCONTINUED | OUTPATIENT
Start: 2021-05-28 | End: 2021-05-28 | Stop reason: HOSPADM

## 2021-05-28 RX ORDER — CEFAZOLIN SODIUM 2 G/100ML
2 INJECTION, SOLUTION INTRAVENOUS ONCE
Status: COMPLETED | OUTPATIENT
Start: 2021-05-28 | End: 2021-05-28

## 2021-05-28 RX ORDER — ROCURONIUM BROMIDE 10 MG/ML
INJECTION, SOLUTION INTRAVENOUS AS NEEDED
Status: DISCONTINUED | OUTPATIENT
Start: 2021-05-28 | End: 2021-05-28 | Stop reason: SURG

## 2021-05-28 RX ORDER — LIDOCAINE HYDROCHLORIDE 10 MG/ML
0.5 INJECTION, SOLUTION EPIDURAL; INFILTRATION; INTRACAUDAL; PERINEURAL ONCE AS NEEDED
Status: DISCONTINUED | OUTPATIENT
Start: 2021-05-28 | End: 2021-05-28 | Stop reason: HOSPADM

## 2021-05-28 RX ORDER — SUCCINYLCHOLINE CHLORIDE 20 MG/ML
INJECTION INTRAMUSCULAR; INTRAVENOUS AS NEEDED
Status: DISCONTINUED | OUTPATIENT
Start: 2021-05-28 | End: 2021-05-28 | Stop reason: SURG

## 2021-05-28 RX ORDER — ONDANSETRON 2 MG/ML
4 INJECTION INTRAMUSCULAR; INTRAVENOUS ONCE AS NEEDED
Status: DISCONTINUED | OUTPATIENT
Start: 2021-05-28 | End: 2021-05-28 | Stop reason: HOSPADM

## 2021-05-28 RX ORDER — MORPHINE SULFATE 2 MG/ML
2 INJECTION, SOLUTION INTRAMUSCULAR; INTRAVENOUS
Status: DISCONTINUED | OUTPATIENT
Start: 2021-05-28 | End: 2021-06-03

## 2021-05-28 RX ADMIN — FENTANYL CITRATE 50 MCG: 50 INJECTION INTRAMUSCULAR; INTRAVENOUS at 13:43

## 2021-05-28 RX ADMIN — DEXTROSE AND SODIUM CHLORIDE 100 ML/HR: 5; 450 INJECTION, SOLUTION INTRAVENOUS at 08:06

## 2021-05-28 RX ADMIN — FENTANYL CITRATE 50 MCG: 50 INJECTION INTRAMUSCULAR; INTRAVENOUS at 14:30

## 2021-05-28 RX ADMIN — SUCCINYLCHOLINE CHLORIDE 100 MG: 20 INJECTION, SOLUTION INTRAMUSCULAR; INTRAVENOUS; PARENTERAL at 13:43

## 2021-05-28 RX ADMIN — MORPHINE SULFATE 2 MG: 2 INJECTION, SOLUTION INTRAMUSCULAR; INTRAVENOUS at 21:41

## 2021-05-28 RX ADMIN — SODIUM CHLORIDE, PRESERVATIVE FREE 10 ML: 5 INJECTION INTRAVENOUS at 08:07

## 2021-05-28 RX ADMIN — ONDANSETRON 4 MG: 2 INJECTION INTRAMUSCULAR; INTRAVENOUS at 15:21

## 2021-05-28 RX ADMIN — ROCURONIUM BROMIDE 10 MG: 50 INJECTION INTRAVENOUS at 13:43

## 2021-05-28 RX ADMIN — PROPOFOL 150 MG: 10 INJECTION, EMULSION INTRAVENOUS at 13:43

## 2021-05-28 RX ADMIN — CEFAZOLIN SODIUM 2 G: 2 INJECTION, SOLUTION INTRAVENOUS at 13:31

## 2021-05-28 RX ADMIN — SODIUM CHLORIDE, PRESERVATIVE FREE 10 ML: 5 INJECTION INTRAVENOUS at 21:42

## 2021-05-28 RX ADMIN — DEXTROSE AND SODIUM CHLORIDE 100 ML/HR: 5; 450 INJECTION, SOLUTION INTRAVENOUS at 23:47

## 2021-05-28 RX ADMIN — FAMOTIDINE 20 MG: 10 INJECTION INTRAVENOUS at 12:57

## 2021-05-28 RX ADMIN — FAMOTIDINE 20 MG: 10 INJECTION INTRAVENOUS at 20:24

## 2021-05-28 RX ADMIN — NEOSTIGMINE METHYLSULFATE 3.5 MG: 1 INJECTION INTRAVENOUS at 15:33

## 2021-05-28 RX ADMIN — FAMOTIDINE 20 MG: 10 INJECTION INTRAVENOUS at 08:06

## 2021-05-28 RX ADMIN — SODIUM CHLORIDE, POTASSIUM CHLORIDE, SODIUM LACTATE AND CALCIUM CHLORIDE 9 ML/HR: 600; 310; 30; 20 INJECTION, SOLUTION INTRAVENOUS at 11:15

## 2021-05-28 RX ADMIN — DEXAMETHASONE SODIUM PHOSPHATE 8 MG: 10 INJECTION INTRAMUSCULAR; INTRAVENOUS at 13:56

## 2021-05-28 RX ADMIN — GLYCOPYRROLATE 0.6 MG: 0.2 INJECTION INTRAMUSCULAR; INTRAVENOUS at 15:33

## 2021-05-28 NOTE — ANESTHESIA PROCEDURE NOTES
Airway  Urgency: elective    Date/Time: 5/28/2021 1:53 PM  Airway not difficult    General Information and Staff    Patient location during procedure: OR  CRNA: Yuliana High CRNA    Indications and Patient Condition  Indications for airway management: airway protection    Preoxygenated: yes  Mask difficulty assessment: 2 - vent by mask + OA or adjuvant +/- NMBA    Final Airway Details  Final airway type: endotracheal airway      Successful airway: ETT  Cuffed: yes   Successful intubation technique: direct laryngoscopy and RSI  Facilitating devices/methods: cricoid pressure  Endotracheal tube insertion site: oral  Blade: Evans  Blade size: 2  ETT size (mm): 7.0  Cormack-Lehane Classification: grade I - full view of glottis  Placement verified by: chest auscultation and capnometry   Cuff volume (mL): 8  Number of attempts at approach: 1  Assessment: lips, teeth, and gum same as pre-op and atraumatic intubation    Additional Comments  PreO2 with 100% O2;  FeO2 >85%;  Intubated with no difficultly after eyes taped; Appears atraumatic;  Lips and teeth intact as preop condition;  Airway secured. Connected to ventilator.

## 2021-05-28 NOTE — ANESTHESIA PREPROCEDURE EVALUATION
Anesthesia Evaluation     Patient summary reviewed and Nursing notes reviewed   history of anesthetic complications: PONV  NPO Solid Status: > 8 hours  NPO Liquid Status: > 2 hours           Airway   Mallampati: I  TM distance: >3 FB  Neck ROM: full  No difficulty expected  Dental - normal exam     Pulmonary - negative pulmonary ROS and normal exam   Cardiovascular - negative cardio ROS and normal exam  Exercise tolerance: unable to assess        Neuro/Psych  (+) seizures, neuromuscular disease, poor historian.,       ROS Comment: H/O cerebral palsy and anoxic encephalopathy, patient is non-verbal.  GI/Hepatic/Renal/Endo    (+)  GERD,    (-) GI bleed    Musculoskeletal (-) negative ROS    Abdominal  - normal exam    Bowel sounds: normal.   Substance History - negative use     OB/GYN negative ob/gyn ROS         Other                        Anesthesia Plan    ASA 3     general   Rapid sequence  intravenous induction     Anesthetic plan, all risks, benefits, and alternatives have been provided, discussed and informed consent has been obtained with: patient.    Plan discussed with CRNA and attending.

## 2021-05-28 NOTE — ANESTHESIA POSTPROCEDURE EVALUATION
Patient: Alex Brown    Procedure Summary     Date: 05/28/21 Room / Location: [facility] OR [address] [place] [address] MAIN OR    Anesthesia Start: 1337 Anesthesia Stop: 1553    Procedure: LAPAROTOMY EXPLORATORY with lysis of adhesions (N/A Abdomen) Diagnosis:     Surgeons: Vinh Ballard MD Provider: Ruel Abraham MD    Anesthesia Type: general ASA Status: 3          Anesthesia Type: general    Vitals  Vitals Value Taken Time   BP 93/63 05/28/21 1645   Temp 36.7 °C (98 °F) 05/28/21 1550   Pulse 85 05/28/21 1658   Resp 16 05/28/21 1645   SpO2 95 % 05/28/21 1700   Vitals shown include unvalidated device data.        Anesthesia Post Evaluation

## 2021-05-29 LAB
ANION GAP SERPL CALCULATED.3IONS-SCNC: 9.3 MMOL/L (ref 5–15)
BUN SERPL-MCNC: 6 MG/DL (ref 6–20)
BUN/CREAT SERPL: 11.3 (ref 7–25)
CALCIUM SPEC-SCNC: 8.5 MG/DL (ref 8.6–10.5)
CHLORIDE SERPL-SCNC: 96 MMOL/L (ref 98–107)
CO2 SERPL-SCNC: 26.7 MMOL/L (ref 22–29)
CREAT SERPL-MCNC: 0.53 MG/DL (ref 0.76–1.27)
DEPRECATED RDW RBC AUTO: 46.6 FL (ref 37–54)
ERYTHROCYTE [DISTWIDTH] IN BLOOD BY AUTOMATED COUNT: 13.6 % (ref 12.3–15.4)
GFR SERPL CREATININE-BSD FRML MDRD: >150 ML/MIN/1.73
GLUCOSE SERPL-MCNC: 119 MG/DL (ref 65–99)
HCT VFR BLD AUTO: 43.6 % (ref 37.5–51)
HGB BLD-MCNC: 14.6 G/DL (ref 13–17.7)
MCH RBC QN AUTO: 31.4 PG (ref 26.6–33)
MCHC RBC AUTO-ENTMCNC: 33.5 G/DL (ref 31.5–35.7)
MCV RBC AUTO: 93.8 FL (ref 79–97)
PLATELET # BLD AUTO: 273 10*3/MM3 (ref 140–450)
PMV BLD AUTO: 10.4 FL (ref 6–12)
POTASSIUM SERPL-SCNC: 3.5 MMOL/L (ref 3.5–5.2)
RBC # BLD AUTO: 4.65 10*6/MM3 (ref 4.14–5.8)
SODIUM SERPL-SCNC: 132 MMOL/L (ref 136–145)
WBC # BLD AUTO: 8.98 10*3/MM3 (ref 3.4–10.8)

## 2021-05-29 PROCEDURE — 25010000002 LORAZEPAM PER 2 MG: Performed by: HOSPITALIST

## 2021-05-29 PROCEDURE — 25010000002 ONDANSETRON PER 1 MG: Performed by: SURGERY

## 2021-05-29 PROCEDURE — 99024 POSTOP FOLLOW-UP VISIT: CPT | Performed by: SURGERY

## 2021-05-29 PROCEDURE — 80048 BASIC METABOLIC PNL TOTAL CA: CPT | Performed by: SURGERY

## 2021-05-29 PROCEDURE — 85027 COMPLETE CBC AUTOMATED: CPT | Performed by: SURGERY

## 2021-05-29 PROCEDURE — 25010000002 MORPHINE PER 10 MG: Performed by: SURGERY

## 2021-05-29 RX ORDER — LORAZEPAM 2 MG/ML
0.5 INJECTION INTRAMUSCULAR EVERY 6 HOURS PRN
Status: DISCONTINUED | OUTPATIENT
Start: 2021-05-29 | End: 2021-06-04 | Stop reason: HOSPADM

## 2021-05-29 RX ORDER — ACETAMINOPHEN 325 MG/1
650 TABLET ORAL EVERY 6 HOURS PRN
Status: DISCONTINUED | OUTPATIENT
Start: 2021-05-29 | End: 2021-06-02

## 2021-05-29 RX ADMIN — LORAZEPAM 0.5 MG: 2 INJECTION INTRAMUSCULAR; INTRAVENOUS at 22:28

## 2021-05-29 RX ADMIN — MORPHINE SULFATE 2 MG: 2 INJECTION, SOLUTION INTRAMUSCULAR; INTRAVENOUS at 16:06

## 2021-05-29 RX ADMIN — DEXTROSE AND SODIUM CHLORIDE 100 ML/HR: 5; 450 INJECTION, SOLUTION INTRAVENOUS at 18:36

## 2021-05-29 RX ADMIN — MORPHINE SULFATE 2 MG: 2 INJECTION, SOLUTION INTRAMUSCULAR; INTRAVENOUS at 05:24

## 2021-05-29 RX ADMIN — ONDANSETRON 4 MG: 2 INJECTION INTRAMUSCULAR; INTRAVENOUS at 18:29

## 2021-05-29 RX ADMIN — FAMOTIDINE 20 MG: 10 INJECTION INTRAVENOUS at 20:26

## 2021-05-29 RX ADMIN — DEXTROSE AND SODIUM CHLORIDE 100 ML/HR: 5; 450 INJECTION, SOLUTION INTRAVENOUS at 09:29

## 2021-05-29 RX ADMIN — FAMOTIDINE 20 MG: 10 INJECTION INTRAVENOUS at 09:29

## 2021-05-29 RX ADMIN — LORAZEPAM 0.5 MG: 2 INJECTION INTRAMUSCULAR; INTRAVENOUS at 09:29

## 2021-05-29 RX ADMIN — SODIUM CHLORIDE, PRESERVATIVE FREE 10 ML: 5 INJECTION INTRAVENOUS at 20:26

## 2021-05-30 ENCOUNTER — APPOINTMENT (OUTPATIENT)
Dept: GENERAL RADIOLOGY | Facility: HOSPITAL | Age: 31
End: 2021-05-30

## 2021-05-30 LAB
BACTERIA UR QL AUTO: ABNORMAL /HPF
BILIRUB UR QL STRIP: ABNORMAL
CLARITY UR: ABNORMAL
COLOR UR: YELLOW
GLUCOSE UR STRIP-MCNC: NEGATIVE MG/DL
HGB UR QL STRIP.AUTO: ABNORMAL
HYALINE CASTS UR QL AUTO: ABNORMAL /LPF
KETONES UR QL STRIP: ABNORMAL
LEUKOCYTE ESTERASE UR QL STRIP.AUTO: NEGATIVE
NITRITE UR QL STRIP: NEGATIVE
PH UR STRIP.AUTO: 8 [PH] (ref 5–8)
PROT UR QL STRIP: ABNORMAL
RBC # UR: ABNORMAL /HPF
REF LAB TEST METHOD: ABNORMAL
SP GR UR STRIP: 1.01 (ref 1–1.03)
SQUAMOUS #/AREA URNS HPF: ABNORMAL /HPF
UROBILINOGEN UR QL STRIP: ABNORMAL
WBC UR QL AUTO: ABNORMAL /HPF

## 2021-05-30 PROCEDURE — 25010000002 MORPHINE PER 10 MG: Performed by: SURGERY

## 2021-05-30 PROCEDURE — 25010000002 ONDANSETRON PER 1 MG: Performed by: SURGERY

## 2021-05-30 PROCEDURE — 71045 X-RAY EXAM CHEST 1 VIEW: CPT

## 2021-05-30 PROCEDURE — 25010000002 LORAZEPAM PER 2 MG: Performed by: HOSPITALIST

## 2021-05-30 PROCEDURE — 81001 URINALYSIS AUTO W/SCOPE: CPT | Performed by: SURGERY

## 2021-05-30 PROCEDURE — 99024 POSTOP FOLLOW-UP VISIT: CPT | Performed by: SURGERY

## 2021-05-30 PROCEDURE — 25010000002 LEVOFLOXACIN PER 250 MG: Performed by: HOSPITALIST

## 2021-05-30 PROCEDURE — 74018 RADEX ABDOMEN 1 VIEW: CPT

## 2021-05-30 PROCEDURE — 87040 BLOOD CULTURE FOR BACTERIA: CPT | Performed by: HOSPITALIST

## 2021-05-30 RX ORDER — LEVOFLOXACIN 5 MG/ML
750 INJECTION, SOLUTION INTRAVENOUS EVERY 24 HOURS
Status: DISCONTINUED | OUTPATIENT
Start: 2021-05-30 | End: 2021-06-04 | Stop reason: HOSPADM

## 2021-05-30 RX ADMIN — ONDANSETRON 4 MG: 2 INJECTION INTRAMUSCULAR; INTRAVENOUS at 16:24

## 2021-05-30 RX ADMIN — LORAZEPAM 0.5 MG: 2 INJECTION INTRAMUSCULAR; INTRAVENOUS at 22:20

## 2021-05-30 RX ADMIN — MORPHINE SULFATE 2 MG: 2 INJECTION, SOLUTION INTRAMUSCULAR; INTRAVENOUS at 18:29

## 2021-05-30 RX ADMIN — ONDANSETRON 4 MG: 2 INJECTION INTRAMUSCULAR; INTRAVENOUS at 09:54

## 2021-05-30 RX ADMIN — LEVOFLOXACIN 750 MG: 750 INJECTION, SOLUTION INTRAVENOUS at 22:20

## 2021-05-30 RX ADMIN — DEXTROSE AND SODIUM CHLORIDE 100 ML/HR: 5; 450 INJECTION, SOLUTION INTRAVENOUS at 04:42

## 2021-05-30 RX ADMIN — MORPHINE SULFATE 2 MG: 2 INJECTION, SOLUTION INTRAMUSCULAR; INTRAVENOUS at 00:07

## 2021-05-30 RX ADMIN — FAMOTIDINE 20 MG: 10 INJECTION INTRAVENOUS at 22:20

## 2021-05-30 RX ADMIN — SODIUM CHLORIDE, PRESERVATIVE FREE 10 ML: 5 INJECTION INTRAVENOUS at 22:21

## 2021-05-30 RX ADMIN — DEXTROSE AND SODIUM CHLORIDE 100 ML/HR: 5; 450 INJECTION, SOLUTION INTRAVENOUS at 19:34

## 2021-05-30 RX ADMIN — FAMOTIDINE 20 MG: 10 INJECTION INTRAVENOUS at 09:36

## 2021-05-31 PROBLEM — J69.0: Status: ACTIVE | Noted: 2021-05-31

## 2021-05-31 LAB
ANION GAP SERPL CALCULATED.3IONS-SCNC: 9.3 MMOL/L (ref 5–15)
BACTERIA SPEC AEROBE CULT: NORMAL
BACTERIA SPEC AEROBE CULT: NORMAL
BUN SERPL-MCNC: 8 MG/DL (ref 6–20)
BUN/CREAT SERPL: 14.8 (ref 7–25)
CALCIUM SPEC-SCNC: 8.4 MG/DL (ref 8.6–10.5)
CHLORIDE SERPL-SCNC: 91 MMOL/L (ref 98–107)
CO2 SERPL-SCNC: 29.7 MMOL/L (ref 22–29)
CREAT SERPL-MCNC: 0.54 MG/DL (ref 0.76–1.27)
DEPRECATED RDW RBC AUTO: 42.1 FL (ref 37–54)
ERYTHROCYTE [DISTWIDTH] IN BLOOD BY AUTOMATED COUNT: 12.9 % (ref 12.3–15.4)
GFR SERPL CREATININE-BSD FRML MDRD: >150 ML/MIN/1.73
GLUCOSE SERPL-MCNC: 129 MG/DL (ref 65–99)
HCT VFR BLD AUTO: 38.3 % (ref 37.5–51)
HGB BLD-MCNC: 12.9 G/DL (ref 13–17.7)
LYMPHOCYTES # BLD MANUAL: 0.87 10*3/MM3 (ref 0.7–3.1)
LYMPHOCYTES NFR BLD MANUAL: 8.1 % (ref 19.6–45.3)
LYMPHOCYTES NFR BLD MANUAL: 8.1 % (ref 5–12)
MCH RBC QN AUTO: 30.3 PG (ref 26.6–33)
MCHC RBC AUTO-ENTMCNC: 33.7 G/DL (ref 31.5–35.7)
MCV RBC AUTO: 89.9 FL (ref 79–97)
MONOCYTES # BLD AUTO: 0.87 10*3/MM3 (ref 0.1–0.9)
NEUTROPHILS # BLD AUTO: 9.02 10*3/MM3 (ref 1.7–7)
NEUTROPHILS NFR BLD MANUAL: 83.8 % (ref 42.7–76)
NRBC BLD AUTO-RTO: 0 /100 WBC (ref 0–0.2)
PLAT MORPH BLD: NORMAL
PLATELET # BLD AUTO: 239 10*3/MM3 (ref 140–450)
PMV BLD AUTO: 10 FL (ref 6–12)
POTASSIUM SERPL-SCNC: 3.2 MMOL/L (ref 3.5–5.2)
PROCALCITONIN SERPL-MCNC: 1.9 NG/ML (ref 0–0.25)
RBC # BLD AUTO: 4.26 10*6/MM3 (ref 4.14–5.8)
RBC MORPH BLD: NORMAL
SODIUM SERPL-SCNC: 130 MMOL/L (ref 136–145)
WBC # BLD AUTO: 10.76 10*3/MM3 (ref 3.4–10.8)
WBC MORPH BLD: NORMAL

## 2021-05-31 PROCEDURE — 99024 POSTOP FOLLOW-UP VISIT: CPT | Performed by: SURGERY

## 2021-05-31 PROCEDURE — 25010000002 ONDANSETRON PER 1 MG: Performed by: SURGERY

## 2021-05-31 PROCEDURE — 25010000002 LEVOFLOXACIN PER 250 MG: Performed by: HOSPITALIST

## 2021-05-31 PROCEDURE — 85025 COMPLETE CBC W/AUTO DIFF WBC: CPT | Performed by: SURGERY

## 2021-05-31 PROCEDURE — 25010000002 LORAZEPAM PER 2 MG: Performed by: HOSPITALIST

## 2021-05-31 PROCEDURE — 85007 BL SMEAR W/DIFF WBC COUNT: CPT | Performed by: SURGERY

## 2021-05-31 PROCEDURE — 80048 BASIC METABOLIC PNL TOTAL CA: CPT | Performed by: SURGERY

## 2021-05-31 PROCEDURE — 25010000002 MORPHINE PER 10 MG: Performed by: SURGERY

## 2021-05-31 PROCEDURE — 25810000003 SODIUM CHLORIDE 0.9 % WITH KCL 20 MEQ 20-0.9 MEQ/L-% SOLUTION: Performed by: HOSPITALIST

## 2021-05-31 PROCEDURE — 84145 PROCALCITONIN (PCT): CPT | Performed by: HOSPITALIST

## 2021-05-31 RX ORDER — SODIUM CHLORIDE AND POTASSIUM CHLORIDE 150; 900 MG/100ML; MG/100ML
75 INJECTION, SOLUTION INTRAVENOUS CONTINUOUS
Status: DISCONTINUED | OUTPATIENT
Start: 2021-05-31 | End: 2021-06-02

## 2021-05-31 RX ADMIN — ONDANSETRON 4 MG: 2 INJECTION INTRAMUSCULAR; INTRAVENOUS at 08:01

## 2021-05-31 RX ADMIN — FAMOTIDINE 20 MG: 10 INJECTION INTRAVENOUS at 08:01

## 2021-05-31 RX ADMIN — MORPHINE SULFATE 2 MG: 2 INJECTION, SOLUTION INTRAMUSCULAR; INTRAVENOUS at 17:40

## 2021-05-31 RX ADMIN — FAMOTIDINE 20 MG: 10 INJECTION INTRAVENOUS at 20:45

## 2021-05-31 RX ADMIN — SODIUM CHLORIDE, PRESERVATIVE FREE 10 ML: 5 INJECTION INTRAVENOUS at 08:01

## 2021-05-31 RX ADMIN — LORAZEPAM 0.5 MG: 2 INJECTION INTRAMUSCULAR; INTRAVENOUS at 22:11

## 2021-05-31 RX ADMIN — MORPHINE SULFATE 2 MG: 2 INJECTION, SOLUTION INTRAMUSCULAR; INTRAVENOUS at 01:29

## 2021-05-31 RX ADMIN — POTASSIUM CHLORIDE AND SODIUM CHLORIDE 75 ML/HR: 900; 150 INJECTION, SOLUTION INTRAVENOUS at 08:48

## 2021-05-31 RX ADMIN — LEVOFLOXACIN 750 MG: 750 INJECTION, SOLUTION INTRAVENOUS at 20:45

## 2021-05-31 RX ADMIN — DEXTROSE AND SODIUM CHLORIDE 100 ML/HR: 5; 450 INJECTION, SOLUTION INTRAVENOUS at 04:17

## 2021-05-31 RX ADMIN — SODIUM CHLORIDE, PRESERVATIVE FREE 10 ML: 5 INJECTION INTRAVENOUS at 20:46

## 2021-05-31 RX ADMIN — MORPHINE SULFATE 2 MG: 2 INJECTION, SOLUTION INTRAMUSCULAR; INTRAVENOUS at 04:17

## 2021-05-31 RX ADMIN — POTASSIUM CHLORIDE AND SODIUM CHLORIDE 75 ML/HR: 900; 150 INJECTION, SOLUTION INTRAVENOUS at 20:45

## 2021-06-01 PROBLEM — R09.02 HYPOXIA: Status: ACTIVE | Noted: 2021-06-01

## 2021-06-01 LAB
ANION GAP SERPL CALCULATED.3IONS-SCNC: 13 MMOL/L (ref 5–15)
BUN SERPL-MCNC: 10 MG/DL (ref 6–20)
BUN/CREAT SERPL: 21.7 (ref 7–25)
CALCIUM SPEC-SCNC: 8.9 MG/DL (ref 8.6–10.5)
CHLORIDE SERPL-SCNC: 94 MMOL/L (ref 98–107)
CO2 SERPL-SCNC: 29 MMOL/L (ref 22–29)
CREAT SERPL-MCNC: 0.46 MG/DL (ref 0.76–1.27)
GFR SERPL CREATININE-BSD FRML MDRD: >150 ML/MIN/1.73
GLUCOSE SERPL-MCNC: 78 MG/DL (ref 65–99)
MAGNESIUM SERPL-MCNC: 2.1 MG/DL (ref 1.6–2.6)
POTASSIUM SERPL-SCNC: 3.6 MMOL/L (ref 3.5–5.2)
SODIUM SERPL-SCNC: 136 MMOL/L (ref 136–145)

## 2021-06-01 PROCEDURE — 25810000003 SODIUM CHLORIDE 0.9 % WITH KCL 20 MEQ 20-0.9 MEQ/L-% SOLUTION: Performed by: HOSPITALIST

## 2021-06-01 PROCEDURE — 25010000002 MORPHINE PER 10 MG: Performed by: SURGERY

## 2021-06-01 PROCEDURE — 83735 ASSAY OF MAGNESIUM: CPT | Performed by: HOSPITALIST

## 2021-06-01 PROCEDURE — 80048 BASIC METABOLIC PNL TOTAL CA: CPT | Performed by: HOSPITALIST

## 2021-06-01 PROCEDURE — 25010000002 LEVOFLOXACIN PER 250 MG: Performed by: HOSPITALIST

## 2021-06-01 PROCEDURE — 99024 POSTOP FOLLOW-UP VISIT: CPT | Performed by: SURGERY

## 2021-06-01 RX ADMIN — POTASSIUM CHLORIDE AND SODIUM CHLORIDE 75 ML/HR: 900; 150 INJECTION, SOLUTION INTRAVENOUS at 11:11

## 2021-06-01 RX ADMIN — SODIUM CHLORIDE, PRESERVATIVE FREE 10 ML: 5 INJECTION INTRAVENOUS at 08:14

## 2021-06-01 RX ADMIN — FAMOTIDINE 20 MG: 10 INJECTION INTRAVENOUS at 20:48

## 2021-06-01 RX ADMIN — MORPHINE SULFATE 2 MG: 2 INJECTION, SOLUTION INTRAMUSCULAR; INTRAVENOUS at 22:07

## 2021-06-01 RX ADMIN — MORPHINE SULFATE 2 MG: 2 INJECTION, SOLUTION INTRAMUSCULAR; INTRAVENOUS at 08:13

## 2021-06-01 RX ADMIN — LEVOFLOXACIN 750 MG: 750 INJECTION, SOLUTION INTRAVENOUS at 20:48

## 2021-06-01 RX ADMIN — SODIUM CHLORIDE, PRESERVATIVE FREE 10 ML: 5 INJECTION INTRAVENOUS at 20:48

## 2021-06-01 RX ADMIN — FAMOTIDINE 20 MG: 10 INJECTION INTRAVENOUS at 08:13

## 2021-06-01 NOTE — PROGRESS NOTES
Name: Alex Brown ADMIT: 2021   : 1990  PCP: Kira Almaraz MD    MRN: [id number] LOS: 6 days   AGE/SEX: 31 y.o. male  ROOM: [facility]     Subjective   Subjective   NG tube functioning well, still a fair amount of output (1840 over 24 hrs, already 400 today). Mom says he is doing better. Some ostomy outout    Review of Systems   Unable to perform ROS: Patient nonverbal        Objective   Objective   Vital Signs  Temp:  [98.5 °F (36.9 °C)-100.2 °F (37.9 °C)] 99.7 °F (37.6 °C)  Heart Rate:  [103-128] 113  Resp:  [16-18] 18  BP: ()/(58-63) 100/63  SpO2:  [88 %-97 %] 97 %  on  Flow (L/min):  [2-3] 2;   Device (Oxygen Therapy): nasal cannula  Body mass index is 15.98 kg/m².  Physical Exam  Vitals and nursing note reviewed.   Constitutional:       General: He is not in acute distress.     Comments: Very thin, frail appearing   HENT:      Head: Normocephalic and atraumatic.   Eyes:      General: No scleral icterus.  Cardiovascular:      Rate and Rhythm: Regular rhythm. Tachycardia present.   Pulmonary:      Effort: Pulmonary effort is normal. No respiratory distress.      Breath sounds: Normal breath sounds.   Abdominal:      General: Bowel sounds are decreased.      Tenderness: There is abdominal tenderness (Appropriate).   Musculoskeletal:      Right lower leg: No edema.      Left lower leg: No edema.   Skin:     General: Skin is warm and dry.      Findings: No rash.   Neurological:      Mental Status: He is alert.   Psychiatric:      Comments: Calm, makes eye contact         Results Review     I reviewed the patient's new clinical results.  Results from last 7 days   Lab Units 21  0525 21  0939 21  0833 21  0710   WBC 10*3/mm3 10.76 8.98 8.56 9.26   HEMOGLOBIN g/dL 12.9* 14.6 15.3 13.9   PLATELETS 10*3/mm3 239 273 250 248     Results from last 7 days   Lab Units 21  0654 21  0525 21  0939 21  0833   SODIUM mmol/L 136 130* 132* 135*    POTASSIUM mmol/L 3.6 3.2* 3.5 3.7   CHLORIDE mmol/L 94* 91* 96* 96*   CO2 mmol/L 29.0 29.7* 26.7 27.4   BUN mg/dL 10 8 6 4*   CREATININE mg/dL 0.46* 0.54* 0.53* 0.58*   GLUCOSE mg/dL 78 129* 119* 116*   Estimated Creatinine Clearance: 145.1 mL/min (A) (by C-G formula based on SCr of 0.46 mg/dL (L)).  Results from last 7 days   Lab Units 05/26/21  0753   ALBUMIN g/dL 4.60   BILIRUBIN mg/dL 0.3   ALK PHOS U/L 67   AST (SGOT) U/L 17   ALT (SGPT) U/L 17     Results from last 7 days   Lab Units 06/01/21  0654 05/31/21  0525 05/29/21  0939 05/28/21  0833 05/26/21  0753   CALCIUM mg/dL 8.9 8.4* 8.5* 9.5 10.0   ALBUMIN g/dL  --   --   --   --  4.60   MAGNESIUM mg/dL 2.1  --   --   --   --      Results from last 7 days   Lab Units 05/31/21  0525 05/26/21  1025 05/26/21  0753   PROCALCITONIN ng/mL 1.90*  --  0.06   LACTATE mmol/L  --  1.2  --      COVID19   Date Value Ref Range Status   05/26/2021 Not Detected Not Detected - Ref. Range Final   05/13/2021 Not Detected Not Detected - Ref. Range Final     No results found for: HGBA1C, POCGLU    XR Abdomen KUB, XR Chest 1 View  ONE VIEW PORTABLE CHEST     HISTORY: Recent abdominal surgery for small bowel obstruction vomiting  and fever.     FINDINGS: The lungs are well-expanded and there is some vague haziness  at the lung bases, left greater than right that is new since the  preoperative chest x-ray dated 03/30/2020 and suspicious for areas of  developing pneumonia. The heart size is normal. An NG tube ends in the  lower stomach.     ONE VIEW PORTABLE ABDOMEN AT 3:39 PM     HISTORY: Recent surgery for small bowel obstruction. Persistent nausea  and vomiting. Fever.     FINDINGS: There are midline skin staples related to the recent surgery.  An NG tube ends in the mid stomach. There remains some residual oral  contrast scattered in several small bowel loops but the previous  prominent small bowel distention is markedly improved. No significant  ileus is identified but the  persistent contrast and small bowel does  suggest an element of postoperative ileus. Continued follow-up  evaluation is recommended.     This report was finalized on 5/30/2021 4:13 PM by Dr. Johann Paul M.D.       Scheduled Medications  famotidine, 20 mg, Intravenous, Q12H  levoFLOXacin, 750 mg, Intravenous, Q24H  sodium chloride, 10 mL, Intravenous, Q12H    Infusions  sodium chloride 0.9 % with KCl 20 mEq, 75 mL/hr, Last Rate: 75 mL/hr (06/01/21 1111)    Diet  NPO Diet NPO Except: Ice Chips, Sips With Meds       Assessment/Plan     Active Hospital Problems    Diagnosis  POA   • **SBO (small bowel obstruction) (CMS/HCC) [K56.609]  Yes   • Hypoxia [R09.02]  No   • Aspiration pneumonia of left lung due to vomit (CMS/HCC) [J69.0]  No   • Anoxic encephalopathy (CMS/HCC) [G93.1]  Yes   • CP (cerebral palsy) (CMS/HCC) [G80.9]  Yes   • Leukocytosis [D72.829]  Yes   • H/O ulcerative colitis [Z87.19]  Not Applicable      Resolved Hospital Problems   No resolved problems to display.       31 y.o. male admitted with SBO (small bowel obstruction) (CMS/HCC), s/p laparotomy w/AD 5/28    · Fevers-chest x-ray consistent with left lower lobe pneumonia, suspect aspiration.    · Cont Levaquin, improving with decreased fever and improved oxygen saturations  · SBO-continue NG tube suction, defer to surgical recommendations.  Continue n.p.o. status and IV fluids with potassium.  Seems to be improving slowly but steadily  · Anxiety/agitation-continue low dose prn Ativan  · SCDs for DVT prophylaxis.  · D/w Mom at bedside  · Disp: Home when GI fxn returns. Likely a few days yet      Hong Smith MD  [facility]  06/01/21  12:43 EDT

## 2021-06-01 NOTE — PROGRESS NOTES
Postoperative day 4 exploratory laparotomy with lysis of adhesions    Resting comfortably.  Maximum temperature 100.2 (overall temperature curve down), vital signs stable  Abdomen is soft.  Ostomy is starting to function.  Nasogastric output more clear and bilious at this point and output is decreasing.    · Try clamping NG

## 2021-06-01 NOTE — PLAN OF CARE
Goal Outcome Evaluation:     Progress: improving  Outcome Summary: pt is alert, nonverbal, mother at bedside helping with care, no falls, bed alarm on, turn, IV fluids/antx, 2-3L o2 nasal cannula, continue to monitor

## 2021-06-02 LAB
ANION GAP SERPL CALCULATED.3IONS-SCNC: 16 MMOL/L (ref 5–15)
BUN SERPL-MCNC: 8 MG/DL (ref 6–20)
BUN/CREAT SERPL: 18.2 (ref 7–25)
CALCIUM SPEC-SCNC: 8.7 MG/DL (ref 8.6–10.5)
CHLORIDE SERPL-SCNC: 100 MMOL/L (ref 98–107)
CO2 SERPL-SCNC: 23 MMOL/L (ref 22–29)
CREAT SERPL-MCNC: 0.44 MG/DL (ref 0.76–1.27)
DEPRECATED RDW RBC AUTO: 45.9 FL (ref 37–54)
ERYTHROCYTE [DISTWIDTH] IN BLOOD BY AUTOMATED COUNT: 13.4 % (ref 12.3–15.4)
GFR SERPL CREATININE-BSD FRML MDRD: >150 ML/MIN/1.73
GLUCOSE SERPL-MCNC: 79 MG/DL (ref 65–99)
HCT VFR BLD AUTO: 35.2 % (ref 37.5–51)
HGB BLD-MCNC: 11.5 G/DL (ref 13–17.7)
MCH RBC QN AUTO: 30.2 PG (ref 26.6–33)
MCHC RBC AUTO-ENTMCNC: 32.7 G/DL (ref 31.5–35.7)
MCV RBC AUTO: 92.4 FL (ref 79–97)
PLATELET # BLD AUTO: 256 10*3/MM3 (ref 140–450)
PMV BLD AUTO: 9.7 FL (ref 6–12)
POTASSIUM SERPL-SCNC: 3.8 MMOL/L (ref 3.5–5.2)
RBC # BLD AUTO: 3.81 10*6/MM3 (ref 4.14–5.8)
SODIUM SERPL-SCNC: 139 MMOL/L (ref 136–145)
WBC # BLD AUTO: 10.59 10*3/MM3 (ref 3.4–10.8)

## 2021-06-02 PROCEDURE — 25010000002 MORPHINE PER 10 MG: Performed by: SURGERY

## 2021-06-02 PROCEDURE — 25010000002 LEVOFLOXACIN PER 250 MG: Performed by: HOSPITALIST

## 2021-06-02 PROCEDURE — 25010000002 LORAZEPAM PER 2 MG: Performed by: HOSPITALIST

## 2021-06-02 PROCEDURE — 99024 POSTOP FOLLOW-UP VISIT: CPT | Performed by: SURGERY

## 2021-06-02 PROCEDURE — 85027 COMPLETE CBC AUTOMATED: CPT | Performed by: SURGERY

## 2021-06-02 PROCEDURE — 25810000003 SODIUM CHLORIDE 0.9 % WITH KCL 20 MEQ 20-0.9 MEQ/L-% SOLUTION: Performed by: HOSPITALIST

## 2021-06-02 PROCEDURE — 80048 BASIC METABOLIC PNL TOTAL CA: CPT | Performed by: SURGERY

## 2021-06-02 RX ORDER — LORAZEPAM 2 MG/ML
0.5 CONCENTRATE ORAL EVERY 6 HOURS PRN
Status: DISCONTINUED | OUTPATIENT
Start: 2021-06-02 | End: 2021-06-04 | Stop reason: HOSPADM

## 2021-06-02 RX ORDER — ACETAMINOPHEN 160 MG/5ML
650 SOLUTION ORAL EVERY 6 HOURS PRN
Status: DISCONTINUED | OUTPATIENT
Start: 2021-06-02 | End: 2021-06-04 | Stop reason: HOSPADM

## 2021-06-02 RX ADMIN — LORAZEPAM 0.5 MG: 2 INJECTION INTRAMUSCULAR; INTRAVENOUS at 02:00

## 2021-06-02 RX ADMIN — ACETAMINOPHEN 650 MG: 325 SUSPENSION ORAL at 13:21

## 2021-06-02 RX ADMIN — POTASSIUM CHLORIDE AND SODIUM CHLORIDE 75 ML/HR: 900; 150 INJECTION, SOLUTION INTRAVENOUS at 01:53

## 2021-06-02 RX ADMIN — LEVOFLOXACIN 750 MG: 750 INJECTION, SOLUTION INTRAVENOUS at 20:31

## 2021-06-02 RX ADMIN — FAMOTIDINE 20 MG: 10 INJECTION INTRAVENOUS at 20:32

## 2021-06-02 RX ADMIN — MORPHINE SULFATE 2 MG: 2 INJECTION, SOLUTION INTRAMUSCULAR; INTRAVENOUS at 06:24

## 2021-06-02 RX ADMIN — SODIUM CHLORIDE, PRESERVATIVE FREE 10 ML: 5 INJECTION INTRAVENOUS at 08:21

## 2021-06-02 RX ADMIN — ACETAMINOPHEN 650 MG: 325 SUSPENSION ORAL at 20:32

## 2021-06-02 RX ADMIN — FAMOTIDINE 20 MG: 10 INJECTION INTRAVENOUS at 08:21

## 2021-06-02 RX ADMIN — SODIUM CHLORIDE, PRESERVATIVE FREE 10 ML: 5 INJECTION INTRAVENOUS at 20:31

## 2021-06-02 NOTE — PLAN OF CARE
Goal Outcome Evaluation:  Plan of Care Reviewed With: patient     Outcome Summary: pt tolerating not having to NG tube in place. is on full liq diet now and advance as tolerated. gave tylenol once this shift. will cont to monitor  Problem: Adult Inpatient Plan of Care  Goal: Plan of Care Review  Outcome: Ongoing, Progressing  Flowsheets (Taken 6/2/2021 1453)  Plan of Care Reviewed With: patient  Outcome Summary: pt tolerating not having to NG tube in place. is on full liq diet now and advance as tolerated. gave tylenol once this shift. will cont to monitor  Goal: Patient-Specific Goal (Individualized)  Outcome: Ongoing, Progressing  Goal: Absence of Hospital-Acquired Illness or Injury  Outcome: Ongoing, Progressing  Intervention: Identify and Manage Fall Risk  Recent Flowsheet Documentation  Taken 6/2/2021 1446 by Roxana Pacheco RN  Safety Promotion/Fall Prevention:   activity supervised   assistive device/personal items within reach   clutter free environment maintained   fall prevention program maintained   nonskid shoes/slippers when out of bed   room organization consistent   safety round/check completed  Taken 6/2/2021 1206 by Roxana Pacheco, RN  Safety Promotion/Fall Prevention:   activity supervised   assistive device/personal items within reach   clutter free environment maintained   fall prevention program maintained   nonskid shoes/slippers when out of bed   room organization consistent   safety round/check completed  Taken 6/2/2021 1030 by Roxana Pacheco, RN  Safety Promotion/Fall Prevention:   activity supervised   assistive device/personal items within reach   clutter free environment maintained   fall prevention program maintained   nonskid shoes/slippers when out of bed   room organization consistent   safety round/check completed  Taken 6/2/2021 0822 by Roxana Pacheco, RN  Safety Promotion/Fall Prevention:   activity supervised   assistive device/personal items within reach   clutter free  environment maintained   fall prevention program maintained   nonskid shoes/slippers when out of bed   room organization consistent   safety round/check completed  Intervention: Prevent Skin Injury  Recent Flowsheet Documentation  Taken 6/2/2021 0822 by Roxana Pacheco RN  Skin Protection: incontinence pads utilized  Intervention: Prevent and Manage VTE (venous thromboembolism) Risk  Recent Flowsheet Documentation  Taken 6/2/2021 0822 by Roxana Pacheco RN  VTE Prevention/Management:   bilateral   dorsiflexion/plantar flexion performed  Goal: Optimal Comfort and Wellbeing  Outcome: Ongoing, Progressing  Intervention: Provide Person-Centered Care  Recent Flowsheet Documentation  Taken 6/2/2021 0822 by Roxana Pacheco RN  Trust Relationship/Rapport:   care explained   thoughts/feelings acknowledged  Goal: Readiness for Transition of Care  Outcome: Ongoing, Progressing     Problem: Pain Acute  Goal: Optimal Pain Control  Outcome: Ongoing, Progressing  Intervention: Optimize Psychosocial Wellbeing  Recent Flowsheet Documentation  Taken 6/2/2021 0822 by Roxana Pacheco RN  Diversional Activities:   television   music     Problem: Fall Injury Risk  Goal: Absence of Fall and Fall-Related Injury  Outcome: Ongoing, Progressing  Intervention: Identify and Manage Contributors to Fall Injury Risk  Recent Flowsheet Documentation  Taken 6/2/2021 1446 by Roxana Pacheco RN  Medication Review/Management: medications reviewed  Taken 6/2/2021 1206 by Roxana Pacheco RN  Medication Review/Management: medications reviewed  Taken 6/2/2021 1030 by Roxana Pacheco RN  Medication Review/Management: medications reviewed  Taken 6/2/2021 0822 by Roxana Pacheco RN  Medication Review/Management: medications reviewed  Intervention: Promote Injury-Free Environment  Recent Flowsheet Documentation  Taken 6/2/2021 1446 by Roxana Pacheco RN  Safety Promotion/Fall Prevention:   activity supervised   assistive device/personal items within  reach   clutter free environment maintained   fall prevention program maintained   nonskid shoes/slippers when out of bed   room organization consistent   safety round/check completed  Taken 6/2/2021 1206 by Roxana Pacheco RN  Safety Promotion/Fall Prevention:   activity supervised   assistive device/personal items within reach   clutter free environment maintained   fall prevention program maintained   nonskid shoes/slippers when out of bed   room organization consistent   safety round/check completed  Taken 6/2/2021 1030 by Roxana Pacheco RN  Safety Promotion/Fall Prevention:   activity supervised   assistive device/personal items within reach   clutter free environment maintained   fall prevention program maintained   nonskid shoes/slippers when out of bed   room organization consistent   safety round/check completed  Taken 6/2/2021 0822 by Roxana Pacheco RN  Safety Promotion/Fall Prevention:   activity supervised   assistive device/personal items within reach   clutter free environment maintained   fall prevention program maintained   nonskid shoes/slippers when out of bed   room organization consistent   safety round/check completed     Problem: Skin Injury Risk Increased  Goal: Skin Health and Integrity  Outcome: Ongoing, Progressing  Intervention: Optimize Skin Protection  Recent Flowsheet Documentation  Taken 6/2/2021 0822 by Roxana Pacheco, RN  Pressure Reduction Techniques: frequent weight shift encouraged  Pressure Reduction Devices: specialty bed utilized  Skin Protection: incontinence pads utilized

## 2021-06-02 NOTE — PLAN OF CARE
Goal Outcome Evaluation:  Plan of Care Reviewed With: patient, mother  Progress: improving  Outcome Summary: Pt alert, nonverbal. NG to low wall suction most of shift, per order, clamped at 1645, residual due to be checked at 2045, pt has no N/V currently. Midline incision open to air per order. 2mg morphine given x1 for pain. Will continue to monitor.

## 2021-06-02 NOTE — PLAN OF CARE
Goal Outcome Evaluation:     Progress: improving  Outcome Summary: pt is alert, nonverbal, pt pulled NG tube out, [facility] NP made aware, states to replace if N/V occurs, no N/V currently, pt seems to be doing better, 1L o2 nasal cannula, IV fluids, mom at bedside, continue to monitor

## 2021-06-02 NOTE — PROGRESS NOTES
Name: Alex Brown ADMIT: 2021   : 1990  PCP: Kira Almaarz MD    MRN: [id number] LOS: 7 days   AGE/SEX: 31 y.o. male  ROOM: [facility]     Subjective   Subjective   NG tube accidentally pulled overnight, no N/V.    Review of Systems   Unable to perform ROS: Patient nonverbal        Objective   Objective   Vital Signs  Temp:  [96.7 °F (35.9 °C)-98.7 °F (37.1 °C)] 96.7 °F (35.9 °C)  Heart Rate:  [101-115] 101  Resp:  [16] 16  BP: ()/(51-64) 104/64  SpO2:  [93 %-99 %] 99 %  on  Flow (L/min):  [1] 1;   Device (Oxygen Therapy): nasal cannula  Body mass index is 15.98 kg/m².  Physical Exam  Vitals and nursing note reviewed.   Constitutional:       General: He is not in acute distress.     Comments: Very thin, frail appearing   HENT:      Head: Normocephalic and atraumatic.   Eyes:      General: No scleral icterus.  Cardiovascular:      Rate and Rhythm: Regular rhythm. Tachycardia present.   Pulmonary:      Effort: Pulmonary effort is normal. No respiratory distress.      Breath sounds: Normal breath sounds.   Abdominal:      General: Bowel sounds are normal.      Tenderness: There is abdominal tenderness (Appropriate).   Musculoskeletal:      Right lower leg: No edema.      Left lower leg: No edema.   Skin:     General: Skin is warm and dry.      Findings: No rash.   Neurological:      Mental Status: He is alert.   Psychiatric:      Comments: Calm, makes eye contact         Results Review     I reviewed the patient's new clinical results.  Results from last 7 days   Lab Units 21  0627 21  0525 21  0939 21  0833   WBC 10*3/mm3 10.59 10.76 8.98 8.56   HEMOGLOBIN g/dL 11.5* 12.9* 14.6 15.3   PLATELETS 10*3/mm3 256 239 273 250     Results from last 7 days   Lab Units 21  0627 21  0654 21  0525 21  0939   SODIUM mmol/L 139 136 130* 132*   POTASSIUM mmol/L 3.8 3.6 3.2* 3.5   CHLORIDE mmol/L 100 94* 91* 96*   CO2 mmol/L 23.0 29.0 29.7* 26.7   BUN  mg/dL 8 10 8 6   CREATININE mg/dL 0.44* 0.46* 0.54* 0.53*   GLUCOSE mg/dL 79 78 129* 119*   Estimated Creatinine Clearance: 151.7 mL/min (A) (by C-G formula based on SCr of 0.44 mg/dL (L)).      Results from last 7 days   Lab Units 06/02/21  0627 06/01/21  0654 05/31/21  0525 05/29/21  0939   CALCIUM mg/dL 8.7 8.9 8.4* 8.5*   MAGNESIUM mg/dL  --  2.1  --   --      Results from last 7 days   Lab Units 05/31/21  0525   PROCALCITONIN ng/mL 1.90*     COVID19   Date Value Ref Range Status   05/26/2021 Not Detected Not Detected - Ref. Range Final   05/13/2021 Not Detected Not Detected - Ref. Range Final     No results found for: HGBA1C, POCGLU      Scheduled Medications  famotidine, 20 mg, Intravenous, Q12H  levoFLOXacin, 750 mg, Intravenous, Q24H  sodium chloride, 10 mL, Intravenous, Q12H    Infusions   Diet  Diet Clear Liquid       Assessment/Plan     Active Hospital Problems    Diagnosis  POA   • **SBO (small bowel obstruction) (CMS/HCC) [K56.609]  Yes   • Hypoxia [R09.02]  No   • Aspiration pneumonia of left lung due to vomit (CMS/HCC) [J69.0]  No   • Anoxic encephalopathy (CMS/HCC) [G93.1]  Yes   • CP (cerebral palsy) (CMS/HCC) [G80.9]  Yes   • Leukocytosis [D72.829]  Yes   • H/O ulcerative colitis [Z87.19]  Not Applicable      Resolved Hospital Problems   No resolved problems to display.       31 y.o. male admitted with SBO (small bowel obstruction) (CMS/HCC), s/p laparotomy w/AD 5/28    · Fevers-left lower lobe pneumonia, suspect aspiration.    · Cont Levaquin, improving with decreased fever and improved oxygen saturations. Took off o2 when I was in room and he was at 96%  · SBO s/p lap - Victoria NG removal, OK to advance diet. Monitor ostomy output.   · Anxiety/agitation-continue low dose prn Ativan  · SCDs for DVT prophylaxis.  · D/w Mom at bedside  · Disp: Home when GI fxn returns. Likely 1-2 days if victoria diet      Hong Smith MD  [facility]  06/02/21  14:22 EDT

## 2021-06-02 NOTE — PROGRESS NOTES
Adult Nutrition  Assessment/PES    Patient Name:  Alex Brown  YOB: 1990  MRN: [id number]  Admit Date:  5/26/2021    Assessment Date:  6/2/2021    Comments:  POD #5, remains NPO with ice chips/sips. NG tube pulled out by pt last night. Plan to replace only if +n/v. Starting to have some ostomy output. No nutrition x 7 days given his small bowel obstruction and then surgery on 5/28. Hopefully diet can be advanced soon to Clears and avoid TPN. Will continue to monitor.     Reason for Assessment     Row Name 06/02/21 1020          Reason for Assessment    Reason For Assessment  follow-up protocol         Nutrition/Diet History     Row Name 06/02/21 1020          Nutrition/Diet History    Typical Food/Fluid Intake  POD 5 lap/lysis adhesions. Began clamping NG tube intermittently yesterday, then pt pulled out last night. Left out, will replace if +n/v.           Labs/Tests/Procedures/Meds     Row Name 06/02/21 1021          Labs/Procedures/Meds    Lab Results Reviewed  reviewed     Lab Results Comments  procal 1.9, hgb 11.5        Diagnostic Tests/Procedures    Diagnostic Test/Procedure Reviewed  reviewed        Medications    Pertinent Medications Reviewed  reviewed     Pertinent Medications Comments  pepcid, abx, IVF 75 cc/hr         Physical Findings     Row Name 06/02/21 1025          Physical Findings    Gastrointestinal  colostomy     Skin  surgical incision           Nutrition Prescription Ordered     Row Name 06/02/21 1025          Nutrition Prescription PO    Current PO Diet  Sips/ice chips;NPO         Evaluation of Received Nutrient/Fluid Intake     Row Name 06/02/21 1025          Fluid Intake Evaluation    IV Fluid (mL)  1800               Problem/Interventions:          Intervention Goal     Row Name 06/02/21 1025          Intervention Goal    General  Maintain nutrition;Disease management/therapy;Reduce/improve symptoms     PO  Advance diet;Tolerate PO         Nutrition Intervention      Row Name 06/02/21 1025          Nutrition Intervention    RD/Tech Action  Follow Tx progress;Care plan reviewd;Recommend/ordered     Recommended/Ordered  Diet         Nutrition Prescription     Row Name 06/02/21 1025          Nutrition Prescription PO    PO Prescription  Begin/change diet     Begin/Change Diet to  Clear Liquid     New PO Prescription Ordered?  No, recommended         Education/Evaluation     Row Name 06/02/21 1025          Education    Education  Will Instruct as appropriate        Monitor/Evaluation    Monitor  Per protocol           Electronically signed by:  Mckayla Lee RD  06/02/21 10:26 EDT

## 2021-06-02 NOTE — PROGRESS NOTES
Postoperative day 5    Pulled NG tube out overnight.  He only had 125 mL yesterday when residuals were checked after clamping.  His abdomen is soft and ostomy is functioning well.  Clear liquids ordered earlier.  I have placed order to advance as tolerated.

## 2021-06-03 PROBLEM — D72.829 LEUKOCYTOSIS: Status: RESOLVED | Noted: 2018-02-19 | Resolved: 2021-06-03

## 2021-06-03 PROBLEM — R09.02 HYPOXIA: Status: RESOLVED | Noted: 2021-06-01 | Resolved: 2021-06-03

## 2021-06-03 PROCEDURE — 25010000002 LEVOFLOXACIN PER 250 MG: Performed by: HOSPITALIST

## 2021-06-03 PROCEDURE — 99024 POSTOP FOLLOW-UP VISIT: CPT | Performed by: SURGERY

## 2021-06-03 RX ADMIN — SODIUM CHLORIDE, PRESERVATIVE FREE 10 ML: 5 INJECTION INTRAVENOUS at 08:58

## 2021-06-03 RX ADMIN — SODIUM CHLORIDE, PRESERVATIVE FREE 10 ML: 5 INJECTION INTRAVENOUS at 20:22

## 2021-06-03 RX ADMIN — ACETAMINOPHEN 650 MG: 325 SUSPENSION ORAL at 08:58

## 2021-06-03 RX ADMIN — FAMOTIDINE 20 MG: 10 INJECTION INTRAVENOUS at 20:22

## 2021-06-03 RX ADMIN — ACETAMINOPHEN 650 MG: 325 SUSPENSION ORAL at 17:45

## 2021-06-03 RX ADMIN — FAMOTIDINE 20 MG: 10 INJECTION INTRAVENOUS at 08:58

## 2021-06-03 RX ADMIN — LEVOFLOXACIN 750 MG: 750 INJECTION, SOLUTION INTRAVENOUS at 20:21

## 2021-06-03 NOTE — PROGRESS NOTES
Name: Alex Brown ADMIT: 2021   : 1990  PCP: Kira Almaraz MD    MRN: [id number] LOS: 8 days   AGE/SEX: 31 y.o. male  ROOM: [facility]     Subjective   Subjective   Brigida fulls fine for breakfast but limited appetite. Rested well    Review of Systems   Unable to perform ROS: Patient nonverbal        Objective   Objective   Vital Signs  Temp:  [97.5 °F (36.4 °C)-98.7 °F (37.1 °C)] 97.8 °F (36.6 °C)  Heart Rate:  [100-112] 109  Resp:  [16] 16  BP: ()/(51-68) 105/68  SpO2:  [95 %-99 %] 96 %  on  Flow (L/min):  [1] 1;   Device (Oxygen Therapy): room air  Body mass index is 15.98 kg/m².  Physical Exam  Vitals and nursing note reviewed.   Constitutional:       General: He is not in acute distress.     Comments: Very thin, frail appearing   HENT:      Head: Normocephalic and atraumatic.   Eyes:      General: No scleral icterus.  Cardiovascular:      Rate and Rhythm: Regular rhythm. Tachycardia present.   Pulmonary:      Effort: Pulmonary effort is normal. No respiratory distress.      Breath sounds: Normal breath sounds.   Abdominal:      General: Bowel sounds are normal.      Tenderness: There is abdominal tenderness (Appropriate).   Musculoskeletal:      Right lower leg: No edema.      Left lower leg: No edema.   Skin:     General: Skin is warm and dry.      Findings: No rash.   Neurological:      Mental Status: He is alert.   Psychiatric:      Comments: Calm, makes eye contact         Results Review     I reviewed the patient's new clinical results.  Results from last 7 days   Lab Units 21  0627 21  0525 21  0939 21  0833   WBC 10*3/mm3 10.59 10.76 8.98 8.56   HEMOGLOBIN g/dL 11.5* 12.9* 14.6 15.3   PLATELETS 10*3/mm3 256 239 273 250     Results from last 7 days   Lab Units 21  0627 21  0654 21  0525 21  0939   SODIUM mmol/L 139 136 130* 132*   POTASSIUM mmol/L 3.8 3.6 3.2* 3.5   CHLORIDE mmol/L 100 94* 91* 96*   CO2 mmol/L 23.0 29.0 29.7*  26.7   BUN mg/dL 8 10 8 6   CREATININE mg/dL 0.44* 0.46* 0.54* 0.53*   GLUCOSE mg/dL 79 78 129* 119*   Estimated Creatinine Clearance: 151.7 mL/min (A) (by C-G formula based on SCr of 0.44 mg/dL (L)).      Results from last 7 days   Lab Units 06/02/21  0627 06/01/21  0654 05/31/21  0525 05/29/21  0939   CALCIUM mg/dL 8.7 8.9 8.4* 8.5*   MAGNESIUM mg/dL  --  2.1  --   --      Results from last 7 days   Lab Units 05/31/21  0525   PROCALCITONIN ng/mL 1.90*     COVID19   Date Value Ref Range Status   05/26/2021 Not Detected Not Detected - Ref. Range Final   05/13/2021 Not Detected Not Detected - Ref. Range Final     No results found for: HGBA1C, POCGLU      Scheduled Medications  famotidine, 20 mg, Intravenous, Q12H  levoFLOXacin, 750 mg, Intravenous, Q24H  sodium chloride, 10 mL, Intravenous, Q12H    Infusions   Diet  Diet Soft Texture; Ground; Thin; Lactose Restricted       Assessment/Plan     Active Hospital Problems    Diagnosis  POA   • **SBO (small bowel obstruction) (CMS/HCC) [K56.609]  Yes   • Aspiration pneumonia of left lung due to vomit (CMS/HCC) [J69.0]  No   • Anoxic encephalopathy (CMS/HCC) [G93.1]  Yes   • CP (cerebral palsy) (CMS/HCC) [G80.9]  Yes   • H/O ulcerative colitis [Z87.19]  Not Applicable      Resolved Hospital Problems    Diagnosis Date Resolved POA   • Hypoxia [R09.02] 06/03/2021 No   • Leukocytosis [D72.829] 06/03/2021 Yes       31 y.o. male admitted with SBO (small bowel obstruction) (CMS/HCC), s/p laparotomy w/AD 5/28    · Left lower lobe pneumonia, suspect aspiration.    · Cont Levaquin x 7 days. Will change to po at d/c, pharmacy does not stock liquid form here  · Off o2  · SBO s/p lap - Brigida diet, advancing per surgery, possibly home tomorrow. Monitor ostomy output.   · Anxiety/agitation-continue low dose prn Ativan  · SCDs for DVT prophylaxis.  · D/w Mom at bedside  · Disp: Prob home in AM if stable      Hong Smith MD  [facility]  06/03/21  14:02  EDT

## 2021-06-03 NOTE — PROGRESS NOTES
Postoperative day 6    Tolerating full liquid diet.  Abdomen soft and incision healing well.  Ostomy functioning well.    Diet to be advanced to regular food later today.  Should be fine to go home tomorrow.  Defer duration of antibiotic treatment to hospitalist.

## 2021-06-03 NOTE — PLAN OF CARE
Goal Outcome Evaluation:  Plan of Care Reviewed With: mother, patient  Progress: improving  Outcome summary  PRN tylenol admin x1 for sx mgmt of mild discomfort, sx improved/resolved after med. Mother at bsd through shift, attentive to pts needs. Pt [place] diet w/o N/V. Pt on RA. Pt appears to have slept comfortably between care. Continue to monitor and tx per POC and MD orders.

## 2021-06-03 NOTE — PROGRESS NOTES
Adult Nutrition  Assessment/PES    Patient Name:  Alex Brown  YOB: 1990  MRN: [id number]  Admit Date:  5/26/2021    Assessment Date:  6/3/2021    Comments:  Making progress - diet advanced to fulls, tolerated without n/v. Now on soft/ground texture. Will continue to monitor.     Reason for Assessment     Row Name 06/03/21 1250          Reason for Assessment    Reason For Assessment  follow-up protocol         Nutrition/Diet History     Row Name 06/03/21 1250          Nutrition/Diet History    Typical Food/Fluid Intake  Diet advancing - had full liquids last night/this morning and advanced to soft solids for lunch. Anticipate dc to home tomorrow           Labs/Tests/Procedures/Meds     Row Name 06/03/21 1251          Labs/Procedures/Meds    Lab Results Reviewed  reviewed     Lab Results Comments  no new labs        Diagnostic Tests/Procedures    Diagnostic Test/Procedure Reviewed  reviewed        Medications    Pertinent Medications Reviewed  reviewed     Pertinent Medications Comments  pepcid, levaquin         Physical Findings     Row Name 06/03/21 1314          Physical Findings    Gastrointestinal  colostomy     Skin  surgical incision           Nutrition Prescription Ordered     Row Name 06/03/21 1314          Nutrition Prescription PO    Current PO Diet  Soft Texture     Texture  Ground     Fluid Consistency  Thin     Common Modifiers  Lactose Restricted         Evaluation of Received Nutrient/Fluid Intake     Row Name 06/03/21 1315          PO Evaluation    % PO Intake  240 cc at bkft               Problem/Interventions:  Problem 1     Row Name 06/03/21 1320          Nutrition Diagnoses Problem 1    Problem 1  Altered GI Function     Etiology (related to)  Medical Diagnosis     Gastrointestinal  SBO     Signs/Symptoms (evidenced by)  Report/Observation     Other Comment  Diet advancing; NG tube out. Tolerating PO.     Resolved?  (S) Yes               Intervention Goal     Row Name  06/03/21 1322          Intervention Goal    General  Maintain nutrition;Disease management/therapy     PO  Continue positive trend;Tolerate PO     Weight  Maintain weight         Nutrition Intervention     Row Name 06/03/21 1323          Nutrition Intervention    RD/Tech Action  Follow Tx progress;Care plan reviewd;Interview for preference;Other (comment) mom generally completes menus           Education/Evaluation     Row Name 06/03/21 1326          Education    Education  No discharge needs identified at this time        Monitor/Evaluation    Monitor  Per protocol;PO intake;Pertinent labs;Symptoms           Electronically signed by:  Mckayla Lee RD  06/03/21 13:26 EDT

## 2021-06-04 VITALS
SYSTOLIC BLOOD PRESSURE: 100 MMHG | TEMPERATURE: 97.2 F | WEIGHT: 99 LBS | RESPIRATION RATE: 16 BRPM | HEART RATE: 104 BPM | OXYGEN SATURATION: 97 % | DIASTOLIC BLOOD PRESSURE: 59 MMHG | HEIGHT: 66 IN | BODY MASS INDEX: 15.91 KG/M2

## 2021-06-04 LAB
BACTERIA SPEC AEROBE CULT: NORMAL
BACTERIA SPEC AEROBE CULT: NORMAL

## 2021-06-04 RX ORDER — LEVOFLOXACIN 750 MG/1
750 TABLET ORAL DAILY
Qty: 2 TABLET | Refills: 0 | Status: SHIPPED | OUTPATIENT
Start: 2021-06-04 | End: 2021-06-06

## 2021-06-04 RX ADMIN — FAMOTIDINE 20 MG: 10 INJECTION INTRAVENOUS at 09:14

## 2021-06-04 RX ADMIN — ACETAMINOPHEN 650 MG: 325 SUSPENSION ORAL at 09:14

## 2021-06-04 RX ADMIN — SODIUM CHLORIDE, PRESERVATIVE FREE 10 ML: 5 INJECTION INTRAVENOUS at 09:15

## 2021-06-04 NOTE — PLAN OF CARE
Goal Outcome Evaluation:  Plan of Care Reviewed With: patient, mother  Progress: improving  Outcome Summary: No change. Pt's tolerating diet. IV antibitic given last night. Pt's mother at bedside, attentive and caring for pt needs. Nurse will continue to monitor.

## 2021-06-04 NOTE — DISCHARGE SUMMARY
Date of Admission: 5/26/2021  Date of Discharge:  6/4/2021  Primary Care Physician: Kira Almaraz MD     Discharge Diagnosis:  Active Hospital Problems    Diagnosis  POA   • **SBO (small bowel obstruction) (CMS/HCC) [K56.609]  Yes   • Aspiration pneumonia of left lung due to vomit (CMS/HCC) [J69.0]  No   • Anoxic encephalopathy (CMS/HCC) [G93.1]  Yes   • CP (cerebral palsy) (CMS/[facility]) [G80.9]  Yes   • H/O ulcerative colitis [Z87.19]  Not Applicable      Resolved Hospital Problems    Diagnosis Date Resolved POA   • Hypoxia [R09.02] 06/03/2021 No   • Leukocytosis [D72.829] 06/03/2021 Yes       Presenting Problem/History of Present Illness:  SBO (small bowel obstruction) (CMS/HCC) [K56.609]     Mr. Brown is a 31 y.o. 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     Patient's mother bedside serves as primary historian given patient nonverbal at baseline secondary to complications of cerebral palsy.  Patient recently admitted on 5/13/2021 discharge on 5/16/2021 for small bowel obstruction secondary to history of ulcerative colitis with subsequent multiple prior abdominal surgeries for bowel obstruction including partial enterectomy and colostomy.  Doing well at home with parents and ate dinner then to bed and awoke approximately 0200 with coughing and throwing up what was described as 'undigested food'.  Vomiting occurred 3 times prior to eventual decision for ER evaluation at 0600.     AVSS on room air, unremarkable laboratory findings with minimally elevated WBC 11,000, unremarkable procalcitonin 0.06, lipase 17, serum lactate 1.2, CT abdomen pelvis with contrast reported findings closed-loop small bowel obstruction without fluid  collections, new small airspace opacity left lower lobe possibly representing infectious process secondary aspiration.     Further recommendations per hospital course.  See additional details on assessment/plan.    Hospital Course:  The patient is a 31 y.o. male who presented with small bowel obstruction with nausea vomiting and resulting aspiration pneumonia.  Was admitted and surgery was consulted.  On 5/28 he underwent exploratory laparotomy with lysis of adhesions.  He was monitored after this and has had return of bowel function.  He will need to follow-up with surgery and continue the wound care as instructed.  He has cerebral palsy and family cares for him.    He was given Levaquin for the aspiration pneumonia procalcitonin was elevated and blood cultures are negative.  Respiratory status is stable now and is afebrile.  He will get an additional 2 days of Levaquin to complete his antibiotic course.    CP patient, nonverbal, Mom stays with him and takes care of him here pretty much. Had a SBO and required ex lap. Progressing well, surgery wanted to watch him till tomorrow but he can prob go home then. Had post op PNA, prob aspirated, finishing out abx but doing well  Edited by: Hong Smith MD at 6/3/2021 1406    Exam Today:  General AA NAD  HEENT NCAT no icterus CV RRR  Lung CTA B, no wheezes  Abdomen tender but nondistended  Extremity no cyanosis or edema  Neuro alert and near mental baseline  Psych calm and pleasant    Results:  CT Abdomen/Pelvis  1. Closed loop small bowel obstruction. The closed loop obstruction may  be intermittent. There has been increase in the small volume of free  fluid within the abdomen and pelvis. There are no fluid collections.  2. The new small airspace opacity at the dependent aspect of the left  lower lobe likely represents an infectious infiltrate, possibly  secondary to aspiration.    SBFT  High-grade partial or complete closed loop type small  bowel  obstruction. The site of the obstruction is suspected to be along the  midline at the level of the pelvic inlet.    Abd XR  Overall severe distention of the small bowel with abrupt cut off of  enteric contrast within the mid abdomen, as seen on prior abdominal  radiograph performed at approximately 4 hours ago. Small bowel loops  measure up to approximately 6.9 cm in diameter (previously 6.2 cm) and  are most suggestive of small bowel obstruction, particularly closed loop  obstruction given the appearance on recent CT abdomen and pelvis.. The  above findings were discussed with Viv, the patient's nurse by telephone  by Timbo Stevenson at 9:14 PM on  05/27/2021  . An orogastric tube courses  in the stomach and the tip is obscured by enteric contrast.    Abd XR  Again noted is oral contrast within several very dilated  proximal small bowel loops with extremely little gas identified in the  remaining large or small bowel. This is most consistent with proximal  small bowel obstruction and shows no significant change from yesterday's  Exam.    CXR  The lungs are well-expanded and there is some vague haziness  at the lung bases, left greater than right that is new since the  preoperative chest x-ray dated 03/30/2020 and suspicious for areas of  developing pneumonia. The heart size is normal. An NG tube ends in the  lower stomach.    KUB  There are midline skin staples related to the recent surgery.  An NG tube ends in the mid stomach. There remains some residual oral  contrast scattered in several small bowel loops but the previous  prominent small bowel distention is markedly improved. No significant  ileus is identified but the persistent contrast and small bowel does  suggest an element of postoperative ileus. Continued follow-up  evaluation is recommended.    Procedures Performed:  Procedure(s):  LAPAROTOMY EXPLORATORY with lysis of adhesions       Consults:   Consults     Date and Time Order Name Status  Description    5/26/2021 12:14 PM Inpatient General Surgery Consult Completed     5/26/2021  9:50 AM [facility] (on-call MD unless specified) Details Completed     5/26/2021  9:38 AM Surgery (on-call MD unless specified) Completed     5/13/2021  1:17 PM [facility] (on-call MD unless specified) Details Completed     5/13/2021  1:07 PM Surgery (on-call MD unless specified) Completed            Discharge Disposition:  Home or Self Care    Discharge Medications:     Discharge Medications      New Medications      Instructions Start Date   levoFLOXacin 750 MG tablet  Commonly known as: Levaquin   750 mg, Oral, Daily         Continue These Medications      Instructions Start Date   lansoprazole 15 MG capsule  Commonly known as: PREVACID   15 mg, Oral, Daily      multivitamin tablet tablet   1 tablet, Oral, Daily             Discharge Diet:   Diet Instructions     Diet: Soft Texture; Thin Liquids, No Restrictions; Ground      Discharge Diet: Soft Texture    Fluid Consistency: Thin Liquids, No Restrictions    Soft Options: Ground          Activity at Discharge:   Activity Instructions     Activity as Tolerated            Follow-up Appointments:  Follow-up Information     Kira Almaraz MD Follow up.    Specialty: Family Medicine  Contact information:  [address] [facility] [address]  [facility] [id number] [phone]             Vinh Ballard MD Follow up.    Specialty: General Surgery  Contact information:  [address]  [facility] [address]  [facility] [id number] [phone]                   Test Results Pending at Discharge:  Pending Labs     Order Current Status    Blood Culture - Blood, Arm, Right Preliminary result    Blood Culture - Blood, Hand, Left Preliminary result           Fer Horn MD  06/04/21  10:00 EDT    Time Spent on Discharge Activities: >30 minutes    Dictated portions using Dragon dictation software.  During the entire encounter, I was wearing recommended PPE including face mask and eye protection.  Hand sanitization was performed prior to entering room and upon exit.

## 2021-06-05 ENCOUNTER — READMISSION MANAGEMENT (OUTPATIENT)
Dept: CALL CENTER | Facility: HOSPITAL | Age: 31
End: 2021-06-05

## 2021-06-05 NOTE — OUTREACH NOTE
Prep Survey      Responses   [facility] facility patient discharged from?  [facility]   Is LACE score < 7 ?  No   Emergency Room discharge w/ pulse ox?  No   Eligibility  Readm Mgmt   Discharge diagnosis  SBO (small bowel obstruction, Aspiration pneumonia of left lung due to vomit    Does the patient have one of the following disease processes/diagnoses(primary or secondary)?  General Surgery   Does the patient have Home health ordered?  No   Is there a DME ordered?  No   General alerts for this patient  Cerebral Palsy, Anoxic brain injury @ birth, nonverbal   Prep survey completed?  Yes          Alfreda Anderson RN

## 2021-06-09 ENCOUNTER — READMISSION MANAGEMENT (OUTPATIENT)
Dept: CALL CENTER | Facility: HOSPITAL | Age: 31
End: 2021-06-09

## 2021-06-09 NOTE — OUTREACH NOTE
General Surgery Week 1 Survey      Responses   [facility] [place] patient discharged from?  [place]   Does the patient have one of the following disease processes/diagnoses(primary or secondary)?  General Surgery   Week 1 attempt successful?  No   Unsuccessful attempts  Attempt 1          Lakeisha Frazier RN

## 2021-06-09 NOTE — PROGRESS NOTES
Case Management Discharge Note      Final Note: Discharged to home with his parents on 6/4/21. His parents provided the transportation to home. GURINDER Yi RN, CCP.    Provided Post Acute Provider List?: Yes (The patient's father was provided with a HH/SNF list but not with a print out of the HH/nursing compare list from [employer] as the patient currently denies any d/c needs)  Post Acute Provider List:  (The patient's father was provided with a HH/SNF list but not with a print out of the HH/nursing compare list from [employer] as the patient currently denies any d/c needs)  Provided Post Acute Provider Quality & Resource List?: Yes  Post Acute Provider Quality and Resource List:  (The patient's father was provided with a HH/SNF list but not with a print out of the HH/nursing compare list from [employer] as the patient currently denies any d/c needs)  Delivered To: Support Person  Support Person: Patient's father/Jung  Method of Delivery: In person    Selected Continued Care - Discharged on 6/4/2021 Admission date: 5/26/2021 - Discharge disposition: Home or Self Care    Destination    No services have been selected for the patient.              Durable Medical Equipment    No services have been selected for the patient.              Dialysis/Infusion    No services have been selected for the patient.              Home Medical Care    No services have been selected for the patient.              Therapy    No services have been selected for the patient.              Community Resources    No services have been selected for the patient.              Community & DME    No services have been selected for the patient.                       Final Discharge Disposition Code: 01 - home or self-care

## 2021-06-10 ENCOUNTER — APPOINTMENT (OUTPATIENT)
Dept: CT IMAGING | Facility: HOSPITAL | Age: 31
End: 2021-06-10

## 2021-06-10 ENCOUNTER — HOSPITAL ENCOUNTER (INPATIENT)
Facility: HOSPITAL | Age: 31
LOS: 2 days | Discharge: HOME | End: 2021-06-13
Attending: EMERGENCY MEDICINE | Admitting: INTERNAL MEDICINE

## 2021-06-10 ENCOUNTER — READMISSION MANAGEMENT (OUTPATIENT)
Dept: CALL CENTER | Facility: HOSPITAL | Age: 31
End: 2021-06-10

## 2021-06-10 DIAGNOSIS — J18.9 PNEUMONIA OF LEFT LOWER LOBE DUE TO INFECTIOUS ORGANISM: Primary | ICD-10-CM

## 2021-06-10 DIAGNOSIS — G80.9 CEREBRAL PALSY, UNSPECIFIED TYPE (HCC): ICD-10-CM

## 2021-06-10 DIAGNOSIS — R11.2 NON-INTRACTABLE VOMITING WITH NAUSEA, UNSPECIFIED VOMITING TYPE: ICD-10-CM

## 2021-06-10 LAB
ALBUMIN SERPL-MCNC: 3.8 G/DL (ref 3.5–5.2)
ALBUMIN/GLOB SERPL: 1.2 G/DL
ALP SERPL-CCNC: 100 U/L (ref 39–117)
ALT SERPL W P-5'-P-CCNC: 57 U/L (ref 1–41)
ANION GAP SERPL CALCULATED.3IONS-SCNC: 11.7 MMOL/L (ref 5–15)
AST SERPL-CCNC: 26 U/L (ref 1–40)
BASOPHILS # BLD AUTO: 0.06 10*3/MM3 (ref 0–0.2)
BASOPHILS NFR BLD AUTO: 0.4 % (ref 0–1.5)
BILIRUB SERPL-MCNC: 0.6 MG/DL (ref 0–1.2)
BUN SERPL-MCNC: 13 MG/DL (ref 6–20)
BUN/CREAT SERPL: 22.4 (ref 7–25)
CALCIUM SPEC-SCNC: 9.7 MG/DL (ref 8.6–10.5)
CHLORIDE SERPL-SCNC: 104 MMOL/L (ref 98–107)
CO2 SERPL-SCNC: 25.3 MMOL/L (ref 22–29)
CREAT SERPL-MCNC: 0.58 MG/DL (ref 0.76–1.27)
D-LACTATE SERPL-SCNC: 1.4 MMOL/L (ref 0.5–2)
DEPRECATED RDW RBC AUTO: 48.2 FL (ref 37–54)
EOSINOPHIL # BLD AUTO: 0.1 10*3/MM3 (ref 0–0.4)
EOSINOPHIL NFR BLD AUTO: 0.6 % (ref 0.3–6.2)
ERYTHROCYTE [DISTWIDTH] IN BLOOD BY AUTOMATED COUNT: 14 % (ref 12.3–15.4)
GFR SERPL CREATININE-BSD FRML MDRD: >150 ML/MIN/1.73
GLOBULIN UR ELPH-MCNC: 3.2 GM/DL
GLUCOSE SERPL-MCNC: 83 MG/DL (ref 65–99)
HCT VFR BLD AUTO: 48.6 % (ref 37.5–51)
HGB BLD-MCNC: 16 G/DL (ref 13–17.7)
IMM GRANULOCYTES # BLD AUTO: 0.08 10*3/MM3 (ref 0–0.05)
IMM GRANULOCYTES NFR BLD AUTO: 0.5 % (ref 0–0.5)
LIPASE SERPL-CCNC: 23 U/L (ref 13–60)
LYMPHOCYTES # BLD AUTO: 1.35 10*3/MM3 (ref 0.7–3.1)
LYMPHOCYTES NFR BLD AUTO: 8.2 % (ref 19.6–45.3)
MCH RBC QN AUTO: 30.8 PG (ref 26.6–33)
MCHC RBC AUTO-ENTMCNC: 32.9 G/DL (ref 31.5–35.7)
MCV RBC AUTO: 93.5 FL (ref 79–97)
MONOCYTES # BLD AUTO: 0.54 10*3/MM3 (ref 0.1–0.9)
MONOCYTES NFR BLD AUTO: 3.3 % (ref 5–12)
NEUTROPHILS NFR BLD AUTO: 14.26 10*3/MM3 (ref 1.7–7)
NEUTROPHILS NFR BLD AUTO: 87 % (ref 42.7–76)
NRBC BLD AUTO-RTO: 0 /100 WBC (ref 0–0.2)
PLATELET # BLD AUTO: 520 10*3/MM3 (ref 140–450)
PMV BLD AUTO: 9.8 FL (ref 6–12)
POTASSIUM SERPL-SCNC: 4.3 MMOL/L (ref 3.5–5.2)
PROCALCITONIN SERPL-MCNC: 0.12 NG/ML (ref 0–0.25)
PROT SERPL-MCNC: 7 G/DL (ref 6–8.5)
RBC # BLD AUTO: 5.2 10*6/MM3 (ref 4.14–5.8)
SARS-COV-2 ORF1AB RESP QL NAA+PROBE: NOT DETECTED
SODIUM SERPL-SCNC: 141 MMOL/L (ref 136–145)
WBC # BLD AUTO: 16.39 10*3/MM3 (ref 3.4–10.8)

## 2021-06-10 PROCEDURE — 25010000002 VANCOMYCIN 750 MG RECONSTITUTED SOLUTION: Performed by: INTERNAL MEDICINE

## 2021-06-10 PROCEDURE — G0378 HOSPITAL OBSERVATION PER HR: HCPCS

## 2021-06-10 PROCEDURE — 87641 MR-STAPH DNA AMP PROBE: CPT | Performed by: INTERNAL MEDICINE

## 2021-06-10 PROCEDURE — 36415 COLL VENOUS BLD VENIPUNCTURE: CPT | Performed by: EMERGENCY MEDICINE

## 2021-06-10 PROCEDURE — 25010000002 ENOXAPARIN PER 10 MG: Performed by: INTERNAL MEDICINE

## 2021-06-10 PROCEDURE — 84145 PROCALCITONIN (PCT): CPT | Performed by: INTERNAL MEDICINE

## 2021-06-10 PROCEDURE — 85025 COMPLETE CBC W/AUTO DIFF WBC: CPT | Performed by: EMERGENCY MEDICINE

## 2021-06-10 PROCEDURE — 74176 CT ABD & PELVIS W/O CONTRAST: CPT

## 2021-06-10 PROCEDURE — 80053 COMPREHEN METABOLIC PANEL: CPT | Performed by: EMERGENCY MEDICINE

## 2021-06-10 PROCEDURE — 83690 ASSAY OF LIPASE: CPT | Performed by: EMERGENCY MEDICINE

## 2021-06-10 PROCEDURE — 25010000002 CEFTRIAXONE PER 250 MG: Performed by: EMERGENCY MEDICINE

## 2021-06-10 PROCEDURE — 99284 EMERGENCY DEPT VISIT MOD MDM: CPT

## 2021-06-10 PROCEDURE — 83605 ASSAY OF LACTIC ACID: CPT | Performed by: EMERGENCY MEDICINE

## 2021-06-10 PROCEDURE — 87040 BLOOD CULTURE FOR BACTERIA: CPT | Performed by: EMERGENCY MEDICINE

## 2021-06-10 PROCEDURE — U0004 COV-19 TEST NON-CDC HGH THRU: HCPCS | Performed by: PHYSICIAN ASSISTANT

## 2021-06-10 PROCEDURE — 25010000002 ONDANSETRON PER 1 MG: Performed by: EMERGENCY MEDICINE

## 2021-06-10 RX ORDER — ACETAMINOPHEN 325 MG/1
650 TABLET ORAL EVERY 4 HOURS PRN
Status: DISCONTINUED | OUTPATIENT
Start: 2021-06-10 | End: 2021-06-13 | Stop reason: HOSPADM

## 2021-06-10 RX ORDER — ONDANSETRON 2 MG/ML
4 INJECTION INTRAMUSCULAR; INTRAVENOUS ONCE
Status: COMPLETED | OUTPATIENT
Start: 2021-06-10 | End: 2021-06-10

## 2021-06-10 RX ORDER — SODIUM CHLORIDE 0.9 % (FLUSH) 0.9 %
10 SYRINGE (ML) INJECTION AS NEEDED
Status: DISCONTINUED | OUTPATIENT
Start: 2021-06-10 | End: 2021-06-13 | Stop reason: HOSPADM

## 2021-06-10 RX ORDER — ONDANSETRON 2 MG/ML
4 INJECTION INTRAMUSCULAR; INTRAVENOUS EVERY 6 HOURS PRN
Status: DISCONTINUED | OUTPATIENT
Start: 2021-06-10 | End: 2021-06-13 | Stop reason: HOSPADM

## 2021-06-10 RX ORDER — CEFTRIAXONE SODIUM 1 G/50ML
1 INJECTION, SOLUTION INTRAVENOUS ONCE
Status: COMPLETED | OUTPATIENT
Start: 2021-06-10 | End: 2021-06-10

## 2021-06-10 RX ORDER — ONDANSETRON 4 MG/1
4 TABLET, FILM COATED ORAL EVERY 6 HOURS PRN
Status: DISCONTINUED | OUTPATIENT
Start: 2021-06-10 | End: 2021-06-13 | Stop reason: HOSPADM

## 2021-06-10 RX ORDER — NITROGLYCERIN 0.4 MG/1
0.4 TABLET SUBLINGUAL
Status: DISCONTINUED | OUTPATIENT
Start: 2021-06-10 | End: 2021-06-13 | Stop reason: HOSPADM

## 2021-06-10 RX ORDER — SODIUM CHLORIDE 9 MG/ML
75 INJECTION, SOLUTION INTRAVENOUS CONTINUOUS
Status: DISCONTINUED | OUTPATIENT
Start: 2021-06-10 | End: 2021-06-13 | Stop reason: HOSPADM

## 2021-06-10 RX ORDER — CEFTRIAXONE SODIUM 1 G/50ML
1 INJECTION, SOLUTION INTRAVENOUS EVERY 24 HOURS
Status: DISCONTINUED | OUTPATIENT
Start: 2021-06-11 | End: 2021-06-11

## 2021-06-10 RX ORDER — UREA 10 %
3 LOTION (ML) TOPICAL NIGHTLY PRN
Status: DISCONTINUED | OUTPATIENT
Start: 2021-06-10 | End: 2021-06-13 | Stop reason: HOSPADM

## 2021-06-10 RX ADMIN — SODIUM CHLORIDE 75 ML/HR: 9 INJECTION, SOLUTION INTRAVENOUS at 21:43

## 2021-06-10 RX ADMIN — SODIUM CHLORIDE 750 MG: 900 INJECTION, SOLUTION INTRAVENOUS at 22:52

## 2021-06-10 RX ADMIN — CEFTRIAXONE SODIUM 1 G: 1 INJECTION, SOLUTION INTRAVENOUS at 16:47

## 2021-06-10 RX ADMIN — ENOXAPARIN SODIUM 30 MG: 30 INJECTION SUBCUTANEOUS at 21:42

## 2021-06-10 RX ADMIN — SODIUM CHLORIDE, POTASSIUM CHLORIDE, SODIUM LACTATE AND CALCIUM CHLORIDE 1000 ML: 600; 310; 30; 20 INJECTION, SOLUTION INTRAVENOUS at 10:20

## 2021-06-10 RX ADMIN — ONDANSETRON 4 MG: 2 INJECTION INTRAMUSCULAR; INTRAVENOUS at 10:27

## 2021-06-10 NOTE — PROGRESS NOTES
Pharmacy to Dose Enoxaparin    Patient: Alex Brown ([id number], [unfilled])  Relevant clinical data and objective history reviewed:  31 y.o. male      There is no height or weight on file to calculate BMI.  he has a past medical history of Anemia (2011), Anoxic encephalopathy (CMS/[facility]), CP (cerebral palsy) (CMS/[facility]) (02/18/2018), Epilepsy (CMS/[facility]), GERD (gastroesophageal reflux disease), H/O ulcerative colitis (12/01/2011), History of aspiration pneumonia, History of MRSA infection (2012), Iron deficiency anemia, PONV (postoperative nausea and vomiting), Rectal bleeding (Ulcerative Colitis), Recurrent sinus infections, Seizures (CMS/[facility]), Seizures (CMS/[facility]) (2009), and Small bowel obstruction (CMS/[facility]) (05/26/2021).    There were no vitals filed for this visit.    Indication: VTE ppx    Other Anticoagulation: none    Estimated Creatinine Clearance: 115 mL/min (A) (by C-G formula based on SCr of 0.58 mg/dL (L)).  There is no height or weight on file to calculate BMI.    Creatinine   Date Value Ref Range Status   06/10/2021 0.58 (L) 0.76 - 1.27 mg/dL Final     Platelets   Date Value Ref Range Status   06/10/2021 520 (H) 140 - 450 10*3/mm3 Final     Lab Results   Component Value Date    INR 1.21 (H) 03/26/2020     No results found for: DDIMERQUANT    PLAN:  Will start Lovenox 30 mg SubQ every 24 hr.   (low BW, empirically decreasing starting dose)    Kaiser Santana, PharmD  06/10/21 18:13 EDT

## 2021-06-10 NOTE — ED PROVIDER NOTES
EMERGENCY DEPARTMENT ENCOUNTER  Patient was placed in face mask in first look and the following protective measures were taken unless additional measures were taken and documented below in the ED course. Patient was wearing facemask when I entered the room and throughout our encounter. I wore full protective equipment throughout this patient encounter including a face mask, and gloves. Hand hygiene was performed before donning protective equipment and after removal when leaving the room.    Room Number:  35/35  Date of encounter:  6/10/2021  PCP: Kira Almaraz MD   History is limited secondary to patient cerebral palsy.  History is obtained through patient's mother.    HPI:  Context: Alex Brown is a 31 y.o. male who presents to the ED c/o chief complaint of nausea, vomiting and anorexia for 2 days.  Patient recently had a ex lap with lysis of adhesions for small bowel obstruction.  He was doing well but his mom noted patient developing nausea and vomiting on Tuesday.  He has had decreased appetite since.  He had decreased output from his ostomy 2 days ago but the output has improved since.  He vomited once yesterday and none today.  However, patient is refusing to eat or drink.  Patient has a history of GERD and recurrent bowel obstructions along with episodic ileus.  Patient is here today to make sure he does not have recurrent bowel obstruction.  His mother thinks that he is not in pain right now but is concerned that he is dehydrated.    MEDICAL HISTORY REVIEW  Reviewed in [facility].  Patient was seen here on May 26, 2021 for a small bowel obstruction.  He had a exploratory laparotomy with lysis of adhesions on May 28, 2021 by Dr. Ballard.    PAST MEDICAL HISTORY  Active Ambulatory Problems     Diagnosis Date Noted   • Perirectal cellulitis 04/08/2016   • Attack, epileptiform (CMS/HCC) 11/07/2016   • Generalized abdominal pain 02/18/2018   • Anoxic brain damage (CMS/[facility]) 02/19/2018   • Seizure  disorder (CMS/[facility]) 02/19/2018   • Pelvic abscess in male (CMS/[facility]) 02/19/2018   • Postprocedural intraabdominal abscess 06/27/2019   • CP (cerebral palsy) (CMS/[facility]) 03/26/2020   • DAYDAY (iron deficiency anemia) 03/26/2020   • Partial small bowel obstruction (CMS/[facility]) 03/30/2020   • SBO (small bowel obstruction) (CMS/[facility]) 05/13/2021   • H/O ulcerative colitis 12/01/2011   • Anoxic encephalopathy (CMS/[facility])    • Vasovagal syncope    • Aspiration pneumonia of left lung due to vomit (CMS/[facility]) 05/31/2021     Resolved Ambulatory Problems     Diagnosis Date Noted   • Leukocytosis 02/19/2018   • SBO (small bowel obstruction) (CMS/[facility]) 03/25/2020   • Other specified injury of superior mesenteric vein, initial encounter 03/26/2020   • Hypoxia 06/01/2021     Past Medical History:   Diagnosis Date   • Anemia 2011   • Epilepsy (CMS/[facility])    • GERD (gastroesophageal reflux disease)    • History of aspiration pneumonia    • History of MRSA infection 2012   • Iron deficiency anemia    • PONV (postoperative nausea and vomiting)    • Rectal bleeding Ulcerative Colitis   • Recurrent sinus infections    • Seizures (CMS/[facility])    • Seizures (CMS/[facility]) 2009   • Small bowel obstruction (CMS/[facility]) 05/26/2021       PAST SURGICAL HISTORY  Past Surgical History:   Procedure Laterality Date   • ABDOMINOPERINEAL PROCTOCOLECTOMY N/A 01/03/2012    Laparoscopic total proctocolectomy with end ileostomy-Dr. Vinh Ballard, [facility]   • CIRCUMCISION N/A    • COLON RESECTION SMALL BOWEL N/A 07/17/2015    Dr. Vinh Ballard, [facility]   • COLONOSCOPY N/A 12/01/2011    Severe ulcerative colitits to the mid tranverse colon-Dr. Pop Baez, [facility]   • CYSTIC HYGROMA EXCISION     • ENDOSCOPY N/A 02/27/2015    No gross lesions in esophagus, erythematous mucosa in the stomach: biopsied, no gross lesions in the duodenum: biopsied-Dr. José Manuel Mayberry, [facility]   • EXPLORATORY LAPAROTOMY N/A 7/9/2019    Procedure: LAPAROTOMY EXPLORATORY WITH DRAINAGE OF PELVIC ABSCESS;  Surgeon:  Vinh Ballard MD;  Location: [facility] MAIN OR;  Service: General   • EXPLORATORY LAPAROTOMY N/A 3/25/2020    Procedure: EXPLORATORY LAPAROTOMY,OPEN ADHESIOLYSIS,REPAIR OF PERINEUM;  Surgeon: Kali Wilcox MD;  Location: [facility] MAIN OR;  Service: General;  Laterality: N/A;   • EXPLORATORY LAPAROTOMY N/A 5/28/2021    Procedure: LAPAROTOMY EXPLORATORY with lysis of adhesions;  Surgeon: Vinh Ballard MD;  Location: [facility] MAIN OR;  Service: General;  Laterality: N/A;   • ILEOSCOPY N/A 02/27/2015    Normal ileoscopy with the exception of some erosions at the distal ileum: biopsied-Dr. José Manuel Mayberry, [facility]   • MOLE REMOVAL     • PELVIC ABCESS DRAINAGE N/A 07/11/2015    Irrigation and drainage of a pelvic abscess-Dr. Kali Wilcox, [facility]   • TESTICLE UNDESCENDED REPAIR         FAMILY HISTORY  Family History   Problem Relation Age of Onset   • Prostate cancer Father    • Malig Hyperthermia Neg Hx        SOCIAL HISTORY  Social History     Socioeconomic History   • Marital status: Single     Spouse name: Not on file   • Number of children: Not on file   • Years of education: Not on file   • Highest education level: Not on file   Tobacco Use   • Smoking status: Never Smoker   • Smokeless tobacco: Never Used   Vaping Use   • Vaping Use: Never used   Substance and Sexual Activity   • Alcohol use: No   • Drug use: No   • Sexual activity: Never       ALLERGIES  Benadryl [diphenhydramine], Ceclor [cefaclor], Dilaudid [hydromorphone hcl], Milk-related compounds, Reglan [metoclopramide], and Thorazine [chlorpromazine]    The patient's allergies have been reviewed    REVIEW OF SYSTEMS  All systems reviewed and negative except for those discussed in HPI.     PHYSICAL EXAM  I have reviewed the triage vital signs and nursing notes.  ED Triage Vitals   Temp Heart Rate Resp BP SpO2   06/10/21 0826 06/10/21 0826 06/10/21 0848 06/10/21 0848 06/10/21 0848   97.5 °F (36.4 °C) 120 27 125/90 97 %      Temp src Heart Rate Source Patient  Position BP Location FiO2 (%)   -- -- -- -- --              General: No distress.  HENT: NCAT, PERRL, Nares patent.  Eyes: no scleral icterus.  Neck: trachea midline, no ROM limitations.  CV: Tachycardic without murmur.  Respiratory: normal effort, CTAB.  Abdomen: Diminished bowel sounds, soft, scaphoid abdomen.  He has a midline incision with staples in place that is dry and intact.  He has an ostomy in place with output noted.  His abdomen is nontender to palpation and is not distended.  : deferred.  Musculoskeletal: no deformity.  Neuro: alert, moves all extremities, follows commands.  Skin: warm, dry.    LAB RESULTS  Recent Results (from the past 24 hour(s))   Lipase    Collection Time: 06/10/21  9:51 AM    Specimen: Blood   Result Value Ref Range    Lipase 23 13 - 60 U/L   CBC Auto Differential    Collection Time: 06/10/21 10:17 AM    Specimen: Blood   Result Value Ref Range    WBC 16.39 (H) 3.40 - 10.80 10*3/mm3    RBC 5.20 4.14 - 5.80 10*6/mm3    Hemoglobin 16.0 13.0 - 17.7 g/dL    Hematocrit 48.6 37.5 - 51.0 %    MCV 93.5 79.0 - 97.0 fL    MCH 30.8 26.6 - 33.0 pg    MCHC 32.9 31.5 - 35.7 g/dL    RDW 14.0 12.3 - 15.4 %    RDW-SD 48.2 37.0 - 54.0 fl    MPV 9.8 6.0 - 12.0 fL    Platelets 520 (H) 140 - 450 10*3/mm3    Neutrophil % 87.0 (H) 42.7 - 76.0 %    Lymphocyte % 8.2 (L) 19.6 - 45.3 %    Monocyte % 3.3 (L) 5.0 - 12.0 %    Eosinophil % 0.6 0.3 - 6.2 %    Basophil % 0.4 0.0 - 1.5 %    Immature Grans % 0.5 0.0 - 0.5 %    Neutrophils, Absolute 14.26 (H) 1.70 - 7.00 10*3/mm3    Lymphocytes, Absolute 1.35 0.70 - 3.10 10*3/mm3    Monocytes, Absolute 0.54 0.10 - 0.90 10*3/mm3    Eosinophils, Absolute 0.10 0.00 - 0.40 10*3/mm3    Basophils, Absolute 0.06 0.00 - 0.20 10*3/mm3    Immature Grans, Absolute 0.08 (H) 0.00 - 0.05 10*3/mm3    nRBC 0.0 0.0 - 0.2 /100 WBC   Comprehensive Metabolic Panel    Collection Time: 06/10/21  2:55 PM    Specimen: Blood   Result Value Ref Range    Glucose 83 65 - 99 mg/dL    BUN 13 6  - 20 mg/dL    Creatinine 0.58 (L) 0.76 - 1.27 mg/dL    Sodium 141 136 - 145 mmol/L    Potassium 4.3 3.5 - 5.2 mmol/L    Chloride 104 98 - 107 mmol/L    CO2 25.3 22.0 - 29.0 mmol/L    Calcium 9.7 8.6 - 10.5 mg/dL    Total Protein 7.0 6.0 - 8.5 g/dL    Albumin 3.80 3.50 - 5.20 g/dL    ALT (SGPT) 57 (H) 1 - 41 U/L    AST (SGOT) 26 1 - 40 U/L    Alkaline Phosphatase 100 39 - 117 U/L    Total Bilirubin 0.6 0.0 - 1.2 mg/dL    eGFR Non African Amer >150 >60 mL/min/1.73    Globulin 3.2 gm/dL    A/G Ratio 1.2 g/dL    BUN/Creatinine Ratio 22.4 7.0 - 25.0    Anion Gap 11.7 5.0 - 15.0 mmol/L       I ordered the above labs and reviewed the results.    RADIOLOGY  CT Abdomen Pelvis Without Contrast    Result Date: 6/10/2021  CT ABDOMEN PELVIS WO CONTRAST-  Radiation dose reduction techniques were utilized, including automated exposure control and exposure modulation based on body size.  CLINICAL: 31-year-old male, with a history of anoxic brain damage, recent exploratory laparotomy with lysis of adhesions, remote history of abdominal perineal proctocolectomy with end ileostomy. Patient currently with nausea and vomiting  COMPARISON:  CT of the abdomen and pelvis 05/26/2021  FINDINGS: There is a focal area of consolidation within the left lower lobe not seen on the previous examination consistent with pneumonia.  The liver, gallbladder, spleen, adrenal glands and kidneys have a satisfactory appearance. No pancreatic abnormality identified. No free intraperitoneal air. There are surgical skin staples demonstrated along the midline abdomen. Ileostomy left upper quadrant. The urinary bladder is typical in appearance.  Caliber of the large and small bowel is within normal limits. There are some nonspecific air-fluid levels seen. No indication of bowel obstruction. No focal intraperitoneal fluid collection to suggest abscess or seroma. Diameter of the aorta is normal. The ileostomy site is typical in appearance.  CONCLUSION: 1.  Ileostomy site is satisfactory in appearance. 2. Bowel caliber within normal limits, no obstruction seen. 3. Abdominal wall is typical in appearance status post exploratory laparotomy with surgical skin staples in place. 4. Focal area of consolidation left lower lobe, consistent with pneumonia. 5. No free intraperitoneal air, no intra-abdominal fluid collection seen.  Findings of this report called to Dr. Soares in the emergency room at the time of completion 3:22 PM.     This report was finalized on 6/10/2021 3:50 PM by Dr. Ez Barker M.D.        I ordered the above noted radiological studies. I reviewed the images and results. I agree with the radiologist interpretation.    PROCEDURES  Procedures    MEDICATIONS GIVEN IN ER  Medications   sodium chloride 0.9 % flush 10 mL (has no administration in time range)   cefTRIAXone (ROCEPHIN) IVPB 1 g (has no administration in time range)   lactated ringers bolus 1,000 mL (0 mL Intravenous Stopped 6/10/21 1330)   ondansetron (ZOFRAN) injection 4 mg (4 mg Intravenous Given 6/10/21 1027)       PROGRESS, DATA ANALYSIS, CONSULTS, AND MEDICAL DECISION MAKING  A complete history and physical exam have been performed.  All available laboratory and imaging results have been reviewed by myself prior to disposition.    MDM  After the initial H&P, I discussed pertinent information from history and physical exam with patient/family.  Discussed differential diagnosis.  Discussed plan for ED evaluation/work-up/treatment.  All questions answered.  Patient/family is agreeable with plan.  ED Course as of Henrik 10 1633   Thu Henrik 10, 2021   1427 The lab has had 3 samples of patient's chemistries.  They have said is hemolyzed.    [DE]   1524 I discussed the case with Dr. Barker, radiology.  Patient CT of the abdomen pelvis without contrast does not show bowel obstruction or abscess.  However, patient has a left basilar infiltrate.    [DE]   1524 I updated patient's mother about the results  of the CT scan.  She states that he was treated for probable aspiration pneumonia last time he was admitted to the hospital.    [DE]   1533 I reviewed patient's records.  Patient did have questionable aspiration pneumonia on the CT during his last admission.  However, patient's last WBC was normal 8 days ago.    [DE]   1535 BUN: 13 [DE]   1536 Creatinine(!): 0.58 [DE]   1536 Patient's mom states that patient is not allergic to Cefaclor.  She states that it gives him diarrhea and that is why she avoids it.    [DE]   1542 I updated family about the results of his CMP and my review of the old records.  Patient's mother states it will be difficult giving patient the oral liquid medications.  Patient's pneumonia does appear to be worse since initial presentation and has had increase in leukocytosis.  I will contact medicine for admission.    [DE]   1550 I discussed the case with Dr. Emery with [facility].  She is admit patient to the hospitalist telemetry bed, observation patient.    [DE]      ED Course User Index  [DE] Salvatore Soares MD       AS OF 16:33 EDT VITALS:    BP - 125/90  HR - 116  TEMP - 97.5 °F (36.4 °C)  O2 SATS - 97%    DIAGNOSIS  Final diagnoses:   Pneumonia of left lower lobe due to infectious organism   Non-intractable vomiting with nausea, unspecified vomiting type   Cerebral palsy, unspecified type (CMS/HCC)         DISPOSITION  ADMISSION    Discussed treatment plan and reason for admission with pt/family and admitting physician.  Pt/family voiced understanding of the plan for admission for further testing/treatment as needed.           Salvatore Soares MD  06/10/21 [id number]

## 2021-06-11 LAB
ANION GAP SERPL CALCULATED.3IONS-SCNC: 10.5 MMOL/L (ref 5–15)
BUN SERPL-MCNC: 11 MG/DL (ref 6–20)
BUN/CREAT SERPL: 19.6 (ref 7–25)
CALCIUM SPEC-SCNC: 9.2 MG/DL (ref 8.6–10.5)
CHLORIDE SERPL-SCNC: 106 MMOL/L (ref 98–107)
CO2 SERPL-SCNC: 25.5 MMOL/L (ref 22–29)
CREAT SERPL-MCNC: 0.56 MG/DL (ref 0.76–1.27)
DEPRECATED RDW RBC AUTO: 49.3 FL (ref 37–54)
ERYTHROCYTE [DISTWIDTH] IN BLOOD BY AUTOMATED COUNT: 13.8 % (ref 12.3–15.4)
GFR SERPL CREATININE-BSD FRML MDRD: >150 ML/MIN/1.73
GLUCOSE SERPL-MCNC: 79 MG/DL (ref 65–99)
HCT VFR BLD AUTO: 38.7 % (ref 37.5–51)
HGB BLD-MCNC: 12.7 G/DL (ref 13–17.7)
MCH RBC QN AUTO: 31.7 PG (ref 26.6–33)
MCHC RBC AUTO-ENTMCNC: 32.8 G/DL (ref 31.5–35.7)
MCV RBC AUTO: 96.5 FL (ref 79–97)
MRSA DNA SPEC QL NAA+PROBE: ABNORMAL
PLATELET # BLD AUTO: 516 10*3/MM3 (ref 140–450)
PMV BLD AUTO: 9.3 FL (ref 6–12)
POTASSIUM SERPL-SCNC: 3.9 MMOL/L (ref 3.5–5.2)
RBC # BLD AUTO: 4.01 10*6/MM3 (ref 4.14–5.8)
SODIUM SERPL-SCNC: 142 MMOL/L (ref 136–145)
VANCOMYCIN SERPL-MCNC: 12.1 MCG/ML (ref 5–40)
WBC # BLD AUTO: 9.67 10*3/MM3 (ref 3.4–10.8)

## 2021-06-11 PROCEDURE — 85027 COMPLETE CBC AUTOMATED: CPT | Performed by: INTERNAL MEDICINE

## 2021-06-11 PROCEDURE — 80202 ASSAY OF VANCOMYCIN: CPT | Performed by: INTERNAL MEDICINE

## 2021-06-11 PROCEDURE — 80048 BASIC METABOLIC PNL TOTAL CA: CPT | Performed by: INTERNAL MEDICINE

## 2021-06-11 PROCEDURE — 25010000002 VANCOMYCIN 750 MG RECONSTITUTED SOLUTION: Performed by: INTERNAL MEDICINE

## 2021-06-11 PROCEDURE — 25010000002 ENOXAPARIN PER 10 MG: Performed by: INTERNAL MEDICINE

## 2021-06-11 RX ORDER — PANTOPRAZOLE SODIUM 40 MG/1
40 TABLET, DELAYED RELEASE ORAL EVERY MORNING
Status: DISCONTINUED | OUTPATIENT
Start: 2021-06-11 | End: 2021-06-13 | Stop reason: HOSPADM

## 2021-06-11 RX ORDER — DOXYCYCLINE 100 MG/1
100 CAPSULE ORAL EVERY 12 HOURS SCHEDULED
Status: DISCONTINUED | OUTPATIENT
Start: 2021-06-11 | End: 2021-06-13 | Stop reason: HOSPADM

## 2021-06-11 RX ADMIN — ENOXAPARIN SODIUM 30 MG: 30 INJECTION SUBCUTANEOUS at 18:25

## 2021-06-11 RX ADMIN — SODIUM CHLORIDE 75 ML/HR: 9 INJECTION, SOLUTION INTRAVENOUS at 23:23

## 2021-06-11 RX ADMIN — SODIUM CHLORIDE 750 MG: 900 INJECTION, SOLUTION INTRAVENOUS at 10:15

## 2021-06-11 RX ADMIN — PANTOPRAZOLE SODIUM 40 MG: 40 TABLET, DELAYED RELEASE ORAL at 12:14

## 2021-06-11 RX ADMIN — DOXYCYCLINE 100 MG: 100 CAPSULE ORAL at 21:19

## 2021-06-11 RX ADMIN — DOXYCYCLINE 100 MG: 100 CAPSULE ORAL at 14:33

## 2021-06-11 RX ADMIN — SODIUM CHLORIDE 75 ML/HR: 9 INJECTION, SOLUTION INTRAVENOUS at 08:50

## 2021-06-11 NOTE — PROGRESS NOTES
"[facility]  Clinical Pharmacy Department     Vancomycin Pharmacokinetic Note    Alex Brown is a 31 y.o. male who is on day 2 of pharmacy to dose vancomycin for HAP.    Target level: AUC24 400-600  Consulting Provider:  Dr Emery  Current Vancomycin Dose: TBD  Planned Duration of Therapy: 5 days  Other Antimicrobials: Ceftriaxone 1 gram Q24    Allergies  Allergies as of 06/10/2021 - Reviewed 06/10/2021   Allergen Reaction Noted   • Benadryl [diphenhydramine] Shortness Of Breath and Other (See Comments) 04/08/2016   • Ceclor [cefaclor] Rash 11/15/2013   • Dilaudid [hydromorphone hcl] Itching 04/08/2016   • Milk-related compounds Diarrhea 02/19/2018   • Reglan [metoclopramide] Other (See Comments) 04/09/2016   • Thorazine [chlorpromazine] Other (See Comments) 11/15/2013     Vitals/Labs/I&O  167.6 cm (66\")  37.6 kg (83 lb)  Body mass index is 13.4 kg/m².   [unfilled]    Results from last 7 days   Lab Units 06/11/21  0400 06/10/21  1017   WBC 10*3/mm3 9.67 16.39*     Results from last 7 days   Lab Units 06/10/21  1603   LACTATE mmol/L 1.4      Results from last 7 days   Lab Units 06/10/21  1455   PROCALCITONIN ng/mL 0.12     Estimated Creatinine Clearance: 99.8 mL/min (A) (by C-G formula based on SCr of 0.56 mg/dL (L)).  Results from last 7 days   Lab Units 06/11/21  0400 06/10/21  1455   BUN mg/dL 11 13   CREATININE mg/dL 0.56* 0.58*     Intake & Output (last 3 days)       06/08 0701 - 06/09 0700 06/09 0701 - 06/10 0700 06/10 0701 - 06/11 0700 06/11 0701 - 06/12 0700    P.O.   0     Total Intake(mL/kg)   0 (0)     Net   0             Urine Unmeasured Occurrence   1 x         Vancomycin Dosing and Level History:  6/10 Vancomycin 750 mg x 1: 2252   Random level = 12.1 mg/L (~5 hr level)      Assessment/Plan:  · Scr appears to be at baseline. Random level collected after an initial loading dose of vancomycin 750 mg x 1 is slightly subtherapeutic at 12.1 mg/L and is reflective of only a 5 hour level. Based on " "this apparent clearance I think it is most appropriate to schedule a regimen.  · I will place the patient on vancomycin 750 mg every 12 hours. This regimen is estimated to achieve an AUC of 440 mg/L*hr (Goal 400-600).   · A trough will be obtained prior to the 3rd dose of the new regimen.      Bayesian analysis of the most recent level(s) using SegmintRComparisim provides the following patient-specific pharmacokinetic parameters:   CL: 3.39 L/h   Vd: 28.6 L   T1/2: 6.38 h  If the predicted trough on this regimen is not within what was previously considered a \"target trough range\", the AUC24 (a stronger predictor of vancomycin efficacy) is predicted to achieve the accepted target of 400-600 mg*h/L. To best balance efficacy and toxicity, we will be targeting AUC24 in this patient rather than steady-state trough levels.      Thank you for involving pharmacy in this patient's care. Please contact pharmacy with any questions or concerns.                           Sary Goldman [facility]  Clinical Pharmacist  06/11/21 07:59 EDT    "

## 2021-06-11 NOTE — PROGRESS NOTES
Name: Alex Brown ADMIT: 6/10/2021   : 1990  PCP: Kira Almaraz MD    MRN: [id number] LOS: 0 days   AGE/SEX: 31 y.o. male  ROOM: [facility]     Subjective   Subjective   This is a 31-year-old male with a past medical history of anoxic brain injury at birth, cerebral palsy, seizure disorder, history of ulcerative colitis status post colectomy with ostomy as well as a history of recurrent small bowel obstructions who was recently admitted to [facility], operated by [facility] for nausea/vomiting as well as aspiration pneumonia, and underwent an ex lap with lysis of adhesions with subsequent return of bowel function.    He was brought to the hospital yesterday by his mother for nausea, vomiting, and anorexia for 2 days.    He underwent a CT abdomen pelvis that did not show any signs concern for obstruction.  However there was a focal area of consolidation of the left lower lobe which is concerning for pneumonia.  His WBC was elevated at 16.4 but his procalcitonin was indeterminate at 0.12.    He was started empirically on vancomycin and ceftriaxone.  MRSA nares was positive.    Review of Systems    Unable to obtain.  Patient is nonverbal.  Objective   Objective   Vital Signs  Temp:  [98.3 °F (36.8 °C)-98.9 °F (37.2 °C)] 98.5 °F (36.9 °C)  Heart Rate:  [] 89  Resp:  [20-22] 20  BP: (89-95)/(57-65) 95/62  SpO2:  [95 %-96 %] 96 %  on   ;   Device (Oxygen Therapy): room air  Body mass index is 13.4 kg/m².  Physical Exam  General: Chronically ill-appearing, no acute distress  HEENT: Normocephalic atraumatic  CV: Regular rate and rhythm, no murmurs  Lungs: Normal work of breathing, on room air  Abdomen: Soft, nontender.  Ostomy noted with dark green output at the left lower quadrant  Extremities: No peripheral edema appreciated  Neuro: Nonverbal, unable to assess  Results Review     I reviewed the patient's new clinical results.  Results from last 7 days   Lab Units 21  0400 06/10/21  1017   WBC 10*3/mm3  9.67 16.39*   HEMOGLOBIN g/dL 12.7* 16.0   PLATELETS 10*3/mm3 516* 520*     Results from last 7 days   Lab Units 06/11/21  0400 06/10/21  1455   SODIUM mmol/L 142 141   POTASSIUM mmol/L 3.9 4.3   CHLORIDE mmol/L 106 104   CO2 mmol/L 25.5 25.3   BUN mg/dL 11 13   CREATININE mg/dL 0.56* 0.58*   GLUCOSE mg/dL 79 83   Estimated Creatinine Clearance: 99.8 mL/min (A) (by C-G formula based on SCr of 0.56 mg/dL (L)).  Results from last 7 days   Lab Units 06/10/21  1455   ALBUMIN g/dL 3.80   BILIRUBIN mg/dL 0.6   ALK PHOS U/L 100   AST (SGOT) U/L 26   ALT (SGPT) U/L 57*     Results from last 7 days   Lab Units 06/11/21  0400 06/10/21  1455   CALCIUM mg/dL 9.2 9.7   ALBUMIN g/dL  --  3.80     Results from last 7 days   Lab Units 06/10/21  1603 06/10/21  1455   PROCALCITONIN ng/mL  --  0.12   LACTATE mmol/L 1.4  --      COVID19   Date Value Ref Range Status   06/10/2021 Not Detected Not Detected - Ref. Range Final   05/26/2021 Not Detected Not Detected - Ref. Range Final     No results found for: HGBA1C, POCGLU    CT Abdomen Pelvis Without Contrast  CT ABDOMEN PELVIS WO CONTRAST-     Radiation dose reduction techniques were utilized, including automated  exposure control and exposure modulation based on body size.     CLINICAL: 31-year-old male, with a history of anoxic brain damage,  recent exploratory laparotomy with lysis of adhesions, remote history of  abdominal perineal proctocolectomy with end ileostomy. Patient currently  with nausea and vomiting     COMPARISON:  CT of the abdomen and pelvis 05/26/2021     FINDINGS: There is a focal area of consolidation within the left lower  lobe not seen on the previous examination consistent with pneumonia.     The liver, gallbladder, spleen, adrenal glands and kidneys have a  satisfactory appearance. No pancreatic abnormality identified. No free  intraperitoneal air. There are surgical skin staples demonstrated along  the midline abdomen. Ileostomy left upper quadrant. The urinary  bladder  is typical in appearance.     Caliber of the large and small bowel is within normal limits. There are  some nonspecific air-fluid levels seen. No indication of bowel  obstruction. No focal intraperitoneal fluid collection to suggest  abscess or seroma. Diameter of the aorta is normal. The ileostomy site  is typical in appearance.     CONCLUSION:  1. Ileostomy site is satisfactory in appearance.  2. Bowel caliber within normal limits, no obstruction seen.  3. Abdominal wall is typical in appearance status post exploratory  laparotomy with surgical skin staples in place.  4. Focal area of consolidation left lower lobe, consistent with  pneumonia.  5. No free intraperitoneal air, no intra-abdominal fluid collection  seen.     Findings of this report called to Dr. Soares in the emergency room at the  time of completion 3:22 PM.              This report was finalized on 6/10/2021 3:50 PM by Dr. Ez Barker M.D.       Scheduled Medications  cefTRIAXone, 1 g, Intravenous, Q24H  enoxaparin, 30 mg, Subcutaneous, Q24H  pantoprazole, 40 mg, Oral, QAM  vancomycin, 750 mg, Intravenous, Q12H    Infusions  Pharmacy to Dose enoxaparin (LOVENOX),   Pharmacy to dose vancomycin,   sodium chloride, 75 mL/hr, Last Rate: 75 mL/hr (06/11/21 1236)    Diet  Diet Soft Texture; Chopped; Lactose Restricted       Assessment/Plan     Active Hospital Problems    Diagnosis  POA   • Pneumonia of left lower lobe due to infectious organism [J18.9]  Yes      Resolved Hospital Problems   No resolved problems to display.       31 y.o. male admitted with <principal problem not specified>.    Pneumonia  Concern for aspiration pneumonia.  Imaging with consistent location of pneumonia from previous.  WBC downtrending from 16-9.  Will transition from IV ceftriaxone/vancomycin to p.o. doxycycline 100 mg twice daily.  Assess overnight and monitor for fever/leukocytosis as well as tolerance.   MRSA nares positive.     History of ulcerative colitis  status post colectomy with ileostomy  History of cerebral palsy  History of anoxic brain injury at birth    · Lovenox 40 mg SC daily for DVT prophylaxis.  · Full code.  · Discussed with patient.  · Anticipate discharge home in 1-2 days.      Eric Pacheco MD  [facility]  06/11/21  13:23 EDT    I wore protective equipment throughout this patient encounter including a face mask, gloves and protective eyewear.  Hand hygiene was performed before donning protective equipment and after removal when leaving the room.

## 2021-06-11 NOTE — H&P
HISTORY AND PHYSICAL   [facility]        Patient Identification:  Name: Alex Brown  Age: 31 y.o.  Sex: male  :  1990  MRN: [id number]                     Primary Care Physician: Kira Almaraz MD    Chief Complaint:  31 year old gentleman who presented to the emergency room with poor po intake, nausea and vomiting for the last 2 days; he has a history of ulcerative colitis and recently had surgery for a bowel obstruction; he has had good output from his ostomy today but it was slow a couple of days ago; the patient has a history of anoxic brain injury from birth and cannot provide any history; his mother is at the bedside to answer questions    History of Present Illness:   As above    Past Medical History:  Past Medical History:   Diagnosis Date   • Anemia    • Anoxic encephalopathy (CMS/[facility])     @Birth   • CP (cerebral palsy) (CMS/[facility]) 2018   • Epilepsy (CMS/[facility])    • GERD (gastroesophageal reflux disease)    • H/O ulcerative colitis 2011    Transverse colon Severe ulcerative colitis-Dr. Pop Baez   • History of aspiration pneumonia    • History of MRSA infection     TREATED BY DR. BALLARD   • Iron deficiency anemia    • PONV (postoperative nausea and vomiting)    • Rectal bleeding Ulcerative Colitis   • Recurrent sinus infections    • Seizures (CMS/[facility])     Type of tonic clonic seizure disorder   • Seizures (CMS/[facility])    • Small bowel obstruction (CMS/[facility]) 2021     Past Surgical History:  Past Surgical History:   Procedure Laterality Date   • ABDOMINOPERINEAL PROCTOCOLECTOMY N/A 2012    Laparoscopic total proctocolectomy with end ileostomy-Dr. Vinh Ballard, [facility]   • CIRCUMCISION N/A    • COLON RESECTION SMALL BOWEL N/A 2015    Dr. Vinh Ballard, [facility]   • COLONOSCOPY N/A 2011    Severe ulcerative colitits to the mid tranverse colon-Dr. Pop Baez, [facility]   • CYSTIC HYGROMA EXCISION     • ENDOSCOPY N/A 2015     No gross lesions in esophagus, erythematous mucosa in the stomach: biopsied, no gross lesions in the duodenum: biopsied-Dr. José Manuel Mayberry [facility]   • EXPLORATORY LAPAROTOMY N/A 7/9/2019    Procedure: LAPAROTOMY EXPLORATORY WITH DRAINAGE OF PELVIC ABSCESS;  Surgeon: Vinh Ballard MD;  Location: [facility];  Service: General   • EXPLORATORY LAPAROTOMY N/A 3/25/2020    Procedure: EXPLORATORY LAPAROTOMY,OPEN ADHESIOLYSIS,REPAIR OF PERINEUM;  Surgeon: Kali Wilcox MD;  Location: [facility] OR;  Service: General;  Laterality: N/A;   • EXPLORATORY LAPAROTOMY N/A 5/28/2021    Procedure: LAPAROTOMY EXPLORATORY with lysis of adhesions;  Surgeon: Vinh Ballard MD;  Location: [facility] OR;  Service: General;  Laterality: N/A;   • ILEOSCOPY N/A 02/27/2015    Normal ileoscopy with the exception of some erosions at the distal ileum: biopsied-Dr. José Manuel Mayberry [facility]   • MOLE REMOVAL     • PELVIC ABCESS DRAINAGE N/A 07/11/2015    Irrigation and drainage of a pelvic abscess-DIEUDONNE Nieto   • TESTICLE UNDESCENDED REPAIR        Home Meds:  Medications Prior to Admission   Medication Sig Dispense Refill Last Dose   • lansoprazole (PREVACID) 15 MG capsule Take 15 mg by mouth Daily.      • multivitamin (Multiple Vitamins) tablet tablet Take 1 tablet by mouth Daily.          Allergies:  Allergies   Allergen Reactions   • Benadryl [Diphenhydramine] Shortness Of Breath and Other (See Comments)     Increased heart rate & extreme irritability   • Ceclor [Cefaclor] Rash     Tolerated ceftriaxone on 6/10/21   • Dilaudid [Hydromorphone Hcl] Itching   • Milk-Related Compounds Diarrhea   • Reglan [Metoclopramide] Other (See Comments)     Physical restless distress   • Thorazine [Chlorpromazine] Other (See Comments)     Physical restless distress     Immunizations:    There is no immunization history on file for this patient.  Social History:   Social History     Social History Narrative   • Not on file     Social History  "    Socioeconomic History   • Marital status: Single     Spouse name: Not on file   • Number of children: Not on file   • Years of education: Not on file   • Highest education level: Not on file   Tobacco Use   • Smoking status: Never Smoker   • Smokeless tobacco: Never Used   Vaping Use   • Vaping Use: Never used   Substance and Sexual Activity   • Alcohol use: No   • Drug use: No   • Sexual activity: Never       Family History:  Family History   Problem Relation Age of Onset   • Prostate cancer Father    • Malig Hyperthermia Neg Hx         Review of Systems  Not obtainable from the patient  Objective:  T Max 24 hrs: Temp (24hrs), Av.2 °F (36.8 °C), Min:97.5 °F (36.4 °C), Max:98.9 °F (37.2 °C)    Vitals Ranges:   Temp:  [97.5 °F (36.4 °C)-98.9 °F (37.2 °C)] 98.9 °F (37.2 °C)  Heart Rate:  [] 99  Resp:  [20-27] 20  BP: ()/(59-90) 93/60      Exam:  BP 93/60 (BP Location: Right arm, Patient Position: Lying)   Pulse 99   Temp 98.9 °F (37.2 °C) (Oral)   Resp 20   Ht 167.6 cm (66\")   Wt 37.8 kg (83 lb 5.3 oz)   SpO2 95%   BMI 13.45 kg/m²     General Appearance:    Alert, cooperative, no distress, appears stated age; nonverbal   Head:    Normocephalic, without obvious abnormality, atraumatic   Eyes:    PERRL, conjunctivae/corneas clear, EOM's intact, both eyes   Ears:    Normal external ear canals, both ears   Nose:   Nares normal, septum midline, mucosa normal, no drainage    or sinus tenderness   Throat:   Lips, mucosa, and tongue normal   Neck:   Supple, symmetrical, trachea midline, no adenopathy;     thyroid:  no enlargement/tenderness/nodules; no carotid    bruit or JVD   Back:     Symmetric, no curvature, ROM normal, no CVA tenderness   Lungs:     Decreased breath sounds bilaterally, respirations unlabored   Chest Wall:    No tenderness or deformity    Heart:    Regular rate and rhythm, S1 and S2 normal, no murmur, rub   or gallop   Abdomen:     Soft, ostomy in place   Extremities:   " Extremities normal, atraumatic, no cyanosis or edema   Pulses:   2+ and symmetric all extremities   Skin:   Skin color, texture, turgor normal, no rashes or lesions   Lymph nodes:   Cervical, supraclavicular, and axillary nodes normal   Neurologic:   CNII-XII intact, normal strength, sensation intact throughout      .    Data Review:  Labs in chart were reviewed.  WBC   Date Value Ref Range Status   06/10/2021 16.39 (H) 3.40 - 10.80 10*3/mm3 Final     Hemoglobin   Date Value Ref Range Status   06/10/2021 16.0 13.0 - 17.7 g/dL Final     Hematocrit   Date Value Ref Range Status   06/10/2021 48.6 37.5 - 51.0 % Final     Platelets   Date Value Ref Range Status   06/10/2021 520 (H) 140 - 450 10*3/mm3 Final     Sodium   Date Value Ref Range Status   06/10/2021 141 136 - 145 mmol/L Final     Potassium   Date Value Ref Range Status   06/10/2021 4.3 3.5 - 5.2 mmol/L Final     Chloride   Date Value Ref Range Status   06/10/2021 104 98 - 107 mmol/L Final     CO2   Date Value Ref Range Status   06/10/2021 25.3 22.0 - 29.0 mmol/L Final     BUN   Date Value Ref Range Status   06/10/2021 13 6 - 20 mg/dL Final     Creatinine   Date Value Ref Range Status   06/10/2021 0.58 (L) 0.76 - 1.27 mg/dL Final     Glucose   Date Value Ref Range Status   06/10/2021 83 65 - 99 mg/dL Final     Calcium   Date Value Ref Range Status   06/10/2021 9.7 8.6 - 10.5 mg/dL Final     AST (SGOT)   Date Value Ref Range Status   06/10/2021 26 1 - 40 U/L Final     ALT (SGPT)   Date Value Ref Range Status   06/10/2021 57 (H) 1 - 41 U/L Final     Alkaline Phosphatase   Date Value Ref Range Status   06/10/2021 100 39 - 117 U/L Final     No results found for: APTT, INR             Imaging Results (All)     Procedure Component Value Units Date/Time    CT Abdomen Pelvis Without Contrast [[id number]] Collected: 06/10/21 1530     Updated: 06/10/21 1553    Narrative:      CT ABDOMEN PELVIS WO CONTRAST-     Radiation dose reduction techniques were utilized, including  automated  exposure control and exposure modulation based on body size.     CLINICAL: 31-year-old male, with a history of anoxic brain damage,  recent exploratory laparotomy with lysis of adhesions, remote history of  abdominal perineal proctocolectomy with end ileostomy. Patient currently  with nausea and vomiting     COMPARISON:  CT of the abdomen and pelvis 05/26/2021     FINDINGS: There is a focal area of consolidation within the left lower  lobe not seen on the previous examination consistent with pneumonia.     The liver, gallbladder, spleen, adrenal glands and kidneys have a  satisfactory appearance. No pancreatic abnormality identified. No free  intraperitoneal air. There are surgical skin staples demonstrated along  the midline abdomen. Ileostomy left upper quadrant. The urinary bladder  is typical in appearance.     Caliber of the large and small bowel is within normal limits. There are  some nonspecific air-fluid levels seen. No indication of bowel  obstruction. No focal intraperitoneal fluid collection to suggest  abscess or seroma. Diameter of the aorta is normal. The ileostomy site  is typical in appearance.     CONCLUSION:  1. Ileostomy site is satisfactory in appearance.  2. Bowel caliber within normal limits, no obstruction seen.  3. Abdominal wall is typical in appearance status post exploratory  laparotomy with surgical skin staples in place.  4. Focal area of consolidation left lower lobe, consistent with  pneumonia.  5. No free intraperitoneal air, no intra-abdominal fluid collection  seen.     Findings of this report called to Dr. Soares in the emergency room at the  time of completion 3:22 PM.              This report was finalized on 6/10/2021 3:50 PM by Dr. Ez Barker M.D.           Patient Active Problem List   Diagnosis Code   • Perirectal cellulitis K61.1   • Attack, epileptiform (CMS/HCC) R56.9   • Generalized abdominal pain R10.84   • Anoxic brain damage (CMS/HCC) G93.1   • Seizure  disorder (CMS/[facility]) G40.909   • Pelvic abscess in male (CMS/[facility]) K65.1   • Postprocedural intraabdominal abscess T81.43XA   • CP (cerebral palsy) (CMS/[facility]) G80.9   • DAYDAY (iron deficiency anemia) D50.9   • Partial small bowel obstruction (CMS/[facility]) K56.600   • SBO (small bowel obstruction) (CMS/[facility]) K56.609   • H/O ulcerative colitis Z87.19   • Anoxic encephalopathy (CMS/[facility]) G93.1   • Vasovagal syncope R55   • Aspiration pneumonia of left lung due to vomit (CMS/[facility]) J69.0   • Pneumonia of left lower lobe due to infectious organism J18.9       Assessment:    Pneumonia of left lower lobe due to infectious organism  leukocytosis  Anoxic brain injury  Cerebral palsy  Thrombocytosis  Anorexia  nausea    Plan:  Gentle fluids  Continue antibiotics for now  Trend wbc  Persistent pneumonia was seen on imaging  He has aspiration pneumonia during his last admission thought to be due to vomiting  Will ask speech therapy to see him  Monitor on telemetry  D.w patient's mother and ED Provider    Daria Emery MD  6/10/2021  20:40 EDT

## 2021-06-11 NOTE — PLAN OF CARE
Goal Outcome Evaluation:      Vitals stable. Mother at bedside - helping with care. IVF continued. Antibiotics changed to PO. Tolerating diet. +stool from ostomy. Will continue to monitor.

## 2021-06-11 NOTE — PLAN OF CARE
Goal Outcome Evaluation:  Plan of Care Reviewed With: patient, mother, family           Outcome Summary: SLP consulted for clinical swallow eval. Patient's mother reports swallow function to be at baseline. SLP and patient's mother discussed recent vomiting, concern for aspiration of vomit as cause of PNA. Patient's mother declines need for swallow eval at this time. SLP to s/o at this time. Please re-consult as warranted.    Patient was not wearing a face mask during this therapy encounter. Therapist used appropriate personal protective equipment including mask, eye protection and gloves.  Mask used was N95/duckbill. Appropriate PPE was worn during the entire therapy session. Hand hygiene was completed before and after therapy session. Patient is not in enhanced droplet precautions.

## 2021-06-11 NOTE — PLAN OF CARE
Spoke with mother at bedside. Patient has hx of anoxic brain injury, he is nonverbal at baseline but can visually track and demonstrate facial expressions with his mood, baseline is requiring total care. Pt is able to perform bed mobility and stand pivot transfers holding onto caregiver in front while blocking knees for transfers in and out of wc. They have multiple ramp entrances into home, multiple manual wc which he relies others to propel him. He has a beasy board shower bench. They have hired help that come in 5 days a week for self care/bathing. They are involved in adult day care program. He has bed rails on his bed. Demonstrates fair sitting balance but poor standing balance. Mother reports he is at his functional baseline and no other acute PT needs or DME needs at this time. Plans to DC in personal vehicle with wc lift.     Educated on importance to maintain current function. Mother safe to perform bed<>chair transfers independently with patient while in the hospital and to continue to perform range of motion therex. He is safe to be up in wc with mother in the room. PT to sign off. Safe for DC home whenever medically released.     Goal Outcome Evaluation:

## 2021-06-11 NOTE — PROGRESS NOTES
"Adult Nutrition  Assessment/PES    Patient Name:  Alex Brown  YOB: 1990  MRN: [id number]  Admit Date:  6/10/2021    Assessment Date:  6/11/2021    Comments:  Nutrition assessment complete due to low BMI.  Pt admitted with Asp PNA and has hx of CP, UC with colectomy and ileostomy.  Spoke with mom. States he has struggled the past month with bowel obstructions and vomiting, decreased po intake. Confirms + weight loss from this. He is down about 15 lbs.  Because of this he qualifies for Severe Malnutrition - See MSA.     Discussed po and supplements, alternate foods available - doesn't tolerate any milk or dairy products or Soy milk.  Drinks rice milk at home. She is monitoring output from ostomy as well and limiting intake a bit (doing 6 small meals instead of 3 large meals).  Will follow clinical course and support him nutritionally best we can given some of his limitations.    Reason for Assessment     Row Name 06/11/21 1442          Reason for Assessment    Reason For Assessment  identified at risk by screening criteria     Diagnosis  neurologic conditions;pulmonary disease;gastrointestinal disease PNA (poss Aspiration), Hx ulcerative colitis status post colectomy with ileostomy, Anoxic brain injury at birth, Cerebral Palsy     Identified At Risk by Screening Criteria  BMI         Nutrition/Diet History     Row Name 06/11/21 1443          Nutrition/Diet History    Food Allergies  milk     Factors Affecting Nutritional Intake  impaired cognitive status/motor control;difficulty/impaired swallowing         Anthropometrics     Row Name 06/11/21 1443          Anthropometrics    Height  167.6 cm (66\")        Admit Weight    Admit Weight  37.6 kg (82 lb 14.3 oz)        Ideal Body Weight (IBW)    Ideal Body Weight (IBW) (kg)  65.3     % of Ideal Body Weight Assessment  -- 58% IBW        Usual Body Weight (UBW)    Weight Loss  unintentional     Weight Loss Time Frame  15 lbs weight loss past month     " "   Body Mass Index (BMI)    BMI Assessment  BMI less than 16: protein-energy malnutrition grade III BMI 13.4         Labs/Tests/Procedures/Meds     Row Name 06/11/21 1444          Labs/Procedures/Meds    Lab Results Reviewed  reviewed, pertinent     Lab Results Comments  Cr, plts        Diagnostic Tests/Procedures    Diagnostic Test/Procedure Reviewed  reviewed, pertinent     Diagnostic Test/Procedures Comments  CXR, SLP eval - Patient's mother declines need for swallow eval at this time        Medications    Pertinent Medications Reviewed  reviewed, pertinent     Pertinent Medications Comments  lovenox, protonix, IVF         Physical Findings     Row Name 06/11/21 1445          Physical Findings    Overall Physical Appearance  hypertonia;underweight;loss of subcutaneous fat;loss of muscle mass;congenital anomalies     Gastrointestinal  ileostomy     Skin  other (see comments) coccyx and heels red         Estimated/Assessed Needs     Row Name 06/11/21 1446 06/11/21 1443       Calculation Measurements    Weight Used For Calculations  37.6 kg (82 lb 14.3 oz)  --    Height  --  167.6 cm (66\")       Estimated/Assessed Needs    Additional Documentation  KCAL/KG (Group);Protein Requirements (Group);Fluid Requirements (Group)  --       KCAL/KG    KCAL/KG  35 Kcal/Kg (kcal);40 Kcal/Kg (kcal)  --    35 Kcal/Kg (kcal)  1316  --    40 Kcal/Kg (kcal)  1504  --       Protein Requirements    Weight Used For Protein Calculations  37.6 kg (82 lb 14.3 oz)  --    Est Protein Requirement Amount (gms/kg)  2.0 gm protein  --    Estimated Protein Requirements (gms/day)  75.2  --       Fluid Requirements    Fluid Requirements (mL/day)  1500  --    RDA Method (mL)  1500  --        Nutrition Prescription Ordered     Row Name 06/11/21 1447          Nutrition Prescription PO    Current PO Diet  Soft Texture     Texture  Chopped     Fluid Consistency  Thin     Common Modifiers  Lactose Restricted         Evaluation of Received Nutrient/Fluid " Intake     Row Name 06/11/21 1447          PO Evaluation    Number of Meals  2     % PO Intake  50-75%           Malnutrition Severity Assessment     Row Name 06/11/21 1502          Malnutrition Severity Assessment    Malnutrition Type  Acute Disease or Injury - Related Malnutrition        Insufficient Energy Intake     Insufficient Energy Intake Findings  Severe     Insufficient Energy Intake   <50% of est. energy requirement for >or equal to 1 month        Unintentional Weight Loss     Unintentional Weight Loss   Weight loss greater than 5% in one month        Muscle Loss    Loss of Muscle Mass Findings  Severe     [place] Region  Severe - deep hollowing/scooping, lack of muscle to touch, facial bones well defined     Clavicle Bone Region  Severe - protruding prominent bone        Fat Loss    Subcutaneous Fat Loss Findings  Severe     Orbital Region   Moderate -  somewhat hollowness, slightly dark circles        Criteria Met (Must meet criteria for severity in at least 2 of these categories: M Wasting, Fat Loss, Fluid, Secondary Signs, Wt. Status, Intake)    Patient meets criteria for   Severe Malnutrition           Problem/Interventions:  Problem 1     Row Name 06/11/21 1504          Nutrition Diagnoses Problem 1    Problem 1  Malnutrition     Signs/Symptoms (evidenced by)  Unintended Weight Change;Report of Mnimal PO Intake;Other (comment) See MSA     Unintended Weight Change  Loss     Number of Pounds Lost  15lbs     Weight loss time period  1 month         Intervention Goal     Row Name 06/11/21 1504          Intervention Goal    General  Maintain nutrition;Reduce/improve symptoms;Improved nutrition related lab(s);Disease management/therapy     PO  Tolerate PO;Increase intake;PO intake (%)     PO Intake %  80 %     Weight  Appropriate weight gain         Nutrition Intervention     Row Name 06/11/21 1502          Nutrition Intervention    RD/Tech Action  Care plan reviewd;Follow Tx progress;Advise available  snack;Advise alternate selection;Interview for preference;Encourage intake;Supplement offered/refused           Education/Evaluation     Row Name 06/11/21 1879          Education    Education  Will Instruct as appropriate        Monitor/Evaluation    Monitor  Skin status;Symptoms;Per protocol;I&O;PO intake;Pertinent labs;Weight           Electronically signed by:  Bridgett Mosley RD  06/11/21 15:05 EDT

## 2021-06-11 NOTE — PLAN OF CARE
Problem: Malnutrition  Goal: Improved Nutritional Intake  Outcome: Ongoing, Progressing  Intervention: Promote and Optimize Oral Intake  Flowsheets (Taken 6/11/2021 1511)  Oral Nutrition Promotion:   calorie-dense foods provided   calorie-dense liquids provided   nutritional therapy counseling provided  GRACE complete, RD following   Goal Outcome Evaluation:

## 2021-06-11 NOTE — PROGRESS NOTES
"[facility]  Clinical Pharmacy Department     Vancomycin Pharmacokinetic Note    Alex Brown is a 31 y.o. male who is on day 1 of pharmacy to dose vancomycin for HAP.    Target level: AUC24 400-600  Consulting Provider:  Dr Emery  Current Vancomycin Dose:   n/a  Planned Duration of Therapy: 5 days  Other Antimicrobials: ceftriaxone     Allergies  Allergies as of 06/10/2021 - Reviewed 06/10/2021   Allergen Reaction Noted    Benadryl [diphenhydramine] Shortness Of Breath and Other (See Comments) 04/08/2016    Ceclor [cefaclor] Rash 11/15/2013    Dilaudid [hydromorphone hcl] Itching 04/08/2016    Milk-related compounds Diarrhea 02/19/2018    Reglan [metoclopramide] Other (See Comments) 04/09/2016    Thorazine [chlorpromazine] Other (See Comments) 11/15/2013       Microbiology:  Recent blood cultures from previous admit 5/26 and 5/30 were all negative    6/10 Blood cx x2  6/10 C19  -pending            Vitals/Labs/I&O  167.6 cm (66\")  37.8 kg (83 lb 5.3 oz)  Body mass index is 13.45 kg/m².   [unfilled]    Results from last 7 days   Lab Units 06/10/21  1017   WBC 10*3/mm3 16.39*     Results from last 7 days   Lab Units 06/10/21  1603   LACTATE mmol/L 1.4                       Estimated Creatinine Clearance: 96.9 mL/min (A) (by C-G formula based on SCr of 0.58 mg/dL (L)).  Results from last 7 days   Lab Units 06/10/21  1455   BUN mg/dL 13   CREATININE mg/dL 0.58*     Intake & Output (last 3 days)       None            Vancomycin Dosing and Level History:                  Assessment/Plan:    Pulse Dose Initiation:     \"Due to unpredictable renal clearance, will pulse dose patient with vancomycin.\"     CP patient so will dose by levels to ensure optimal dosing       Recommendations/Plan:   -Vancomycin  750 mg IV once   -Obtain vancomycin level in AM   -Continue to monitor SCr, follow up results of MRSA screen    Thank you for involving pharmacy in this patient's care. Please contact pharmacy with any questions or " concerns.                           [facility]  Clinical Pharmacist  06/10/21 19:52 EDT

## 2021-06-11 NOTE — PLAN OF CARE
Goal Outcome Evaluation:  Plan of Care Reviewed With: patient, mother           Outcome Summary: VSS. No complaints of brain, no signs of distress.  Pt is a nonverbal male rt brain injury with a hx of colitis.  The patient's mother reports that her son has not been eating or drinking for a few days.  It was discovered that he developed pnuemonia and was admitted to . Pt has on ostomy on the right side and midline staples from a recent surgery. IV fluids and antibiotics have been started. Waiting on further instructions. REA.

## 2021-06-11 NOTE — CASE MANAGEMENT/SOCIAL WORK
Discharge Planning Assessment  [facility]     Patient Name: Alex Brown  MRN: [id number]  Today's Date: 6/11/2021    Admit Date: 6/10/2021    Discharge Needs Assessment     Row Name 06/11/21 1610       Living Environment    Lives With  parent(s)    Current Living Arrangements  home/apartment/condo    Primary Care Provided by  parent(s)    Provides Primary Care For  no one, unable/limited ability to care for self    Family Caregiver if Needed  parent(s)    Quality of Family Relationships  involved;supportive    Able to Return to Prior Arrangements  yes       Resource/Environmental Concerns    Resource/Environmental Concerns  none       Transition Planning    Patient/Family Anticipates Transition to  home with family    Patient/Family Anticipated Services at Transition  none    Transportation Anticipated  family or friend will provide       Discharge Needs Assessment    Readmission Within the Last 30 Days  previous discharge plan unsuccessful    Current Outpatient/Agency/Support Group  adult ;homecare agency    Equipment Currently Used at Home  wheelchair;shower chair;ramp;colostomy/ostomy supplies;hospital bed;other (see comments)  chair    Concerns to be Addressed  care coordination/care conferences;discharge planning;denies needs/concerns at this time    Anticipated Changes Related to Illness  none    Equipment Needed After Discharge  none    Outpatient/Agency/Support Group Needs  adult ;homecare agency Receives through Medicaid [place]    Provided Post Acute Provider List?  Refused    Refused Provider List Comment  Parents plan to return home. Denies any needs.    Provided Post Acute Provider Quality & Resource List?  N/A    Current Discharge Risk  dependent with mobility/activities of daily living;chronically ill;cognitively impaired        Discharge Plan     Row Name 06/11/21 8087       Plan    Plan  Home with parents/caregivers with services setup through the [facility]. Parents deny any  needs at D/C and they will transport home.    Patient/Family in Agreement with Plan  yes    Plan Comments  Met with pt., mom, and dad at bedside. Explained roll of . Face sheet and pharmacy verified. Pt lives with his parents. 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 His parents are his caregivers. There is a ramp to enter home.  DME equipment includes a hospital bed, WC, shower chair, ramp, ostomy supplies, and  chair.  Pt’s PCP is Dr. Cally Almaraz. Pt enrolled with [facility].  Patient has a Medicaid Waiver and receives services through the state. Pt receives services through his [facility] Waiver and receives a total of 40 hours of in home care per week. Pt’s mother states they come in daily and deliver personal care to pt. States they also provide Respite Care. Pt goes to adult  through [facility]. Pt has a  through [facility] case management services. Pt At discharge, family will transport. Explained that CCP would follow to assess for discharge needs.  Nehemiah Barnes RN-BC        Continued Care and Services - Admitted Since 6/10/2021    Coordination has not been started for this encounter.         Demographic Summary     Row Name 06/11/21 1609       General Information    Admission Type  inpatient    Arrived From  emergency department    Reason for Consult  discharge planning;decision-making;care coordination/care conference    Preferred Language  English     Used During This Interaction  no       Contact Information    Permission Granted to Share Info With  guardian;family/designee Rosy Brown, mother, [phone]        Functional Status     Row Name 06/11/21 1609       Functional Status    Usual Activity Tolerance  fair    Current Activity Tolerance  fair       Functional Status, IADL    Medications  completely dependent    Meal Preparation  completely dependent    Housekeeping  completely dependent     Laundry  completely dependent    Shopping  completely dependent       Mental Status    General Appearance WDL  WDL       Mental Status Summary    Recent Changes in Mental Status/Cognitive Functioning  no changes       Employment/    Employment Status  disabled                Nehemiah Barnes RN

## 2021-06-11 NOTE — CASE MANAGEMENT/SOCIAL WORK
Continued Stay Note  [facility]     Patient Name: Alex Brown  MRN: [id number]  Today's Date: 6/11/2021    Admit Date: 6/10/2021    Discharge Plan     Row Name 06/11/21 1635       Plan    Plan  Home with parents/caregivers with services setup through the [facility]. Parents deny any needs at D/C and they will transport home.    Patient/Family in Agreement with Plan  yes    Plan Comments  Met with pt., mom, and dad at bedside. Explained roll of . Face sheet and pharmacy verified. Pt lives with his parents. 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 His parents are his caregivers. There is a ramp to enter home.  DME equipment includes a hospital bed, WC, shower chair, ramp, ostomy supplies, and  chair.  Pt’s PCP is Dr. Cally Almaraz. Pt enrolled with [facility].  Patient has a Medicaid Waiver and receives services through the [facility]. Pt receives services through his [facility] Waiver and receives a total of 40 hours of in home care per week. Pt’s mother states they come in daily and deliver personal care to pt. States they also provide Respite Care. Pt goes to adult  through [facility]. Pt has a  through [facility] case management services. Pt At discharge, family will transport. Explained that CCP would follow to assess for discharge needs.  Nehemiah Barnes RN-BC        Discharge Codes    No documentation.             Nehemiah Barnes RN

## 2021-06-12 PROBLEM — E43 SEVERE MALNUTRITION (HCC): Status: ACTIVE | Noted: 2021-06-12

## 2021-06-12 LAB
DEPRECATED RDW RBC AUTO: 49.7 FL (ref 37–54)
ERYTHROCYTE [DISTWIDTH] IN BLOOD BY AUTOMATED COUNT: 13.9 % (ref 12.3–15.4)
HCT VFR BLD AUTO: 33.8 % (ref 37.5–51)
HGB BLD-MCNC: 10.8 G/DL (ref 13–17.7)
MCH RBC QN AUTO: 30.9 PG (ref 26.6–33)
MCHC RBC AUTO-ENTMCNC: 32 G/DL (ref 31.5–35.7)
MCV RBC AUTO: 96.6 FL (ref 79–97)
PLATELET # BLD AUTO: 415 10*3/MM3 (ref 140–450)
PMV BLD AUTO: 9.3 FL (ref 6–12)
RBC # BLD AUTO: 3.5 10*6/MM3 (ref 4.14–5.8)
WBC # BLD AUTO: 6 10*3/MM3 (ref 3.4–10.8)

## 2021-06-12 PROCEDURE — 85027 COMPLETE CBC AUTOMATED: CPT | Performed by: STUDENT IN AN ORGANIZED HEALTH CARE EDUCATION/TRAINING PROGRAM

## 2021-06-12 PROCEDURE — 25010000002 ENOXAPARIN PER 10 MG: Performed by: INTERNAL MEDICINE

## 2021-06-12 RX ADMIN — ENOXAPARIN SODIUM 30 MG: 30 INJECTION SUBCUTANEOUS at 20:30

## 2021-06-12 RX ADMIN — DOXYCYCLINE 100 MG: 100 CAPSULE ORAL at 20:29

## 2021-06-12 RX ADMIN — DOXYCYCLINE 100 MG: 100 CAPSULE ORAL at 10:04

## 2021-06-12 RX ADMIN — PANTOPRAZOLE SODIUM 40 MG: 40 TABLET, DELAYED RELEASE ORAL at 06:56

## 2021-06-12 NOTE — PLAN OF CARE
Goal Outcome Evaluation:  Plan of Care Reviewed With: patient, mother           Outcome Summary: VSS. No complaints of brain, no signs of distress.  Pt is a nonverbal male rt brain injury with a hx of colitis.  The patient's mother reports that her son has not been eating or drinking for a few days.  It was discovered that he developed pnuemonia and was admitted to . Pt has on ostomy on the right side and midline staples from a recent surgery. IV fluids and antibiotics have been started. Waiting on further instructions. WCM.    VSS. No complaints of pain. No signs of distress. PO antibiotics. Uneventful evening.

## 2021-06-12 NOTE — PROGRESS NOTES
Name: Alex Brown ADMIT: 6/10/2021   : 1990  PCP: Kira Almaraz MD    MRN: [id number] LOS: 1 days   AGE/SEX: 31 y.o. 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    He was brought to the hospital yesterday by his mother for nausea, vomiting, and anorexia for 2 days.    He underwent a CT abdomen pelvis that did not show any signs concern for obstruction.  However there was a focal area of consolidation of the left lower lobe which is concerning for pneumonia.  His WBC was elevated at 16.4 but his procalcitonin was indeterminate at 0.12.    He was started empirically on vancomycin and ceftriaxone.  MRSA nares was positive.  Transitioned to doxycycline with continued improvement.     Review of Systems    Unable to obtain.  Patient is nonverbal.  Objective   Objective   Vital Signs  Temp:  [97.4 °F (36.3 °C)-98.7 °F (37.1 °C)] 97.4 °F (36.3 °C)  Heart Rate:  [70-87] 87  Resp:  [18] 18  BP: (91-96)/(63-76) 93/63  SpO2:  [95 %-98 %] 97 %  on   ;   Device (Oxygen Therapy): room air  Body mass index is 14.57 kg/m².  Physical Exam  General: Chronically ill-appearing, no acute distress. Sitting upright in bed today.   HEENT: Normocephalic atraumatic  CV: Regular rate and rhythm, no murmurs  Lungs: Normal work of breathing, on room air  Abdomen: Soft, nontender.  Ostomy noted with dark green output at the left lower quadrant  Extremities: No peripheral edema appreciated  Neuro: Nonverbal, unable to assess  Results Review     I reviewed the patient's new clinical  results.  Results from last 7 days   Lab Units 06/12/21  1151 06/11/21  0400 06/10/21  1017   WBC 10*3/mm3 6.00 9.67 16.39*   HEMOGLOBIN g/dL 10.8* 12.7* 16.0   PLATELETS 10*3/mm3 415 516* 520*     Results from last 7 days   Lab Units 06/11/21  0400 06/10/21  1455   SODIUM mmol/L 142 141   POTASSIUM mmol/L 3.9 4.3   CHLORIDE mmol/L 106 104   CO2 mmol/L 25.5 25.3   BUN mg/dL 11 13   CREATININE mg/dL 0.56* 0.58*   GLUCOSE mg/dL 79 83   Estimated Creatinine Clearance: 108.8 mL/min (A) (by C-G formula based on SCr of 0.56 mg/dL (L)).  Results from last 7 days   Lab Units 06/10/21  1455   ALBUMIN g/dL 3.80   BILIRUBIN mg/dL 0.6   ALK PHOS U/L 100   AST (SGOT) U/L 26   ALT (SGPT) U/L 57*     Results from last 7 days   Lab Units 06/11/21  0400 06/10/21  1455   CALCIUM mg/dL 9.2 9.7   ALBUMIN g/dL  --  3.80     Results from last 7 days   Lab Units 06/10/21  1603 06/10/21  1455   PROCALCITONIN ng/mL  --  0.12   LACTATE mmol/L 1.4  --      COVID19   Date Value Ref Range Status   06/10/2021 Not Detected Not Detected - Ref. Range Final   05/26/2021 Not Detected Not Detected - Ref. Range Final     No results found for: HGBA1C, POCGLU    CT Abdomen Pelvis Without Contrast  CT ABDOMEN PELVIS WO CONTRAST-     Radiation dose reduction techniques were utilized, including automated  exposure control and exposure modulation based on body size.     CLINICAL: 31-year-old male, with a history of anoxic brain damage,  recent exploratory laparotomy with lysis of adhesions, remote history of  abdominal perineal proctocolectomy with end ileostomy. Patient currently  with nausea and vomiting     COMPARISON:  CT of the abdomen and pelvis 05/26/2021     FINDINGS: There is a focal area of consolidation within the left lower  lobe not seen on the previous examination consistent with pneumonia.     The liver, gallbladder, spleen, adrenal glands and kidneys have a  satisfactory appearance. No pancreatic abnormality identified. No  free  intraperitoneal air. There are surgical skin staples demonstrated along  the midline abdomen. Ileostomy left upper quadrant. The urinary bladder  is typical in appearance.     Caliber of the large and small bowel is within normal limits. There are  some nonspecific air-fluid levels seen. No indication of bowel  obstruction. No focal intraperitoneal fluid collection to suggest  abscess or seroma. Diameter of the aorta is normal. The ileostomy site  is typical in appearance.     CONCLUSION:  1. Ileostomy site is satisfactory in appearance.  2. Bowel caliber within normal limits, no obstruction seen.  3. Abdominal wall is typical in appearance status post exploratory  laparotomy with surgical skin staples in place.  4. Focal area of consolidation left lower lobe, consistent with  pneumonia.  5. No free intraperitoneal air, no intra-abdominal fluid collection  seen.     Findings of this report called to Dr. Soares in the emergency room at the  time of completion 3:22 PM.              This report was finalized on 6/10/2021 3:50 PM by Dr. Ez Barker M.D.       Scheduled Medications  doxycycline, 100 mg, Oral, Q12H  enoxaparin, 30 mg, Subcutaneous, Q24H  pantoprazole, 40 mg, Oral, QAM    Infusions  Pharmacy to Dose enoxaparin (LOVENOX),   sodium chloride, 75 mL/hr, Last Rate: 75 mL/hr (06/11/21 1493)    Diet  Diet Soft Texture; Chopped; Lactose Restricted       Assessment/Plan     Active Hospital Problems    Diagnosis  POA   • Severe malnutrition (CMS/HCC) [E43]  Yes   • Pneumonia of left lower lobe due to infectious organism [J18.9]  Yes   • Aspiration pneumonia of left lung due to vomit (CMS/HCC) [J69.0]  Yes   • CP (cerebral palsy) (CMS/HCC) [G80.9]  Yes   • Anoxic brain damage (CMS/HCC) [G93.1]  Yes   • H/O ulcerative colitis [Z87.19]  Not Applicable      Resolved Hospital Problems   No resolved problems to display.       31 y.o. male admitted with <principal problem not specified>.    Pneumonia  Concern for  aspiration pneumonia.  Imaging with consistent location of pneumonia from previous.  WBC downtrending from 16-9-> 6  Assess overnight and monitor for fever/leukocytosis as well as tolerance.   MRSA nares positive.   Continue doxycycline      History of ulcerative colitis status post colectomy with ileostomy  History of cerebral palsy  History of anoxic brain injury at birth    · Lovenox 40 mg SC daily for DVT prophylaxis.  · Full code.  · Discussed with patient.  · Anticipate discharge home in 1-2 days.      Eric Pacheco MD  [facility]  06/12/21  13:33 EDT    I wore protective equipment throughout this patient encounter including a face mask, gloves and protective eyewear.  Hand hygiene was performed before donning protective equipment and after removal when leaving the room.

## 2021-06-13 VITALS
BODY MASS INDEX: 14.51 KG/M2 | WEIGHT: 90.3 LBS | HEART RATE: 87 BPM | RESPIRATION RATE: 16 BRPM | OXYGEN SATURATION: 95 % | SYSTOLIC BLOOD PRESSURE: 90 MMHG | HEIGHT: 66 IN | TEMPERATURE: 98.2 F | DIASTOLIC BLOOD PRESSURE: 62 MMHG

## 2021-06-13 LAB
ANION GAP SERPL CALCULATED.3IONS-SCNC: 7.3 MMOL/L (ref 5–15)
BUN SERPL-MCNC: 5 MG/DL (ref 6–20)
BUN/CREAT SERPL: 13.9 (ref 7–25)
CALCIUM SPEC-SCNC: 8.9 MG/DL (ref 8.6–10.5)
CHLORIDE SERPL-SCNC: 104 MMOL/L (ref 98–107)
CO2 SERPL-SCNC: 26.7 MMOL/L (ref 22–29)
CREAT SERPL-MCNC: 0.36 MG/DL (ref 0.76–1.27)
DEPRECATED RDW RBC AUTO: 45.8 FL (ref 37–54)
ERYTHROCYTE [DISTWIDTH] IN BLOOD BY AUTOMATED COUNT: 13.5 % (ref 12.3–15.4)
GFR SERPL CREATININE-BSD FRML MDRD: >150 ML/MIN/1.73
GLUCOSE SERPL-MCNC: 80 MG/DL (ref 65–99)
HCT VFR BLD AUTO: 35.3 % (ref 37.5–51)
HGB BLD-MCNC: 11.6 G/DL (ref 13–17.7)
MCH RBC QN AUTO: 31 PG (ref 26.6–33)
MCHC RBC AUTO-ENTMCNC: 32.9 G/DL (ref 31.5–35.7)
MCV RBC AUTO: 94.4 FL (ref 79–97)
PLATELET # BLD AUTO: 452 10*3/MM3 (ref 140–450)
PMV BLD AUTO: 9.5 FL (ref 6–12)
POTASSIUM SERPL-SCNC: 3.5 MMOL/L (ref 3.5–5.2)
RBC # BLD AUTO: 3.74 10*6/MM3 (ref 4.14–5.8)
SODIUM SERPL-SCNC: 138 MMOL/L (ref 136–145)
WBC # BLD AUTO: 4.69 10*3/MM3 (ref 3.4–10.8)

## 2021-06-13 PROCEDURE — 85027 COMPLETE CBC AUTOMATED: CPT | Performed by: STUDENT IN AN ORGANIZED HEALTH CARE EDUCATION/TRAINING PROGRAM

## 2021-06-13 PROCEDURE — 80048 BASIC METABOLIC PNL TOTAL CA: CPT | Performed by: STUDENT IN AN ORGANIZED HEALTH CARE EDUCATION/TRAINING PROGRAM

## 2021-06-13 RX ORDER — DOXYCYCLINE HYCLATE 100 MG
100 TABLET ORAL 2 TIMES DAILY
Qty: 12 TABLET | Refills: 0 | Status: SHIPPED | OUTPATIENT
Start: 2021-06-13 | End: 2021-06-19

## 2021-06-13 RX ADMIN — PANTOPRAZOLE SODIUM 40 MG: 40 TABLET, DELAYED RELEASE ORAL at 06:33

## 2021-06-13 RX ADMIN — DOXYCYCLINE 100 MG: 100 CAPSULE ORAL at 10:09

## 2021-06-13 NOTE — DISCHARGE SUMMARY
Patient Name: Alex Brown  : 1990  MRN: [id number]    Date of Admission: 6/10/2021  Date of Discharge:  2021  Primary Care Physician: Kira Almaraz MD      Chief Complaint:   Nausea      Discharge Diagnoses     Active Hospital Problems    Diagnosis  POA   • Severe malnutrition (CMS/HCC) [E43]  Yes   • Pneumonia of left lower lobe due to infectious organism [J18.9]  Yes   • Aspiration pneumonia of left lung due to vomit (CMS/HCC) [J69.0]  Yes   • CP (cerebral palsy) (CMS/HCC) [G80.9]  Yes   • Anoxic brain damage (CMS/HCC) [G93.1]  Yes   • H/O ulcerative colitis [Z87.19]  Not Applicable      Resolved Hospital Problems   No resolved problems to display.        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     He was brought to the hospital by his mother for nausea, vomiting, and anorexia.     He underwent a CT abdomen pelvis that did not show any signs concern for obstruction.  However there was a focal area of consolidation of the left lower lobe which is concerning for pneumonia.  His WBC was elevated at 16.4 but his procalcitonin was indeterminate at 0.12.     He was started empirically on vancomycin and ceftriaxone.  MRSA nares was positive.  Antibiotics were transitioned to doxycycline 100 mg twice daily with continued improvement.  He was subsequently discharged and asked to follow-up with primary care provider for further management.     Day of Discharge       Physical Exam:  Temp:  [97.4 °F (36.3 °C)-98.2 °F (36.8 °C)] 98.2 °F (36.8 °C)  Resp:  [16-18] 16  BP:  (90-93)/(62-67) 90/62  Body mass index is 14.57 kg/m².  Physical Exam  General: Chronically ill-appearing, no acute distress. HEENT: Normocephalic atraumatic  CV: Regular rate and rhythm, no murmurs  Lungs: Normal work of breathing, on room air  Abdomen: Soft, nontender.  Ostomy noted with dark green output at the left lower quadrant  Extremities: No peripheral edema appreciated  Neuro: Nonverbal, unable to assess  Consultants     Consult Orders (all) (From admission, onward)     Start     Ordered    06/10/21 1543  [facility] (on-call MD unless specified) Details  Once     Specialty:  Hospitalist  Provider:  (Not yet assigned)    06/10/21 1542              Procedures     Imaging Results (All)     Procedure Component Value Units Date/Time    CT Abdomen Pelvis Without Contrast [[id number]] Collected: 06/10/21 1530     Updated: 06/10/21 1553    Narrative:      CT ABDOMEN PELVIS WO CONTRAST-     Radiation dose reduction techniques were utilized, including automated  exposure control and exposure modulation based on body size.     CLINICAL: 31-year-old male, with a history of anoxic brain damage,  recent exploratory laparotomy with lysis of adhesions, remote history of  abdominal perineal proctocolectomy with end ileostomy. Patient currently  with nausea and vomiting     COMPARISON:  CT of the abdomen and pelvis 05/26/2021     FINDINGS: There is a focal area of consolidation within the left lower  lobe not seen on the previous examination consistent with pneumonia.     The liver, gallbladder, spleen, adrenal glands and kidneys have a  satisfactory appearance. No pancreatic abnormality identified. No free  intraperitoneal air. There are surgical skin staples demonstrated along  the midline abdomen. Ileostomy left upper quadrant. The urinary bladder  is typical in appearance.     Caliber of the large and small bowel is within normal limits. There are  some nonspecific air-fluid levels seen. No indication of bowel  obstruction. No  focal intraperitoneal fluid collection to suggest  abscess or seroma. Diameter of the aorta is normal. The ileostomy site  is typical in appearance.     CONCLUSION:  1. Ileostomy site is satisfactory in appearance.  2. Bowel caliber within normal limits, no obstruction seen.  3. Abdominal wall is typical in appearance status post exploratory  laparotomy with surgical skin staples in place.  4. Focal area of consolidation left lower lobe, consistent with  pneumonia.  5. No free intraperitoneal air, no intra-abdominal fluid collection  seen.     Findings of this report called to Dr. Soares in the emergency room at the  time of completion 3:22 PM.              This report was finalized on 6/10/2021 3:50 PM by Dr. Ez Barker M.D.             Pertinent Labs     Results from last 7 days   Lab Units 06/13/21  0531 06/12/21  1151 06/11/21  0400 06/10/21  1017   WBC 10*3/mm3 4.69 6.00 9.67 16.39*   HEMOGLOBIN g/dL 11.6* 10.8* 12.7* 16.0   PLATELETS 10*3/mm3 452* 415 516* 520*     Results from last 7 days   Lab Units 06/13/21  0531 06/11/21  0400 06/10/21  1455   SODIUM mmol/L 138 142 141   POTASSIUM mmol/L 3.5 3.9 4.3   CHLORIDE mmol/L 104 106 104   CO2 mmol/L 26.7 25.5 25.3   BUN mg/dL 5* 11 13   CREATININE mg/dL 0.36* 0.56* 0.58*   GLUCOSE mg/dL 80 79 83   Estimated Creatinine Clearance: 169.3 mL/min (A) (by C-G formula based on SCr of 0.36 mg/dL (L)).  Results from last 7 days   Lab Units 06/10/21  1455   ALBUMIN g/dL 3.80   BILIRUBIN mg/dL 0.6   ALK PHOS U/L 100   AST (SGOT) U/L 26   ALT (SGPT) U/L 57*     Results from last 7 days   Lab Units 06/13/21  0531 06/11/21  0400 06/10/21  1455   CALCIUM mg/dL 8.9 9.2 9.7   ALBUMIN g/dL  --   --  3.80     Results from last 7 days   Lab Units 06/10/21  0951   LIPASE U/L 23             Invalid input(s): LDLCALC  Results from last 7 days   Lab Units 06/10/21  1608 06/10/21  1603   BLOODCX  No growth at 2 days No growth at 2 days       Test Results Pending at Discharge      Pending Labs     Order Current Status    Blood Culture - Blood, Arm, Left Preliminary result    Blood Culture - Blood, Arm, Right Preliminary result          Discharge Details        Discharge Medications      New Medications      Instructions Start Date   doxycycline 100 MG enteric coated tablet  Commonly known as: DORYX   100 mg, Oral, 2 Times Daily         Continue These Medications      Instructions Start Date   lansoprazole 15 MG capsule  Commonly known as: PREVACID   15 mg, Oral, Daily      multivitamin tablet tablet   1 tablet, Oral, Daily             Allergies   Allergen Reactions   • Benadryl [Diphenhydramine] Shortness Of Breath and Other (See Comments)     Increased heart rate & extreme irritability   • Ceclor [Cefaclor] Rash     Tolerated ceftriaxone on 6/10/21   • Dilaudid [Hydromorphone Hcl] Itching   • Milk-Related Compounds Diarrhea   • Reglan [Metoclopramide] Other (See Comments)     Physical restless distress   • Thorazine [Chlorpromazine] Other (See Comments)     Physical restless distress         Discharge Disposition:  Home or Self Care    Discharge Diet:  Diet Order   Procedures   • Diet Soft Texture; Chopped; Lactose Restricted       Discharge Activity: as tolerated    Notify your primary care provider if you experience chest pain, difficulty breathing, fevers/chills, nausea or vomiting, bleeding in stool, excessive diarrhea, numbness or weakness or tingling in any part of your body.        CODE STATUS:    Code Status and Medical Interventions:   Ordered at: 06/10/21 1801     Code Status:    CPR     Medical Interventions (Level of Support Prior to Arrest):    Full       No future appointments.  Follow-up Information     Kira Almaraz MD Follow up in 2 week(s).    Specialty: Family Medicine  Why: Make an appointment the regular provider within 2 weeks of discharge for hospital follow-up  Contact information:  [address]  [address]  [address]  [phone]                   Time Spent on Discharge:  Greater than 30 minutes      Eric Pacheco MD  [facility]  06/13/21  10:49 EDT

## 2021-06-13 NOTE — PLAN OF CARE
Goal Outcome Evaluation:  Plan of Care Reviewed With: patient, mother         VSS. No complaints of pain, no signs of distress. Eager for possible discharge today. REA

## 2021-06-14 ENCOUNTER — READMISSION MANAGEMENT (OUTPATIENT)
Dept: CALL CENTER | Facility: HOSPITAL | Age: 31
End: 2021-06-14

## 2021-06-14 NOTE — PAYOR COMM NOTE
"StephanieElroy BEBA (31 y.o. Male)     DC SUMMARY FOR [id number]      Date of Birth Social Security Number Address Home Phone MRN    1990  [address] [facility] [address] [phone] [id number]    [employer] Marital Status          Buddhist Single       Admission Date Admission Type Admitting Provider Attending Provider Department, Room/Bed    6/10/21 Emergency Stingl, Daria Hernandez MD  [address], [address]/1    Discharge Date Discharge Disposition Discharge Destination        6/13/2021 Home or Self Care              Attending Provider: (none)   Allergies: Benadryl [Diphenhydramine], Ceclor [Cefaclor], Dilaudid [Hydromorphone Hcl], Milk-related Compounds, Reglan [Metoclopramide], Thorazine [Chlorpromazine]    Isolation: None   Infection: MRSA/History Only (05/13/21)   Code Status: Prior    Ht: 167.6 cm (66\")   Wt: 41 kg (90 lb 4.8 oz)    Admission Cmt: None   Principal Problem: None                Active Insurance as of 6/10/2021     Primary Coverage     Payor Plan Insurance Group Employer/Plan Group    ANTHEM BLUE CROSS ANTHEM BLUE CROSS BLUE SHIELD PPO [id number]     Payor Plan Address Payor Plan Phone Number Payor Plan Fax Number Effective Dates    [address] [phone]  1/1/2012 - None Entered    [facility] [id number]       Subscriber Name Subscriber Birth Date Member ID       AGUEDA CASTANEDA 10/4/1949 [id number]           Secondary Coverage     Payor Plan Insurance Group Employer/Plan Group    MEDICARE MEDICARE A ONLY      Payor Plan Address Payor Plan Phone Number Payor Plan Fax Number Effective Dates    [address] [phone]  9/1/2012 - None Entered    [facility] [id number]       Subscriber Name Subscriber Birth Date Member ID       ELROY CASTANEDA 1990 [id number]           Tertiary Coverage     Payor Plan Insurance Group Employer/Plan Group    [place]      Payor Plan Address Payor Plan Phone Number Payor Plan Fax Number Effective " Dates    [phone]  2016 - None Entered    [facility] [place] [address]       Subscriber Name Subscriber Birth Date Member ID       ELROY BROWN 1990 [id number]                 Emergency Contacts      (Rel.) Home Phone Work Phone Mobile Phone    Rosy Brown (Mother) [phone] -- [phone]    Jung Brown (Father) [phone] -- [phone]               Discharge Summary      Eric Pacheco MD at 21 1049              Patient Name: Elroy Brown  : 1990  MRN: [id number]    Date of Admission: 6/10/2021  Date of Discharge:  2021  Primary Care Physician: Kira Almaraz MD      Chief Complaint:   Nausea      Discharge Diagnoses     Active Hospital Problems    Diagnosis  POA   • Severe malnutrition (CMS/HCC) [E43]  Yes   • Pneumonia of left lower lobe due to infectious organism [J18.9]  Yes   • Aspiration pneumonia of left lung due to vomit (CMS/HCC) [J69.0]  Yes   • CP (cerebral palsy) (CMS/HCC) [G80.9]  Yes   • Anoxic brain damage (CMS/HCC) [G93.1]  Yes   • H/O ulcerative colitis [Z87.19]  Not Applicable      Resolved Hospital Problems   No resolved problems to display.        Hospital Course        This is a 31-year-old male with a past medical history of anoxic brain injury at birth, cerebral palsy, seizure disorder, history of ulcerative colitis status post colectomy with ostomy as well as a history of recurrent small bowel obstructions who was recently admitted to [facility] for nausea/vomiting as well as aspiration pneumonia, and underwent an ex lap with lysis of adhesions with subsequent return of bowel function.     He was brought to the hospital by his mother for nausea, vomiting, and anorexia.     He underwent a CT abdomen pelvis that did not show any signs concern for obstruction.  However there was a focal area of consolidation of the left lower lobe which is concerning for pneumonia.  His WBC was elevated at 16.4 but his  procalcitonin was indeterminate at 0.12.     He was started empirically on vancomycin and ceftriaxone.  MRSA nares was positive.  Antibiotics were transitioned to doxycycline 100 mg twice daily with continued improvement.  He was subsequently discharged and asked to follow-up with primary care provider for further management.     Day of Discharge       Physical Exam:  Temp:  [97.4 °F (36.3 °C)-98.2 °F (36.8 °C)] 98.2 °F (36.8 °C)  Resp:  [16-18] 16  BP: (90-93)/(62-67) 90/62  Body mass index is 14.57 kg/m².  Physical Exam  General: Chronically ill-appearing, no acute distress. HEENT: Normocephalic atraumatic  CV: Regular rate and rhythm, no murmurs  Lungs: Normal work of breathing, on room air  Abdomen: Soft, nontender.  Ostomy noted with dark green output at the left lower quadrant  Extremities: No peripheral edema appreciated  Neuro: Nonverbal, unable to assess  Consultants     Consult Orders (all) (From admission, onward)     Start     Ordered    06/10/21 1543  [facility] (on-call MD unless specified) Details  Once     Specialty:  Hospitalist  Provider:  (Not yet assigned)    06/10/21 1542              Procedures     Imaging Results (All)     Procedure Component Value Units Date/Time    CT Abdomen Pelvis Without Contrast [[id number]] Collected: 06/10/21 1530     Updated: 06/10/21 1553    Narrative:      CT ABDOMEN PELVIS WO CONTRAST-     Radiation dose reduction techniques were utilized, including automated  exposure control and exposure modulation based on body size.     CLINICAL: 31-year-old male, with a history of anoxic brain damage,  recent exploratory laparotomy with lysis of adhesions, remote history of  abdominal perineal proctocolectomy with end ileostomy. Patient currently  with nausea and vomiting     COMPARISON:  CT of the abdomen and pelvis 05/26/2021     FINDINGS: There is a focal area of consolidation within the left lower  lobe not seen on the previous examination consistent with pneumonia.     The liver,  gallbladder, spleen, adrenal glands and kidneys have a  satisfactory appearance. No pancreatic abnormality identified. No free  intraperitoneal air. There are surgical skin staples demonstrated along  the midline abdomen. Ileostomy left upper quadrant. The urinary bladder  is typical in appearance.     Caliber of the large and small bowel is within normal limits. There are  some nonspecific air-fluid levels seen. No indication of bowel  obstruction. No focal intraperitoneal fluid collection to suggest  abscess or seroma. Diameter of the aorta is normal. The ileostomy site  is typical in appearance.     CONCLUSION:  1. Ileostomy site is satisfactory in appearance.  2. Bowel caliber within normal limits, no obstruction seen.  3. Abdominal wall is typical in appearance status post exploratory  laparotomy with surgical skin staples in place.  4. Focal area of consolidation left lower lobe, consistent with  pneumonia.  5. No free intraperitoneal air, no intra-abdominal fluid collection  seen.     Findings of this report called to Dr. Soares in the emergency room at the  time of completion 3:22 PM.              This report was finalized on 6/10/2021 3:50 PM by Dr. Ez Barker M.D.             Pertinent Labs     Results from last 7 days   Lab Units 06/13/21  0531 06/12/21  1151 06/11/21  0400 06/10/21  1017   WBC 10*3/mm3 4.69 6.00 9.67 16.39*   HEMOGLOBIN g/dL 11.6* 10.8* 12.7* 16.0   PLATELETS 10*3/mm3 452* 415 516* 520*     Results from last 7 days   Lab Units 06/13/21  0531 06/11/21  0400 06/10/21  1455   SODIUM mmol/L 138 142 141   POTASSIUM mmol/L 3.5 3.9 4.3   CHLORIDE mmol/L 104 106 104   CO2 mmol/L 26.7 25.5 25.3   BUN mg/dL 5* 11 13   CREATININE mg/dL 0.36* 0.56* 0.58*   GLUCOSE mg/dL 80 79 83   Estimated Creatinine Clearance: 169.3 mL/min (A) (by C-G formula based on SCr of 0.36 mg/dL (L)).  Results from last 7 days   Lab Units 06/10/21  1455   ALBUMIN g/dL 3.80   BILIRUBIN mg/dL 0.6   ALK PHOS U/L 100   AST  (SGOT) U/L 26   ALT (SGPT) U/L 57*     Results from last 7 days   Lab Units 06/13/21  0531 06/11/21  0400 06/10/21  1455   CALCIUM mg/dL 8.9 9.2 9.7   ALBUMIN g/dL  --   --  3.80     Results from last 7 days   Lab Units 06/10/21  0951   LIPASE U/L 23             Invalid input(s): LDLCALC  Results from last 7 days   Lab Units 06/10/21  1608 06/10/21  1603   BLOODCX  No growth at 2 days No growth at 2 days       Test Results Pending at Discharge     Pending Labs     Order Current Status    Blood Culture - Blood, Arm, Left Preliminary result    Blood Culture - Blood, Arm, Right Preliminary result          Discharge Details        Discharge Medications      New Medications      Instructions Start Date   doxycycline 100 MG enteric coated tablet  Commonly known as: DORYX   100 mg, Oral, 2 Times Daily         Continue These Medications      Instructions Start Date   lansoprazole 15 MG capsule  Commonly known as: PREVACID   15 mg, Oral, Daily      multivitamin tablet tablet   1 tablet, Oral, Daily             Allergies   Allergen Reactions   • Benadryl [Diphenhydramine] Shortness Of Breath and Other (See Comments)     Increased heart rate & extreme irritability   • Ceclor [Cefaclor] Rash     Tolerated ceftriaxone on 6/10/21   • Dilaudid [Hydromorphone Hcl] Itching   • Milk-Related Compounds Diarrhea   • Reglan [Metoclopramide] Other (See Comments)     Physical restless distress   • Thorazine [Chlorpromazine] Other (See Comments)     Physical restless distress         Discharge Disposition:  Home or Self Care    Discharge Diet:  Diet Order   Procedures   • Diet Soft Texture; Chopped; Lactose Restricted       Discharge Activity: as tolerated    Notify your primary care provider if you experience chest pain, difficulty breathing, fevers/chills, nausea or vomiting, bleeding in stool, excessive diarrhea, numbness or weakness or tingling in any part of your body.        CODE STATUS:    Code Status and Medical Interventions:    Ordered at: 06/10/21 1801     Code Status:    CPR     Medical Interventions (Level of Support Prior to Arrest):    Full       No future appointments.  Follow-up Information     Kira Almaraz MD Follow up in 2 week(s).    Specialty: Family Medicine  Why: Make an appointment the regular provider within 2 weeks of discharge for hospital follow-up  Contact information:  [address]  [place] [id number]  [phone]                   Time Spent on Discharge:  Greater than 30 minutes      Eric Pacheco MD  [facility]  06/13/21  10:49 EDT                Electronically signed by Eric Pacheco MD at 06/13/21 0681

## 2021-06-14 NOTE — OUTREACH NOTE
Prep Survey      Responses   [facility] discharged from?  [place]   Is LACE score < 7 ?  No   Emergency Room discharge w/ pulse ox?  No   Eligibility  Readm Mgmt   Discharge diagnosis  Pneumonia of left lower lobe due to infectious organism,  Aspiration pneumonia,  CP   Does the patient have one of the following disease processes/diagnoses(primary or secondary)?  COPD/Pneumonia   Does the patient have Home health ordered?  No   Is there a DME ordered?  No   Prep survey completed?  Yes          Yuliana Mackey RN

## 2021-06-14 NOTE — CASE MANAGEMENT/SOCIAL WORK
Case Management Discharge Note      Final Note: Home with parents/caregivers with services setup through the state. Parents deny any needs. Transport by parents in private auto    Provided Post Acute Provider List?: Refused  Refused Provider List Comment: Parents plan to return home. Denies any needs.  Provided Post Acute Provider Quality & Resource List?: N/A    Selected Continued Care - Discharged on 6/13/2021 Admission date: 6/10/2021 - Discharge disposition: Home or Self Care    Destination    No services have been selected for the patient.              Durable Medical Equipment    No services have been selected for the patient.              Dialysis/Infusion    No services have been selected for the patient.              Home Medical Care    No services have been selected for the patient.              Therapy    No services have been selected for the patient.              Community Resources    No services have been selected for the patient.              Community & DME    No services have been selected for the patient.                  Transportation Services  Private: Car    Final Discharge Disposition Code: 01 - home or self-care

## 2021-06-15 LAB
BACTERIA SPEC AEROBE CULT: NORMAL
BACTERIA SPEC AEROBE CULT: NORMAL

## 2021-06-18 ENCOUNTER — READMISSION MANAGEMENT (OUTPATIENT)
Dept: CALL CENTER | Facility: HOSPITAL | Age: 31
End: 2021-06-18

## 2021-06-18 NOTE — OUTREACH NOTE
COPD/PN Week 1 Survey      Responses   [facility] [place] patient discharged from?  [place]   Does the patient have one of the following disease processes/diagnoses(primary or secondary)?  COPD/Pneumonia   Was the primary reason for admission:  Pneumonia   Week 1 attempt successful?  Yes   Call start time  1630   Call end time  1632   General alerts for this patient  Cerebral Palsy, Anoxic brain injury @ birth, nonverbal   Discharge diagnosis  Pneumonia of left lower lobe due to infectious organism,  Aspiration pneumonia,  CP   Is patient permission given to speak with other caregiver?  Yes   Person spoke with today (if not patient) and relationship  MotherRosy reviewed with patient/caregiver?  Yes   Is the patient having any side effects they believe may be caused by any medication additions or changes?  No   Does the patient have all medications ordered at discharge?  Yes   Is the patient taking all medications as directed (includes completed medication regime)?  Yes   Does the patient have a primary care provider?   Yes   Has the patient kept scheduled appointments due by today?  N/A   Has home health visited the patient within 72 hours of discharge?  N/A   Psychosocial issues?  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  Yes   Nursing interventions  Reviewed instructions with patient   What is the patient's perception of their health status since discharge?  Improving   Nursing Interventions  Nurse provided patient education   Are the patient's immunizations up to date?   Yes   Nursing interventions  Educated on importance of maintaining up to date immunizations as advised by provider   If the patient is a current smoker, are they able to teach back resources for cessation?  Not a smoker   Is the patient/caregiver able to teach back the hierarchy of who to call/visit for symptoms/problems? PCP,  Specialist, Home health nurse, Urgent Care, ED, 911  Yes   Additional teach back comments  Colostomy is slower but working ok she says, no breathing issues, afebrile.   Is the patient/caregiver able to teach back signs and symptoms of worsening condition:  Fever/chills, Shortness of breath, Chest pain   Is the patient/caregiver able to teach back importance of completing antibiotic course of treatment?  Yes   Week 1 call completed?  Yes   Wrap up additional comments  He is eating smaller meals with food more easily digested.          Lakeisha Frazier RN

## 2021-06-25 ENCOUNTER — READMISSION MANAGEMENT (OUTPATIENT)
Dept: CALL CENTER | Facility: HOSPITAL | Age: 31
End: 2021-06-25

## 2021-06-25 NOTE — OUTREACH NOTE
COPD/PN Week 2 Survey      Responses   [facility] patient discharged from?  [place]   Does the patient have one of the following disease processes/diagnoses(primary or secondary)?  COPD/Pneumonia   Was the primary reason for admission:  Pneumonia   Week 2 attempt successful?  Yes   Call end time  1540   General alerts for this patient  Cerebral Palsy, Anoxic brain injury @ birth, nonverbal   Discharge diagnosis  Pneumonia of left lower lobe due to infectious organism,  Aspiration pneumonia,  CP   Is patient permission given to speak with other caregiver?  Yes   List who call center can speak with  Rosy Gamboa   Person spoke with today (if not patient) and relationship  Mother, Rosy   Meds reviewed with patient/caregiver?  Yes   Is the patient having any side effects they believe may be caused by any medication additions or changes?  No   Does the patient have all medications ordered at discharge?  Yes   Is the patient taking all medications as directed (includes completed medication regime)?  Yes   Does the patient have a primary care provider?   Yes   Comments regarding PCP  PCP Dr Kira Almaraz   Has the patient kept scheduled appointments due by today?  No   Comments  He has a visit on July 12th, 2021.    Has home health visited the patient within 72 hours of discharge?  N/A   Pulse Ox monitoring  None   Psychosocial comments  Anoxic brain injury @ birth, cerebral palsy, nonverbal   Did the patient receive a copy of their discharge instructions?  Yes   What is the patient's perception of their health status since discharge?  Improving   Nursing Interventions  Nurse provided patient education   Are the patient's immunizations up to date?   Yes   Nursing interventions  Educated on importance of maintaining up to date immunizations as advised by provider   If the patient is a current smoker, are they able to teach back resources for cessation?  Not a smoker   Is the patient/caregiver able to  teach back the hierarchy of who to call/visit for symptoms/problems? PCP, Specialist, Home health nurse, Urgent Care, ED, 911  Yes   Additional teach back comments  No breathing problems.   Is the patient/caregiver able to teach back signs and symptoms of worsening condition:  Fever/chills, Shortness of breath, Chest pain   Is the patient/caregiver able to teach back importance of completing antibiotic course of treatment?  Yes   Week 2 call completed?  Yes   Wrap up additional comments  His Mom says he is doing well for him.           Viv Soto RN

## 2021-07-01 ENCOUNTER — READMISSION MANAGEMENT (OUTPATIENT)
Dept: CALL CENTER | Facility: HOSPITAL | Age: 31
End: 2021-07-01

## 2021-07-01 NOTE — OUTREACH NOTE
COPD/PN Week 3 Survey      Responses   [facility] patient discharged from?  [place]   Does the patient have one of the following disease processes/diagnoses(primary or secondary)?  COPD/Pneumonia   Was the primary reason for admission:  Pneumonia   Week 3 attempt successful?  No   Unsuccessful attempts  Attempt 1          Linnea Yi RN

## 2021-07-02 ENCOUNTER — READMISSION MANAGEMENT (OUTPATIENT)
Dept: CALL CENTER | Facility: HOSPITAL | Age: 31
End: 2021-07-02

## 2021-07-02 NOTE — OUTREACH NOTE
COPD/PN Week 3 Survey      Responses   [facility] patient discharged from?  [place]   Does the patient have one of the following disease processes/diagnoses(primary or secondary)?  COPD/Pneumonia   Was the primary reason for admission:  Pneumonia   Week 3 attempt successful?  Yes   Call start time  1354   Call end time  1354   General alerts for this patient  Cerebral Palsy, Anoxic brain injury @ birth, nonverbal   Discharge diagnosis  Pneumonia of left lower lobe due to infectious organism,  Aspiration pneumonia,  CP   Is patient permission given to speak with other caregiver?  Yes   List who call center can speak with  mother- Rosy   Person spoke with today (if not patient) and relationship  mother- Rosy   What is the patient's perception of their health status since discharge?  Improving   Week 3 call completed?  Yes   Revoked  No further contact(revokes)-requires comment   Is the patient interested in additional calls from an ambulatory ?  NOTE:  applies to high risk patients requiring additional follow-up.  No   Graduated/Revoked comments  Mother requests no further calls, patient is fine.          Alma Friedman RN

## 2022-01-02 NOTE — PLAN OF CARE
Please stop taking the ibuprofen if you are going to take the Toradol.   Problem: Patient Care Overview  Goal: Plan of Care Review  Outcome: Ongoing (interventions implemented as appropriate)   07/09/19 2000   Coping/Psychosocial   Plan of Care Reviewed With father;mother   Plan of Care Review   Progress no change   OTHER   Outcome Summary received patient to unit this afternoon. oriented to floor and answered questions. did not seem to be in any pain or discomfort after coming up from recovery. will continue to monitor.      Goal: Individualization and Mutuality  Outcome: Ongoing (interventions implemented as appropriate)      Problem: Skin Injury Risk (Adult)  Goal: Skin Health and Integrity  Outcome: Ongoing (interventions implemented as appropriate)      Problem: Pain, Acute (Adult)  Goal: Acceptable Pain Control/Comfort Level  Outcome: Ongoing (interventions implemented as appropriate)         0

## 2023-02-02 ENCOUNTER — APPOINTMENT (OUTPATIENT)
Dept: CT IMAGING | Facility: HOSPITAL | Age: 33
End: 2023-02-02
Payer: COMMERCIAL

## 2023-02-02 ENCOUNTER — HOSPITAL ENCOUNTER (OUTPATIENT)
Facility: HOSPITAL | Age: 33
Setting detail: OBSERVATION
Discharge: HOME | End: 2023-02-04
Attending: EMERGENCY MEDICINE | Admitting: SURGERY
Payer: COMMERCIAL

## 2023-02-02 DIAGNOSIS — G93.1 CHRONIC ANOXIC ENCEPHALOPATHY: ICD-10-CM

## 2023-02-02 DIAGNOSIS — K65.1 PELVIC ABSCESS IN MALE: ICD-10-CM

## 2023-02-02 DIAGNOSIS — G80.9 CEREBRAL PALSY, UNSPECIFIED TYPE: ICD-10-CM

## 2023-02-02 DIAGNOSIS — K61.1 PERIRECTAL ABSCESS: Primary | ICD-10-CM

## 2023-02-02 LAB
ALBUMIN SERPL-MCNC: 3.3 G/DL (ref 3.5–5.2)
ALBUMIN/GLOB SERPL: 1 G/DL
ALP SERPL-CCNC: 74 U/L (ref 39–117)
ALT SERPL W P-5'-P-CCNC: 15 U/L (ref 1–41)
ANION GAP SERPL CALCULATED.3IONS-SCNC: 7.7 MMOL/L (ref 5–15)
APTT PPP: 29.2 SECONDS (ref 22.7–35.4)
AST SERPL-CCNC: 21 U/L (ref 1–40)
BASOPHILS # BLD AUTO: 0.04 10*3/MM3 (ref 0–0.2)
BASOPHILS NFR BLD AUTO: 0.4 % (ref 0–1.5)
BILIRUB SERPL-MCNC: <0.2 MG/DL (ref 0–1.2)
BILIRUB UR QL STRIP: NEGATIVE
BUN SERPL-MCNC: 11 MG/DL (ref 6–20)
BUN/CREAT SERPL: 20.8 (ref 7–25)
CALCIUM SPEC-SCNC: 8.5 MG/DL (ref 8.6–10.5)
CHLORIDE SERPL-SCNC: 110 MMOL/L (ref 98–107)
CLARITY UR: CLEAR
CO2 SERPL-SCNC: 22.3 MMOL/L (ref 22–29)
COLOR UR: YELLOW
CREAT SERPL-MCNC: 0.53 MG/DL (ref 0.76–1.27)
D-LACTATE SERPL-SCNC: 1.6 MMOL/L (ref 0.5–2)
DEPRECATED RDW RBC AUTO: 44.6 FL (ref 37–54)
EGFRCR SERPLBLD CKD-EPI 2021: 134.9 ML/MIN/1.73
EOSINOPHIL # BLD AUTO: 0.12 10*3/MM3 (ref 0–0.4)
EOSINOPHIL NFR BLD AUTO: 1.2 % (ref 0.3–6.2)
ERYTHROCYTE [DISTWIDTH] IN BLOOD BY AUTOMATED COUNT: 13.1 % (ref 12.3–15.4)
GLOBULIN UR ELPH-MCNC: 3.3 GM/DL
GLUCOSE SERPL-MCNC: 107 MG/DL (ref 65–99)
GLUCOSE UR STRIP-MCNC: NEGATIVE MG/DL
HCT VFR BLD AUTO: 42.9 % (ref 37.5–51)
HGB BLD-MCNC: 13.2 G/DL (ref 13–17.7)
HGB UR QL STRIP.AUTO: NEGATIVE
IMM GRANULOCYTES # BLD AUTO: 0.03 10*3/MM3 (ref 0–0.05)
IMM GRANULOCYTES NFR BLD AUTO: 0.3 % (ref 0–0.5)
INR PPP: 1.03 (ref 0.9–1.1)
KETONES UR QL STRIP: NEGATIVE
LEUKOCYTE ESTERASE UR QL STRIP.AUTO: NEGATIVE
LIPASE SERPL-CCNC: 19 U/L (ref 13–60)
LYMPHOCYTES # BLD AUTO: 2.06 10*3/MM3 (ref 0.7–3.1)
LYMPHOCYTES NFR BLD AUTO: 20.3 % (ref 19.6–45.3)
MCH RBC QN AUTO: 28.3 PG (ref 26.6–33)
MCHC RBC AUTO-ENTMCNC: 30.8 G/DL (ref 31.5–35.7)
MCV RBC AUTO: 92.1 FL (ref 79–97)
MONOCYTES # BLD AUTO: 1.03 10*3/MM3 (ref 0.1–0.9)
MONOCYTES NFR BLD AUTO: 10.1 % (ref 5–12)
NEUTROPHILS NFR BLD AUTO: 6.88 10*3/MM3 (ref 1.7–7)
NEUTROPHILS NFR BLD AUTO: 67.7 % (ref 42.7–76)
NITRITE UR QL STRIP: NEGATIVE
NRBC BLD AUTO-RTO: 0 /100 WBC (ref 0–0.2)
PH UR STRIP.AUTO: 5.5 [PH] (ref 5–8)
PLATELET # BLD AUTO: 345 10*3/MM3 (ref 140–450)
PMV BLD AUTO: 9.9 FL (ref 6–12)
POTASSIUM SERPL-SCNC: 4.4 MMOL/L (ref 3.5–5.2)
PROT SERPL-MCNC: 6.6 G/DL (ref 6–8.5)
PROT UR QL STRIP: NEGATIVE
PROTHROMBIN TIME: 13.6 SECONDS (ref 11.7–14.2)
RBC # BLD AUTO: 4.66 10*6/MM3 (ref 4.14–5.8)
SODIUM SERPL-SCNC: 140 MMOL/L (ref 136–145)
SP GR UR STRIP: >=1.03 (ref 1–1.03)
UROBILINOGEN UR QL STRIP: NORMAL
WBC NRBC COR # BLD: 10.16 10*3/MM3 (ref 3.4–10.8)

## 2023-02-02 PROCEDURE — 96367 TX/PROPH/DG ADDL SEQ IV INF: CPT

## 2023-02-02 PROCEDURE — 83605 ASSAY OF LACTIC ACID: CPT | Performed by: INTERNAL MEDICINE

## 2023-02-02 PROCEDURE — G0378 HOSPITAL OBSERVATION PER HR: HCPCS

## 2023-02-02 PROCEDURE — 87040 BLOOD CULTURE FOR BACTERIA: CPT | Performed by: INTERNAL MEDICINE

## 2023-02-02 PROCEDURE — 96365 THER/PROPH/DIAG IV INF INIT: CPT

## 2023-02-02 PROCEDURE — 96376 TX/PRO/DX INJ SAME DRUG ADON: CPT

## 2023-02-02 PROCEDURE — 80053 COMPREHEN METABOLIC PANEL: CPT | Performed by: EMERGENCY MEDICINE

## 2023-02-02 PROCEDURE — 85610 PROTHROMBIN TIME: CPT | Performed by: EMERGENCY MEDICINE

## 2023-02-02 PROCEDURE — 83690 ASSAY OF LIPASE: CPT | Performed by: EMERGENCY MEDICINE

## 2023-02-02 PROCEDURE — 81003 URINALYSIS AUTO W/O SCOPE: CPT | Performed by: EMERGENCY MEDICINE

## 2023-02-02 PROCEDURE — 96361 HYDRATE IV INFUSION ADD-ON: CPT

## 2023-02-02 PROCEDURE — 74177 CT ABD & PELVIS W/CONTRAST: CPT

## 2023-02-02 PROCEDURE — 25010000002 PIPERACILLIN SOD-TAZOBACTAM PER 1 G: Performed by: INTERNAL MEDICINE

## 2023-02-02 PROCEDURE — 85730 THROMBOPLASTIN TIME PARTIAL: CPT | Performed by: EMERGENCY MEDICINE

## 2023-02-02 PROCEDURE — 85025 COMPLETE CBC W/AUTO DIFF WBC: CPT | Performed by: EMERGENCY MEDICINE

## 2023-02-02 PROCEDURE — 96366 THER/PROPH/DIAG IV INF ADDON: CPT

## 2023-02-02 PROCEDURE — 96375 TX/PRO/DX INJ NEW DRUG ADDON: CPT

## 2023-02-02 PROCEDURE — 25010000002 MORPHINE PER 10 MG: Performed by: EMERGENCY MEDICINE

## 2023-02-02 PROCEDURE — 25010000002 IOPAMIDOL 61 % SOLUTION: Performed by: EMERGENCY MEDICINE

## 2023-02-02 PROCEDURE — 99285 EMERGENCY DEPT VISIT HI MDM: CPT

## 2023-02-02 PROCEDURE — 25010000002 VANCOMYCIN PER 500 MG: Performed by: INTERNAL MEDICINE

## 2023-02-02 RX ORDER — SODIUM CHLORIDE 0.9 % (FLUSH) 0.9 %
10 SYRINGE (ML) INJECTION AS NEEDED
Status: DISCONTINUED | OUTPATIENT
Start: 2023-02-02 | End: 2023-02-04 | Stop reason: HOSPADM

## 2023-02-02 RX ORDER — ACETAMINOPHEN 160 MG/5ML
650 SOLUTION ORAL EVERY 4 HOURS PRN
Status: DISCONTINUED | OUTPATIENT
Start: 2023-02-02 | End: 2023-02-04 | Stop reason: HOSPADM

## 2023-02-02 RX ORDER — VANCOMYCIN HYDROCHLORIDE 1 G/200ML
20 INJECTION, SOLUTION INTRAVENOUS ONCE
Status: COMPLETED | OUTPATIENT
Start: 2023-02-02 | End: 2023-02-03

## 2023-02-02 RX ORDER — MORPHINE SULFATE 2 MG/ML
4 INJECTION, SOLUTION INTRAMUSCULAR; INTRAVENOUS ONCE
Status: COMPLETED | OUTPATIENT
Start: 2023-02-02 | End: 2023-02-02

## 2023-02-02 RX ORDER — ACETAMINOPHEN 325 MG/1
650 TABLET ORAL EVERY 4 HOURS PRN
Status: DISCONTINUED | OUTPATIENT
Start: 2023-02-02 | End: 2023-02-04 | Stop reason: HOSPADM

## 2023-02-02 RX ORDER — SODIUM CHLORIDE 0.9 % (FLUSH) 0.9 %
10 SYRINGE (ML) INJECTION EVERY 12 HOURS SCHEDULED
Status: DISCONTINUED | OUTPATIENT
Start: 2023-02-02 | End: 2023-02-04 | Stop reason: HOSPADM

## 2023-02-02 RX ORDER — FAMOTIDINE 10 MG/ML
20 INJECTION, SOLUTION INTRAVENOUS EVERY 12 HOURS SCHEDULED
Status: DISCONTINUED | OUTPATIENT
Start: 2023-02-02 | End: 2023-02-04 | Stop reason: HOSPADM

## 2023-02-02 RX ORDER — SODIUM CHLORIDE 9 MG/ML
125 INJECTION, SOLUTION INTRAVENOUS CONTINUOUS
Status: DISCONTINUED | OUTPATIENT
Start: 2023-02-02 | End: 2023-02-04 | Stop reason: HOSPADM

## 2023-02-02 RX ORDER — VANCOMYCIN HYDROCHLORIDE 1 G/200ML
1000 INJECTION, SOLUTION INTRAVENOUS EVERY 12 HOURS
Status: DISCONTINUED | OUTPATIENT
Start: 2023-02-03 | End: 2023-02-02

## 2023-02-02 RX ORDER — SODIUM CHLORIDE 9 MG/ML
40 INJECTION, SOLUTION INTRAVENOUS AS NEEDED
Status: DISCONTINUED | OUTPATIENT
Start: 2023-02-02 | End: 2023-02-04 | Stop reason: HOSPADM

## 2023-02-02 RX ORDER — ONDANSETRON 2 MG/ML
4 INJECTION INTRAMUSCULAR; INTRAVENOUS EVERY 6 HOURS PRN
Status: DISCONTINUED | OUTPATIENT
Start: 2023-02-02 | End: 2023-02-04 | Stop reason: HOSPADM

## 2023-02-02 RX ORDER — ACETAMINOPHEN 650 MG/1
650 SUPPOSITORY RECTAL EVERY 4 HOURS PRN
Status: DISCONTINUED | OUTPATIENT
Start: 2023-02-02 | End: 2023-02-04 | Stop reason: HOSPADM

## 2023-02-02 RX ORDER — OXYCODONE HCL 5 MG/5 ML
5 SOLUTION, ORAL ORAL EVERY 4 HOURS PRN
Status: DISCONTINUED | OUTPATIENT
Start: 2023-02-02 | End: 2023-02-02

## 2023-02-02 RX ORDER — VANCOMYCIN HYDROCHLORIDE 1 G/200ML
1000 INJECTION, SOLUTION INTRAVENOUS EVERY 12 HOURS
Status: DISCONTINUED | OUTPATIENT
Start: 2023-02-03 | End: 2023-02-04

## 2023-02-02 RX ORDER — VANCOMYCIN HYDROCHLORIDE 1 G/200ML
20 INJECTION, SOLUTION INTRAVENOUS ONCE
Status: DISCONTINUED | OUTPATIENT
Start: 2023-02-02 | End: 2023-02-02

## 2023-02-02 RX ADMIN — IOPAMIDOL 85 ML: 612 INJECTION, SOLUTION INTRAVENOUS at 13:31

## 2023-02-02 RX ADMIN — SODIUM CHLORIDE 500 ML: 9 INJECTION, SOLUTION INTRAVENOUS at 14:39

## 2023-02-02 RX ADMIN — MORPHINE SULFATE 4 MG: 2 INJECTION, SOLUTION INTRAMUSCULAR; INTRAVENOUS at 16:21

## 2023-02-02 RX ADMIN — TAZOBACTAM SODIUM AND PIPERACILLIN SODIUM 3.38 G: 375; 3 INJECTION, SOLUTION INTRAVENOUS at 21:41

## 2023-02-02 RX ADMIN — FAMOTIDINE 20 MG: 10 INJECTION INTRAVENOUS at 21:41

## 2023-02-02 RX ADMIN — VANCOMYCIN HYDROCHLORIDE 1000 MG: 1 INJECTION, SOLUTION INTRAVENOUS at 22:18

## 2023-02-02 RX ADMIN — Medication 10 ML: at 22:18

## 2023-02-02 RX ADMIN — MORPHINE SULFATE 4 MG: 2 INJECTION, SOLUTION INTRAMUSCULAR; INTRAVENOUS at 12:03

## 2023-02-02 RX ADMIN — SODIUM CHLORIDE 500 ML: 9 INJECTION, SOLUTION INTRAVENOUS at 12:07

## 2023-02-02 RX ADMIN — SODIUM CHLORIDE 125 ML/HR: 9 INJECTION, SOLUTION INTRAVENOUS at 16:22

## 2023-02-02 RX ADMIN — HYDROCODONE BITARTRATE AND ACETAMINOPHEN 10 ML: 7.5; 325 SOLUTION ORAL at 21:05

## 2023-02-02 NOTE — ED PROVIDER NOTES
EMERGENCY DEPARTMENT ENCOUNTER    Room Number:  33/33  Date of encounter:  2/2/2023  PCP: Kira Almaraz MD  Historian: Patient's mother and father    Patient was placed in face mask during triage process. Patient was wearing facemask when I entered the room and throughout our encounter. I wore full protective equipment throughout this patient encounter including a face mask, eye protection, and gloves. Hand hygiene was performed before donning protective equipment and again following doffing of PPE after leaving the room.    HPI:  Chief Complaint: Rectal pain with discharge  A complete HPI/ROS/PMH/PSH/SH/FH are unobtainable due to: Patient with history of CP is nonverbal with me  Context: Alex Brown is a 32 y.o. male who presents to the ED via POV with his parents who report that he has had some rectal discharge that is slightly bloody and has begun to have a odor to it.  He does have history of ulcerative colitis and is status post total colectomy more than a decade ago.  He has a ileostomy but has had normal output.  Patient's mother noted in the last several days the patient will not sit down on the toilet to urinate and refuses to sit with pressure on his buttocks when he is in his wheelchair or in the car.  No reported vomiting, fever, change in ostomy output.      MEDICAL HISTORY REVIEW  EMR reviewed:    Patient had a history of hospitalization in June 2021 where he was evaluated and treated for both severe malnutrition as well as aspiration pneumonia    PAST MEDICAL HISTORY  Active Ambulatory Problems     Diagnosis Date Noted   • Perirectal cellulitis 04/08/2016   • Attack, epileptiform ([facility]) 11/07/2016   • Generalized abdominal pain 02/18/2018   • Anoxic brain damage ([facility]) 02/19/2018   • Seizure disorder ([facility]) 02/19/2018   • Pelvic abscess in male ([facility]) 02/19/2018   • Postprocedural intraabdominal abscess 06/27/2019   • CP (cerebral palsy) ([facility]) 03/26/2020   • DAYDAY (iron deficiency anemia)  03/26/2020   • Partial small bowel obstruction (HCC) 03/30/2020   • SBO (small bowel obstruction) (formerly [facility]) 05/13/2021   • H/O ulcerative colitis 12/01/2011   • Anoxic encephalopathy (formerly [facility])    • Vasovagal syncope    • Aspiration pneumonia of left lung due to vomit (HCC) 05/31/2021   • Pneumonia of left lower lobe due to infectious organism 06/10/2021   • Severe malnutrition (formerly [facility]) 06/12/2021     Resolved Ambulatory Problems     Diagnosis Date Noted   • Leukocytosis 02/19/2018   • SBO (small bowel obstruction) (formerly [facility]) 03/25/2020   • Other specified injury of superior mesenteric vein, initial encounter 03/26/2020   • Hypoxia 06/01/2021     Past Medical History:   Diagnosis Date   • Anemia 2011   • Epilepsy (formerly [facility])    • GERD (gastroesophageal reflux disease)    • History of aspiration pneumonia    • History of MRSA infection 2012   • Iron deficiency anemia    • PONV (postoperative nausea and vomiting)    • Rectal bleeding Ulcerative Colitis   • Recurrent sinus infections    • Seizures (formerly [facility])    • Seizures (formerly [facility]) 2009   • Small bowel obstruction (formerly [facility]) 05/26/2021         PAST SURGICAL HISTORY  Past Surgical History:   Procedure Laterality Date   • ABDOMINOPERINEAL PROCTOCOLECTOMY N/A 01/03/2012    Laparoscopic total proctocolectomy with end ileostomy-Dr. Vinh Ballard, [facility]   • CIRCUMCISION N/A    • COLON RESECTION SMALL BOWEL N/A 07/17/2015    Dr. Vinh Ballard, [facility]   • COLONOSCOPY N/A 12/01/2011    Severe ulcerative colitits to the mid tranverse colon-Dr. Pop Baez, [facility]   • CYSTIC HYGROMA EXCISION     • ENDOSCOPY N/A 02/27/2015    No gross lesions in esophagus, erythematous mucosa in the stomach: biopsied, no gross lesions in the duodenum: biopsied-Dr. José Manuel Mayberry, [facility]   • EXPLORATORY LAPAROTOMY N/A 7/9/2019    Procedure: LAPAROTOMY EXPLORATORY WITH DRAINAGE OF PELVIC ABSCESS;  Surgeon: Vinh Ballard MD;  Location: [facility] OR;  Service: General   • EXPLORATORY LAPAROTOMY N/A 3/25/2020    Procedure: EXPLORATORY  LAPAROTOMY,OPEN ADHESIOLYSIS,REPAIR OF PERINEUM;  Surgeon: Kali Wilcox MD;  Location: [facility] MAIN OR;  Service: General;  Laterality: N/A;   • EXPLORATORY LAPAROTOMY N/A 5/28/2021    Procedure: LAPAROTOMY EXPLORATORY with lysis of adhesions;  Surgeon: Vinh Ballard MD;  Location: [facility] MAIN OR;  Service: General;  Laterality: N/A;   • ILEOSCOPY N/A 02/27/2015    Normal ileoscopy with the exception of some erosions at the distal ileum: biopsied-Dr. José Manuel Mayberry, [facility]   • MOLE REMOVAL     • PELVIC ABCESS DRAINAGE N/A 07/11/2015    Irrigation and drainage of a pelvic abscess-Dr. Kali Wilcox, [facility]   • TESTICLE UNDESCENDED REPAIR           FAMILY HISTORY  Family History   Problem Relation Age of Onset   • Prostate cancer Father    • Malig Hyperthermia Neg Hx          SOCIAL HISTORY  Social History     Socioeconomic History   • Marital status: Single   Tobacco Use   • Smoking status: Never   • Smokeless tobacco: Never   Vaping Use   • Vaping Use: Never used   Substance and Sexual Activity   • Alcohol use: No   • Drug use: No   • Sexual activity: Never         ALLERGIES  Benadryl [diphenhydramine], Ceclor [cefaclor], Dilaudid [hydromorphone hcl], Milk-related compounds, Reglan [metoclopramide], and Thorazine [chlorpromazine]        REVIEW OF SYSTEMS  Review of Systems     All systems reviewed and negative except for those discussed in HPI.       PHYSICAL EXAM    I have reviewed the triage vital signs and nursing notes.    ED Triage Vitals [02/02/23 1122]   Temp Heart Rate Resp BP SpO2   97.6 °F (36.4 °C) 100 16 -- 97 %      Temp src Heart Rate Source Patient Position BP Location FiO2 (%)   Tympanic Monitor -- -- --       Physical Exam    Physical Exam   Constitutional: No distress.  Tracks tracks me about the room.  HENT:  Head:  atraumatic.   Oropharynx: Mucous membranes are moist.   Eyes: No scleral icterus. No conjunctival pallor.  Neck: Painless range of motion noted. Neck supple.   Cardiovascular:  Normal rate, regular rhythm and intact distal pulses.  Pulmonary/Chest: No respiratory distress.  No tachypnea or increased work of breathing   abdominal: Patient appears to be guarding throughout.  Ileostomy present with dark green output but no blood..   /perineal: Exam chaperoned by nurseSally.  Normal external male genitalia.  Small amount of whitish rectal discharge with no surrounding perineal inflammation or induration.  Musculoskeletal: Spontaneously moving extremities.  Atraumatic   neurological: Alert.  At baseline mental status per family.  Skin: Skin is pink, warm, and dry. No pallor.   Psychiatric: Unable to evaluate  Nursing note and vitals reviewed.    LAB RESULTS  Recent Results (from the past 24 hour(s))   Protime-INR    Collection Time: 02/02/23 11:56 AM    Specimen: Blood   Result Value Ref Range    Protime 13.6 11.7 - 14.2 Seconds    INR 1.03 0.90 - 1.10   aPTT    Collection Time: 02/02/23 11:56 AM    Specimen: Blood   Result Value Ref Range    PTT 29.2 22.7 - 35.4 seconds   Lipase    Collection Time: 02/02/23 11:56 AM    Specimen: Blood   Result Value Ref Range    Lipase 19 13 - 60 U/L   CBC Auto Differential    Collection Time: 02/02/23 11:56 AM    Specimen: Blood   Result Value Ref Range    WBC 10.16 3.40 - 10.80 10*3/mm3    RBC 4.66 4.14 - 5.80 10*6/mm3    Hemoglobin 13.2 13.0 - 17.7 g/dL    Hematocrit 42.9 37.5 - 51.0 %    MCV 92.1 79.0 - 97.0 fL    MCH 28.3 26.6 - 33.0 pg    MCHC 30.8 (L) 31.5 - 35.7 g/dL    RDW 13.1 12.3 - 15.4 %    RDW-SD 44.6 37.0 - 54.0 fl    MPV 9.9 6.0 - 12.0 fL    Platelets 345 140 - 450 10*3/mm3    Neutrophil % 67.7 42.7 - 76.0 %    Lymphocyte % 20.3 19.6 - 45.3 %    Monocyte % 10.1 5.0 - 12.0 %    Eosinophil % 1.2 0.3 - 6.2 %    Basophil % 0.4 0.0 - 1.5 %    Immature Grans % 0.3 0.0 - 0.5 %    Neutrophils, Absolute 6.88 1.70 - 7.00 10*3/mm3    Lymphocytes, Absolute 2.06 0.70 - 3.10 10*3/mm3    Monocytes, Absolute 1.03 (H) 0.10 - 0.90 10*3/mm3    Eosinophils,  Absolute 0.12 0.00 - 0.40 10*3/mm3    Basophils, Absolute 0.04 0.00 - 0.20 10*3/mm3    Immature Grans, Absolute 0.03 0.00 - 0.05 10*3/mm3    nRBC 0.0 0.0 - 0.2 /100 WBC   Comprehensive Metabolic Panel    Collection Time: 02/02/23 12:40 PM    Specimen: Blood   Result Value Ref Range    Glucose 107 (H) 65 - 99 mg/dL    BUN 11 6 - 20 mg/dL    Creatinine 0.53 (L) 0.76 - 1.27 mg/dL    Sodium 140 136 - 145 mmol/L    Potassium 4.4 3.5 - 5.2 mmol/L    Chloride 110 (H) 98 - 107 mmol/L    CO2 22.3 22.0 - 29.0 mmol/L    Calcium 8.5 (L) 8.6 - 10.5 mg/dL    Total Protein 6.6 6.0 - 8.5 g/dL    Albumin 3.3 (L) 3.5 - 5.2 g/dL    ALT (SGPT) 15 1 - 41 U/L    AST (SGOT) 21 1 - 40 U/L    Alkaline Phosphatase 74 39 - 117 U/L    Total Bilirubin <0.2 0.0 - 1.2 mg/dL    Globulin 3.3 gm/dL    A/G Ratio 1.0 g/dL    BUN/Creatinine Ratio 20.8 7.0 - 25.0    Anion Gap 7.7 5.0 - 15.0 mmol/L    eGFR 134.9 >60.0 mL/min/1.73   Urinalysis With Microscopic If Indicated (No Culture) - Urine, Clean Catch    Collection Time: 02/02/23  3:18 PM    Specimen: Urine, Clean Catch   Result Value Ref Range    Color, UA Yellow Yellow, Straw    Appearance, UA Clear Clear    pH, UA 5.5 5.0 - 8.0    Specific Gravity, UA >=1.030 1.005 - 1.030    Glucose, UA Negative Negative    Ketones, UA Negative Negative    Bilirubin, UA Negative Negative    Blood, UA Negative Negative    Protein, UA Negative Negative    Leuk Esterase, UA Negative Negative    Nitrite, UA Negative Negative    Urobilinogen, UA 0.2 E.U./dL 0.2 - 1.0 E.U./dL       Ordered the above labs and independently reviewed the results.        RADIOLOGY  CT Abdomen Pelvis With Contrast    Result Date: 2/2/2023  CT ABDOMEN AND PELVIS WITH CONTRAST  HISTORY: 32-year-old male with history of abdominoperineal proctocolectomy with end ileostomy. Rectal discharge  TECHNIQUE: Axial CT images of the abdomen and pelvis were obtained following administration of intravenous contrast. The patient was not given oral contrast  Coronal and sagittal reformats were obtained.  COMPARISON: 06/10/2021.  FINDINGS: No evidence of bowel obstruction. Left midabdominal ileostomy is present. Changes from total colectomy are present. There is a thick-walled partly enhancing fluid collection seen within the presacral space which measures up to 1.8 cm in thickness and approximately 7.5 cm in craniocaudal dimension.  The liver, gallbladder, spleen and pancreas are normal. No renal calculi or hydronephrosis. There is a low-density cortical lesion within the posterior cortex of the midpole of the left kidney most consistent with a cyst. The urinary bladder is partially distended and normal. No pathological retroperitoneal lymphadenopathy.      1. No evidence of bowel obstruction. Ileostomy site is unremarkable. 2. There is a presacral coccygeal pelvic fluid collection which is thick-walled and enhancing.  These findings were discussed with Dr. John Paul Mckinley by telephone.  Radiation dose reduction techniques were utilized, including automated exposure control and exposure modulation based on body size.         I ordered the above noted radiological studies. Reviewed by me and discussed with radiologist.  See dictation for official radiology interpretation.      PROCEDURES    Procedures        MEDICATIONS GIVEN IN ER    Medications   sodium chloride 0.9 % flush 10 mL (has no administration in time range)   sodium chloride 0.9 % bolus 500 mL (500 mL Intravenous New Bag 2/2/23 1439)     Followed by   sodium chloride 0.9 % infusion (has no administration in time range)   morphine injection 4 mg (4 mg Intravenous Given 2/2/23 1203)   sodium chloride 0.9 % bolus 500 mL (0 mL Intravenous Stopped 2/2/23 1440)   iopamidol (ISOVUE-300) 61 % injection 100 mL (85 mL Intravenous Given by Other 2/2/23 1331)         PROGRESS, DATA ANALYSIS, CONSULTS, AND MEDICAL DECISION MAKING    My differential diagnosis for abdominal pain includes but is not limited to:  Gastritis,  gastroenteritis, peptic ulcer disease, GERD, esophageal perforation, acute appendicitis, mesenteric adenitis, Meckel’s diverticulum, epiploic appendagitis, diverticulitis, colon cancer, ulcerative colitis, Crohn’s disease, intussusception, small bowel obstruction, adhesions, ischemic bowel, perforated viscus, ileus, obstipation, biliary colic, cholecystitis, cholelithiasis, Davian-Danny Kelechi, hepatitis, pancreatitis, common bile duct obstruction, cholangitis, bile leak, splenic trauma, splenic rupture, splenic infarction, splenic abscess, abdominal abscess, ascites, spontaneous bacterial peritonitis, hernia, UTI, cystitis, prostatitis, ureterolithiasis, urinary obstruction, AAA, myocardial infarction, pneumonia, cancer, porphyria, DKA, medications, sickle cell, viral syndrome, zoster      All labs have been independently reviewed by me.  All radiology studies have been reviewed by me and discussed with radiologist dictating the report.   EKG's independently viewed and interpreted by me.  Discussion below represents my analysis of pertinent findings related to patient's condition, differential diagnosis, treatment plan and final disposition.      ED Course as of 02/02/23 1605   Thu Feb 02, 2023   1330 WBC: 10.16 [RS]   1330 Hemoglobin: 13.2 [RS]   1330 Immature Grans, Absolute: 0.03 [RS]   1330 Glucose(!): 107 [RS]   1330 BUN: 11 [RS]   1330 Creatinine(!): 0.53 [RS]   1330 Sodium: 140 [RS]   1330 Potassium: 4.4 [RS]   1330 Lipase: 19 [RS]   1420 IV contrasted CT abdomen pelvis reviewed and discussed with Dr. Sutherland, radiologist along with review of the patient's history.  Ultimately, she believes the patient has a fluid collection in the presacral space consistent with recurrent abscess. [RS]   1420 I reviewed findings and need for discussion of case with general surgery and possible need for admission with the patient's parents.  They are agreeable. [RS]   1518 No word back yet from general surgery. [RS]   1535  Leukocytes, UA: Negative [RS]   1535 Nitrite, UA: Negative [RS]   1603 CONSULT        Provider: Dr. Rubio-[facility]    Discussion: Reviewed patient history, ED presentation and evaluation as well as pending general surgery consult.  He is agreeable accept the patient for observation admission to [facility] floor for further evaluation and treatment.  We discussed the possibility of IV antibiotics but collectively we decided the patient is afebrile with no leukocytosis and the treatment for his disease will actually be drainage, we can hold off on antibiotics General surgery is consulted and made recommendations.  Agreeable c treatment and planned disposition.         [RS]      ED Course User Index  [RS] John Paul Mckinley MD       AS OF 16:05 EST VITALS:    BP - 105/68  HR - 105  TEMP - 97.6 °F (36.4 °C) (Tympanic)  O2 SATS - 94%        DIAGNOSIS  Final diagnoses:   Perirectal abscess   Cerebral palsy, unspecified type (HCC)   Chronic anoxic encephalopathy (HCC)         DISPOSITION  ADMISSION    Discussed treatment plan and reason for admission with pt/family and admitting physician.  Pt/family voiced understanding of the plan for admission for further testing/treatment as needed.          John Paul Mckinley MD  02/02/23 [id number]

## 2023-02-02 NOTE — ED TRIAGE NOTES
Mom reports pt has a hx of anal abscess.  He has been bleeding from his bottom.  He has a colonostomy    Patient was placed in face mask during first look triage.  Patient was wearing a face mask throughout encounter.  I wore personal protective equipment throughout the encounter.  Hand hygiene was performed before and after patient encounter.

## 2023-02-02 NOTE — ED NOTES
Nursing report ED to floor  Alex Brown  32 y.o.  male    HPI :   Chief Complaint   Patient presents with    possible abscess       Admitting doctor:   Kali Rubio MD    Admitting diagnosis:   The primary encounter diagnosis was Perirectal abscess. Diagnoses of Cerebral palsy, unspecified type (HCC) and Chronic anoxic encephalopathy (HCC) were also pertinent to this visit.    Code status:   Current Code Status       Date Active Code Status Order ID Comments User Context       Prior            Allergies:   Benadryl [diphenhydramine], Ceclor [cefaclor], Dilaudid [hydromorphone hcl], Milk-related compounds, Reglan [metoclopramide], and Thorazine [chlorpromazine]    Isolation:   No active isolations    Intake and Output    Intake/Output Summary (Last 24 hours) at 2/2/2023 1639  Last data filed at 2/2/2023 1622  Gross per 24 hour   Intake 500 ml   Output --   Net 500 ml       Weight:   There were no vitals filed for this visit.    Most recent vitals:   Vitals:    02/02/23 1401 02/02/23 1431 02/02/23 1500 02/02/23 1530   BP: 107/69 100/64 107/67 105/68   Pulse: 108 107 105 105   Resp:       Temp:       TempSrc:       SpO2: 93% 93%  94%       Active LDAs/IV Access:   Lines, Drains & Airways       Active LDAs       Name Placement date Placement time Site Days    Peripheral IV 02/02/23 1157 Anterior;Right Hand 02/02/23  1157  Hand  less than 1    Closed/Suction Drain Right RLQ Bulb 19 Fr. 07/09/19  1539  RLQ  1304    Colostomy LUQ 03/25/20  2245  LUQ  1043    Colostomy LUQ --  --  LUQ  --                    Labs (abnormal labs have a star):   Labs Reviewed   COMPREHENSIVE METABOLIC PANEL - Abnormal; Notable for the following components:       Result Value    Glucose 107 (*)     Creatinine 0.53 (*)     Chloride 110 (*)     Calcium 8.5 (*)     Albumin 3.3 (*)     All other components within normal limits    Narrative:     GFR Normal >60  Chronic Kidney Disease <60  Kidney Failure <15     CBC WITH AUTO  DIFFERENTIAL - Abnormal; Notable for the following components:    MCHC 30.8 (*)     Monocytes, Absolute 1.03 (*)     All other components within normal limits   PROTIME-INR - Normal   APTT - Normal   LIPASE - Normal   URINALYSIS W/ MICROSCOPIC IF INDICATED (NO CULTURE) - Normal    Narrative:     Urine microscopic not indicated.   CBC AND DIFFERENTIAL    Narrative:     The following orders were created for panel order CBC & Differential.  Procedure                               Abnormality         Status                     ---------                               -----------         ------                     CBC Auto Differential[[id number]]        Abnormal            Final result                 Please view results for these tests on the individual orders.       EKG:   No orders to display       Meds given in ED:   Medications   sodium chloride 0.9 % flush 10 mL (has no administration in time range)   sodium chloride 0.9 % bolus 500 mL (0 mL Intravenous Stopped 2/2/23 1622)     Followed by   sodium chloride 0.9 % infusion (125 mL/hr Intravenous New Bag 2/2/23 1622)   morphine injection 4 mg (4 mg Intravenous Given 2/2/23 1203)   sodium chloride 0.9 % bolus 500 mL (0 mL Intravenous Stopped 2/2/23 1440)   iopamidol (ISOVUE-300) 61 % injection 100 mL (85 mL Intravenous Given by Other 2/2/23 1331)   morphine injection 4 mg (4 mg Intravenous Given 2/2/23 1621)       Imaging results:  CT Abdomen Pelvis With Contrast    Result Date: 2/2/2023  1. No evidence of bowel obstruction. Ileostomy site is unremarkable. 2. There is a presacral coccygeal pelvic fluid collection which is thick-walled and enhancing.  These findings were discussed with Dr. John Paul Mckinley by telephone.  Radiation dose reduction techniques were utilized, including automated exposure control and exposure modulation based on body size.        Ambulatory status:   - bed bound, wheel chair    Social issues:   Social History     Socioeconomic History    Marital  status: Single   Tobacco Use    Smoking status: Never    Smokeless tobacco: Never   Vaping Use    Vaping Use: Never used   Substance and Sexual Activity    Alcohol use: No    Drug use: No    Sexual activity: Never       NIH Stroke Scale:         Yuliana Sheppard RN  02/02/23 16:39 EST

## 2023-02-02 NOTE — PROGRESS NOTES
Clinical Pharmacy Services: Medication History    Alex Brown is a 32 y.o. male presenting to [facility] for   Chief Complaint   Patient presents with   • possible abscess       He  has a past medical history of Anemia (2011), Anoxic encephalopathy ([facility]), CP (cerebral palsy) ([facility]) (02/18/2018), Epilepsy ([facility]), GERD (gastroesophageal reflux disease), H/O ulcerative colitis (12/01/2011), History of aspiration pneumonia, History of MRSA infection (2012), Iron deficiency anemia, PONV (postoperative nausea and vomiting), Rectal bleeding (Ulcerative Colitis), Recurrent sinus infections, Seizures ([facility]), Seizures ([facility]) (2009), and Small bowel obstruction ([facility]) (05/26/2021).    Allergies as of 02/02/2023 - Reviewed 02/02/2023   Allergen Reaction Noted   • Benadryl [diphenhydramine] Shortness Of Breath and Other (See Comments) 04/08/2016   • Ceclor [cefaclor] Rash 11/15/2013   • Dilaudid [hydromorphone hcl] Itching 04/08/2016   • Milk-related compounds Diarrhea 02/19/2018   • Reglan [metoclopramide] Other (See Comments) 04/09/2016   • Thorazine [chlorpromazine] Other (See Comments) 11/15/2013       Medication information was obtained from: Mother  Pharmacy and Phone Number:     Prior to Admission Medications     Prescriptions Last Dose Informant Patient Reported? Taking?    lansoprazole (PREVACID) 15 MG capsule  Mother Yes Yes    Take 15 mg by mouth Daily.    multivitamin (THERAGRAN) tablet tablet  Mother Yes Yes    Take 1 tablet by mouth Daily.            Medication notes: Per pt mother pt is not taking any prescription medications.     This medication list is complete to the best of my knowledge as of 2/2/2023    Please call if questions.    Duke Rosales  Medication History Technician   [phone]    2/2/2023 14:31 EST

## 2023-02-02 NOTE — CASE MANAGEMENT/SOCIAL WORK
Continued Stay Note  [facility]     Patient Name: Alex Brown  MRN: [id number]  Today's Date: 2/2/2023    Admit Date: 2/2/2023        Discharge Plan     Row Name 02/02/23 1627       Plan    Plan Comments Pt's mother is his legal guardian, Rosy Brown. Permission obtained to treat. Guardianship papers placed on the chart. Informed manager, Velma PALUMBO               Discharge Codes    No documentation.                     Princess Vale RN

## 2023-02-03 PROBLEM — K61.1 PERIRECTAL ABSCESS: Status: RESOLVED | Noted: 2023-02-02 | Resolved: 2023-02-03

## 2023-02-03 PROBLEM — K65.1 PELVIC ABSCESS IN MALE: Status: ACTIVE | Noted: 2023-02-03

## 2023-02-03 LAB
ANION GAP SERPL CALCULATED.3IONS-SCNC: 7.9 MMOL/L (ref 5–15)
BASOPHILS # BLD AUTO: 0.03 10*3/MM3 (ref 0–0.2)
BASOPHILS NFR BLD AUTO: 0.3 % (ref 0–1.5)
BUN SERPL-MCNC: 6 MG/DL (ref 6–20)
BUN/CREAT SERPL: 9.8 (ref 7–25)
CALCIUM SPEC-SCNC: 8.7 MG/DL (ref 8.6–10.5)
CHLORIDE SERPL-SCNC: 107 MMOL/L (ref 98–107)
CO2 SERPL-SCNC: 25.1 MMOL/L (ref 22–29)
CREAT SERPL-MCNC: 0.61 MG/DL (ref 0.76–1.27)
DEPRECATED RDW RBC AUTO: 42.2 FL (ref 37–54)
EGFRCR SERPLBLD CKD-EPI 2021: 129.3 ML/MIN/1.73
EOSINOPHIL # BLD AUTO: 0.23 10*3/MM3 (ref 0–0.4)
EOSINOPHIL NFR BLD AUTO: 2.1 % (ref 0.3–6.2)
ERYTHROCYTE [DISTWIDTH] IN BLOOD BY AUTOMATED COUNT: 12.7 % (ref 12.3–15.4)
GLUCOSE SERPL-MCNC: 91 MG/DL (ref 65–99)
HCT VFR BLD AUTO: 39.2 % (ref 37.5–51)
HGB BLD-MCNC: 12.7 G/DL (ref 13–17.7)
IMM GRANULOCYTES # BLD AUTO: 0.03 10*3/MM3 (ref 0–0.05)
IMM GRANULOCYTES NFR BLD AUTO: 0.3 % (ref 0–0.5)
LYMPHOCYTES # BLD AUTO: 1.73 10*3/MM3 (ref 0.7–3.1)
LYMPHOCYTES NFR BLD AUTO: 16.1 % (ref 19.6–45.3)
MCH RBC QN AUTO: 29.3 PG (ref 26.6–33)
MCHC RBC AUTO-ENTMCNC: 32.4 G/DL (ref 31.5–35.7)
MCV RBC AUTO: 90.5 FL (ref 79–97)
MONOCYTES # BLD AUTO: 0.66 10*3/MM3 (ref 0.1–0.9)
MONOCYTES NFR BLD AUTO: 6.2 % (ref 5–12)
NEUTROPHILS NFR BLD AUTO: 75 % (ref 42.7–76)
NEUTROPHILS NFR BLD AUTO: 8.04 10*3/MM3 (ref 1.7–7)
NRBC BLD AUTO-RTO: 0 /100 WBC (ref 0–0.2)
PLATELET # BLD AUTO: 289 10*3/MM3 (ref 140–450)
PMV BLD AUTO: 9.4 FL (ref 6–12)
POTASSIUM SERPL-SCNC: 3.7 MMOL/L (ref 3.5–5.2)
RBC # BLD AUTO: 4.33 10*6/MM3 (ref 4.14–5.8)
SODIUM SERPL-SCNC: 140 MMOL/L (ref 136–145)
VANCOMYCIN TROUGH SERPL-MCNC: 9.8 MCG/ML (ref 5–20)
WBC NRBC COR # BLD: 10.72 10*3/MM3 (ref 3.4–10.8)

## 2023-02-03 PROCEDURE — 80202 ASSAY OF VANCOMYCIN: CPT | Performed by: INTERNAL MEDICINE

## 2023-02-03 PROCEDURE — 99214 OFFICE O/P EST MOD 30 MIN: CPT | Performed by: SURGERY

## 2023-02-03 PROCEDURE — G0378 HOSPITAL OBSERVATION PER HR: HCPCS

## 2023-02-03 PROCEDURE — 87077 CULTURE AEROBIC IDENTIFY: CPT | Performed by: SURGERY

## 2023-02-03 PROCEDURE — 80048 BASIC METABOLIC PNL TOTAL CA: CPT | Performed by: INTERNAL MEDICINE

## 2023-02-03 PROCEDURE — 87205 SMEAR GRAM STAIN: CPT | Performed by: SURGERY

## 2023-02-03 PROCEDURE — 25010000002 VANCOMYCIN PER 500 MG: Performed by: INTERNAL MEDICINE

## 2023-02-03 PROCEDURE — 96366 THER/PROPH/DIAG IV INF ADDON: CPT

## 2023-02-03 PROCEDURE — 25010000002 PIPERACILLIN SOD-TAZOBACTAM PER 1 G: Performed by: INTERNAL MEDICINE

## 2023-02-03 PROCEDURE — 85025 COMPLETE CBC W/AUTO DIFF WBC: CPT | Performed by: INTERNAL MEDICINE

## 2023-02-03 PROCEDURE — 87186 SC STD MICRODIL/AGAR DIL: CPT | Performed by: SURGERY

## 2023-02-03 PROCEDURE — 96375 TX/PRO/DX INJ NEW DRUG ADDON: CPT

## 2023-02-03 PROCEDURE — 87070 CULTURE OTHR SPECIMN AEROBIC: CPT | Performed by: SURGERY

## 2023-02-03 PROCEDURE — 96361 HYDRATE IV INFUSION ADD-ON: CPT

## 2023-02-03 RX ADMIN — SODIUM CHLORIDE 125 ML/HR: 9 INJECTION, SOLUTION INTRAVENOUS at 04:07

## 2023-02-03 RX ADMIN — HYDROCODONE BITARTRATE AND ACETAMINOPHEN 10 ML: 7.5; 325 SOLUTION ORAL at 18:57

## 2023-02-03 RX ADMIN — HYDROCODONE BITARTRATE AND ACETAMINOPHEN 10 ML: 7.5; 325 SOLUTION ORAL at 05:14

## 2023-02-03 RX ADMIN — Medication 10 ML: at 09:31

## 2023-02-03 RX ADMIN — TAZOBACTAM SODIUM AND PIPERACILLIN SODIUM 3.38 G: 375; 3 INJECTION, SOLUTION INTRAVENOUS at 11:59

## 2023-02-03 RX ADMIN — TAZOBACTAM SODIUM AND PIPERACILLIN SODIUM 3.38 G: 375; 3 INJECTION, SOLUTION INTRAVENOUS at 21:59

## 2023-02-03 RX ADMIN — VANCOMYCIN HYDROCHLORIDE 1000 MG: 1 INJECTION, SOLUTION INTRAVENOUS at 20:16

## 2023-02-03 RX ADMIN — FAMOTIDINE 20 MG: 10 INJECTION INTRAVENOUS at 20:15

## 2023-02-03 RX ADMIN — VANCOMYCIN HYDROCHLORIDE 1000 MG: 1 INJECTION, SOLUTION INTRAVENOUS at 09:31

## 2023-02-03 RX ADMIN — SODIUM CHLORIDE 125 ML/HR: 9 INJECTION, SOLUTION INTRAVENOUS at 20:17

## 2023-02-03 RX ADMIN — HYDROCODONE BITARTRATE AND ACETAMINOPHEN 10 ML: 7.5; 325 SOLUTION ORAL at 11:12

## 2023-02-03 RX ADMIN — Medication 10 ML: at 20:16

## 2023-02-03 RX ADMIN — TAZOBACTAM SODIUM AND PIPERACILLIN SODIUM 3.38 G: 375; 3 INJECTION, SOLUTION INTRAVENOUS at 04:06

## 2023-02-03 RX ADMIN — FAMOTIDINE 20 MG: 10 INJECTION INTRAVENOUS at 09:31

## 2023-02-03 NOTE — PROGRESS NOTES
Name: Alex Brown ADMIT: 2023   : 1990  PCP: Kira Almaraz MD    MRN: [id number] LOS: 0 days   AGE/SEX: 32 y.o. male  ROOM: [facility]     Subjective   Subjective   Patient is aphasic at baseline.  History obtained from parents who are at bedside.  They report that they do not see any new issues today and he seems at his baseline.    Objective   Objective   Vital Signs  Temp:  [97 °F (36.1 °C)-100.3 °F (37.9 °C)] 97 °F (36.1 °C)  Heart Rate:  [] 90  Resp:  [16-18] 16  BP: (100-122)/(64-75) 106/66  SpO2:  [93 %-96 %] 93 %  on   ;   Device (Oxygen Therapy): room air  Body mass index is 16.87 kg/m².  Physical Exam  Constitutional:       General: He is not in acute distress.  Cardiovascular:      Rate and Rhythm: Normal rate and regular rhythm.      Heart sounds: No murmur heard.    No gallop.   Pulmonary:      Effort: Pulmonary effort is normal. No respiratory distress.      Breath sounds: Normal breath sounds.   Abdominal:      General: Abdomen is flat. There is no distension.      Tenderness: There is no abdominal tenderness.      Comments: Ostomy bag with brown stool   Musculoskeletal:      Right lower leg: No edema.      Left lower leg: No edema.      Comments: Contractures of extremities   Skin:     General: Skin is warm.      Coloration: Skin is not jaundiced.   Neurological:      Mental Status: He is alert. Mental status is at baseline.         Results Review     I reviewed the patient's new clinical results.  Results from last 7 days   Lab Units 23  0744 23  1156   WBC 10*3/mm3 10.72 10.16   HEMOGLOBIN g/dL 12.7* 13.2   PLATELETS 10*3/mm3 289 345     Results from last 7 days   Lab Units 23  0744 23  1240   SODIUM mmol/L 140 140   POTASSIUM mmol/L 3.7 4.4   CHLORIDE mmol/L 107 110*   CO2 mmol/L 25.1 22.3   BUN mg/dL 6 11   CREATININE mg/dL 0.61* 0.53*   GLUCOSE mg/dL 91 107*   EGFR mL/min/1.73 129.3 134.9     Results from last 7 days   Lab Units  02/02/23  1240   ALBUMIN g/dL 3.3*   BILIRUBIN mg/dL <0.2   ALK PHOS U/L 74   AST (SGOT) U/L 21   ALT (SGPT) U/L 15     Results from last 7 days   Lab Units 02/03/23  0744 02/02/23  1240   CALCIUM mg/dL 8.7 8.5*   ALBUMIN g/dL  --  3.3*     Results from last 7 days   Lab Units 02/02/23  2036   LACTATE mmol/L 1.6     No results found for: HGBA1C, POCGLU    CT Abdomen Pelvis With Contrast    Result Date: 2/3/2023  No evidence of bowel obstruction. Ileostomy site is unremarkable. There is a presacral/ coccygeal pelvic fluid collection which is thick-walled and enhancing.  These findings were discussed with Dr. John Paul Mckinley by telephone.  Radiation dose reduction techniques were utilized, including automated exposure control and exposure modulation based on body size.  This report was finalized on 2/3/2023 1:22 PM by Dr. Avelina Sams M.D.      Scheduled Medications  famotidine, 20 mg, Intravenous, Q12H  piperacillin-tazobactam, 3.375 g, Intravenous, Q8H  sodium chloride, 10 mL, Intravenous, Q12H  vancomycin, 1,000 mg, Intravenous, Q12H    Infusions  Pharmacy to dose vancomycin,   sodium chloride, 125 mL/hr, Last Rate: 125 mL/hr (02/03/23 1138)    Diet  NPO Diet NPO Type: Sips with Meds       Assessment/Plan     Active Hospital Problems    Diagnosis  POA   • **Pelvic abscess in male (HCC) [K65.1]  Unknown   • Severe malnutrition (HCC) [E43]  Yes   • Anoxic encephalopathy (HCC) [G93.1]  Yes   • CP (cerebral palsy) (HCC) [G80.9]  Yes   • Anoxic brain damage (HCC) [G93.1]  Yes   • Seizure disorder (HCC) [G40.909]  Yes      Resolved Hospital Problems    Diagnosis Date Resolved POA   • Perirectal abscess [K61.1] 02/03/2023 Yes       32 y.o. male admitted with Pelvic abscess in male (HCC).      02/03/23  Await general surgery input    Pelvic abscess  -CT A/P (2/3/2023): No evidence of bowel obstruction; presacral coccygea /pelvic fluid collection, thick-walled and enhancing  -General surgery consulted  -cont  vanc/Zosyn    Ulcerative colitis (s/p colectomy w/ ileostomy)  -follows with Dr Ballard    Cerebral palsy  -Listed history of seizure disorder, though patient is not on any medicines    · SCDs for DVT prophylaxis.  · Full code.  · Discussed with patient and family.  · Anticipate discharge home when cleared by consultants      John Paul Gonzales MD  [facility]  02/03/23  14:00 EST

## 2023-02-03 NOTE — CASE MANAGEMENT/SOCIAL WORK
Continued Stay Note  [facility]     Patient Name: Alex Brown  MRN: [id number]  Today's Date: 2/3/2023    Admit Date: 2/2/2023    Plan: DC home with parents, no needs   Discharge Plan     Row Name 02/03/23 1443       Plan    Plan DC home with parents, no needs    Plan Comments CCP spoke to pts mom, Rosy, introduced self, role and reviewed face sheet.  Pt is aphasic and cannot communicate with CCP. Rosy, pts mom said pt lives with them in a single story home with 2 steps to enter the home.  There are also 2 ramps for wheelchair use.  Pt is totally dependent for all ADL's.  Parents provide all care.  They have a transport wheelchair, shower bench, toilet with safety straps, and bed with rails. Pt can bear weight and pivot but he does not ambulate.  Pt has never been to rehab.  They did use HH once years ago for IV antibx.  We are currently unsure if he will need IV antibx again with this admission.  Pt goes to adult day 2 times a week from 9a-3p.  parents transport.  He is enrolled in a waiver program for personal care but since August of 2022 there is no staff available to assist.  Parents have no needs at this time.  Plans are to dc home on dc.  They have a lift seat on their van and can transport.  CCP will f/u for poss IV antibx.  CCP will continue to follow. Marion Pedro               Discharge Codes    No documentation.                     Marion Law

## 2023-02-03 NOTE — CONSULTS
ASSESSMENT/PLAN:    32-year-old gentleman with acute pain and infection on top of chronic draining perineal wound resulting from previous total proctocolectomy for severe ulcerative colitis.  He has already improved with initiation of intravenous antibiotics.  I obtained a wound culture from the perineum today.  He is currently on Zosyn and vancomycin which are appropriate choices based on his prior culture results.  We will continue to monitor his clinical response to this and adjust antibiotics according to culture results moving forward.  Should he not respond to antibiotics only, neck step would be transperineal drainage of the pelvic collection.  All was discussed with patient's parents.    CC:     Pelvic pain    HPI:    32-year-old gentleman with cerebral palsy secondary to severe anoxic injury at birth who has a chronic pelvic fluid collection secondary to need for total proctocolectomy for severe ulcerative colitis years ago.  He recently developed increasing discomfort trying to sit and increasing drainage from chronic perineal wound that became foul-smelling.    RADIOLOGY:   • CT abdomen pelvis yesterday demonstrated presacral/coccygeal pelvic fluid collection.  I reviewed the images and the collection is fairly small.  It is also consistent with chronic collection that he has had there in the past.  This has been addressed both with percutaneous and surgical drainage and persistently recurs.    LABS:    •   • Potassium 3.7  • WBC 10.7  • Hemoglobin 12.7  • Platelets 289  • Urinalysis negative  • LFTs normal  • Wound culture 8/5/2020 heavy growth Streptococcus (considered to be universally susceptible to penicillin)  • Wound culture 6/27/2019: MRSA light growth    SOCIAL HISTORY:   • No tobacco use  • No alcohol use    FAMILY HISTORY:    • Colorectal cancer: Negative    PREVIOUS ABDOMINAL SURGERY    • Exploratory laparotomy for lysis of adhesions for bowel obstruction 5/28/2021  • Exploratory  laparotomy with lysis of adhesions 3/25/2020  • Open drainage pelvic abscess 7/9/2019  • Laparoscopic small bowel resection for intrapelvic fistula with pelvic abscess 7/17/2015  • Total proctocolectomy with end ileostomy 1/3/2012 for chronic severe ulcerative colitis    PAST MEDICAL HISTORY:    • I heard ulcerative colitis  • Anoxic brain injury at birth  • Seizure disorder  • Cerebral palsy    MEDICATIONS:   · Reviewed    ALLERGIES:   • Reviewed    PHYSICAL EXAM:   • Constitutional: Well-developed well-nourished, no acute distress  • Vital signs:   o Maximum temperature since admission 100.3, afebrile last 12 hours  o Heart rate 93  o Respiratory rate 16  o Blood pressure 123/78  o Weight: 104 pounds  o Height: 66 inches  o BMI: 16.9  • Respiratory: Normal nonlabored inspiratory effort  • Cardiovascular: Regular rate, no jugular venous distention  • Gastrointestinal: Soft, nontender  • Perineum: Small persistent perineal opening with mild purulent drainage  • Psychiatric: Alert and oriented ×3, normal affect     YOGI COATS M.D.

## 2023-02-03 NOTE — DISCHARGE PLACEMENT REQUEST
"Elroy Brown (32 y.o. Male)     Date of Birth   1990    Social Security Number       Address   [address] [place], [place] [address] [id number]    Home Phone   [phone]    MRN   [id number]       Rastafari   Hoahaoism    Marital Status   Single                            Admission Date   2/2/23    Admission Type   Emergency    Admitting Provider   Kali Rubio MD    Attending Provider   John Paul Gonzales MD    Department, Room/Bed   [address], 83/[address]       Discharge Date       Discharge Disposition       Discharge Destination                               Attending Provider: John Paul Gonzales MD    Allergies: Benadryl [Diphenhydramine], Vancomycin, Ceclor [Cefaclor], Dilaudid [Hydromorphone Hcl], Milk-related Compounds, Reglan [Metoclopramide], Thorazine [Chlorpromazine]    Isolation: Contact   Infection: MRSA/History Only (05/13/21)   Code Status: CPR    Ht: 167.6 cm (65.98\")   Wt: 47.4 kg (104 lb 8 oz)    Admission Cmt: None   Principal Problem: Pelvic abscess in male (HCC) [K65.1]                 Active Insurance as of 2/2/2023     Primary Coverage     Payor Plan Insurance Group Employer/Plan Group    ANTHEM BLUE CROSS ANTHEM BLUE CROSS BLUE SHIELD [facility] [id number]     Payor Plan Address Payor Plan Phone Number Payor Plan Fax Number Effective Dates    [address] [phone]  1/1/2012 - None Entered    [facility] [id number]       Subscriber Name Subscriber Birth Date Member ID       AGUEDA BROWN 10/4/1947 [id number]           Secondary Coverage     Payor Plan Insurance Group Employer/Plan Group    [place]      Payor Plan Address Payor Plan Phone Number Payor Plan Fax Number Effective Dates    [address] [phone]  4/8/2016 - None Entered    [facility] [id number]       Subscriber Name Subscriber Birth Date Member ID       ELROY BROWN 1990 [id number]                 Emergency Contacts      (Rel.) Home " Phone Work Phone Mobile Phone    Rosy Brown (Mother) [phone] -- [phone]    Jung Brown (Father) [phone] -- [phone]

## 2023-02-03 NOTE — PROGRESS NOTES
"[facility] Clinical Pharmacy Services: Vancomycin Pharmacokinetic Initial Consult Note    Alex Brown is a 32 y.o. male who is on day 1 of pharmacy to dose vancomycin.    Indication: Skin and Soft Tissue  Consulting Provider: Dr. Rubio  Planned Duration of Therapy: 7 days  Loading Dose Ordered or Given: 1000 mg on 2/2 at ~2100  MRSA PCR performed: no  Culture/Source: 2/2 Bcx x2 - pending  Target: -600 mg/L.hr   Other Antimicrobials: Zosyn    Vitals/Labs  Ht: 167.6 cm (65.98\"); Wt: 47.4 kg (104 lb 8 oz)  Temp Readings from Last 1 Encounters:   02/02/23 100.3 °F (37.9 °C) (Oral)    Estimated Creatinine Clearance: 131.7 mL/min (A) (by C-G formula based on SCr of 0.53 mg/dL (L)).        Results from last 7 days   Lab Units 02/02/23  1240 02/02/23  1156   CREATININE mg/dL 0.53*  --    WBC 10*3/mm3  --  10.16     Assessment/Plan:    Vancomycin Dose:   1000 mg IV every  12  hours  Predictive AUC level for the dose ordered is 479 mg/L.hr, which is within the target of 400-600 mg/L.hr  Vanc Trough has been ordered for 2/3 at 0800     Pharmacy will follow patient's kidney function and will adjust doses and obtain levels as necessary. Thank you for involving pharmacy in this patient's care. Please contact pharmacy with any questions or concerns.                           Quyen Wheeler, [facility]  Clinical Pharmacist    "

## 2023-02-03 NOTE — PLAN OF CARE
Goal Outcome Evaluation:  Plan of Care Reviewed With: patient           Outcome Evaluation: VSS. Patient is nonverbal baseline. Temp below 100. IVF and iv abx currently infusing. Ostomy care. Family at bedside. NPO until surgery sees in am. Will continue to monitor. All questions answered with verbalize understanding.

## 2023-02-03 NOTE — PROGRESS NOTES
"Nutrition Services    Patient Name:  Alex Brown  YOB: 1990  MRN: [id number]  Admit Date:  2/2/2023    Assessment Date:  02/03/23    CLINICAL NUTRITION ASSESSMENT     Encounter Information         Reason for Encounter Low BMI-16.87    Admitting Diagnosis Pelvic abscess in male     Pertinent Medical History Anoxic brain damage, cerebral palsy, seizure disorder    Current Issues Visited patient at bedside whose parents were present. He is NPO until surgery. His mother explained his baseline eating which consists of no dairy (patient drinks rice milk due to intolerance to soy). He is prone to choking and eats ground meats only. Patient has gained a few pounds and UBW reported to be in the 90s. Patient is nonverbal, smiling, and has spastic movement s/t cerebral palsy. RD completed NFPE and little to no signs of malnutrition despite low BMI. He appears well cared for by his family. Will monitor for diet to advance after surgery.      Current Nutrition Orders & Evaluation of Intake       Oral Nutrition     Current PO Diet NPO Diet NPO Type: Sips with Meds   Supplement    PO Evaluation     Trending % PO Intake    --  Anthropometrics          Height    Weight Height: 167.6 cm (65.98\")  Weight: 47.4 kg (104 lb 8 oz) (02/02/23 1940)    BMI kg/m2 Body mass index is 16.87 kg/m².    Weight Trend/Change Gain    Weight History  Wt Readings from Last 15 Encounters:   02/02/23 1940 47.4 kg (104 lb 8 oz)   06/12/21 0309 41 kg (90 lb 4.8 oz)   06/11/21 0500 37.6 kg (83 lb)   06/10/21 1811 37.8 kg (83 lb 5.3 oz)   05/26/21 1730 44.9 kg (99 lb)   05/26/21 0728 44.9 kg (99 lb)   05/13/21 1054 43.1 kg (95 lb)   08/05/20 1155 43.1 kg (95 lb)   03/30/20 1259 43.1 kg (95 lb)   03/25/20 1803 44.9 kg (99 lb)   11/20/19 0937 42.7 kg (94 lb 3.2 oz)   07/09/19 1218 44.5 kg (98 lb)   07/03/19 1516 47.6 kg (105 lb)   06/27/19 0853 47.6 kg (105 lb)   03/11/19 1027 46.7 kg (103 lb)   03/12/18 1225 46.7 kg (103 lb)   03/12/18 " "0952 45.4 kg (100 lb)   02/18/18 2248 46.4 kg (102 lb 6.4 oz)   02/18/18 1754 45.4 kg (100 lb)      --  Estimated/Assessed Needs       Energy Requirements    Height for Calculation  Height: 167.6 cm (65.98\")   Weight for Calculation 104# (47.4 kg)    Method for Estimation  30 kcal/kg, 35 kcal/kg   EST Needs (kcal/day) 3544-6580       Protein Requirements    Weight for Calculation 104# (47.4 kg)    EST Protein Needs (g/kg) 1.0 - 1.2 gm/kg   EST Daily Needs (g/day) 47-57       Fluid Requirements     Method for Estimation 1 mL/kcal    Estimated Needs (mL/day) 2610-8385       Fluid Deficit    Current Na Level (mEq/L)    Desired Na Level (mEq/L)    Estimated Fluid Deficit (L)       Labs        Pertinent Labs Reviewed, listed below     Results from last 7 days   Lab Units 02/03/23  0744 02/02/23  1240   SODIUM mmol/L 140 140   POTASSIUM mmol/L 3.7 4.4   CHLORIDE mmol/L 107 110*   CO2 mmol/L 25.1 22.3   BUN mg/dL 6 11   CREATININE mg/dL 0.61* 0.53*   CALCIUM mg/dL 8.7 8.5*   BILIRUBIN mg/dL  --  <0.2   ALK PHOS U/L  --  74   ALT (SGPT) U/L  --  15   AST (SGOT) U/L  --  21   GLUCOSE mg/dL 91 107*     Results from last 7 days   Lab Units 02/03/23  0744 02/02/23  1240   HEMOGLOBIN g/dL 12.7*  --    HEMATOCRIT % 39.2  --    WBC 10*3/mm3 10.72  --    ALBUMIN g/dL  --  3.3*     Results from last 7 days   Lab Units 02/03/23  0744 02/02/23  1156   INR   --  1.03   APTT seconds  --  29.2   PLATELETS 10*3/mm3 289 345     COVID19   Date Value Ref Range Status   06/10/2021 Not Detected Not Detected - Ref. Range Final     No results found for: HGBA1C       Medications            Scheduled Medications famotidine, 20 mg, Intravenous, Q12H  piperacillin-tazobactam, 3.375 g, Intravenous, Q8H  sodium chloride, 10 mL, Intravenous, Q12H  vancomycin, 1,000 mg, Intravenous, Q12H        Infusions Pharmacy to dose vancomycin,   sodium chloride, 125 mL/hr, Last Rate: 125 mL/hr (02/03/23 9720)        PRN Medications •  acetaminophen **OR** " acetaminophen **OR** acetaminophen  •  HYDROcodone-acetaminophen  •  ondansetron  •  Pharmacy to dose vancomycin  •  [COMPLETED] Insert Peripheral IV **AND** sodium chloride  •  sodium chloride  •  sodium chloride     Physical Findings         Skin, GI, Oral, LDA Alert, nonverbal, oral WNL, last BM 2/3, skin intact     NFPE Consented to exam    --  Fat Loss Unable  None  Mild  Moderate Severe   Orbital  x      Upper Arm  x      Thoracic  x       Muscle Loss         Temple  x      Clavicle   x      Acromion   x      Scapular  x       Dorsal Hand   x      Patellar   x     Thigh    x     Calf     x      PES STATEMENT / NUTRITION DIAGNOSIS      Nutrition Dx Problem  Problem: Altered GI Function  Etiology: Medical Diagnosis pelvic abscess   Signs/Symptoms: NPO    Comment:      NUTRITION INTERVENTION / PLAN OF CARE  Intervention Goal        Intervention Goal(s) Reduce/improve symptoms, Meet estimated needs, Establish PO intake, Tolerate PO , Advance diet and Appropriate weight gain     Nutrition Intervention        RD Action Interview for preferences, Follow Tx Progress and Care plan reviewed     Nutrition Prescription          Diet      Supplement/Snack    EN/PN      Prescription Ordered No changes at this time     Monitor/Evaluation        Monitor Per protocol   Discharge Needs Pending clinical course   Education Will instruct as appropriate     RD to follow up per protocol.    Electronically signed by:  June Cummings RD  02/03/23 15:28 EST

## 2023-02-03 NOTE — H&P
[facility] Admission H&P    Patient Care Team:  Kira Almaraz MD as PCP - General  Kira Almaraz MD as PCP - Family Medicine  Vinh Ballard MD as Surgeon (General Surgery)    Chief complaint: Fever, drainage from rectum, rectal pain    History of Present Illness    This is a 32-year-old male with history of anoxic brain injury at birth, cerebral palsy, seizure disorder, history of ulcerative colitis status post colectomy with ileostomy, history of presacral abscess and MRSA who presented to the emergency room with fever, bloody drainage from the rectum, and rectal pain.  Patient is nonverbal at baseline and his mother provides all history.  His temperature is gotten as high as 101.3.  Over the past week she has noticed that he is not wanting to sit in his wheelchair or on the toilet.  He tries to contort his body to offload pressure from his rectum.  She states that he has had scant drainage from the rectum for a long time but this is worse over the past week as well.  In the emergency room he had a CT scan of the abdomen pelvis that showed a enhancing, thick-walled presacral fluid collection.  His temperature has reached 100.3 and he has associated tachycardia.  General surgery has been consulted I been asked admit him for further care.    Past Medical History:   Diagnosis Date   • Anemia 2011   • Anoxic encephalopathy (HCC)     @Birth   • CP (cerebral palsy) (Formerly [facility]) 02/18/2018   • Epilepsy (Formerly [facility])    • GERD (gastroesophageal reflux disease)    • H/O ulcerative colitis 12/01/2011    Transverse colon Severe ulcerative colitis-Dr. Pop Baez   • History of aspiration pneumonia    • History of MRSA infection 2012    TREATED BY DR. BALLARD   • Iron deficiency anemia    • PONV (postoperative nausea and vomiting)    • Rectal bleeding Ulcerative Colitis   • Recurrent sinus infections    • Seizures (Formerly [facility])     Type of tonic clonic seizure disorder   • Seizures (Formerly [facility]) 2009   • Small bowel obstruction  (Formerly [facility]) 05/26/2021     Past Surgical History:   Procedure Laterality Date   • ABDOMINOPERINEAL PROCTOCOLECTOMY N/A 01/03/2012    Laparoscopic total proctocolectomy with end ileostomy-Dr. Vinh Ballard [facility]   • CIRCUMCISION N/A    • COLON RESECTION SMALL BOWEL N/A 07/17/2015    Dr. Vinh Ballard [facility]   • COLONOSCOPY N/A 12/01/2011    Severe ulcerative colitits to the mid tranverse colon-Dr. Pop Baez [facility]   • CYSTIC HYGROMA EXCISION     • ENDOSCOPY N/A 02/27/2015    No gross lesions in esophagus, erythematous mucosa in the stomach: biopsied, no gross lesions in the duodenum: biopsied-Dr. José Manuel Mayberry [facility]   • EXPLORATORY LAPAROTOMY N/A 7/9/2019    Procedure: LAPAROTOMY EXPLORATORY WITH DRAINAGE OF PELVIC ABSCESS;  Surgeon: Vinh Ballard MD;  Location: [facility];  Service: General   • EXPLORATORY LAPAROTOMY N/A 3/25/2020    Procedure: EXPLORATORY LAPAROTOMY,OPEN ADHESIOLYSIS,REPAIR OF PERINEUM;  Surgeon: Kali Wilcox MD;  Location: [facility] OR;  Service: General;  Laterality: N/A;   • EXPLORATORY LAPAROTOMY N/A 5/28/2021    Procedure: LAPAROTOMY EXPLORATORY with lysis of adhesions;  Surgeon: Vinh Ballard MD;  Location: [facility];  Service: General;  Laterality: N/A;   • ILEOSCOPY N/A 02/27/2015    Normal ileoscopy with the exception of some erosions at the distal ileum: biopsied-Dr. José Manuel Mayberry [facility]   • MOLE REMOVAL     • PELVIC ABCESS DRAINAGE N/A 07/11/2015    Irrigation and drainage of a pelvic abscess-Dr. Kali Wilcox [facility]   • TESTICLE UNDESCENDED REPAIR       Family History   Problem Relation Age of Onset   • Prostate cancer Father    • Malig Hyperthermia Neg Hx      Social History     Tobacco Use   • Smoking status: Never   • Smokeless tobacco: Never   Vaping Use   • Vaping Use: Never used   Substance Use Topics   • Alcohol use: No   • Drug use: No     Medications Prior to Admission   Medication Sig Dispense Refill Last Dose   • lansoprazole (PREVACID) 15 MG capsule Take  15 mg by mouth Daily.      • multivitamin (THERAGRAN) tablet tablet Take 1 tablet by mouth Daily.        Allergies:  Benadryl [diphenhydramine], Ceclor [cefaclor], Dilaudid [hydromorphone hcl], Milk-related compounds, Reglan [metoclopramide], and Thorazine [chlorpromazine]    Review of Systems   Unable to perform ROS: Patient nonverbal        PHYSICAL EXAM    Vital Signs  tMax 24 hrs:  Temp (24hrs), Av °F (37.2 °C), Min:97.6 °F (36.4 °C), Max:100.3 °F (37.9 °C)    Vitals Ranges:  Temp:  [97.6 °F (36.4 °C)-100.3 °F (37.9 °C)] 100.3 °F (37.9 °C)  Heart Rate:  [100-123] 123  Resp:  [16] 16  BP: (100-123)/(64-89) 122/70    Physical Exam  Vitals and nursing note reviewed.   Constitutional:       General: He is not in acute distress.     Appearance: He is well-developed. He is not ill-appearing.      Comments: Appears chronically ill   HENT:      Head: Normocephalic and atraumatic.      Nose: Nose normal.      Mouth/Throat:      Mouth: Mucous membranes are not dry.   Eyes:      Conjunctiva/sclera: Conjunctivae normal.      Pupils: Pupils are equal, round, and reactive to light.   Neck:      Vascular: No JVD.   Cardiovascular:      Rate and Rhythm: Normal rate and regular rhythm.      Heart sounds: No murmur heard.    No gallop.   Pulmonary:      Effort: Pulmonary effort is normal. No accessory muscle usage or respiratory distress.      Breath sounds: No decreased breath sounds or wheezing.   Abdominal:      General: Bowel sounds are normal. There is no distension.      Palpations: Abdomen is soft.      Tenderness: There is no abdominal tenderness. There is no guarding or rebound.      Comments: Ileostomy in place with soft stool in the appliance.  No abdominal pain.  Positive bowel sounds.   Musculoskeletal:         General: No deformity. Normal range of motion.      Cervical back: Normal range of motion and neck supple.      Comments: Contracted upper and lower extremities with muscle wasting   Lymphadenopathy:       Cervical: No cervical adenopathy.   Skin:     General: Skin is warm and dry.      Findings: No erythema or rash.   Neurological:      Mental Status: He is alert.      Comments: Patient is nonverbal.  Cognitive deficit consistent with cerebral palsy and anoxic brain injury         Results Review:  Results from last 7 days   Lab Units 02/02/23  1156   WBC 10*3/mm3 10.16   HEMOGLOBIN g/dL 13.2   HEMATOCRIT % 42.9   PLATELETS 10*3/mm3 345     Results from last 7 days   Lab Units 02/02/23  1240   SODIUM mmol/L 140   POTASSIUM mmol/L 4.4   CHLORIDE mmol/L 110*   CO2 mmol/L 22.3   BUN mg/dL 11   CREATININE mg/dL 0.53*   CALCIUM mg/dL 8.5*   BILIRUBIN mg/dL <0.2   ALK PHOS U/L 74   ALT (SGPT) U/L 15   AST (SGOT) U/L 21   GLUCOSE mg/dL 107*        I reviewed the patient's new clinical results.  I reviewed the patient's new imaging results and agree with the interpretation.        Active Hospital Problems    Diagnosis  POA   • **Perirectal abscess [K61.1]  Yes   • Severe malnutrition (HCC) [E43]  Yes   • Anoxic encephalopathy (HCC) [G93.1]  Yes   • CP (cerebral palsy) (HCC) [G80.9]  Yes   • Anoxic brain damage (HCC) [G93.1]  Yes   • Seizure disorder (HCC) [G40.909]  Yes      Resolved Hospital Problems   No resolved problems to display.       Assessment & Plan    The patient will be admitted.  His mother reports fever as high as 101.3 at home.  Current temperature is 100.3 and he has associated tachycardia.  We will start broad-spectrum antibiotics and IV fluids.  General surgery has been consulted for the presacral fluid collection/abscess seen on CT this evening.  Blood cultures have been collected.  We will provide gentle pain regimen.  N.p.o. after midnight tonight in case he needs intervention in the morning.  Additional plans based on his clinical course.    I discussed the patients findings and my recommendations with family      Kali Rubio MD  02/02/23  19:00 EST

## 2023-02-03 NOTE — PROGRESS NOTES
[facility] Clinical Pharmacy Services: Vancomycin Level Monitoring Note    Alex Brown is a 32 y.o. male who is on day 2/7 of pharmacy to dose vancomycin for Skin and Soft Tissue.    Estimated Creatinine Clearance: 114.4 mL/min (A) (by C-G formula based on SCr of 0.61 mg/dL (L)).    Current Vanc Dose: 1000 mg IV every 12 hours  Results from last 7 days   Lab Units 02/03/23  0744   VANCOMYCIN TR mcg/mL 9.80   Predicted AUC at current dose: 509 mg/L.hr  Next Level Date and Time: Vanc Trough on 2/5 @ 0830    No changes at this time. Pharmacy is continuing to monitor and will adjust as needed.    Skyler Whitley III, PharmD  Clinical Pharmacist

## 2023-02-04 VITALS
OXYGEN SATURATION: 97 % | BODY MASS INDEX: 16.79 KG/M2 | HEIGHT: 66 IN | HEART RATE: 97 BPM | SYSTOLIC BLOOD PRESSURE: 107 MMHG | DIASTOLIC BLOOD PRESSURE: 72 MMHG | RESPIRATION RATE: 16 BRPM | TEMPERATURE: 97.1 F | WEIGHT: 104.5 LBS

## 2023-02-04 LAB
ANION GAP SERPL CALCULATED.3IONS-SCNC: 8.7 MMOL/L (ref 5–15)
BUN SERPL-MCNC: 5 MG/DL (ref 6–20)
BUN/CREAT SERPL: 5.6 (ref 7–25)
CALCIUM SPEC-SCNC: 8.8 MG/DL (ref 8.6–10.5)
CHLORIDE SERPL-SCNC: 110 MMOL/L (ref 98–107)
CO2 SERPL-SCNC: 23.3 MMOL/L (ref 22–29)
CREAT SERPL-MCNC: 0.89 MG/DL (ref 0.76–1.27)
DEPRECATED RDW RBC AUTO: 40.7 FL (ref 37–54)
EGFRCR SERPLBLD CKD-EPI 2021: 115.3 ML/MIN/1.73
ERYTHROCYTE [DISTWIDTH] IN BLOOD BY AUTOMATED COUNT: 12.4 % (ref 12.3–15.4)
GLUCOSE SERPL-MCNC: 86 MG/DL (ref 65–99)
HCT VFR BLD AUTO: 37.9 % (ref 37.5–51)
HGB BLD-MCNC: 12.4 G/DL (ref 13–17.7)
MCH RBC QN AUTO: 29.5 PG (ref 26.6–33)
MCHC RBC AUTO-ENTMCNC: 32.7 G/DL (ref 31.5–35.7)
MCV RBC AUTO: 90 FL (ref 79–97)
PLATELET # BLD AUTO: 280 10*3/MM3 (ref 140–450)
PMV BLD AUTO: 9.5 FL (ref 6–12)
POTASSIUM SERPL-SCNC: 3.5 MMOL/L (ref 3.5–5.2)
RBC # BLD AUTO: 4.21 10*6/MM3 (ref 4.14–5.8)
SODIUM SERPL-SCNC: 142 MMOL/L (ref 136–145)
WBC NRBC COR # BLD: 11.6 10*3/MM3 (ref 3.4–10.8)

## 2023-02-04 PROCEDURE — 80048 BASIC METABOLIC PNL TOTAL CA: CPT | Performed by: STUDENT IN AN ORGANIZED HEALTH CARE EDUCATION/TRAINING PROGRAM

## 2023-02-04 PROCEDURE — 99238 HOSP IP/OBS DSCHRG MGMT 30/<: CPT | Performed by: SURGERY

## 2023-02-04 PROCEDURE — 96375 TX/PRO/DX INJ NEW DRUG ADDON: CPT

## 2023-02-04 PROCEDURE — G0378 HOSPITAL OBSERVATION PER HR: HCPCS

## 2023-02-04 PROCEDURE — 25010000002 VANCOMYCIN PER 500 MG: Performed by: INTERNAL MEDICINE

## 2023-02-04 PROCEDURE — 25010000002 PIPERACILLIN SOD-TAZOBACTAM PER 1 G: Performed by: INTERNAL MEDICINE

## 2023-02-04 PROCEDURE — 85027 COMPLETE CBC AUTOMATED: CPT | Performed by: STUDENT IN AN ORGANIZED HEALTH CARE EDUCATION/TRAINING PROGRAM

## 2023-02-04 PROCEDURE — 96366 THER/PROPH/DIAG IV INF ADDON: CPT

## 2023-02-04 RX ORDER — CIPROFLOXACIN 500 MG/1
500 TABLET, FILM COATED ORAL 2 TIMES DAILY
Qty: 14 TABLET | Refills: 0 | Status: SHIPPED | OUTPATIENT
Start: 2023-02-04 | End: 2023-02-11

## 2023-02-04 RX ADMIN — VANCOMYCIN HYDROCHLORIDE 1000 MG: 1 INJECTION, SOLUTION INTRAVENOUS at 09:12

## 2023-02-04 RX ADMIN — TAZOBACTAM SODIUM AND PIPERACILLIN SODIUM 3.38 G: 375; 3 INJECTION, SOLUTION INTRAVENOUS at 03:50

## 2023-02-04 RX ADMIN — FAMOTIDINE 20 MG: 10 INJECTION INTRAVENOUS at 09:12

## 2023-02-04 RX ADMIN — TAZOBACTAM SODIUM AND PIPERACILLIN SODIUM 3.38 G: 375; 3 INJECTION, SOLUTION INTRAVENOUS at 11:31

## 2023-02-04 RX ADMIN — HYDROCODONE BITARTRATE AND ACETAMINOPHEN 10 ML: 7.5; 325 SOLUTION ORAL at 11:57

## 2023-02-04 RX ADMIN — HYDROCODONE BITARTRATE AND ACETAMINOPHEN 10 ML: 7.5; 325 SOLUTION ORAL at 03:49

## 2023-02-04 NOTE — PLAN OF CARE
Goal Outcome Evaluation:           Progress: no change  Outcome Evaluation: Pt rested throughout the shift. PRN Hycet given for pain with good effect. IVF and antibiotics continued per orders. Pts family requesting to assist with changing pt and repositioning him, asked them to let us know if they needed any assistance. VSS. Wound culture sent. Diet restarted and pt ate dinner without difficulty. Placed in contact precautions. Family updated on POC. Will CTM. Low air loss mattress ordered, not delivered this shift, should come tomorrow from [employer].

## 2023-02-04 NOTE — PROGRESS NOTES
Name: Alex Brown ADMIT: 2023   : 1990  PCP: Kira Almaraz MD    MRN: [id number] LOS: 0 days   AGE/SEX: 32 y.o. male  ROOM: [facility]     Subjective   Subjective   Patient seen at bedside.       Objective   Objective   Vital Signs  Temp:  [97 °F (36.1 °C)-100 °F (37.8 °C)] 97.1 °F (36.2 °C)  Heart Rate:  [] 97  Resp:  [16] 16  BP: (105-125)/(64-76) 107/72  SpO2:  [97 %-98 %] 97 %  on   ;   Device (Oxygen Therapy): room air  Body mass index is 16.87 kg/m².  Physical Exam   General, awake and alert.  Head and ENT, normocephalic and atraumatic.  Lungs, symmetric expansion, equal air entry bilaterally.  Heart, regular rate and rhythm.  Abdomen, ostomy bag noted  Extremities, no clubbing or cyanosis.  Neuro, no focal deficits.  Skin: Warm and no rash.  Psych, normal mood and affect.  Musculoskeletal, joint examination is grossly normal.    Results Review     I reviewed the patient's new clinical results.  Results from last 7 days   Lab Units 23  0705 23  0744 23  1156   WBC 10*3/mm3 11.60* 10.72 10.16   HEMOGLOBIN g/dL 12.4* 12.7* 13.2   PLATELETS 10*3/mm3 280 289 345     Results from last 7 days   Lab Units 23  0705 23  0744 23  1240   SODIUM mmol/L 142 140 140   POTASSIUM mmol/L 3.5 3.7 4.4   CHLORIDE mmol/L 110* 107 110*   CO2 mmol/L 23.3 25.1 22.3   BUN mg/dL 5* 6 11   CREATININE mg/dL 0.89 0.61* 0.53*   GLUCOSE mg/dL 86 91 107*   EGFR mL/min/1.73 115.3 129.3 134.9     Results from last 7 days   Lab Units 23  1240   ALBUMIN g/dL 3.3*   BILIRUBIN mg/dL <0.2   ALK PHOS U/L 74   AST (SGOT) U/L 21   ALT (SGPT) U/L 15     Results from last 7 days   Lab Units 23  0705 23  0744 23  1240   CALCIUM mg/dL 8.8 8.7 8.5*   ALBUMIN g/dL  --   --  3.3*     Results from last 7 days   Lab Units 23  2036   LACTATE mmol/L 1.6     No results found for: HGBA1C, POCGLU    No radiology results for the last day  I have personally reviewed  all medications:  Scheduled Medications  famotidine, 20 mg, Intravenous, Q12H  piperacillin-tazobactam, 3.375 g, Intravenous, Q8H  sodium chloride, 10 mL, Intravenous, Q12H  vancomycin, 1,000 mg, Intravenous, Q12H    Infusions  Pharmacy to dose vancomycin,   sodium chloride, 125 mL/hr, Last Rate: 125 mL/hr (02/03/23 2017)    Diet  Diet: Regular/House Diet; Texture: Mechanical Ground (NDD 2); Fluid Consistency: Thin (IDDSI 0)    I have personally reviewed:  [x]  Laboratory   [x]  Microbiology   [x]  Radiology   [x]  EKG/Telemetry  [x]  Cardiology/Vascular   [x]  Pathology    [x]  Records       Assessment/Plan     Active Hospital Problems    Diagnosis  POA   • **Pelvic abscess in male (HCC) [K65.1]  Unknown   • Severe malnutrition (HCC) [E43]  Yes   • Anoxic encephalopathy (HCC) [G93.1]  Yes   • CP (cerebral palsy) ([facility]) [G80.9]  Yes   • Anoxic brain damage (HCC) [G93.1]  Yes   • Seizure disorder (Formerly [facility]) [G40.909]  Yes      Resolved Hospital Problems    Diagnosis Date Resolved POA   • Perirectal abscess [K61.1] 02/03/2023 Yes       32 y.o. male admitted with Pelvic abscess in male (HCC).    Assessment and plan  32-year-old male, with past medical history significant for anoxic brain injury, cerebral palsy, seizure disorder, got admitted to the hospital with pelvic abscess.  General surgery evaluation was requested, broad-spectrum antibiotics were initiated.  Patient has history of ulcerative colitis, follows surgery on outpatient basis.  Patient is status post colectomy with ileostomy.  Patient medically appears to be stable to be discharged today.  I have seen and examined patient at bedside.    Joon Amaya MD  [facility]  02/04/23  15:51 EST

## 2023-02-04 NOTE — PLAN OF CARE
Goal Outcome Evaluation:              Outcome Evaluation: PT being discharged home in care of parents, plan of care discussed, pain medicaiton called in per Dr Keane to [place]. Pt transitioned to PO abx, follow up appointments will be made by mother upon discharge, plan of care discussed with parents at bs with Dr. Wright and Dr Ballard. Family to transport pt home.

## 2023-02-04 NOTE — DISCHARGE INSTRUCTIONS
1.  I have placed orders for discharge, discussed wound care with patient's mother  2.  Prescription sent to their pharmacy for ciprofloxacin, I will contact his mother early next week when final culture results come back if adjustments need to be made to antibiotics

## 2023-02-04 NOTE — PLAN OF CARE
Eyes open to voice, unable speak. Colostomy. Brief in place. Mother at bedside, assists in care. VSS. Room air. IVF infusing. Continued antibiotics. Plans to DC pending cultures.     Problem: Adult Inpatient Plan of Care  Goal: Absence of Hospital-Acquired Illness or Injury  Intervention: Prevent Skin Injury  Recent Flowsheet Documentation  Taken 2/4/2023 0355 by Corine Dolan RN  Body Position: sitting up in bed  Taken 2/4/2023 0156 by Corine Dolan RN  Body Position:   right   side-lying  Taken 2/3/2023 2349 by Corine Dolan RN  Body Position:   left   side-lying  Taken 2/3/2023 2202 by Corine Dolan RN  Body Position: sitting up in bed  Taken 2/3/2023 1951 by Corine Dolan RN  Body Position:   right   side-lying  Skin Protection:   adhesive use limited   incontinence pads utilized   Goal Outcome Evaluation:

## 2023-02-04 NOTE — DISCHARGE SUMMARY
DATE OF ADMIT: 2/2/2023    DATE OF DISCHARGE: 2/4/2023    DIAGNOSIS: Pelvic abscess    SUMMARY OF HOSPITAL COURSE:   32-year-old bedridden individual with severe cerebral palsy who underwent total proctocolectomy with end ileostomy and January 2012 for chronic severe ulcerative colitis.  He has had persistent pelvic fluid collection which has been treated with antibiotics, CT-guided drainage, and even open evacuation and debridement but has nonetheless remained a persistent chronic problem with intermittent acute flares of infection.  Current admission was prompted by increased discomfort with normal daily activities and increased malodorous drainage from his chronic perineal wound.  He had low-grade fever and tachycardia when admitted and this quickly resolved with initiation of broad-spectrum antibiotics.  I cultured the drainage from the perineal wound.  By date of discharge, he was afebrile and improving clinically.  Options of continued broad-spectrum intravenous antibiotic coverage while remaining in hospital versus discharge on broad-spectrum antibiotics orally with adjustments made after final culture and sensitivity returns as outpatient discussed.  Patient's mother preferred to have him home as it is much easier to take care of him at home and I will call her when final culture and sensitivity returns to adjust antibiotics if necessary.    DIET: Regular    ACTIVITY: Ad monik.    MEDICATIONS: No changes made to his preadmission medications.  I sent in prescription for ciprofloxacin 500 mg p.o. twice daily for 7 days.  Augmentin was discussed but patient's mother indicates that he generally tolerates fluoroquinolones better.    FOLLOW-UP: I will contact patient's mother when final culture and sensitivity results return.    Vinh Ballard M.D.

## 2023-02-06 LAB
BACTERIA SPEC AEROBE CULT: ABNORMAL
GRAM STN SPEC: ABNORMAL
GRAM STN SPEC: ABNORMAL

## 2023-02-06 NOTE — PROGRESS NOTES
Case Management Discharge Note      Final Note: Discharged to home on 2/4/23. GURINDER Yi RN, CCP         Selected Continued Care - Discharged on 2/4/2023 Admission date: 2/2/2023 - Discharge disposition: Home or Self Care    Destination    No services have been selected for the patient.              Durable Medical Equipment    No services have been selected for the patient.              Dialysis/Infusion    No services have been selected for the patient.              Home Medical Care    No services have been selected for the patient.              Therapy    No services have been selected for the patient.              Community Resources    No services have been selected for the patient.              Community & DME    No services have been selected for the patient.                  Transportation Services  Private: Car    Final Discharge Disposition Code: 01 - home or self-care

## 2023-02-07 LAB
BACTERIA SPEC AEROBE CULT: NORMAL
BACTERIA SPEC AEROBE CULT: NORMAL

## 2023-02-07 RX ORDER — SULFAMETHOXAZOLE AND TRIMETHOPRIM 800; 160 MG/1; MG/1
1 TABLET ORAL 2 TIMES DAILY
Qty: 20 TABLET | Refills: 0 | Status: SHIPPED | OUTPATIENT
Start: 2023-02-07 | End: 2023-02-17

## 2023-03-16 ENCOUNTER — TELEPHONE (OUTPATIENT)
Dept: SURGERY | Facility: CLINIC | Age: 33
End: 2023-03-16
Payer: COMMERCIAL

## 2023-03-16 DIAGNOSIS — K65.1 PELVIC ABSCESS IN MALE: Primary | ICD-10-CM

## 2023-03-16 RX ORDER — SULFAMETHOXAZOLE AND TRIMETHOPRIM 800; 160 MG/1; MG/1
1 TABLET ORAL 2 TIMES DAILY
Qty: 14 TABLET | Refills: 0 | Status: SHIPPED | OUTPATIENT
Start: 2023-03-16 | End: 2023-03-23

## 2023-03-16 NOTE — TELEPHONE ENCOUNTER
Patients mother called stating that the abscess has come back and would like to get patient in soon.

## 2023-03-23 ENCOUNTER — HOSPITAL ENCOUNTER (OUTPATIENT)
Dept: CT IMAGING | Facility: HOSPITAL | Age: 33
Discharge: HOME | End: 2023-03-23
Admitting: SURGERY
Payer: COMMERCIAL

## 2023-03-23 ENCOUNTER — OFFICE VISIT (OUTPATIENT)
Dept: SURGERY | Facility: CLINIC | Age: 33
End: 2023-03-23
Payer: COMMERCIAL

## 2023-03-23 VITALS — HEIGHT: 66 IN | BODY MASS INDEX: 16.71 KG/M2 | WEIGHT: 104 LBS

## 2023-03-23 DIAGNOSIS — K65.1 PELVIC ABSCESS IN MALE: ICD-10-CM

## 2023-03-23 DIAGNOSIS — K65.1 PELVIC ABSCESS IN MALE: Primary | ICD-10-CM

## 2023-03-23 PROCEDURE — 25510000001 IOPAMIDOL PER 1 ML: Performed by: SURGERY

## 2023-03-23 PROCEDURE — 99213 OFFICE O/P EST LOW 20 MIN: CPT | Performed by: SURGERY

## 2023-03-23 PROCEDURE — 74177 CT ABD & PELVIS W/CONTRAST: CPT

## 2023-03-23 RX ORDER — SULFAMETHOXAZOLE AND TRIMETHOPRIM 800; 160 MG/1; MG/1
1 TABLET ORAL 2 TIMES DAILY
Qty: 60 TABLET | Refills: 2 | Status: SHIPPED | OUTPATIENT
Start: 2023-03-23 | End: 2023-04-22

## 2023-03-23 RX ADMIN — IOPAMIDOL 100 ML: 755 INJECTION, SOLUTION INTRAVENOUS at 14:40

## 2023-03-26 ENCOUNTER — DOCUMENTATION (OUTPATIENT)
Dept: SURGERY | Facility: CLINIC | Age: 33
End: 2023-03-26
Payer: COMMERCIAL

## 2023-03-26 NOTE — PROGRESS NOTES
ASSESSMENT/PLAN:    32-year-old gentleman with chronic pelvic fluid collection and intermittent perineal drainage secondary to total proctocolectomy for severe ulcerative colitis many years ago.  He has had intermittent episodes where the drainage from his perineum becomes more foul-smelling, he appears more uncomfortable to his mother, and typically these episodes respond to antibiotics.  He has a CT scan scheduled for later today.  I discussed with his mother the options of nothing further if CT scan shows improvement or stability, CT-guided drainage of pelvic fluid collection, or chronic suppressive use of Bactrim.  Pros and cons of each option discussed.    CC:     Pelvic fluid collection follow-up    HPI:    32-year-old gentleman with chronic pelvic fluid collection and intermittent perineal drainage secondary to total proctocolectomy for severe ulcerative colitis years ago.  He was admitted to hospital in early February due to increasing discomfort and foul-smelling drainage which improved with antibiotics.  Following discharge from hospital, early last week he had discharge and blood as well as abdominal discomfort and was restarted on Bactrim.  His drainage started improving.  Bactrim was completed yesterday.  CT scan was scheduled for today.  He had fever of 100.9 at home last week but has had no fever or chills in the last few days.  He has had no issues taking the Bactrim.  Relevant review of systems negative other than presenting complaints.    RADIOLOGY:   • CT abdomen pelvis 2/2/2023 demonstrated presacral/coccygeal pelvic fluid collection.    LABS:    • Wound culture from perineal drainage on 2/3/2023 demonstrated Proteus, Klebsiella, and Staph aureus, all were sensitive to Bactrim    SOCIAL HISTORY:   • No tobacco use  • No alcohol use    FAMILY HISTORY:    • Colorectal cancer: Negative    PREVIOUS ABDOMINAL SURGERY    • Exploratory laparotomy for lysis of adhesions for bowel obstruction  "5/28/2021  • Exploratory laparotomy for lysis of adhesions 3/25/2020  • Open drainage pelvic abscess 7/9/2019  • Laparoscopic small bowel resection for intrapelvic fistula with pelvic abscess 7/17/2015  • Total proctocolectomy with end ileostomy 1/3/2012 for chronic severe ulcerative colitis    PAST MEDICAL HISTORY:    • Severe cerebral palsy secondary to anoxic injury at birth  • Ulcerative colitis  • Seizure disorder    MEDICATIONS:   • Prevacid  • Multivitamin    ALLERGIES:   • Vancomycin-\"infusion reaction syndrome\", medication needs to be run at half.  • Ceclor-rash (tolerated ceftriaxone in the past)  • Dilaudid-itching  • Lactose intolerance  • Reglan-restlessness  • Thorazine-restlessness    PHYSICAL EXAM:   • Constitutional: No acute distress  • Vital signs:   o Weight: 104 pounds  o Height: 65 inches  o BMI: 17  • Respiratory: Normal nonlabored inspiratory effort  • Cardiovascular: Regular rate, no jugular venous distention  • Gastrointestinal: Soft, nontender    YOGI COATS M.D.    "

## 2023-03-26 NOTE — PROGRESS NOTES
I spoke with his mother regarding the results of the CT scan.  We discussed options that were again reviewed during her office visit last week.  Given the alternatives, like to proceed with completing current course of Bactrim and then taking Bactrim on a daily basis in a chronic suppressive manner for the next few months and she is to call me in the conclusion that he is done.  Obviously she will call sooner if there is any worsening.

## 2023-05-03 ENCOUNTER — OFFICE VISIT (OUTPATIENT)
Dept: SURGERY | Facility: CLINIC | Age: 33
End: 2023-05-03
Payer: COMMERCIAL

## 2023-05-03 VITALS — WEIGHT: 103 LBS | HEIGHT: 66 IN | BODY MASS INDEX: 16.55 KG/M2

## 2023-05-03 DIAGNOSIS — K65.1 PELVIC ABSCESS IN MALE: Primary | ICD-10-CM

## 2023-05-03 RX ORDER — SULFAMETHOXAZOLE AND TRIMETHOPRIM 800; 160 MG/1; MG/1
1 TABLET ORAL 2 TIMES DAILY
Qty: 20 TABLET | Refills: 0 | Status: SHIPPED | OUTPATIENT
Start: 2023-05-03

## 2023-05-03 NOTE — LETTER
May 4, 2023       No Recipients    Patient: Alex Brown   YOB: 1990   Date of Visit: 5/3/2023       Dear Dr. Gallegos Recipients:    Thank you for referring Alex Brown to me for evaluation. Below are the relevant portions of my assessment and plan of care.    If you have questions, please do not hesitate to call me. I look forward to following Alex along with you.         Sincerely,        Rajan Keane MD        CC:   No Recipients    Rajan Keane MD  05/04/23 1001  Signed  ASSESSMENT/PLAN:    32-year-old gentleman with recurrent/chronic pelvic abscess and perineal drainage.  There was an area of fluctuance to the right and anterior of his chronic wound today from which I was able to express purulence through his chronic wound.  His parents report that he appeared much more comfortable after doing this.      I have recommended obtaining a CT abdomen and pelvis to assess for worsening undrained fluid collections within the perineum or the pelvis.  I recommended going back to Bactrim twice daily as he has developed worsening infectious complication when transitioning to once a day regimen.  Additionally I discussed with them pressing on the fluctuant area should it appear that fluid is accumulating to see if this will express out the open wound.  Refill given for Hycet (1 week) given discomfort related to the abscess.  I will call his mom and dad with the results of the CT scan once I have reviewed it.      CC:     Chief Complaint   Patient presents with   • Follow-up        HPI:    32-year-old gentleman with complex history related to prior total proctocolectomy with end ileostomy for ulcerative colitis in 2012.  He has had multiple procedures and operations for a chronic, recurrent pelvic abscess.  He was admitted on February 3, 2023 for a pelvic abscess.  His symptoms were improving on antibiotics and he was discharged home.  On follow-up imaging on 3/23/2023 there was stability of the  fluid collection and he was transition from Bactrim twice daily (prescribed based on cultures from the February admission) to once a day.  His parents report ongoing drainage from his perineal wound since that time.  Over the last week they report that he appears more uncomfortable and his mother is noticed an area of redness anterior and to the right of his chronic draining wound up near the scrotum.    RADIOLOGY:   CT abdomen pelvis from 3/23/2023 reviewed showing a chronic pelvic abscess that was stable compared to scan from 2/2/2023      SOCIAL HISTORY:   Social Determinants of Health     Tobacco Use: Low Risk    • Smoking Tobacco Use: Never   • Smokeless Tobacco Use: Never   • Passive Exposure: Not on file   Alcohol Use: Not At Risk   • Frequency of Alcohol Consumption: Never   • Average Number of Drinks: Patient does not drink   • Frequency of Binge Drinking: Never   Financial Resource Strain: Not on file   Food Insecurity: Not on file   Transportation Needs: Not on file   Physical Activity: Not on file   Stress: Not on file   Social Connections: Not on file   Intimate Partner Violence: Not on file   Depression: Not on file   Housing Stability: Not on file        FAMILY HISTORY:    Family History   Problem Relation Age of Onset   • Prostate cancer Father    • Cancer Father         Prostate cancer   • Diabetes Father    • Hypertension Father    • Malig Hyperthermia Neg Hx         OTHER SURGERY:  Past Surgical History:   Procedure Laterality Date   • ABDOMINOPERINEAL PROCTOCOLECTOMY N/A 01/03/2012    Laparoscopic total proctocolectomy with end ileostomy-Dr. Vinh Ballard, [facility]   • CIRCUMCISION N/A    • COLON RESECTION SMALL BOWEL N/A 07/17/2015    Dr. Vinh Ballard, [facility]   • COLONOSCOPY N/A 12/01/2011    Severe ulcerative colitits to the mid tranverse colon-Dr. Pop Baez, [facility]   • CYSTIC HYGROMA EXCISION     • ENDOSCOPY N/A 02/27/2015    No gross lesions in esophagus, erythematous mucosa in the stomach:  biopsied, no gross lesions in the duodenum: biopsied-Dr. José Manuel Mayberry, [facility]   • EXPLORATORY LAPAROTOMY N/A 7/9/2019    Procedure: LAPAROTOMY EXPLORATORY WITH DRAINAGE OF PELVIC ABSCESS;  Surgeon: Vinh Ballard MD;  Location: [facility] OR;  Service: General   • EXPLORATORY LAPAROTOMY N/A 3/25/2020    Procedure: EXPLORATORY LAPAROTOMY,OPEN ADHESIOLYSIS,REPAIR OF PERINEUM;  Surgeon: Kali Wilcox MD;  Location: [facility] MAIN OR;  Service: General;  Laterality: N/A;   • EXPLORATORY LAPAROTOMY N/A 5/28/2021    Procedure: LAPAROTOMY EXPLORATORY with lysis of adhesions;  Surgeon: Vinh Ballard MD;  Location: [facility] MAIN OR;  Service: General;  Laterality: N/A;   • ILEOSCOPY N/A 02/27/2015    Normal ileoscopy with the exception of some erosions at the distal ileum: biopsied-Dr. José Manuel Mayberry, [facility]   • MOLE REMOVAL     • PELVIC ABCESS DRAINAGE N/A 07/11/2015    Irrigation and drainage of a pelvic abscess-Dr. Kali Wilcox [facility]   • TESTICLE UNDESCENDED REPAIR          PAST MEDICAL HISTORY:    Past Medical History:   Diagnosis Date   • Anemia 2011   • Anoxic encephalopathy     @Birth   • CP (cerebral palsy) 02/18/2018   • Epilepsy    • GERD (gastroesophageal reflux disease)    • H/O ulcerative colitis 12/01/2011    Transverse colon Severe ulcerative colitis-Dr. Pop Baez   • History of aspiration pneumonia    • History of MRSA infection 2012    TREATED BY DR. BALLARD   • Iron deficiency anemia    • PONV (postoperative nausea and vomiting)    • Rectal bleeding Ulcerative Colitis   • Recurrent sinus infections    • Seizures     Type of tonic clonic seizure disorder   • Seizures 2009   • Small bowel obstruction 05/26/2021        MEDICATIONS:   Current Outpatient Medications on File Prior to Visit   Medication Sig Dispense Refill   • lansoprazole (PREVACID) 15 MG capsule Take 1 capsule by mouth Daily.     • multivitamin (THERAGRAN) tablet tablet Take 1 tablet by mouth Daily.       No current  facility-administered medications on file prior to visit.        ALLERGIES:   Allergies   Allergen Reactions   • Benadryl [Diphenhydramine] Shortness Of Breath and Other (See Comments)     Increased heart rate & extreme irritability   • Vancomycin Other (See Comments)     Possible vancomycin infusion reaction syndrome reported by family.  Medication needs to be run at half rate   • Ceclor [Cefaclor] Rash     Tolerated ceftriaxone on 6/10/21   • Dilaudid [Hydromorphone Hcl] Itching   • Milk-Related Compounds Diarrhea   • Reglan [Metoclopramide] Other (See Comments)     Physical restless distress   • Thorazine [Chlorpromazine] Other (See Comments)     Physical restless distress        PHYSICAL EXAM:   • Perineum: 1-1/2 cm open wound in the perineum, induration/fluctuance to the right and anterior of this which with manual expression I was able to have increased drainage from the wound    Rajan Keane M.D.  General and Endoscopic Surgery  [facility]    [address], [address]  [place], [place], [address]  P: [phone]  F: [phone]

## 2023-05-04 ENCOUNTER — HOSPITAL ENCOUNTER (OUTPATIENT)
Dept: CT IMAGING | Facility: HOSPITAL | Age: 33
Discharge: HOME | End: 2023-05-04
Admitting: SURGERY
Payer: COMMERCIAL

## 2023-05-04 DIAGNOSIS — K65.1 PELVIC ABSCESS IN MALE: ICD-10-CM

## 2023-05-04 PROCEDURE — 25510000001 IOPAMIDOL PER 1 ML: Performed by: SURGERY

## 2023-05-04 PROCEDURE — 74177 CT ABD & PELVIS W/CONTRAST: CPT

## 2023-05-04 RX ADMIN — IOPAMIDOL 100 ML: 755 INJECTION, SOLUTION INTRAVENOUS at 16:17

## 2023-05-04 NOTE — PROGRESS NOTES
ASSESSMENT/PLAN:    32-year-old gentleman with recurrent/chronic pelvic abscess and perineal drainage.  There was an area of fluctuance to the right and anterior of his chronic wound today from which I was able to express purulence through his chronic wound.  His parents report that he appeared much more comfortable after doing this.      I have recommended obtaining a CT abdomen and pelvis to assess for worsening undrained fluid collections within the perineum or the pelvis.  I recommended going back to Bactrim twice daily as he has developed worsening infectious complication when transitioning to once a day regimen.  Additionally I discussed with them pressing on the fluctuant area should it appear that fluid is accumulating to see if this will express out the open wound.  Refill given for Hycet (1 week) given discomfort related to the abscess.  I will call his mom and dad with the results of the CT scan once I have reviewed it.      CC:     Chief Complaint   Patient presents with   • Follow-up        HPI:    32-year-old gentleman with complex history related to prior total proctocolectomy with end ileostomy for ulcerative colitis in 2012.  He has had multiple procedures and operations for a chronic, recurrent pelvic abscess.  He was admitted on February 3, 2023 for a pelvic abscess.  His symptoms were improving on antibiotics and he was discharged home.  On follow-up imaging on 3/23/2023 there was stability of the fluid collection and he was transition from Bactrim twice daily (prescribed based on cultures from the February admission) to once a day.  His parents report ongoing drainage from his perineal wound since that time.  Over the last week they report that he appears more uncomfortable and his mother is noticed an area of redness anterior and to the right of his chronic draining wound up near the scrotum.    RADIOLOGY:   CT abdomen pelvis from 3/23/2023 reviewed showing a chronic pelvic abscess that was  stable compared to scan from 2/2/2023      SOCIAL HISTORY:   Social Determinants of Health     Tobacco Use: Low Risk    • Smoking Tobacco Use: Never   • Smokeless Tobacco Use: Never   • Passive Exposure: Not on file   Alcohol Use: Not At Risk   • Frequency of Alcohol Consumption: Never   • Average Number of Drinks: Patient does not drink   • Frequency of Binge Drinking: Never   Financial Resource Strain: Not on file   Food Insecurity: Not on file   Transportation Needs: Not on file   Physical Activity: Not on file   Stress: Not on file   Social Connections: Not on file   Intimate Partner Violence: Not on file   Depression: Not on file   Housing Stability: Not on file        FAMILY HISTORY:    Family History   Problem Relation Age of Onset   • Prostate cancer Father    • Cancer Father         Prostate cancer   • Diabetes Father    • Hypertension Father    • Malig Hyperthermia Neg Hx         OTHER SURGERY:  Past Surgical History:   Procedure Laterality Date   • ABDOMINOPERINEAL PROCTOCOLECTOMY N/A 01/03/2012    Laparoscopic total proctocolectomy with end ileostomy-Dr. Vinh Ballard, [facility]   • CIRCUMCISION N/A    • COLON RESECTION SMALL BOWEL N/A 07/17/2015    Dr. Vinh Ballard, [facility]   • COLONOSCOPY N/A 12/01/2011    Severe ulcerative colitits to the mid tranverse colon-Dr. Pop Baez, [facility]   • CYSTIC HYGROMA EXCISION     • ENDOSCOPY N/A 02/27/2015    No gross lesions in esophagus, erythematous mucosa in the stomach: biopsied, no gross lesions in the duodenum: biopsied-Dr. José Manuel Mayberry, [facility]   • EXPLORATORY LAPAROTOMY N/A 7/9/2019    Procedure: LAPAROTOMY EXPLORATORY WITH DRAINAGE OF PELVIC ABSCESS;  Surgeon: Vinh Ballard MD;  Location: [facility] OR;  Service: General   • EXPLORATORY LAPAROTOMY N/A 3/25/2020    Procedure: EXPLORATORY LAPAROTOMY,OPEN ADHESIOLYSIS,REPAIR OF PERINEUM;  Surgeon: Kali Wilcox MD;  Location: [facility] MAIN OR;  Service: General;  Laterality: N/A;   • EXPLORATORY LAPAROTOMY  N/A 5/28/2021    Procedure: LAPAROTOMY EXPLORATORY with lysis of adhesions;  Surgeon: Vinh Ballard MD;  Location: [facility] MAIN OR;  Service: General;  Laterality: N/A;   • ILEOSCOPY N/A 02/27/2015    Normal ileoscopy with the exception of some erosions at the distal ileum: biopsied-Dr. José Manuel Mayberry, [facility]   • MOLE REMOVAL     • PELVIC ABCESS DRAINAGE N/A 07/11/2015    Irrigation and drainage of a pelvic abscess-Dr. Kali Wilcox, [facility]   • TESTICLE UNDESCENDED REPAIR          PAST MEDICAL HISTORY:    Past Medical History:   Diagnosis Date   • Anemia 2011   • Anoxic encephalopathy     @Birth   • CP (cerebral palsy) 02/18/2018   • Epilepsy    • GERD (gastroesophageal reflux disease)    • H/O ulcerative colitis 12/01/2011    Transverse colon Severe ulcerative colitis-Dr. Pop Baez   • History of aspiration pneumonia    • History of MRSA infection 2012    TREATED BY DR. BALLARD   • Iron deficiency anemia    • PONV (postoperative nausea and vomiting)    • Rectal bleeding Ulcerative Colitis   • Recurrent sinus infections    • Seizures     Type of tonic clonic seizure disorder   • Seizures 2009   • Small bowel obstruction 05/26/2021        MEDICATIONS:   Current Outpatient Medications on File Prior to Visit   Medication Sig Dispense Refill   • lansoprazole (PREVACID) 15 MG capsule Take 1 capsule by mouth Daily.     • multivitamin (THERAGRAN) tablet tablet Take 1 tablet by mouth Daily.       No current facility-administered medications on file prior to visit.        ALLERGIES:   Allergies   Allergen Reactions   • Benadryl [Diphenhydramine] Shortness Of Breath and Other (See Comments)     Increased heart rate & extreme irritability   • Vancomycin Other (See Comments)     Possible vancomycin infusion reaction syndrome reported by family.  Medication needs to be run at half rate   • Ceclor [Cefaclor] Rash     Tolerated ceftriaxone on 6/10/21   • Dilaudid [Hydromorphone Hcl] Itching   • Milk-Related Compounds Diarrhea    • Reglan [Metoclopramide] Other (See Comments)     Physical restless distress   • Thorazine [Chlorpromazine] Other (See Comments)     Physical restless distress        PHYSICAL EXAM:   • Perineum: 1-1/2 cm open wound in the perineum, induration/fluctuance to the right and anterior of this which with manual expression I was able to have increased drainage from the wound    Rajan Keane M.D.  General and Endoscopic Surgery  [facility]    [address], [address]  [place], [place], [address]  P: [phone]  F: [phone]

## 2023-05-09 ENCOUNTER — TELEPHONE (OUTPATIENT)
Dept: SURGERY | Facility: CLINIC | Age: 33
End: 2023-05-09
Payer: COMMERCIAL

## 2023-05-09 NOTE — TELEPHONE ENCOUNTER
Pt calling about CT. Lakhwinder went over result and told them you would be in contact with them. The also called to let us know the problem is not getting any better. Wondering what you would recommend?

## 2023-05-10 ENCOUNTER — APPOINTMENT (OUTPATIENT)
Dept: CT IMAGING | Facility: HOSPITAL | Age: 33
DRG: 579 | End: 2023-05-10
Payer: COMMERCIAL

## 2023-05-10 ENCOUNTER — HOSPITAL ENCOUNTER (INPATIENT)
Facility: HOSPITAL | Age: 33
LOS: 6 days | Discharge: HOME | DRG: 579 | End: 2023-05-17
Attending: EMERGENCY MEDICINE | Admitting: SURGERY
Payer: COMMERCIAL

## 2023-05-10 DIAGNOSIS — L02.215 PERINEAL ABSCESS: Primary | ICD-10-CM

## 2023-05-10 LAB
ALBUMIN SERPL-MCNC: 3.9 G/DL (ref 3.5–5.2)
ALBUMIN/GLOB SERPL: 1.1 G/DL
ALP SERPL-CCNC: 84 U/L (ref 39–117)
ALT SERPL W P-5'-P-CCNC: 20 U/L (ref 1–41)
ANION GAP SERPL CALCULATED.3IONS-SCNC: 10.1 MMOL/L (ref 5–15)
APTT PPP: 29 SECONDS (ref 22.7–35.4)
AST SERPL-CCNC: 12 U/L (ref 1–40)
BASOPHILS # BLD AUTO: 0.04 10*3/MM3 (ref 0–0.2)
BASOPHILS NFR BLD AUTO: 0.4 % (ref 0–1.5)
BILIRUB SERPL-MCNC: <0.2 MG/DL (ref 0–1.2)
BUN SERPL-MCNC: 10 MG/DL (ref 6–20)
BUN/CREAT SERPL: 11.9 (ref 7–25)
CALCIUM SPEC-SCNC: 10.2 MG/DL (ref 8.6–10.5)
CHLORIDE SERPL-SCNC: 107 MMOL/L (ref 98–107)
CO2 SERPL-SCNC: 23.9 MMOL/L (ref 22–29)
CREAT SERPL-MCNC: 0.84 MG/DL (ref 0.76–1.27)
D-LACTATE SERPL-SCNC: 1.3 MMOL/L (ref 0.5–2)
DEPRECATED RDW RBC AUTO: 43.3 FL (ref 37–54)
EGFRCR SERPLBLD CKD-EPI 2021: 117.4 ML/MIN/1.73
EOSINOPHIL # BLD AUTO: 0.38 10*3/MM3 (ref 0–0.4)
EOSINOPHIL NFR BLD AUTO: 3.7 % (ref 0.3–6.2)
ERYTHROCYTE [DISTWIDTH] IN BLOOD BY AUTOMATED COUNT: 13.4 % (ref 12.3–15.4)
GLOBULIN UR ELPH-MCNC: 3.5 GM/DL
GLUCOSE SERPL-MCNC: 106 MG/DL (ref 65–99)
HCT VFR BLD AUTO: 39.5 % (ref 37.5–51)
HGB BLD-MCNC: 12.8 G/DL (ref 13–17.7)
IMM GRANULOCYTES # BLD AUTO: 0.03 10*3/MM3 (ref 0–0.05)
IMM GRANULOCYTES NFR BLD AUTO: 0.3 % (ref 0–0.5)
INR PPP: 1.05 (ref 0.9–1.1)
LYMPHOCYTES # BLD AUTO: 1.76 10*3/MM3 (ref 0.7–3.1)
LYMPHOCYTES NFR BLD AUTO: 17.3 % (ref 19.6–45.3)
MCH RBC QN AUTO: 28.4 PG (ref 26.6–33)
MCHC RBC AUTO-ENTMCNC: 32.4 G/DL (ref 31.5–35.7)
MCV RBC AUTO: 87.8 FL (ref 79–97)
MONOCYTES # BLD AUTO: 0.86 10*3/MM3 (ref 0.1–0.9)
MONOCYTES NFR BLD AUTO: 8.5 % (ref 5–12)
NEUTROPHILS NFR BLD AUTO: 69.8 % (ref 42.7–76)
NEUTROPHILS NFR BLD AUTO: 7.1 10*3/MM3 (ref 1.7–7)
NRBC BLD AUTO-RTO: 0 /100 WBC (ref 0–0.2)
PLATELET # BLD AUTO: 407 10*3/MM3 (ref 140–450)
PMV BLD AUTO: 9.3 FL (ref 6–12)
POTASSIUM SERPL-SCNC: 4.2 MMOL/L (ref 3.5–5.2)
PROCALCITONIN SERPL-MCNC: 0.05 NG/ML (ref 0–0.25)
PROT SERPL-MCNC: 7.4 G/DL (ref 6–8.5)
PROTHROMBIN TIME: 13.8 SECONDS (ref 11.7–14.2)
RBC # BLD AUTO: 4.5 10*6/MM3 (ref 4.14–5.8)
SODIUM SERPL-SCNC: 141 MMOL/L (ref 136–145)
WBC NRBC COR # BLD: 10.17 10*3/MM3 (ref 3.4–10.8)

## 2023-05-10 PROCEDURE — 85025 COMPLETE CBC W/AUTO DIFF WBC: CPT | Performed by: EMERGENCY MEDICINE

## 2023-05-10 PROCEDURE — 87070 CULTURE OTHR SPECIMN AEROBIC: CPT | Performed by: EMERGENCY MEDICINE

## 2023-05-10 PROCEDURE — 25010000002 AMPICILLIN-SULBACTAM PER 1.5 G: Performed by: SURGERY

## 2023-05-10 PROCEDURE — G0378 HOSPITAL OBSERVATION PER HR: HCPCS

## 2023-05-10 PROCEDURE — 36415 COLL VENOUS BLD VENIPUNCTURE: CPT

## 2023-05-10 PROCEDURE — 83605 ASSAY OF LACTIC ACID: CPT | Performed by: EMERGENCY MEDICINE

## 2023-05-10 PROCEDURE — 80053 COMPREHEN METABOLIC PANEL: CPT | Performed by: EMERGENCY MEDICINE

## 2023-05-10 PROCEDURE — 87205 SMEAR GRAM STAIN: CPT | Performed by: EMERGENCY MEDICINE

## 2023-05-10 PROCEDURE — 87040 BLOOD CULTURE FOR BACTERIA: CPT | Performed by: EMERGENCY MEDICINE

## 2023-05-10 PROCEDURE — 84145 PROCALCITONIN (PCT): CPT | Performed by: EMERGENCY MEDICINE

## 2023-05-10 PROCEDURE — 36415 COLL VENOUS BLD VENIPUNCTURE: CPT | Performed by: EMERGENCY MEDICINE

## 2023-05-10 PROCEDURE — 74177 CT ABD & PELVIS W/CONTRAST: CPT

## 2023-05-10 PROCEDURE — 85730 THROMBOPLASTIN TIME PARTIAL: CPT | Performed by: EMERGENCY MEDICINE

## 2023-05-10 PROCEDURE — 99285 EMERGENCY DEPT VISIT HI MDM: CPT

## 2023-05-10 PROCEDURE — 85610 PROTHROMBIN TIME: CPT | Performed by: EMERGENCY MEDICINE

## 2023-05-10 PROCEDURE — 25010000002 AMPICILLIN-SULBACTAM PER 1.5 G: Performed by: EMERGENCY MEDICINE

## 2023-05-10 PROCEDURE — 25510000001 IOPAMIDOL 61 % SOLUTION: Performed by: EMERGENCY MEDICINE

## 2023-05-10 RX ORDER — MORPHINE SULFATE 2 MG/ML
1 INJECTION, SOLUTION INTRAMUSCULAR; INTRAVENOUS EVERY 4 HOURS PRN
Status: DISCONTINUED | OUTPATIENT
Start: 2023-05-10 | End: 2023-05-17 | Stop reason: HOSPADM

## 2023-05-10 RX ORDER — NALOXONE HCL 0.4 MG/ML
0.4 VIAL (ML) INJECTION
Status: DISCONTINUED | OUTPATIENT
Start: 2023-05-10 | End: 2023-05-17 | Stop reason: HOSPADM

## 2023-05-10 RX ORDER — CLINDAMYCIN PHOSPHATE 300 MG/50ML
300 INJECTION INTRAVENOUS EVERY 8 HOURS
Status: DISCONTINUED | OUTPATIENT
Start: 2023-05-10 | End: 2023-05-17 | Stop reason: HOSPADM

## 2023-05-10 RX ORDER — HYDROCODONE BITARTRATE AND ACETAMINOPHEN 5; 325 MG/1; MG/1
1 TABLET ORAL ONCE
Status: COMPLETED | OUTPATIENT
Start: 2023-05-10 | End: 2023-05-10

## 2023-05-10 RX ORDER — CLINDAMYCIN PHOSPHATE 600 MG/50ML
600 INJECTION INTRAVENOUS ONCE
Status: COMPLETED | OUTPATIENT
Start: 2023-05-10 | End: 2023-05-10

## 2023-05-10 RX ORDER — SODIUM CHLORIDE 0.9 % (FLUSH) 0.9 %
10 SYRINGE (ML) INJECTION AS NEEDED
Status: DISCONTINUED | OUTPATIENT
Start: 2023-05-10 | End: 2023-05-17 | Stop reason: HOSPADM

## 2023-05-10 RX ORDER — SODIUM CHLORIDE 9 MG/ML
40 INJECTION, SOLUTION INTRAVENOUS AS NEEDED
Status: DISCONTINUED | OUTPATIENT
Start: 2023-05-10 | End: 2023-05-17 | Stop reason: HOSPADM

## 2023-05-10 RX ORDER — DIPHENOXYLATE HYDROCHLORIDE AND ATROPINE SULFATE 2.5; .025 MG/1; MG/1
1 TABLET ORAL DAILY
Status: DISCONTINUED | OUTPATIENT
Start: 2023-05-10 | End: 2023-05-17 | Stop reason: HOSPADM

## 2023-05-10 RX ORDER — SODIUM CHLORIDE 9 MG/ML
75 INJECTION, SOLUTION INTRAVENOUS CONTINUOUS
Status: DISCONTINUED | OUTPATIENT
Start: 2023-05-10 | End: 2023-05-12

## 2023-05-10 RX ORDER — PANTOPRAZOLE SODIUM 40 MG/1
40 TABLET, DELAYED RELEASE ORAL
Status: DISCONTINUED | OUTPATIENT
Start: 2023-05-11 | End: 2023-05-17 | Stop reason: HOSPADM

## 2023-05-10 RX ORDER — SODIUM CHLORIDE 0.9 % (FLUSH) 0.9 %
10 SYRINGE (ML) INJECTION EVERY 12 HOURS SCHEDULED
Status: DISCONTINUED | OUTPATIENT
Start: 2023-05-10 | End: 2023-05-17 | Stop reason: HOSPADM

## 2023-05-10 RX ADMIN — AMPICILLIN SODIUM AND SULBACTAM SODIUM 3 G: 2; 1 INJECTION, POWDER, FOR SOLUTION INTRAMUSCULAR; INTRAVENOUS at 17:13

## 2023-05-10 RX ADMIN — IOPAMIDOL 85 ML: 612 INJECTION, SOLUTION INTRAVENOUS at 13:30

## 2023-05-10 RX ADMIN — AMPICILLIN SODIUM AND SULBACTAM SODIUM 3 G: 2; 1 INJECTION, POWDER, FOR SOLUTION INTRAMUSCULAR; INTRAVENOUS at 23:37

## 2023-05-10 RX ADMIN — SODIUM CHLORIDE 75 ML/HR: 9 INJECTION, SOLUTION INTRAVENOUS at 20:11

## 2023-05-10 RX ADMIN — Medication 10 ML: at 20:12

## 2023-05-10 RX ADMIN — CLINDAMYCIN PHOSPHATE 300 MG: 300 INJECTION, SOLUTION INTRAVENOUS at 22:20

## 2023-05-10 RX ADMIN — SODIUM CHLORIDE, POTASSIUM CHLORIDE, SODIUM LACTATE AND CALCIUM CHLORIDE 1000 ML: 600; 310; 30; 20 INJECTION, SOLUTION INTRAVENOUS at 13:42

## 2023-05-10 RX ADMIN — CLINDAMYCIN PHOSPHATE 600 MG: 600 INJECTION, SOLUTION INTRAVENOUS at 13:45

## 2023-05-10 RX ADMIN — HYDROCODONE BITARTRATE AND ACETAMINOPHEN 1 TABLET: 5; 325 TABLET ORAL at 17:52

## 2023-05-10 NOTE — ED NOTES
Patient from home via pv; patient has abscess on buttock that has been looked at prior to this visit however is now having discharge, redness and swelling at the site.

## 2023-05-10 NOTE — CASE MANAGEMENT/SOCIAL WORK
Patient with paperwork on file confirming that his parents (Nora Brown) are his legal guardians. Parents are at bedside presently. Banner present on chart review. Contacted Velma HARDEN RN CCP manager, per policy. ROSANGELA Terrell RN

## 2023-05-10 NOTE — ED PROVIDER NOTES
EMERGENCY DEPARTMENT ENCOUNTER    Room Number:  22/22  Date seen:  5/10/2023  PCP: Kira Almaraz MD  Historian(s): Patient's mother and father      HPI:  Chief Complaint: Perineal abscess, worsening pain and drainage  A complete HPI/ROS/PMH/PSH/SH/FH are unobtainable / limited due to: Patient is nonverbal  Context: Alex Brown is a 32 y.o. male who presents to the ED from home for evaluation of a worsening abscess in the perineal region.  Patient has been taking Bactrim for over 1 week.  Apparently he has a known cyst in the low pelvis/perirectal region that had been monitored recently.  There was initial thought that perhaps he would need CT-guided drainage of it but on recent follow-ups there was improvement with antibiotics and so it has been managed in the outpatient setting.  However his mother says that now he is having increasing redness and swelling that is spreading into the scrotum area and right inguinal area and that he has had some foul purulent drainage noted in the diaper.  She said he has had some intolerance to stay seated in his chair because of the increased pain.  She gave him a hydrocodone tablet today because of his pain.        PAST MEDICAL HISTORY  Active Ambulatory Problems     Diagnosis Date Noted   • Perirectal cellulitis 04/08/2016   • Attack, epileptiform 11/07/2016   • Generalized abdominal pain 02/18/2018   • Anoxic brain damage (HCC) 02/19/2018   • Seizure disorder ([facility]) 02/19/2018   • Pelvic abscess in male 02/19/2018   • Postprocedural intraabdominal abscess 06/27/2019   • CP (cerebral palsy) ([facility]) 03/26/2020   • DAYDAY (iron deficiency anemia) 03/26/2020   • Partial small bowel obstruction 03/30/2020   • SBO (small bowel obstruction) 05/13/2021   • H/O ulcerative colitis 12/01/2011   • Anoxic encephalopathy ([facility])    • Vasovagal syncope    • Aspiration pneumonia of left lung due to vomit 05/31/2021   • Pneumonia of left lower lobe due to infectious organism 06/10/2021    • Severe malnutrition (HCC) 06/12/2021   • Pelvic abscess in male 02/03/2023     Resolved Ambulatory Problems     Diagnosis Date Noted   • Leukocytosis 02/19/2018   • SBO (small bowel obstruction) 03/25/2020   • Other specified injury of superior mesenteric vein, initial encounter 03/26/2020   • Hypoxia 06/01/2021   • Perirectal abscess 02/02/2023     Past Medical History:   Diagnosis Date   • Anemia 2011   • Epilepsy    • GERD (gastroesophageal reflux disease)    • History of aspiration pneumonia    • History of MRSA infection 2012   • Iron deficiency anemia    • PONV (postoperative nausea and vomiting)    • Rectal bleeding Ulcerative Colitis   • Recurrent sinus infections    • Seizures    • Seizures 2009   • Small bowel obstruction 05/26/2021         PAST SURGICAL HISTORY  Past Surgical History:   Procedure Laterality Date   • ABDOMINOPERINEAL PROCTOCOLECTOMY N/A 01/03/2012    Laparoscopic total proctocolectomy with end ileostomy-Dr. Vinh Ballard, [facility]   • CIRCUMCISION N/A    • COLON RESECTION SMALL BOWEL N/A 07/17/2015    Dr. Vinh Ballard, [facility]   • COLONOSCOPY N/A 12/01/2011    Severe ulcerative colitits to the mid tranverse colon-Dr. Pop Baez, [facility]   • CYSTIC HYGROMA EXCISION     • ENDOSCOPY N/A 02/27/2015    No gross lesions in esophagus, erythematous mucosa in the stomach: biopsied, no gross lesions in the duodenum: biopsied-Dr. José Manuel Mayberry, [facility]   • EXPLORATORY LAPAROTOMY N/A 7/9/2019    Procedure: LAPAROTOMY EXPLORATORY WITH DRAINAGE OF PELVIC ABSCESS;  Surgeon: Vinh Ballard MD;  Location: [facility]  Service: General   • EXPLORATORY LAPAROTOMY N/A 3/25/2020    Procedure: EXPLORATORY LAPAROTOMY,OPEN ADHESIOLYSIS,REPAIR OF PERINEUM;  Surgeon: Kali Wilcox MD;  Location: [facility] OR;  Service: General;  Laterality: N/A;   • EXPLORATORY LAPAROTOMY N/A 5/28/2021    Procedure: LAPAROTOMY EXPLORATORY with lysis of adhesions;  Surgeon: Vinh Ballard MD;  Location: [facility]  OR;  Service: General;  Laterality: N/A;   • ILEOSCOPY N/A 02/27/2015    Normal ileoscopy with the exception of some erosions at the distal ileum: biopsied-Dr. José Manuel Mayberry, [facility]   • MOLE REMOVAL     • PELVIC ABCESS DRAINAGE N/A 07/11/2015    Irrigation and drainage of a pelvic abscess-Dr. Kali Wilcox, [facility]   • TESTICLE UNDESCENDED REPAIR           FAMILY HISTORY  Family History   Problem Relation Age of Onset   • Prostate cancer Father    • Cancer Father         Prostate cancer   • Diabetes Father    • Hypertension Father    • Malig Hyperthermia Neg Hx          SOCIAL HISTORY  Social History     Socioeconomic History   • Marital status: Single   Tobacco Use   • Smoking status: Never   • Smokeless tobacco: Never   Vaping Use   • Vaping Use: Never used   Substance and Sexual Activity   • Alcohol use: Never   • Drug use: Never   • Sexual activity: Never         ALLERGIES  Benadryl [diphenhydramine], Vancomycin, Ceclor [cefaclor], Dilaudid [hydromorphone hcl], Milk-related compounds, Reglan [metoclopramide], and Thorazine [chlorpromazine]        REVIEW OF SYSTEMS  Review of Systems   Unable to perform ROS: Patient nonverbal            PHYSICAL EXAM  ED Triage Vitals   Temp Heart Rate Resp BP SpO2   05/10/23 1113 05/10/23 1113 05/10/23 1113 05/10/23 1122 05/10/23 1113   97.8 °F (36.6 °C) (!) 130 22 134/91 96 %      Temp src Heart Rate Source Patient Position BP Location FiO2 (%)   -- -- 05/10/23 1122 05/10/23 1122 --     Lying Right arm        Physical Exam      GENERAL: Thin habitus, appears uncomfortable, alert, no diaphoresis  HENT: nares patent, normocephalic and atraumatic  EYES: no scleral icterus, EOMI, normal conjunctiva  CV: regular rhythm, tachycardic rate, normal distal pulses  RESPIRATORY: normal effort, no stridor  ABDOMEN: soft, nontender in the 4 quadrants, ileostomy bag noted, thin and flat  Genitourinary: Normal penis, the right inferior hemiscrotum does show some induration and erythema that is  related to an obvious abscess formation in the right hemiperineal soft tissues.  There is some evidence of purulent drainage noted from this abscessed region.  It is very tender to palpation.  MUSCULOSKELETAL: no deformity, no asymmetry  NEURO: alert, baseline neurologic exam with known history of cerebral palsy  PSYCH: Anxious but cooperative  SKIN: warm, dry    Vital signs and nursing notes reviewed.        LAB RESULTS  Recent Results (from the past 24 hour(s))   Comprehensive Metabolic Panel    Collection Time: 05/10/23 12:46 PM    Specimen: Blood   Result Value Ref Range    Glucose 106 (H) 65 - 99 mg/dL    BUN 10 6 - 20 mg/dL    Creatinine 0.84 0.76 - 1.27 mg/dL    Sodium 141 136 - 145 mmol/L    Potassium 4.2 3.5 - 5.2 mmol/L    Chloride 107 98 - 107 mmol/L    CO2 23.9 22.0 - 29.0 mmol/L    Calcium 10.2 8.6 - 10.5 mg/dL    Total Protein 7.4 6.0 - 8.5 g/dL    Albumin 3.9 3.5 - 5.2 g/dL    ALT (SGPT) 20 1 - 41 U/L    AST (SGOT) 12 1 - 40 U/L    Alkaline Phosphatase 84 39 - 117 U/L    Total Bilirubin <0.2 0.0 - 1.2 mg/dL    Globulin 3.5 gm/dL    A/G Ratio 1.1 g/dL    BUN/Creatinine Ratio 11.9 7.0 - 25.0    Anion Gap 10.1 5.0 - 15.0 mmol/L    eGFR 117.4 >60.0 mL/min/1.73   Protime-INR    Collection Time: 05/10/23 12:46 PM    Specimen: Blood   Result Value Ref Range    Protime 13.8 11.7 - 14.2 Seconds    INR 1.05 0.90 - 1.10   aPTT    Collection Time: 05/10/23 12:46 PM    Specimen: Blood   Result Value Ref Range    PTT 29.0 22.7 - 35.4 seconds   Lactic Acid, Plasma    Collection Time: 05/10/23 12:46 PM    Specimen: Blood   Result Value Ref Range    Lactate 1.3 0.5 - 2.0 mmol/L   Procalcitonin    Collection Time: 05/10/23 12:46 PM    Specimen: Blood   Result Value Ref Range    Procalcitonin 0.05 0.00 - 0.25 ng/mL   CBC Auto Differential    Collection Time: 05/10/23 12:46 PM    Specimen: Blood   Result Value Ref Range    WBC 10.17 3.40 - 10.80 10*3/mm3    RBC 4.50 4.14 - 5.80 10*6/mm3    Hemoglobin 12.8 (L) 13.0 - 17.7  g/dL    Hematocrit 39.5 37.5 - 51.0 %    MCV 87.8 79.0 - 97.0 fL    MCH 28.4 26.6 - 33.0 pg    MCHC 32.4 31.5 - 35.7 g/dL    RDW 13.4 12.3 - 15.4 %    RDW-SD 43.3 37.0 - 54.0 fl    MPV 9.3 6.0 - 12.0 fL    Platelets 407 140 - 450 10*3/mm3    Neutrophil % 69.8 42.7 - 76.0 %    Lymphocyte % 17.3 (L) 19.6 - 45.3 %    Monocyte % 8.5 5.0 - 12.0 %    Eosinophil % 3.7 0.3 - 6.2 %    Basophil % 0.4 0.0 - 1.5 %    Immature Grans % 0.3 0.0 - 0.5 %    Neutrophils, Absolute 7.10 (H) 1.70 - 7.00 10*3/mm3    Lymphocytes, Absolute 1.76 0.70 - 3.10 10*3/mm3    Monocytes, Absolute 0.86 0.10 - 0.90 10*3/mm3    Eosinophils, Absolute 0.38 0.00 - 0.40 10*3/mm3    Basophils, Absolute 0.04 0.00 - 0.20 10*3/mm3    Immature Grans, Absolute 0.03 0.00 - 0.05 10*3/mm3    nRBC 0.0 0.0 - 0.2 /100 WBC       Ordered the above labs and reviewed the results.        RADIOLOGY  CT Abdomen Pelvis With Contrast    Result Date: 5/10/2023  CT ABDOMEN AND PELVIS WITH IV CONTRAST  HISTORY: Perineal abscess, worsening symptoms on Bactrim. Increased redness and swelling spreading to scrotum. Drainage.  TECHNIQUE: Radiation dose reduction techniques were utilized, including automated exposure control and exposure modulation based on body size. 3 mm images were obtained through the abdomen and pelvis after the administration of IV contrast. Image quality somewhat degraded by motion and overlying soft tissues.  COMPARISON: 05/04/2023  FINDINGS: Lower chest: Increasing ground glass opacity in the left lower lobe with tree-in-bud nodularity favoring an infectious or inflammatory pneumonitis. Recommend continued attention on follow-up to resolution after treatment. The heart size is stable..  Liver: Normal morphology.  Biliary tract: Within normal limits.  Spleen: Within normal limits.  Pancreas: Within normal limits.  Adrenals: Within normal limits.  Kidneys:  Stable hypodense renal lesions favoring cysts. Punctate nonobstructing stone interpolar right kidney.   Bowel:  The stomach is incompletely distended. Total proctocolectomy with end ileostomy. Enhancing thick-walled fluid and air collection in the central pelvis approximating 1.2 x 3.6 cm (coronal image 102) is similar in appearance to the most recent exam but more conspicuous from more remote imaging.  Improvement in subjacent inflammatory changes with redemonstration 1.7 x 5.3 cm heterogeneous collection extending to the right of midline in the perineum (previously 2 x 5.5 cm when remeasured in the same plane). More focal fluid is noted along the posterior margin. Ill-defined phlegmon/infection extends along the right medial gluteal cleft into the perineum and base of the scrotum incompletely included in the field-of-view. No new focal fluid collections or subcutaneous emphysema.  Peritoneum: No free air or focal fluid collections.    Vasculature:    Scattered calcific atherosclerosis..  Lymph Nodes:  Mildly prominent perirectal nodes and pelvic nodes similar to the prior.                                                        Pelvic organs: Heterogeneous mildly enlarged prostate. The bladder is incompletely distended.  Abdominal/Pelvic Wall: Postsurgical changes abdominal wall.  Bones: Multilevel degenerative changes..      1.  Total colectomy with end ileostomy. Stable central fluid and gas collection in the central pelvis with slight improvement in subjacent inflammation which may represent an abscess or Brewer's pouch in the appropriate clinical setting. Ill-defined phlegmon along the right perianal region extending along the perineum, medial gluteal cleft and base of the scrotum is slightly smaller. No new focal fluid collections or subcutaneous emphysema. Recommend continued conservative surveillance and clinical correlation to exclude necrotizing skin infection. 2.  Please see above for additional findings.            Ordered the above noted radiological studies. Reviewed by me in PACS.           PROCEDURES  Procedures      MEDICATIONS GIVEN IN ER  Medications   sodium chloride 0.9 % flush 10 mL (has no administration in time range)   ampicillin-sulbactam (UNASYN) 3 g in sodium chloride 0.9 % 100 mL IVPB-VTB (has no administration in time range)   lactated ringers bolus 1,000 mL (0 mL Intravenous Stopped 5/10/23 1520)   clindamycin (CLEOCIN) 600 mg in dextrose 5% 50 mL IVPB (premix) (0 mg Intravenous Stopped 5/10/23 1422)   iopamidol (ISOVUE-300) 61 % injection 85 mL (85 mL Intravenous Given 5/10/23 1330)           MEDICAL DECISION MAKING, PROGRESS, and CONSULTS    All labs have been independently reviewed by me.  All radiology studies have been reviewed by me and I have also reviewed the radiology report.   EKG's independently viewed and interpreted by me.  Discussion below represents my analysis of pertinent findings related to patient's condition, differential diagnosis, treatment plan and final disposition.      Additional sources:  - Discussed/ obtained information from independent historians: I discussed with patient's mother and father at the bedside and they provided the entire history for me.    - External (non-ED) record review: I reviewed the most recent hospital discharge summary from February 4, 2023 when patient was evaluated by Dr. Ballard for a persistent pelvic fluid collection that was treated with antibiotics.    - Chronic or social conditions impacting care: Cerebral palsy, nonverbal and limited mobility, mother is the primary caretaker who attends to his daily needs.        Orders placed during this visit:  Orders Placed This Encounter   Procedures   • Blood Culture - Blood,   • Blood Culture - Blood,   • Wound Culture - Drainage, Buttock, Right   • CT Abdomen Pelvis With Contrast   • Comprehensive Metabolic Panel   • Protime-INR   • aPTT   • Lactic Acid, Plasma   • Procalcitonin   • CBC Auto Differential   • Monitor Blood Pressure   • Discontinue Cardiac Monitoring   • Surgery  (on-call MD unless specified)   • Insert Peripheral IV   • Initiate Observation Status   • CBC & Differential           Differential diagnosis includes but is not limited to:    Guru's gangrene, sepsis, perineal abscess, perirectal fistula      Independent interpretation of labs, radiology studies, and discussions with consultants:  ED Course as of 05/10/23 1601   Wed May 10, 2023   1152 Patient is tachycardic and physical exam findings are worrisome for a complicated perineal abscess that has failed outpatient management on oral antibiotics.  Therefore I am certain he will need to be admitted to the hospital for IV antibiotics and surgical consult today.  I will obtain a CT study of the abdomen pelvis for further delineation of the extent of this abscess problem today in comparison to recent studies on file. [DIXON]   1152 Going to start some IV antibiotics at this time as well [DIXON]   1350 I just discussed with Dr. Ballard about this patient.  He asks me to give a dose of Unasyn IV.  He asked me to admit the patient to his service for observation and he will see him in the morning. [DIXON]   1600 I independently interpreted the CT abdomen and pelvis with contrast study and my findings are: No bowel obstruction, no perforation [DIXON]      ED Course User Index  [DIXON] Lazaro Moore MD         DIAGNOSIS  Final diagnoses:   Perineal abscess         DISPOSITION  Admit to general surgery        Latest Documented Vital Signs:  As of 16:01 EDT  BP- 108/73 HR- 101 Temp- 97.8 °F (36.6 °C) O2 sat- 96%              --    Please note that portions of this were completed with a voice recognition program.       Note Disclaimer: At [facility], we believe that sharing information builds trust and better relationships. You are receiving this note because you are receiving care at [facility] or recently visited. It is possible you will see health information before a provider has talked with you about it. This kind of  information can be easy to misunderstand. To help you fully understand what it means for your health, we urge you to discuss this note with your provider.           Lazaro Moore MD  05/10/23 0480

## 2023-05-10 NOTE — ED NOTES
Nursing report ED to floor  Alex Brown  32 y.o.  male    HPI :   Chief Complaint   Patient presents with    Abscess       Admitting doctor:   Vinh Ballard MD    Admitting diagnosis:   The encounter diagnosis was Perineal abscess.    Code status:   Current Code Status       Date Active Code Status Order ID Comments User Context       Prior            Allergies:   Benadryl [diphenhydramine], Vancomycin, Ceclor [cefaclor], Dilaudid [hydromorphone hcl], Milk-related compounds, Reglan [metoclopramide], and Thorazine [chlorpromazine]    Isolation:   Contact    Intake and Output    Intake/Output Summary (Last 24 hours) at 5/10/2023 1637  Last data filed at 5/10/2023 1520  Gross per 24 hour   Intake 1050 ml   Output --   Net 1050 ml       Weight:       05/10/23  1244   Weight: 46.7 kg (103 lb)       Most recent vitals:   Vitals:    05/10/23 1501 05/10/23 1531 05/10/23 1532 05/10/23 1540   BP: 101/71 108/73     BP Location:       Patient Position:       Pulse: 92  105 101   Resp:       Temp:       SpO2: 95%  97% 96%   Weight:       Height:           Active LDAs/IV Access:   Lines, Drains & Airways       Active LDAs       Name Placement date Placement time Site Days    Peripheral IV 05/10/23 1249 Anterior;Right Forearm 05/10/23  1249  Forearm  less than 1    Closed/Suction Drain Right RLQ Bulb 19 Fr. 07/09/19  1539  RLQ  1401    Colostomy LUQ 03/25/20  2245  LUQ  1140    Colostomy LUQ --  --  LUQ  --                    Labs (abnormal labs have a star):   Labs Reviewed   COMPREHENSIVE METABOLIC PANEL - Abnormal; Notable for the following components:       Result Value    Glucose 106 (*)     All other components within normal limits    Narrative:     GFR Normal >60  Chronic Kidney Disease <60  Kidney Failure <15     CBC WITH AUTO DIFFERENTIAL - Abnormal; Notable for the following components:    Hemoglobin 12.8 (*)     Lymphocyte % 17.3 (*)     Neutrophils, Absolute 7.10 (*)     All other components within normal  "limits   PROTIME-INR - Normal   APTT - Normal   LACTIC ACID, PLASMA - Normal   PROCALCITONIN - Normal    Narrative:     As a Marker for Sepsis (Non-Neonates):    1. <0.5 ng/mL represents a low risk of severe sepsis and/or septic shock.  2. >2 ng/mL represents a high risk of severe sepsis and/or septic shock.    As a Marker for Lower Respiratory Tract Infections that require antibiotic therapy:    PCT on Admission    Antibiotic Therapy       6-12 Hrs later    >0.5                Strongly Recommended  >0.25 - <0.5        Recommended   0.1 - 0.25          Discouraged              Remeasure/reassess PCT  <0.1                Strongly Discouraged     Remeasure/reassess PCT    As 28 day mortality risk marker: \"Change in Procalcitonin Result\" (>80% or <=80%) if Day 0 (or Day 1) and Day 4 values are available. Refer to http://www.Huitongdapct-calculator.com    Change in PCT <=80%  A decrease of PCT levels below or equal to 80% defines a positive change in PCT test result representing a higher risk for 28-day all-cause mortality of patients diagnosed with severe sepsis for septic shock.    Change in PCT >80%  A decrease of PCT levels of more than 80% defines a negative change in PCT result representing a lower risk for 28-day all-cause mortality of patients diagnosed with severe sepsis or septic shock.      BLOOD CULTURE   BLOOD CULTURE   WOUND CULTURE   CBC AND DIFFERENTIAL    Narrative:     The following orders were created for panel order CBC & Differential.  Procedure                               Abnormality         Status                     ---------                               -----------         ------                     CBC Auto Differential[[id number]]        Abnormal            Final result                 Please view results for these tests on the individual orders.       EKG:   No orders to display       Meds given in ED:   Medications   sodium chloride 0.9 % flush 10 mL (has no administration in time range) "   ampicillin-sulbactam (UNASYN) 3 g in sodium chloride 0.9 % 100 mL IVPB-VTB (has no administration in time range)   lactated ringers bolus 1,000 mL (0 mL Intravenous Stopped 5/10/23 1520)   clindamycin (CLEOCIN) 600 mg in dextrose 5% 50 mL IVPB (premix) (0 mg Intravenous Stopped 5/10/23 1422)   iopamidol (ISOVUE-300) 61 % injection 85 mL (85 mL Intravenous Given 5/10/23 1330)       Imaging results:  CT Abdomen Pelvis With Contrast    Result Date: 5/10/2023  1.  Total colectomy with end ileostomy. Stable central fluid and gas collection in the central pelvis with slight improvement in subjacent inflammation which may represent an abscess or Brewer's pouch in the appropriate clinical setting. Ill-defined phlegmon along the right perianal region extending along the perineum, medial gluteal cleft and base of the scrotum is slightly smaller. No new focal fluid collections or subcutaneous emphysema. Recommend continued conservative surveillance and clinical correlation to exclude necrotizing skin infection. 2.  Please see above for additional findings.           Ambulatory status:   - BEDREST    Social issues:   Social History     Socioeconomic History    Marital status: Single   Tobacco Use    Smoking status: Never    Smokeless tobacco: Never   Vaping Use    Vaping Use: Never used   Substance and Sexual Activity    Alcohol use: Never    Drug use: Never    Sexual activity: Never       NIH Stroke Scale:         Merlyn Horne RN  05/10/23 16:37 EDT

## 2023-05-11 ENCOUNTER — ANESTHESIA (OUTPATIENT)
Dept: PERIOP | Facility: HOSPITAL | Age: 33
DRG: 579 | End: 2023-05-11
Payer: COMMERCIAL

## 2023-05-11 ENCOUNTER — ANESTHESIA EVENT (OUTPATIENT)
Dept: PERIOP | Facility: HOSPITAL | Age: 33
DRG: 579 | End: 2023-05-11
Payer: COMMERCIAL

## 2023-05-11 PROCEDURE — 25010000002 DEXAMETHASONE PER 1 MG: Performed by: ANESTHESIOLOGY

## 2023-05-11 PROCEDURE — 25010000002 FENTANYL CITRATE (PF) 50 MCG/ML SOLUTION: Performed by: ANESTHESIOLOGY

## 2023-05-11 PROCEDURE — 25010000002 SUCCINYLCHOLINE PER 20 MG: Performed by: ANESTHESIOLOGY

## 2023-05-11 PROCEDURE — 25010000002 PROPOFOL 10 MG/ML EMULSION: Performed by: ANESTHESIOLOGY

## 2023-05-11 PROCEDURE — 0Y950ZZ DRAINAGE OF RIGHT INGUINAL REGION, OPEN APPROACH: ICD-10-PCS | Performed by: SURGERY

## 2023-05-11 PROCEDURE — 25010000002 MORPHINE PER 10 MG: Performed by: SURGERY

## 2023-05-11 PROCEDURE — 25010000002 ONDANSETRON PER 1 MG: Performed by: ANESTHESIOLOGY

## 2023-05-11 PROCEDURE — 99222 1ST HOSP IP/OBS MODERATE 55: CPT | Performed by: SURGERY

## 2023-05-11 PROCEDURE — 25010000002 AMPICILLIN-SULBACTAM PER 1.5 G: Performed by: SURGERY

## 2023-05-11 PROCEDURE — 25010000002 PHENYLEPHRINE 10 MG/ML SOLUTION: Performed by: ANESTHESIOLOGY

## 2023-05-11 PROCEDURE — 26990 DRAINAGE OF PELVIS LESION: CPT | Performed by: SURGERY

## 2023-05-11 PROCEDURE — 10060 I&D ABSCESS SIMPLE/SINGLE: CPT | Performed by: SURGERY

## 2023-05-11 PROCEDURE — 0W9J0ZZ DRAINAGE OF PELVIC CAVITY, OPEN APPROACH: ICD-10-PCS | Performed by: SURGERY

## 2023-05-11 RX ORDER — EPHEDRINE SULFATE 50 MG/ML
5 INJECTION, SOLUTION INTRAVENOUS ONCE AS NEEDED
Status: DISCONTINUED | OUTPATIENT
Start: 2023-05-11 | End: 2023-05-11 | Stop reason: HOSPADM

## 2023-05-11 RX ORDER — SUCCINYLCHOLINE CHLORIDE 20 MG/ML
INJECTION INTRAMUSCULAR; INTRAVENOUS AS NEEDED
Status: DISCONTINUED | OUTPATIENT
Start: 2023-05-11 | End: 2023-05-11 | Stop reason: SURG

## 2023-05-11 RX ORDER — FENTANYL CITRATE 50 UG/ML
INJECTION, SOLUTION INTRAMUSCULAR; INTRAVENOUS AS NEEDED
Status: DISCONTINUED | OUTPATIENT
Start: 2023-05-11 | End: 2023-05-11 | Stop reason: SURG

## 2023-05-11 RX ORDER — HYDRALAZINE HYDROCHLORIDE 20 MG/ML
5 INJECTION INTRAMUSCULAR; INTRAVENOUS
Status: DISCONTINUED | OUTPATIENT
Start: 2023-05-11 | End: 2023-05-11 | Stop reason: HOSPADM

## 2023-05-11 RX ORDER — MAGNESIUM HYDROXIDE 1200 MG/15ML
LIQUID ORAL AS NEEDED
Status: DISCONTINUED | OUTPATIENT
Start: 2023-05-11 | End: 2023-05-11 | Stop reason: HOSPADM

## 2023-05-11 RX ORDER — SODIUM CHLORIDE 9 MG/ML
40 INJECTION, SOLUTION INTRAVENOUS AS NEEDED
Status: DISCONTINUED | OUTPATIENT
Start: 2023-05-11 | End: 2023-05-11 | Stop reason: HOSPADM

## 2023-05-11 RX ORDER — DROPERIDOL 2.5 MG/ML
0.62 INJECTION, SOLUTION INTRAMUSCULAR; INTRAVENOUS
Status: DISCONTINUED | OUTPATIENT
Start: 2023-05-11 | End: 2023-05-11 | Stop reason: HOSPADM

## 2023-05-11 RX ORDER — FENTANYL CITRATE 50 UG/ML
50 INJECTION, SOLUTION INTRAMUSCULAR; INTRAVENOUS
Status: DISCONTINUED | OUTPATIENT
Start: 2023-05-11 | End: 2023-05-11 | Stop reason: HOSPADM

## 2023-05-11 RX ORDER — SODIUM CHLORIDE 0.9 % (FLUSH) 0.9 %
10 SYRINGE (ML) INJECTION EVERY 12 HOURS SCHEDULED
Status: DISCONTINUED | OUTPATIENT
Start: 2023-05-11 | End: 2023-05-11 | Stop reason: HOSPADM

## 2023-05-11 RX ORDER — ONDANSETRON 2 MG/ML
INJECTION INTRAMUSCULAR; INTRAVENOUS AS NEEDED
Status: DISCONTINUED | OUTPATIENT
Start: 2023-05-11 | End: 2023-05-11 | Stop reason: SURG

## 2023-05-11 RX ORDER — LIDOCAINE HYDROCHLORIDE 20 MG/ML
INJECTION, SOLUTION INFILTRATION; PERINEURAL AS NEEDED
Status: DISCONTINUED | OUTPATIENT
Start: 2023-05-11 | End: 2023-05-11 | Stop reason: SURG

## 2023-05-11 RX ORDER — NITROGLYCERIN 0.4 MG/1
0.4 TABLET SUBLINGUAL
Status: DISCONTINUED | OUTPATIENT
Start: 2023-05-11 | End: 2023-05-17 | Stop reason: HOSPADM

## 2023-05-11 RX ORDER — PHENYLEPHRINE HYDROCHLORIDE 10 MG/ML
INJECTION INTRAVENOUS AS NEEDED
Status: DISCONTINUED | OUTPATIENT
Start: 2023-05-11 | End: 2023-05-11 | Stop reason: SURG

## 2023-05-11 RX ORDER — BUPIVACAINE HYDROCHLORIDE AND EPINEPHRINE 5; 5 MG/ML; UG/ML
INJECTION, SOLUTION PERINEURAL AS NEEDED
Status: DISCONTINUED | OUTPATIENT
Start: 2023-05-11 | End: 2023-05-11 | Stop reason: HOSPADM

## 2023-05-11 RX ORDER — IPRATROPIUM BROMIDE AND ALBUTEROL SULFATE 2.5; .5 MG/3ML; MG/3ML
3 SOLUTION RESPIRATORY (INHALATION) ONCE AS NEEDED
Status: DISCONTINUED | OUTPATIENT
Start: 2023-05-11 | End: 2023-05-11 | Stop reason: HOSPADM

## 2023-05-11 RX ORDER — SODIUM CHLORIDE, SODIUM LACTATE, POTASSIUM CHLORIDE, CALCIUM CHLORIDE 600; 310; 30; 20 MG/100ML; MG/100ML; MG/100ML; MG/100ML
9 INJECTION, SOLUTION INTRAVENOUS CONTINUOUS PRN
Status: DISCONTINUED | OUTPATIENT
Start: 2023-05-11 | End: 2023-05-11 | Stop reason: HOSPADM

## 2023-05-11 RX ORDER — ONDANSETRON 2 MG/ML
4 INJECTION INTRAMUSCULAR; INTRAVENOUS ONCE AS NEEDED
Status: DISCONTINUED | OUTPATIENT
Start: 2023-05-11 | End: 2023-05-11 | Stop reason: HOSPADM

## 2023-05-11 RX ORDER — HYDROCODONE BITARTRATE AND ACETAMINOPHEN 7.5; 325 MG/1; MG/1
1 TABLET ORAL ONCE AS NEEDED
Status: DISCONTINUED | OUTPATIENT
Start: 2023-05-11 | End: 2023-05-11 | Stop reason: HOSPADM

## 2023-05-11 RX ORDER — SODIUM CHLORIDE 0.9 % (FLUSH) 0.9 %
10 SYRINGE (ML) INJECTION AS NEEDED
Status: DISCONTINUED | OUTPATIENT
Start: 2023-05-11 | End: 2023-05-11 | Stop reason: HOSPADM

## 2023-05-11 RX ORDER — PROPOFOL 10 MG/ML
VIAL (ML) INTRAVENOUS AS NEEDED
Status: DISCONTINUED | OUTPATIENT
Start: 2023-05-11 | End: 2023-05-11 | Stop reason: SURG

## 2023-05-11 RX ORDER — FLUMAZENIL 0.1 MG/ML
0.2 INJECTION INTRAVENOUS AS NEEDED
Status: DISCONTINUED | OUTPATIENT
Start: 2023-05-11 | End: 2023-05-11 | Stop reason: HOSPADM

## 2023-05-11 RX ORDER — ROCURONIUM BROMIDE 10 MG/ML
INJECTION, SOLUTION INTRAVENOUS AS NEEDED
Status: DISCONTINUED | OUTPATIENT
Start: 2023-05-11 | End: 2023-05-11 | Stop reason: SURG

## 2023-05-11 RX ORDER — NALOXONE HCL 0.4 MG/ML
0.2 VIAL (ML) INJECTION AS NEEDED
Status: DISCONTINUED | OUTPATIENT
Start: 2023-05-11 | End: 2023-05-11 | Stop reason: HOSPADM

## 2023-05-11 RX ORDER — OXYCODONE AND ACETAMINOPHEN 7.5; 325 MG/1; MG/1
1 TABLET ORAL EVERY 4 HOURS PRN
Status: DISCONTINUED | OUTPATIENT
Start: 2023-05-11 | End: 2023-05-11 | Stop reason: HOSPADM

## 2023-05-11 RX ORDER — LABETALOL HYDROCHLORIDE 5 MG/ML
5 INJECTION, SOLUTION INTRAVENOUS
Status: DISCONTINUED | OUTPATIENT
Start: 2023-05-11 | End: 2023-05-11 | Stop reason: HOSPADM

## 2023-05-11 RX ORDER — LIDOCAINE HYDROCHLORIDE 10 MG/ML
0.5 INJECTION, SOLUTION EPIDURAL; INFILTRATION; INTRACAUDAL; PERINEURAL ONCE AS NEEDED
Status: DISCONTINUED | OUTPATIENT
Start: 2023-05-11 | End: 2023-05-11 | Stop reason: HOSPADM

## 2023-05-11 RX ORDER — DEXAMETHASONE SODIUM PHOSPHATE 4 MG/ML
INJECTION, SOLUTION INTRA-ARTICULAR; INTRALESIONAL; INTRAMUSCULAR; INTRAVENOUS; SOFT TISSUE AS NEEDED
Status: DISCONTINUED | OUTPATIENT
Start: 2023-05-11 | End: 2023-05-11 | Stop reason: SURG

## 2023-05-11 RX ADMIN — MORPHINE SULFATE 1 MG: 2 INJECTION, SOLUTION INTRAMUSCULAR; INTRAVENOUS at 11:15

## 2023-05-11 RX ADMIN — PANTOPRAZOLE SODIUM 40 MG: 40 TABLET, DELAYED RELEASE ORAL at 08:38

## 2023-05-11 RX ADMIN — HYDROCODONE BITARTRATE AND ACETAMINOPHEN 10 ML: 7.5; 325 SOLUTION ORAL at 03:23

## 2023-05-11 RX ADMIN — FENTANYL CITRATE 50 MCG: 50 INJECTION, SOLUTION INTRAMUSCULAR; INTRAVENOUS at 17:40

## 2023-05-11 RX ADMIN — SUCCINYLCHOLINE CHLORIDE 100 MG: 20 INJECTION, SOLUTION INTRAMUSCULAR; INTRAVENOUS; PARENTERAL at 17:42

## 2023-05-11 RX ADMIN — SODIUM CHLORIDE, POTASSIUM CHLORIDE, SODIUM LACTATE AND CALCIUM CHLORIDE: 600; 310; 30; 20 INJECTION, SOLUTION INTRAVENOUS at 17:37

## 2023-05-11 RX ADMIN — CLINDAMYCIN PHOSPHATE 300 MG: 300 INJECTION, SOLUTION INTRAVENOUS at 13:55

## 2023-05-11 RX ADMIN — AMPICILLIN SODIUM AND SULBACTAM SODIUM 3 G: 2; 1 INJECTION, POWDER, FOR SOLUTION INTRAMUSCULAR; INTRAVENOUS at 11:15

## 2023-05-11 RX ADMIN — AMPICILLIN SODIUM AND SULBACTAM SODIUM 3 G: 2; 1 INJECTION, POWDER, FOR SOLUTION INTRAMUSCULAR; INTRAVENOUS at 06:04

## 2023-05-11 RX ADMIN — SODIUM CHLORIDE 75 ML/HR: 9 INJECTION, SOLUTION INTRAVENOUS at 08:40

## 2023-05-11 RX ADMIN — CLINDAMYCIN PHOSPHATE 300 MG: 300 INJECTION, SOLUTION INTRAVENOUS at 22:10

## 2023-05-11 RX ADMIN — ONDANSETRON 4 MG: 2 INJECTION INTRAMUSCULAR; INTRAVENOUS at 17:57

## 2023-05-11 RX ADMIN — DEXAMETHASONE SODIUM PHOSPHATE 4 MG: 4 INJECTION, SOLUTION INTRA-ARTICULAR; INTRALESIONAL; INTRAMUSCULAR; INTRAVENOUS; SOFT TISSUE at 17:57

## 2023-05-11 RX ADMIN — MORPHINE SULFATE 1 MG: 2 INJECTION, SOLUTION INTRAMUSCULAR; INTRAVENOUS at 19:12

## 2023-05-11 RX ADMIN — CLINDAMYCIN PHOSPHATE 300 MG: 300 INJECTION, SOLUTION INTRAVENOUS at 06:39

## 2023-05-11 RX ADMIN — PROPOFOL 200 MG: 10 INJECTION, EMULSION INTRAVENOUS at 17:44

## 2023-05-11 RX ADMIN — PHENYLEPHRINE HYDROCHLORIDE 200 MCG: 10 INJECTION, SOLUTION INTRAVENOUS at 17:51

## 2023-05-11 RX ADMIN — ROCURONIUM BROMIDE 50 MG: 50 INJECTION INTRAVENOUS at 17:48

## 2023-05-11 RX ADMIN — AMPICILLIN SODIUM AND SULBACTAM SODIUM 3 G: 2; 1 INJECTION, POWDER, FOR SOLUTION INTRAMUSCULAR; INTRAVENOUS at 19:12

## 2023-05-11 RX ADMIN — Medication 10 ML: at 21:21

## 2023-05-11 RX ADMIN — LIDOCAINE HYDROCHLORIDE 60 MG: 20 INJECTION, SOLUTION INFILTRATION; PERINEURAL at 17:40

## 2023-05-11 RX ADMIN — HYDROCODONE BITARTRATE AND ACETAMINOPHEN 10 ML: 7.5; 325 SOLUTION ORAL at 22:10

## 2023-05-11 NOTE — ANESTHESIA PROCEDURE NOTES
Airway  Urgency: elective    Airway not difficult    General Information and Staff    Patient location during procedure: OR  Anesthesiologist: Pierre Wong MD    Indications and Patient Condition  Indications for airway management: airway protection    Preoxygenated: yes  MILS not maintained throughout  Mask difficulty assessment: 1 - vent by mask    Final Airway Details  Final airway type: endotracheal airway      Successful airway: ETT  Cuffed: yes   Successful intubation technique: direct laryngoscopy  Facilitating devices/methods: intubating stylet  Endotracheal tube insertion site: oral  Blade: Heidy  Blade size: D  ETT size (mm): 7.5  Cormack-Lehane Classification: grade I - full view of glottis  Placement verified by: capnometry   Measured from: teeth  ETT/EBT  to teeth (cm): 21  Number of attempts at approach: 1  Assessment: lips, teeth, and gum same as pre-op and atraumatic intubation

## 2023-05-11 NOTE — PROGRESS NOTES
Nutrition Services    Patient Name:  Alex Brown  YOB: 1990  MRN: [id number]  Admit Date:  5/10/2023      Assessment Date:  05/11/23    Comment: Nutrition assessment for BMI of 17.12    Visited patient at bedside with mother present. Pt nonverbal d/t dx of CP. He is NPO at this time for surgery. Pt's mother reports pt has been eating well lately and explained home diet of ground meats and softly cooked vegetables, also states he drinks rice milk d/t lactose and soy intolerance. Pt's weight has been stable, actually has ~2# of weight gain noted in the past few months. NFPE completed with little to no signs of muscle/fat loss despite underweight status. No concern for malnutrition at this time. RD will continue to follow and monitor for diet advancement.      CLINICAL NUTRITION ASSESSMENT      Reason for Assessment BMI   Diagnosis/Problem Perineal abscess   Medical & Surgical Hx Past Medical History:   Diagnosis Date   • Anemia 2011   • Anoxic encephalopathy     @Birth   • CP (cerebral palsy) 02/18/2018   • Epilepsy    • GERD (gastroesophageal reflux disease)    • H/O ulcerative colitis 12/01/2011    Transverse colon Severe ulcerative colitis-Dr. Pop Baez   • History of aspiration pneumonia    • History of MRSA infection 2012    TREATED BY DR. BALLARD   • Iron deficiency anemia    • PONV (postoperative nausea and vomiting)    • Rectal bleeding Ulcerative Colitis   • Recurrent sinus infections    • Seizures     Type of tonic clonic seizure disorder   • Seizures 2009   • Small bowel obstruction 05/26/2021       Past Surgical History:   Procedure Laterality Date   • ABDOMINOPERINEAL PROCTOCOLECTOMY N/A 01/03/2012    Laparoscopic total proctocolectomy with end ileostomy-Dr. Vinh Ballard, [facility]   • CIRCUMCISION N/A    • COLON RESECTION SMALL BOWEL N/A 07/17/2015    Dr. Vinh Ballard, [facility]   • COLONOSCOPY N/A 12/01/2011    Severe ulcerative colitits to the mid tranverse colon-Dr. Lord  "Sasha [facility]   • CYSTIC HYGROMA EXCISION     • ENDOSCOPY N/A 02/27/2015    No gross lesions in esophagus, erythematous mucosa in the stomach: biopsied, no gross lesions in the duodenum: biopsied-Dr. José Manuel Mayberry [facility]   • EXPLORATORY LAPAROTOMY N/A 7/9/2019    Procedure: LAPAROTOMY EXPLORATORY WITH DRAINAGE OF PELVIC ABSCESS;  Surgeon: Vinh Ballard MD;  Location: [facility] MAIN OR;  Service: General   • EXPLORATORY LAPAROTOMY N/A 3/25/2020    Procedure: EXPLORATORY LAPAROTOMY,OPEN ADHESIOLYSIS,REPAIR OF PERINEUM;  Surgeon: Kali Wilcox MD;  Location: [facility] MAIN OR;  Service: General;  Laterality: N/A;   • EXPLORATORY LAPAROTOMY N/A 5/28/2021    Procedure: LAPAROTOMY EXPLORATORY with lysis of adhesions;  Surgeon: Vinh Ballard MD;  Location: [facility] MAIN OR;  Service: General;  Laterality: N/A;   • ILEOSCOPY N/A 02/27/2015    Normal ileoscopy with the exception of some erosions at the distal ileum: biopsied-Dr. José Manuel Mayberry [facility]   • MOLE REMOVAL     • PELVIC ABCESS DRAINAGE N/A 07/11/2015    Irrigation and drainage of a pelvic abscess-Dr. Kali Wilcox [facility]   • TESTICLE UNDESCENDED REPAIR          Current Problems      Encounter Information        Nutrition History    Food Preferences    Supplements    Factors Affecting Intake No factors at this time   Tests/Procedures CT scan     Anthropometrics        Current Height   Current Weight  BMI kg/m2 Height: 167.6 cm (66\")  Weight: 48.1 kg (106 lb 0.7 oz) (05/10/23 2300)  Body mass index is 17.12 kg/m².     Adj BMI (if applicable)    BMI Category Underweight (18.4 or below)       Admission Weight 106#   Ideal Body Weight (IBW) 142#     Adj IBW (if applicable)    Usual Body Weight (UBW) 104-106#   Weight Change/Trend Gain       Weight history: Wt Readings from Last 30 Encounters:   05/10/23 2300 48.1 kg (106 lb 0.7 oz)   05/10/23 1244 46.7 kg (103 lb)   05/03/23 1118 46.7 kg (103 lb)   03/23/23 0956 47.2 kg (104 lb)   02/02/23 1940 47.4 kg (104 lb 8 oz) "   06/12/21 0309 41 kg (90 lb 4.8 oz)   06/11/21 0500 37.6 kg (83 lb)   06/10/21 1811 37.8 kg (83 lb 5.3 oz)   05/26/21 1730 44.9 kg (99 lb)   05/26/21 0728 44.9 kg (99 lb)   05/13/21 1054 43.1 kg (95 lb)   08/05/20 1155 43.1 kg (95 lb)   03/30/20 1259 43.1 kg (95 lb)   03/25/20 1803 44.9 kg (99 lb)   11/20/19 0937 42.7 kg (94 lb 3.2 oz)   07/09/19 1218 44.5 kg (98 lb)   07/03/19 1516 47.6 kg (105 lb)   06/27/19 0853 47.6 kg (105 lb)   03/11/19 1027 46.7 kg (103 lb)   03/12/18 1225 46.7 kg (103 lb)   03/12/18 0952 45.4 kg (100 lb)   02/18/18 2248 46.4 kg (102 lb 6.4 oz)   02/18/18 1754 45.4 kg (100 lb)   11/07/16 1049 46.3 kg (102 lb)   04/09/16 0057 46.7 kg (102 lb 14.4 oz)   04/08/16 1824 44.5 kg (98 lb)   11/09/15 1101 44 kg (96 lb 15.7 oz)   11/10/14 1106 48.5 kg (106 lb 15.8 oz)        Estimated/Assessed Needs        Energy Requirements    Weight for Calculation 48.1kg   Method for Estimation  30 kcal/kg, 35 kcal/kg   EST Needs (kcal/day) 8779-8616       Protein Requirements    Weight for Calculation 48.1kg   EST Protein Needs (g/kg) 1.2 - 1.5 gm/kg   EST Daily Needs (g/day) 58-72       Fluid Requirements     Method for Estimation 1 mL/kcal    Estimated Needs (mL/day)        Fluid Deficit    Current Na Level (mEq/L)    Desired Na Level (mEq/L)    Estimated Fluid Deficit (L)       Labs        Pertinent Labs    Results from last 7 days   Lab Units 05/10/23  1246   SODIUM mmol/L 141   POTASSIUM mmol/L 4.2   CHLORIDE mmol/L 107   CO2 mmol/L 23.9   BUN mg/dL 10   CREATININE mg/dL 0.84   CALCIUM mg/dL 10.2   BILIRUBIN mg/dL <0.2   ALK PHOS U/L 84   ALT (SGPT) U/L 20   AST (SGOT) U/L 12   GLUCOSE mg/dL 106*     Results from last 7 days   Lab Units 05/10/23  1246   HEMOGLOBIN g/dL 12.8*   HEMATOCRIT % 39.5   WBC 10*3/mm3 10.17   ALBUMIN g/dL 3.9     Results from last 7 days   Lab Units 05/10/23  1246   INR  1.05   APTT seconds 29.0   PLATELETS 10*3/mm3 407     COVID19   Date Value Ref Range Status   06/10/2021 Not  Detected Not Detected - Ref. Range Final     No results found for: HGBA1C       Medications            Scheduled Medications ampicillin-sulbactam, 3 g, Intravenous, Q6H  clindamycin, 300 mg, Intravenous, Q8H  multivitamin, 1 tablet, Oral, Daily  pantoprazole, 40 mg, Oral, Q AM  sodium chloride, 10 mL, Intravenous, Q12H        Infusions sodium chloride, 75 mL/hr, Last Rate: 75 mL/hr (05/11/23 1115)        PRN Medications •  HYDROcodone-acetaminophen  •  Morphine **AND** naloxone  •  nitroglycerin  •  [COMPLETED] Insert Peripheral IV **AND** sodium chloride  •  sodium chloride  •  sodium chloride     Physical Findings          Physical Appearance alert, nonverbal, underweight   Oral/Mouth Cavity WNL   Edema  no edema   Gastrointestinal colostomy, hyperactive bowel sounds, last bowel movement:5/10   Skin  other: R perineum wound/abscess   Tubes/Drains/Lines colostomy   NFPE Date Completed: 5/11     Fat Loss Unable  None  Mild  Moderate Severe   Orbital  x      Upper Arm  x      Thoracic  x       Muscle Loss         Temple  x      Clavicle   x      Acromion   x      Scapular  x       Dorsal Hand   x      Patellar   x     Thigh    x     Calf     x      Current Nutrition Orders & Evaluation of Intake       Oral Nutrition     Food Allergies NKFA   Current PO Diet NPO Diet NPO Type: Strict NPO   Supplement    PO Evaluation     Trending % PO Intake      Nutrition Diagnosis        Nutrition Dx Problem 1 Problem: No Nutrition Diagnosis at this Time       INTERVENTION / PLAN OF CARE  Intervention Goal        Intervention Goal(s) Maintain nutrition status, Reduce/improve symptoms, Meet estimated needs, Establish PO intake, Tolerate PO , Advance diet and Appropriate weight gain     Nutrition Intervention        RD Action Await begin PO diet, Interview for preferences, Encourage intake and Follow Tx Progress     Prescription         Diet  NPO   Supplement/Snack    EN/PN     Prescription Ordered No changes at this time      Monitor/Evaluation        Monitor Per protocol   Discharge Plan No discharge needs identified at this time, Pending clinical course   Education Will instruct as appropriate     RD to follow per protocol.       Electronically signed by:  Madison Green RD  05/11/23 13:22 EDT

## 2023-05-11 NOTE — PLAN OF CARE
Goal Outcome Evaluation:  Plan of Care Reviewed With: mother, patient        Progress: no change  Outcome Evaluation: Awaiting general surgery consult. Creamy white drainage visualized at perinium. No open areas observed. Pts mother stated the drainage appears to be coming from the pts rectum. Pt NPO since 0000 with the exception of pain medication prn. Pts mother remained at bedside overnight. Pts mother assisted with incontinence care, brief changes, etc. IV fluids initiated and maintained. IV abx given per MD orders. Tachycardic via telelmetry. Pt nonverbal at baseline but does audibly make noises when in pain per the pts mothers' report. WC from home at bedside. Family will transport pt home when ready for discharge. WON consulted. Ileostomy present PTA. Will pass infromation on to day shift nursing staff and continue to monitor.

## 2023-05-11 NOTE — H&P
ASSESSMENT/PLAN:    32-year-old gentleman with chronic low pelvic fluid collection following total proctocolectomy for severe ulcerative colitis in January 2012.  Currently he has an acute cutaneous infection in the right inguinal/perineal region that may or may not be related to the chronic drainage from his small open wound where his anus was previously located.  I have recommended and started:  · Intravenous clindamycin and Unasyn based on prior culture results  · Intravenous Dilaudid as needed for pain  · Incision and drainage of right inguinal/perineal abscess.  If it seems to communicate with his chronic perineal wound and if we can identify a tract to the low pelvic collection and drain it we will do so.  If there is not an obvious communication or if identifying the tract to the pelvic collection proves difficult we will not pursue that.  His mother is understanding of the recommendations and agrees to proceed as advised.    CC:     Perineal pain    HPI:    32-year-old gentleman with long/complicated surgical history who has had right inguinal/groin pain for at least a week that is different than his chronic draining perineal wound.  Outpatient treatment with Bactrim has not resolved the issue.      Relevant review of systems negative other than presenting complaints.    RADIOLOGY:   • CT abdomen pelvis 5/10/2023: Postsurgical changes seen.  Stable central fluid and gas collection in the central pelvis with slight improvement in subadjacent inflammation.  Ill-defined phlegmon along the right perianal region extending along the perineum is slightly smaller.  No new focal fluid collections or subcutaneous emphysema.  I reviewed the images and findings are consistent with physical exam.    LABS:    • CMP: Glucose 106, otherwise normal  • WBC 10, hemoglobin 12.8    SOCIAL HISTORY:   • No tobacco use  • No alcohol use    FAMILY HISTORY:    • Colorectal cancer: Negative    PREVIOUS ABDOMINAL SURGERY     • Exploratory laparotomy for lysis of adhesions for small bowel obstruction 5/28/2021  • Exploratory laparotomy with lysis of adhesions 3/25/2020  • Open drainage pelvic abscess 7/9/2019  • Laparoscopic small bowel resection for intrapelvic fistula with pelvic abscess 7/17/2015  • Total proctocolectomy with end ileostomy 1/3/2012 for severe chronic ulcerative colitis    PAST MEDICAL HISTORY:    • Ulcerative colitis  • Anoxic brain injury at birth  • Seizure disorder  • Cerebral palsy    MEDICATIONS:   • Prevacid  • Multivitamin  • Bactrim    ALLERGIES:   • Reviewed    PHYSICAL EXAM:   • Constitutional: Well-developed well-nourished, no acute distress  • Vital signs:   o Weight: 106 pounds  o Height: 66 inches  o BMI: 17.1  o Blood pressure 118/78  o Heart rate 117  o Respiratory rate 16  o Temperature 98.7  • Neck: Supple, no palpable mass, trachea midline  • Respiratory: Normal nonlabored inspiratory effort  • Cardiovascular: Regular rate, no jugular venous distention  • Gastrointestinal: Soft, nontender  • In the right inguinal/right perineal region he has cutaneous erythema, minimal drainage, moderate tenderness to palpation.  The midline perineal opening does not appear changed in any significant way  • Psychiatric: Alert and oriented ×3, normal affect     YOGI COATS M.D.

## 2023-05-11 NOTE — PLAN OF CARE
Goal Outcome Evaluation:  Plan of Care Reviewed With: patient        Progress: no change  Outcome Evaluation: Patient VSS. RA. PRN meds given as needed. Pt down for I&D

## 2023-05-11 NOTE — CASE MANAGEMENT/SOCIAL WORK
Discharge Planning Assessment  [facility]     Patient Name: Alex Brown  MRN: [id number]  Today's Date: 5/11/2023    Admit Date: 5/10/2023    Plan: Home with parents; follow for needs   Discharge Needs Assessment     Row Name 05/11/23 1327       Living Environment    People in Home parent(s)    Current Living Arrangements home    Primary Care Provided by self    Provides Primary Care For no one    Family Caregiver if Needed parent(s)    Quality of Family Relationships helpful;involved    Able to Return to Prior Arrangements yes       Resource/Environmental Concerns    Resource/Environmental Concerns none       Transition Planning    Patient/Family Anticipates Transition to home with family    Patient/Family Anticipated Services at Transition none    Transportation Anticipated family or friend will provide       Discharge Needs Assessment    Equipment Currently Used at Home wheelchair;bath bench;other (see comments);ramp  bed with rails    Concerns to be Addressed denies needs/concerns at this time;no discharge needs identified    Anticipated Changes Related to Illness none    Equipment Needed After Discharge none    Provided Post Acute Provider List? N/A               Discharge Plan     Row Name 05/11/23 1327       Plan    Plan Home with parents; follow for needs    Patient/Family in Agreement with Plan yes    Plan Comments Spoke with pt and pt's parents Rosy and Jung black. Confirmed facesheet correct. Explained role of CCP. Pt's parents are his legal guardian and paperwork in on file. Pt lives with his parents in a single level house with ramp to enter. Pt has all needed equipment, wheelchair, bath bench, bed with rails. Pt has used HH in past but mother declines need at this time. No SNF history. pt has Medicaid waiver for personal care. He goes to adult day 2x week. Pt plans home at d/c. Family declines d/c needs at this time. CCP to follow.              Continued Care and Services - Admitted Since  5/10/2023    Coordination has not been started for this encounter.          Demographic Summary    No documentation.                Functional Status    No documentation.                Psychosocial    No documentation.                Abuse/Neglect    No documentation.                Legal    No documentation.                Substance Abuse    No documentation.                Patient Forms    No documentation.                   CHARLES Jackson

## 2023-05-11 NOTE — PAYOR COMM NOTE
"Alex Castaneda (32 y.o. Male)     PLEASE SEE ATTACHED FOR INPT AUTH     REF # [id number]    PLEASE CALL OMID QIU RN/ DEPT @ [phone]  OR FAX  DEPARTMENT @  [phone]    THANK YOU   OMID QIU RN  [facility]         Date of Birth   1990    Social Security Number       Address   [address] [place] [address]    Home Phone   [phone]    MRN   [id number]       Hinduism   Presybeterian    Marital Status   Single                            Admission Date   5/10/23    Admission Type   Emergency    Admitting Provider   Vinh Ballard MD    Attending Provider   Lazaro Moore MD    Department, Room/Bed   [address], S510/1       Discharge Date       Discharge Disposition       Discharge Destination                               Attending Provider: Lazaro Moore MD    Allergies: Benadryl [Diphenhydramine], Vancomycin, Ceclor [Cefaclor], Dilaudid [Hydromorphone Hcl], Milk-related Compounds, Reglan [Metoclopramide], Thorazine [Chlorpromazine]    Isolation: Contact   Infection: MRSA (05/10/23)   Code Status: CPR    Ht: 167.6 cm (66\")   Wt: 48.1 kg (106 lb 0.7 oz)    Admission Cmt: None   Principal Problem: Perineal abscess [L02.215]                 Active Insurance as of 5/10/2023     Primary Coverage     Payor Plan Insurance Group Employer/Plan Group    ANTH BLUE CROSS ANTHEM BLUE CROSS BLUE SHIELD [facility] [id number]     Payor Plan Address Payor Plan Phone Number Payor Plan Fax Number Effective Dates    [address] [phone]  1/1/2012 - None Entered    [facility] [id number]       Subscriber Name Subscriber Birth Date Member ID       AGUEDA CASTANEDA 10/4/1949 [id number]           Secondary Coverage     Payor Plan Insurance Group Employer/Plan Group    [place]      Payor Plan Address Payor Plan Phone Number Payor Plan Fax Number Effective Dates    [address] [phone]  4/8/2016 - None Entered    [place] " [id number]       Subscriber Name Subscriber Birth Date Member ID       ELROY BROWN 1990 [id number]                 Emergency Contacts      (Rel.) Home Phone Work Phone Mobile Phone    Rosy Brown ([place]) (Mother) [phone] -- [phone]    Jung Brown (Father) [phone] -- [phone]            [place]: [facility] [id number]  Tax ID [id number]     History & Physical      Vinh Ballard MD at 05/11/23 1010        ASSESSMENT/PLAN:    32-year-old gentleman with chronic low pelvic fluid collection following total proctocolectomy for severe ulcerative colitis in January 2012.  Currently he has an acute cutaneous infection in the right inguinal/perineal region that may or may not be related to the chronic drainage from his small open wound where his anus was previously located.  I have recommended and started:  · Intravenous clindamycin and Unasyn based on prior culture results  · Intravenous Dilaudid as needed for pain  · Incision and drainage of right inguinal/perineal abscess.  If it seems to communicate with his chronic perineal wound and if we can identify a tract to the low pelvic collection and drain it we will do so.  If there is not an obvious communication or if identifying the tract to the pelvic collection proves difficult we will not pursue that.  His mother is understanding of the recommendations and agrees to proceed as advised.    CC:     Perineal pain    HPI:    32-year-old gentleman with long/complicated surgical history who has had right inguinal/groin pain for at least a week that is different than his chronic draining perineal wound.  Outpatient treatment with Bactrim has not resolved the issue.      Relevant review of systems negative other than presenting complaints.    RADIOLOGY:   • CT abdomen pelvis 5/10/2023: Postsurgical changes seen.  Stable central fluid and gas collection in the central pelvis with slight improvement in subadjacent inflammation.   Ill-defined phlegmon along the right perianal region extending along the perineum is slightly smaller.  No new focal fluid collections or subcutaneous emphysema.  I reviewed the images and findings are consistent with physical exam.    LABS:    • CMP: Glucose 106, otherwise normal  • WBC 10, hemoglobin 12.8    SOCIAL HISTORY:   • No tobacco use  • No alcohol use    FAMILY HISTORY:    • Colorectal cancer: Negative    PREVIOUS ABDOMINAL SURGERY    • Exploratory laparotomy for lysis of adhesions for small bowel obstruction 5/28/2021  • Exploratory laparotomy with lysis of adhesions 3/25/2020  • Open drainage pelvic abscess 7/9/2019  • Laparoscopic small bowel resection for intrapelvic fistula with pelvic abscess 7/17/2015  • Total proctocolectomy with end ileostomy 1/3/2012 for severe chronic ulcerative colitis    PAST MEDICAL HISTORY:    • Ulcerative colitis  • Anoxic brain injury at birth  • Seizure disorder  • Cerebral palsy    MEDICATIONS:   • Prevacid  • Multivitamin  • Bactrim    ALLERGIES:   • Reviewed    PHYSICAL EXAM:   • Constitutional: Well-developed well-nourished, no acute distress  • Vital signs:   o Weight: 106 pounds  o Height: 66 inches  o BMI: 17.1  o Blood pressure 118/78  o Heart rate 117  o Respiratory rate 16  o Temperature 98.7  • Neck: Supple, no palpable mass, trachea midline  • Respiratory: Normal nonlabored inspiratory effort  • Cardiovascular: Regular rate, no jugular venous distention  • Gastrointestinal: Soft, nontender  • In the right inguinal/right perineal region he has cutaneous erythema, minimal drainage, moderate tenderness to palpation.  The midline perineal opening does not appear changed in any significant way  • Psychiatric: Alert and oriented ×3, normal affect     VINH BALLARD M.D.      Electronically signed by Vinh Ballard MD at 05/11/23 1127          Emergency Department Notes      Gwendolyn Johnson RN at 05/10/23 1112        Patient from home via pv; patient has abscess  on buttock that has been looked at prior to this visit however is now having discharge, redness and swelling at the site.     Electronically signed by Gwendolyn Johnson RN at 05/10/23 1113     Maria C Cabrera RN at 05/10/23 1116        Patient took home hydrocodone/APAP 10 mg at approx 2115.    Electronically signed by Maria C Cabrera RN at 05/10/23 1116     Lazaro Moore MD at 05/10/23 1146           EMERGENCY DEPARTMENT ENCOUNTER    Room Number:  22/22  Date seen:  5/10/2023  PCP: Kira Almaraz MD  Historian(s): Patient's mother and father      HPI:  Chief Complaint: Perineal abscess, worsening pain and drainage  A complete HPI/ROS/PMH/PSH/SH/FH are unobtainable / limited due to: Patient is nonverbal  Context: Alex Brown is a 32 y.o. male who presents to the ED from home for evaluation of a worsening abscess in the perineal region.  Patient has been taking Bactrim for over 1 week.  Apparently he has a known cyst in the low pelvis/perirectal region that had been monitored recently.  There was initial thought that perhaps he would need CT-guided drainage of it but on recent follow-ups there was improvement with antibiotics and so it has been managed in the outpatient setting.  However his mother says that now he is having increasing redness and swelling that is spreading into the scrotum area and right inguinal area and that he has had some foul purulent drainage noted in the diaper.  She said he has had some intolerance to stay seated in his chair because of the increased pain.  She gave him a hydrocodone tablet today because of his pain.        PAST MEDICAL HISTORY  Active Ambulatory Problems     Diagnosis Date Noted   • Perirectal cellulitis 04/08/2016   • Attack, epileptiform 11/07/2016   • Generalized abdominal pain 02/18/2018   • Anoxic brain damage (HCC) 02/19/2018   • Seizure disorder (HCC) 02/19/2018   • Pelvic abscess in male 02/19/2018   • Postprocedural intraabdominal abscess  06/27/2019   • CP (cerebral palsy) ([facility]) 03/26/2020   • DAYDAY (iron deficiency anemia) 03/26/2020   • Partial small bowel obstruction 03/30/2020   • SBO (small bowel obstruction) 05/13/2021   • H/O ulcerative colitis 12/01/2011   • Anoxic encephalopathy ([facility])    • Vasovagal syncope    • Aspiration pneumonia of left lung due to vomit 05/31/2021   • Pneumonia of left lower lobe due to infectious organism 06/10/2021   • Severe malnutrition ([facility]) 06/12/2021   • Pelvic abscess in male 02/03/2023     Resolved Ambulatory Problems     Diagnosis Date Noted   • Leukocytosis 02/19/2018   • SBO (small bowel obstruction) 03/25/2020   • Other specified injury of superior mesenteric vein, initial encounter 03/26/2020   • Hypoxia 06/01/2021   • Perirectal abscess 02/02/2023     Past Medical History:   Diagnosis Date   • Anemia 2011   • Epilepsy    • GERD (gastroesophageal reflux disease)    • History of aspiration pneumonia    • History of MRSA infection 2012   • Iron deficiency anemia    • PONV (postoperative nausea and vomiting)    • Rectal bleeding Ulcerative Colitis   • Recurrent sinus infections    • Seizures    • Seizures 2009   • Small bowel obstruction 05/26/2021         PAST SURGICAL HISTORY  Past Surgical History:   Procedure Laterality Date   • ABDOMINOPERINEAL PROCTOCOLECTOMY N/A 01/03/2012    Laparoscopic total proctocolectomy with end ileostomy-Dr. Vinh Ballard, [facility]   • CIRCUMCISION N/A    • COLON RESECTION SMALL BOWEL N/A 07/17/2015    Dr. Vinh Ballard, [facility]   • COLONOSCOPY N/A 12/01/2011    Severe ulcerative colitits to the mid tranverse colon-Dr. Pop Baez, [facility]   • CYSTIC HYGROMA EXCISION     • ENDOSCOPY N/A 02/27/2015    No gross lesions in esophagus, erythematous mucosa in the stomach: biopsied, no gross lesions in the duodenum: biopsied-Dr. José Manuel Mayberry, [facility]   • EXPLORATORY LAPAROTOMY N/A 7/9/2019    Procedure: LAPAROTOMY EXPLORATORY WITH DRAINAGE OF PELVIC ABSCESS;  Surgeon: Vinh Ballard MD;   Location: [facility] MAIN OR;  Service: General   • EXPLORATORY LAPAROTOMY N/A 3/25/2020    Procedure: EXPLORATORY LAPAROTOMY,OPEN ADHESIOLYSIS,REPAIR OF PERINEUM;  Surgeon: Kali Wilcox MD;  Location: [facility] MAIN OR;  Service: General;  Laterality: N/A;   • EXPLORATORY LAPAROTOMY N/A 5/28/2021    Procedure: LAPAROTOMY EXPLORATORY with lysis of adhesions;  Surgeon: Vinh Ballard MD;  Location: [facility] MAIN OR;  Service: General;  Laterality: N/A;   • ILEOSCOPY N/A 02/27/2015    Normal ileoscopy with the exception of some erosions at the distal ileum: biopsied-Dr. José Manuel Mayberry, [facility]   • MOLE REMOVAL     • PELVIC ABCESS DRAINAGE N/A 07/11/2015    Irrigation and drainage of a pelvic abscess-Dr. Kali Wilcox, [facility]   • TESTICLE UNDESCENDED REPAIR           FAMILY HISTORY  Family History   Problem Relation Age of Onset   • Prostate cancer Father    • Cancer Father         Prostate cancer   • Diabetes Father    • Hypertension Father    • Malig Hyperthermia Neg Hx          SOCIAL HISTORY  Social History     Socioeconomic History   • Marital status: Single   Tobacco Use   • Smoking status: Never   • Smokeless tobacco: Never   Vaping Use   • Vaping Use: Never used   Substance and Sexual Activity   • Alcohol use: Never   • Drug use: Never   • Sexual activity: Never         ALLERGIES  Benadryl [diphenhydramine], Vancomycin, Ceclor [cefaclor], Dilaudid [hydromorphone hcl], Milk-related compounds, Reglan [metoclopramide], and Thorazine [chlorpromazine]        REVIEW OF SYSTEMS  Review of Systems   Unable to perform ROS: Patient nonverbal            PHYSICAL EXAM  ED Triage Vitals   Temp Heart Rate Resp BP SpO2   05/10/23 1113 05/10/23 1113 05/10/23 1113 05/10/23 1122 05/10/23 1113   97.8 °F (36.6 °C) (!) 130 22 134/91 96 %      Temp src Heart Rate Source Patient Position BP Location FiO2 (%)   -- -- 05/10/23 1122 05/10/23 1122 --     Lying Right arm        Physical Exam      GENERAL: Thin habitus, appears uncomfortable,  alert, no diaphoresis  HENT: nares patent, normocephalic and atraumatic  EYES: no scleral icterus, EOMI, normal conjunctiva  CV: regular rhythm, tachycardic rate, normal distal pulses  RESPIRATORY: normal effort, no stridor  ABDOMEN: soft, nontender in the 4 quadrants, ileostomy bag noted, thin and flat  Genitourinary: Normal penis, the right inferior hemiscrotum does show some induration and erythema that is related to an obvious abscess formation in the right hemiperineal soft tissues.  There is some evidence of purulent drainage noted from this abscessed region.  It is very tender to palpation.  MUSCULOSKELETAL: no deformity, no asymmetry  NEURO: alert, baseline neurologic exam with known history of cerebral palsy  PSYCH: Anxious but cooperative  SKIN: warm, dry    Vital signs and nursing notes reviewed.        LAB RESULTS  Recent Results (from the past 24 hour(s))   Comprehensive Metabolic Panel    Collection Time: 05/10/23 12:46 PM    Specimen: Blood   Result Value Ref Range    Glucose 106 (H) 65 - 99 mg/dL    BUN 10 6 - 20 mg/dL    Creatinine 0.84 0.76 - 1.27 mg/dL    Sodium 141 136 - 145 mmol/L    Potassium 4.2 3.5 - 5.2 mmol/L    Chloride 107 98 - 107 mmol/L    CO2 23.9 22.0 - 29.0 mmol/L    Calcium 10.2 8.6 - 10.5 mg/dL    Total Protein 7.4 6.0 - 8.5 g/dL    Albumin 3.9 3.5 - 5.2 g/dL    ALT (SGPT) 20 1 - 41 U/L    AST (SGOT) 12 1 - 40 U/L    Alkaline Phosphatase 84 39 - 117 U/L    Total Bilirubin <0.2 0.0 - 1.2 mg/dL    Globulin 3.5 gm/dL    A/G Ratio 1.1 g/dL    BUN/Creatinine Ratio 11.9 7.0 - 25.0    Anion Gap 10.1 5.0 - 15.0 mmol/L    eGFR 117.4 >60.0 mL/min/1.73   Protime-INR    Collection Time: 05/10/23 12:46 PM    Specimen: Blood   Result Value Ref Range    Protime 13.8 11.7 - 14.2 Seconds    INR 1.05 0.90 - 1.10   aPTT    Collection Time: 05/10/23 12:46 PM    Specimen: Blood   Result Value Ref Range    PTT 29.0 22.7 - 35.4 seconds   Lactic Acid, Plasma    Collection Time: 05/10/23 12:46 PM     Specimen: Blood   Result Value Ref Range    Lactate 1.3 0.5 - 2.0 mmol/L   Procalcitonin    Collection Time: 05/10/23 12:46 PM    Specimen: Blood   Result Value Ref Range    Procalcitonin 0.05 0.00 - 0.25 ng/mL   CBC Auto Differential    Collection Time: 05/10/23 12:46 PM    Specimen: Blood   Result Value Ref Range    WBC 10.17 3.40 - 10.80 10*3/mm3    RBC 4.50 4.14 - 5.80 10*6/mm3    Hemoglobin 12.8 (L) 13.0 - 17.7 g/dL    Hematocrit 39.5 37.5 - 51.0 %    MCV 87.8 79.0 - 97.0 fL    MCH 28.4 26.6 - 33.0 pg    MCHC 32.4 31.5 - 35.7 g/dL    RDW 13.4 12.3 - 15.4 %    RDW-SD 43.3 37.0 - 54.0 fl    MPV 9.3 6.0 - 12.0 fL    Platelets 407 140 - 450 10*3/mm3    Neutrophil % 69.8 42.7 - 76.0 %    Lymphocyte % 17.3 (L) 19.6 - 45.3 %    Monocyte % 8.5 5.0 - 12.0 %    Eosinophil % 3.7 0.3 - 6.2 %    Basophil % 0.4 0.0 - 1.5 %    Immature Grans % 0.3 0.0 - 0.5 %    Neutrophils, Absolute 7.10 (H) 1.70 - 7.00 10*3/mm3    Lymphocytes, Absolute 1.76 0.70 - 3.10 10*3/mm3    Monocytes, Absolute 0.86 0.10 - 0.90 10*3/mm3    Eosinophils, Absolute 0.38 0.00 - 0.40 10*3/mm3    Basophils, Absolute 0.04 0.00 - 0.20 10*3/mm3    Immature Grans, Absolute 0.03 0.00 - 0.05 10*3/mm3    nRBC 0.0 0.0 - 0.2 /100 WBC       Ordered the above labs and reviewed the results.        RADIOLOGY  CT Abdomen Pelvis With Contrast    Result Date: 5/10/2023  CT ABDOMEN AND PELVIS WITH IV CONTRAST  HISTORY: Perineal abscess, worsening symptoms on Bactrim. Increased redness and swelling spreading to scrotum. Drainage.  TECHNIQUE: Radiation dose reduction techniques were utilized, including automated exposure control and exposure modulation based on body size. 3 mm images were obtained through the abdomen and pelvis after the administration of IV contrast. Image quality somewhat degraded by motion and overlying soft tissues.  COMPARISON: 05/04/2023  FINDINGS: Lower chest: Increasing ground glass opacity in the left lower lobe with tree-in-bud nodularity favoring an  infectious or inflammatory pneumonitis. Recommend continued attention on follow-up to resolution after treatment. The heart size is stable..  Liver: Normal morphology.  Biliary tract: Within normal limits.  Spleen: Within normal limits.  Pancreas: Within normal limits.  Adrenals: Within normal limits.  Kidneys:  Stable hypodense renal lesions favoring cysts. Punctate nonobstructing stone interpolar right kidney.  Bowel:  The stomach is incompletely distended. Total proctocolectomy with end ileostomy. Enhancing thick-walled fluid and air collection in the central pelvis approximating 1.2 x 3.6 cm (coronal image 102) is similar in appearance to the most recent exam but more conspicuous from more remote imaging.  Improvement in subjacent inflammatory changes with redemonstration 1.7 x 5.3 cm heterogeneous collection extending to the right of midline in the perineum (previously 2 x 5.5 cm when remeasured in the same plane). More focal fluid is noted along the posterior margin. Ill-defined phlegmon/infection extends along the right medial gluteal cleft into the perineum and base of the scrotum incompletely included in the field-of-view. No new focal fluid collections or subcutaneous emphysema.  Peritoneum: No free air or focal fluid collections.    Vasculature:    Scattered calcific atherosclerosis..  Lymph Nodes:  Mildly prominent perirectal nodes and pelvic nodes similar to the prior.                                                        Pelvic organs: Heterogeneous mildly enlarged prostate. The bladder is incompletely distended.  Abdominal/Pelvic Wall: Postsurgical changes abdominal wall.  Bones: Multilevel degenerative changes..      1.  Total colectomy with end ileostomy. Stable central fluid and gas collection in the central pelvis with slight improvement in subjacent inflammation which may represent an abscess or Brewer's pouch in the appropriate clinical setting. Ill-defined phlegmon along the right perianal  region extending along the perineum, medial gluteal cleft and base of the scrotum is slightly smaller. No new focal fluid collections or subcutaneous emphysema. Recommend continued conservative surveillance and clinical correlation to exclude necrotizing skin infection. 2.  Please see above for additional findings.            Ordered the above noted radiological studies. Reviewed by me in PACS.          PROCEDURES  Procedures      MEDICATIONS GIVEN IN ER  Medications   sodium chloride 0.9 % flush 10 mL (has no administration in time range)   ampicillin-sulbactam (UNASYN) 3 g in sodium chloride 0.9 % 100 mL IVPB-VTB (has no administration in time range)   lactated ringers bolus 1,000 mL (0 mL Intravenous Stopped 5/10/23 1520)   clindamycin (CLEOCIN) 600 mg in dextrose 5% 50 mL IVPB (premix) (0 mg Intravenous Stopped 5/10/23 1422)   iopamidol (ISOVUE-300) 61 % injection 85 mL (85 mL Intravenous Given 5/10/23 1330)           MEDICAL DECISION MAKING, PROGRESS, and CONSULTS    All labs have been independently reviewed by me.  All radiology studies have been reviewed by me and I have also reviewed the radiology report.   EKG's independently viewed and interpreted by me.  Discussion below represents my analysis of pertinent findings related to patient's condition, differential diagnosis, treatment plan and final disposition.      Additional sources:  - Discussed/ obtained information from independent historians: I discussed with patient's mother and father at the bedside and they provided the entire history for me.    - External (non-ED) record review: I reviewed the most recent hospital discharge summary from February 4, 2023 when patient was evaluated by Dr. Ballard for a persistent pelvic fluid collection that was treated with antibiotics.    - Chronic or social conditions impacting care: Cerebral palsy, nonverbal and limited mobility, mother is the primary caretaker who attends to his daily needs.        Orders placed  during this visit:  Orders Placed This Encounter   Procedures   • Blood Culture - Blood,   • Blood Culture - Blood,   • Wound Culture - Drainage, Buttock, Right   • CT Abdomen Pelvis With Contrast   • Comprehensive Metabolic Panel   • Protime-INR   • aPTT   • Lactic Acid, Plasma   • Procalcitonin   • CBC Auto Differential   • Monitor Blood Pressure   • Discontinue Cardiac Monitoring   • Surgery (on-call MD unless specified)   • Insert Peripheral IV   • Initiate Observation Status   • CBC & Differential           Differential diagnosis includes but is not limited to:    Guru's gangrene, sepsis, perineal abscess, perirectal fistula      Independent interpretation of labs, radiology studies, and discussions with consultants:  ED Course as of 05/10/23 1601   Wed May 10, 2023   1152 Patient is tachycardic and physical exam findings are worrisome for a complicated perineal abscess that has failed outpatient management on oral antibiotics.  Therefore I am certain he will need to be admitted to the hospital for IV antibiotics and surgical consult today.  I will obtain a CT study of the abdomen pelvis for further delineation of the extent of this abscess problem today in comparison to recent studies on file. [DIXON]   1152 Going to start some IV antibiotics at this time as well [DIXON]   1350 I just discussed with Dr. Ballard about this patient.  He asks me to give a dose of Unasyn IV.  He asked me to admit the patient to his service for observation and he will see him in the morning. [DIXON]   1600 I independently interpreted the CT abdomen and pelvis with contrast study and my findings are: No bowel obstruction, no perforation [DIXON]      ED Course User Index  [DIXON] Lazaro Moore MD         DIAGNOSIS  Final diagnoses:   Perineal abscess         DISPOSITION  Admit to general surgery        Latest Documented Vital Signs:  As of 16:01 EDT  BP- 108/73 HR- 101 Temp- 97.8 °F (36.6 °C) O2 sat- 96%              --    Please note that  portions of this were completed with a voice recognition program.       Note Disclaimer: At [facility], we believe that sharing information builds trust and better relationships. You are receiving this note because you are receiving care at [facility] or recently visited. It is possible you will see health information before a provider has talked with you about it. This kind of information can be easy to misunderstand. To help you fully understand what it means for your health, we urge you to discuss this note with your provider.           Lazaro Moore MD  05/10/23 1601      Electronically signed by Lazaro Moore MD at 05/10/23 1601     Merlyn Horne, RN at 05/10/23 1636          Nursing report ED to floor  Alex Brown  32 y.o.  male    HPI :   Chief Complaint   Patient presents with   • Abscess       Admitting doctor:   Vinh Ballard MD    Admitting diagnosis:   The encounter diagnosis was Perineal abscess.    Code status:   Current Code Status       Date Active Code Status Order ID Comments User Context       Prior            Allergies:   Benadryl [diphenhydramine], Vancomycin, Ceclor [cefaclor], Dilaudid [hydromorphone hcl], Milk-related compounds, Reglan [metoclopramide], and Thorazine [chlorpromazine]    Isolation:   Contact    Intake and Output    Intake/Output Summary (Last 24 hours) at 5/10/2023 1637  Last data filed at 5/10/2023 1520  Gross per 24 hour   Intake 1050 ml   Output --   Net 1050 ml       Weight:       05/10/23  1244   Weight: 46.7 kg (103 lb)       Most recent vitals:   Vitals:    05/10/23 1501 05/10/23 1531 05/10/23 1532 05/10/23 1540   BP: 101/71 108/73     BP Location:       Patient Position:       Pulse: 92  105 101   Resp:       Temp:       SpO2: 95%  97% 96%   Weight:       Height:           Active LDAs/IV Access:   Lines, Drains & Airways       Active LDAs       Name Placement date Placement time Site Days    Peripheral IV 05/10/23 1249 Anterior;Right Forearm  "05/10/23  1249  Forearm  less than 1    Closed/Suction Drain Right RLQ Bulb 19 Fr. 07/09/19  1539  RLQ  1401    Colostomy LUQ 03/25/20  2245  LUQ  1140    Colostomy LUQ --  --  LUQ  --                    Labs (abnormal labs have a star):   Labs Reviewed   COMPREHENSIVE METABOLIC PANEL - Abnormal; Notable for the following components:       Result Value    Glucose 106 (*)     All other components within normal limits    Narrative:     GFR Normal >60  Chronic Kidney Disease <60  Kidney Failure <15     CBC WITH AUTO DIFFERENTIAL - Abnormal; Notable for the following components:    Hemoglobin 12.8 (*)     Lymphocyte % 17.3 (*)     Neutrophils, Absolute 7.10 (*)     All other components within normal limits   PROTIME-INR - Normal   APTT - Normal   LACTIC ACID, PLASMA - Normal   PROCALCITONIN - Normal    Narrative:     As a Marker for Sepsis (Non-Neonates):    1. <0.5 ng/mL represents a low risk of severe sepsis and/or septic shock.  2. >2 ng/mL represents a high risk of severe sepsis and/or septic shock.    As a Marker for Lower Respiratory Tract Infections that require antibiotic therapy:    PCT on Admission    Antibiotic Therapy       6-12 Hrs later    >0.5                Strongly Recommended  >0.25 - <0.5        Recommended   0.1 - 0.25          Discouraged              Remeasure/reassess PCT  <0.1                Strongly Discouraged     Remeasure/reassess PCT    As 28 day mortality risk marker: \"Change in Procalcitonin Result\" (>80% or <=80%) if Day 0 (or Day 1) and Day 4 values are available. Refer to http://www.[facility]-pct-calculator.com    Change in PCT <=80%  A decrease of PCT levels below or equal to 80% defines a positive change in PCT test result representing a higher risk for 28-day all-cause mortality of patients diagnosed with severe sepsis for septic shock.    Change in PCT >80%  A decrease of PCT levels of more than 80% defines a negative change in PCT result representing a lower risk for 28-day all-cause " mortality of patients diagnosed with severe sepsis or septic shock.      BLOOD CULTURE   BLOOD CULTURE   WOUND CULTURE   CBC AND DIFFERENTIAL    Narrative:     The following orders were created for panel order CBC & Differential.  Procedure                               Abnormality         Status                     ---------                               -----------         ------                     CBC Auto Differential[[id number]]        Abnormal            Final result                 Please view results for these tests on the individual orders.       EKG:   No orders to display       Meds given in ED:   Medications   sodium chloride 0.9 % flush 10 mL (has no administration in time range)   ampicillin-sulbactam (UNASYN) 3 g in sodium chloride 0.9 % 100 mL IVPB-VTB (has no administration in time range)   lactated ringers bolus 1,000 mL (0 mL Intravenous Stopped 5/10/23 1520)   clindamycin (CLEOCIN) 600 mg in dextrose 5% 50 mL IVPB (premix) (0 mg Intravenous Stopped 5/10/23 1422)   iopamidol (ISOVUE-300) 61 % injection 85 mL (85 mL Intravenous Given 5/10/23 1330)       Imaging results:  CT Abdomen Pelvis With Contrast    Result Date: 5/10/2023  1.  Total colectomy with end ileostomy. Stable central fluid and gas collection in the central pelvis with slight improvement in subjacent inflammation which may represent an abscess or Brewer's pouch in the appropriate clinical setting. Ill-defined phlegmon along the right perianal region extending along the perineum, medial gluteal cleft and base of the scrotum is slightly smaller. No new focal fluid collections or subcutaneous emphysema. Recommend continued conservative surveillance and clinical correlation to exclude necrotizing skin infection. 2.  Please see above for additional findings.           Ambulatory status:   - BEDREST    Social issues:   Social History     Socioeconomic History   • Marital status: Single   Tobacco Use   • Smoking status: Never   • Smokeless  tobacco: Never   Vaping Use   • Vaping Use: Never used   Substance and Sexual Activity   • Alcohol use: Never   • Drug use: Never   • Sexual activity: Never       NIH Stroke Scale:         Merlyn Horne RN  05/10/23 16:37 EDT         Electronically signed by Merlyn Horne RN at 05/10/23 8851

## 2023-05-11 NOTE — NURSING NOTE
Wound/Ostomy; Consult received regarding skin problems to perineal area. Patient is alert, family at bedside.The patient's mother stated that he will undergo surgery today to drain an abscess that is in the perineal area, upon assessment, no open wound is observed, but yellowish drainage possibly from the patient's  rectum is noted.  Low air loss mattress for adequate pressure redistribution allowing better circulation from Agility and frame from EVS  is requested.  Rn notified.  Wound care order and nursing intervention have been implemented into [facility]  Please re-consult for any additional needs.

## 2023-05-11 NOTE — OP NOTE
PREOPERATIVE DIAGNOSIS:  · Right inner thigh/inguinal abscess  · Deep pelvic abscess    POSTOPERATIVE DIAGNOSIS (FINDINGS):  · Same    PROCEDURE:  · Incision and drainage of right inguinal/inner thigh abscess  · Drainage of deep pelvic abscess through existing perineal wound    SURGEON:  Vinh Ballard MD    ASSISTANT:  None    ANESTHESIA:  General    EBL:  Minimal    SPECIMEN(S):  none    DESCRIPTION:  In supine position under general anesthetic patient was placed in dorsal lithotomy position.  The indurated fluctuant area in the right inguinal inner thigh region was identified and incised.  There was a small amount of purulent fluid drained.  There was a deep cavity that extended to the midline towards his pre-existing chronic perineal wound which was also draining purulent fluid.  I was able to take a Linnea clamp and pass it along the presacral plane to where this cavity ended and the 2 cavities communicated more superficially.  I irrigated and aspirated the cavities and cauterized the accessible portions of the cavities for hemostasis.  Both were packed with Betadine soaked gauze and good hemostasis was noted.  Tolerated well.    Vinh Ballard M.D.

## 2023-05-11 NOTE — ANESTHESIA PREPROCEDURE EVALUATION
Anesthesia Evaluation     Patient summary reviewed and Nursing notes reviewed   history of anesthetic complications: PONV  NPO Solid Status: > 8 hours  NPO Liquid Status: > 2 hours           Airway   Mallampati: III  TM distance: >3 FB  Neck ROM: full  Difficult intubation highly probable, Small opening and Anterior  Dental - normal exam     Pulmonary - normal exam   Cardiovascular - negative cardio ROS and normal exam  Exercise tolerance: good (4-7 METS)        Neuro/Psych  (+) seizures,      ROS Comment: Cerebral palsy  GI/Hepatic/Renal/Endo    (+)  GERD,      Musculoskeletal (-) negative ROS    Abdominal    Substance History - negative use     OB/GYN          Other                        Anesthesia Plan    ASA 3     general     (Talked with mother/poa/legal guardian)  intravenous induction     Anesthetic plan, risks, benefits, and alternatives have been provided, discussed and informed consent has been obtained with: healthcare power of , mother and legal guardian.        CODE STATUS:    Code Status (Patient has no pulse and is not breathing): CPR (Attempt to Resuscitate)  Medical Interventions (Patient has pulse or is breathing): Full Support

## 2023-05-11 NOTE — ANESTHESIA POSTPROCEDURE EVALUATION
Patient: Alex Brown    Procedure Summary     Date: 05/11/23 Room / Location: [facility] OR 11 / [facility] MAIN OR    Anesthesia Start: 1737 Anesthesia Stop: 1820    Procedure: INCISION AND DRAINAGE RIGHT THIGH (Right) Diagnosis:     Surgeons: Vinh Ballard MD Provider: Pierre Wong MD    Anesthesia Type: general ASA Status: 3          Anesthesia Type: general    Vitals  Vitals Value Taken Time   /78 05/11/23 1841   Temp 36.8 °C (98.3 °F) 05/11/23 1816   Pulse 102 05/11/23 1844   Resp 16 05/11/23 1835   SpO2 98 % 05/11/23 1844   Vitals shown include unvalidated device data.        Post Anesthesia Care and Evaluation    Level of consciousness: awake  Pain management: satisfactory to patient    Airway patency: patent  Anesthetic complications: No anesthetic complications  PONV Status: controlled  Cardiovascular status: acceptable  Respiratory status: acceptable  Hydration status: acceptable

## 2023-05-12 PROCEDURE — 99024 POSTOP FOLLOW-UP VISIT: CPT | Performed by: SURGERY

## 2023-05-12 PROCEDURE — 25010000002 AMPICILLIN-SULBACTAM PER 1.5 G: Performed by: SURGERY

## 2023-05-12 RX ADMIN — AMPICILLIN SODIUM AND SULBACTAM SODIUM 3 G: 2; 1 INJECTION, POWDER, FOR SOLUTION INTRAMUSCULAR; INTRAVENOUS at 09:19

## 2023-05-12 RX ADMIN — SODIUM CHLORIDE 75 ML/HR: 9 INJECTION, SOLUTION INTRAVENOUS at 02:46

## 2023-05-12 RX ADMIN — Medication 1 TABLET: at 09:18

## 2023-05-12 RX ADMIN — CLINDAMYCIN PHOSPHATE 300 MG: 300 INJECTION, SOLUTION INTRAVENOUS at 14:18

## 2023-05-12 RX ADMIN — Medication 10 ML: at 09:20

## 2023-05-12 RX ADMIN — PANTOPRAZOLE SODIUM 40 MG: 40 TABLET, DELAYED RELEASE ORAL at 09:18

## 2023-05-12 RX ADMIN — CLINDAMYCIN PHOSPHATE 300 MG: 300 INJECTION, SOLUTION INTRAVENOUS at 22:45

## 2023-05-12 RX ADMIN — Medication 10 ML: at 21:27

## 2023-05-12 RX ADMIN — HYDROCODONE BITARTRATE AND ACETAMINOPHEN 10 ML: 7.5; 325 SOLUTION ORAL at 16:49

## 2023-05-12 RX ADMIN — AMPICILLIN SODIUM AND SULBACTAM SODIUM 3 G: 2; 1 INJECTION, POWDER, FOR SOLUTION INTRAMUSCULAR; INTRAVENOUS at 21:26

## 2023-05-12 RX ADMIN — HYDROCODONE BITARTRATE AND ACETAMINOPHEN 10 ML: 7.5; 325 SOLUTION ORAL at 09:18

## 2023-05-12 RX ADMIN — AMPICILLIN SODIUM AND SULBACTAM SODIUM 3 G: 2; 1 INJECTION, POWDER, FOR SOLUTION INTRAMUSCULAR; INTRAVENOUS at 02:46

## 2023-05-12 RX ADMIN — CLINDAMYCIN PHOSPHATE 300 MG: 300 INJECTION, SOLUTION INTRAVENOUS at 06:58

## 2023-05-12 NOTE — PLAN OF CARE
Goal Outcome Evaluation:  Plan of Care Reviewed With: patient        Progress: no change  Outcome Evaluation: Dressing to incision was reinforced. Pts mother notified this nurse that the guaze and abd was saturated and fell out when incontinence care was provided. Outer guaze 4x4s and abd changed. Mesh panties removed and brief replaced per pts mothers' request due to the amount of drainage. Male incontinence wrap changed in an attempt to keep urine from reaching the incision due to pt being incontinent. Colostomy. Pain medication given as needed overnight. IV abx given per MD orders. Specialty bed. Awaiting further dressing change orders today. Pts mother aware. Pt remains ST via telemetry. Will continue to monitor the patient throughout my shift.

## 2023-05-12 NOTE — PLAN OF CARE
Goal Outcome Evaluation:  Difficult to understand patient's pain level so depending on patient's mom to help staff understand his pain level.  Patient seemed to tolerate surgeon looking at surgical site.  Mom very much involved in patient's care.  HR WNL most of the rounds unless patient getting active in bed.

## 2023-05-12 NOTE — PROGRESS NOTES
Postoperative day 1    The right inguinal and perineal wounds are clean with gauze packing.  Discharge is not bloody.  No fever and vital signs stable.    · Continue IV Unasyn and Cleocin based on prior culture, current cultures showing rare normal skin sepideh  · Leave wounds packed as is today, remove packing and start dressing changes tomorrow

## 2023-05-13 LAB
BACTERIA SPEC AEROBE CULT: NORMAL
GRAM STN SPEC: NORMAL

## 2023-05-13 PROCEDURE — 25010000002 AMPICILLIN-SULBACTAM PER 1.5 G: Performed by: SURGERY

## 2023-05-13 PROCEDURE — 99024 POSTOP FOLLOW-UP VISIT: CPT | Performed by: SURGERY

## 2023-05-13 RX ORDER — ACETAMINOPHEN 325 MG/1
650 TABLET ORAL EVERY 6 HOURS PRN
Status: DISCONTINUED | OUTPATIENT
Start: 2023-05-13 | End: 2023-05-13

## 2023-05-13 RX ORDER — ACETAMINOPHEN 160 MG/5ML
650 SOLUTION ORAL EVERY 6 HOURS PRN
Status: DISCONTINUED | OUTPATIENT
Start: 2023-05-13 | End: 2023-05-17 | Stop reason: HOSPADM

## 2023-05-13 RX ADMIN — HYDROCODONE BITARTRATE AND ACETAMINOPHEN 10 ML: 7.5; 325 SOLUTION ORAL at 00:08

## 2023-05-13 RX ADMIN — CLINDAMYCIN PHOSPHATE 300 MG: 300 INJECTION, SOLUTION INTRAVENOUS at 06:29

## 2023-05-13 RX ADMIN — Medication 10 ML: at 22:23

## 2023-05-13 RX ADMIN — AMPICILLIN SODIUM AND SULBACTAM SODIUM 3 G: 2; 1 INJECTION, POWDER, FOR SOLUTION INTRAMUSCULAR; INTRAVENOUS at 14:45

## 2023-05-13 RX ADMIN — AMPICILLIN SODIUM AND SULBACTAM SODIUM 3 G: 2; 1 INJECTION, POWDER, FOR SOLUTION INTRAMUSCULAR; INTRAVENOUS at 21:04

## 2023-05-13 RX ADMIN — Medication 10 ML: at 08:47

## 2023-05-13 RX ADMIN — PANTOPRAZOLE SODIUM 40 MG: 40 TABLET, DELAYED RELEASE ORAL at 06:29

## 2023-05-13 RX ADMIN — HYDROCODONE BITARTRATE AND ACETAMINOPHEN 10 ML: 7.5; 325 SOLUTION ORAL at 06:35

## 2023-05-13 RX ADMIN — HYDROCODONE BITARTRATE AND ACETAMINOPHEN 10 ML: 7.5; 325 SOLUTION ORAL at 11:44

## 2023-05-13 RX ADMIN — AMPICILLIN SODIUM AND SULBACTAM SODIUM 3 G: 2; 1 INJECTION, POWDER, FOR SOLUTION INTRAMUSCULAR; INTRAVENOUS at 04:02

## 2023-05-13 RX ADMIN — CLINDAMYCIN PHOSPHATE 300 MG: 300 INJECTION, SOLUTION INTRAVENOUS at 14:06

## 2023-05-13 RX ADMIN — Medication 1 TABLET: at 08:46

## 2023-05-13 RX ADMIN — AMPICILLIN SODIUM AND SULBACTAM SODIUM 3 G: 2; 1 INJECTION, POWDER, FOR SOLUTION INTRAMUSCULAR; INTRAVENOUS at 08:47

## 2023-05-13 RX ADMIN — CLINDAMYCIN PHOSPHATE 300 MG: 300 INJECTION, SOLUTION INTRAVENOUS at 22:21

## 2023-05-13 RX ADMIN — ACETAMINOPHEN 650 MG: 325 SUSPENSION ORAL at 21:03

## 2023-05-13 NOTE — NURSING NOTE
Surgeon performed dressing change, including unpacking and packing of two surgical wounds.  Patient presents as being in severe pain during and following.  Requested and received one-time dose of pain medication for patient.  Awaiting pharm to approve.

## 2023-05-13 NOTE — PLAN OF CARE
Goal Outcome Evaluation:   Surgery changed dressing today. ABX continue.  Patient's mother does most of care.  No skin breakdown noted.  SR to ST as high as low 120's when moving.

## 2023-05-13 NOTE — PROGRESS NOTES
Postoperative day 2    Right inguinal and perineal packing removed.  Inguinal region repacked with saline soaked gauze.  Perineal wound repacked with iodoform gauze.  There was no significant purulent drainage and no significant bloody discharge from either wound.  Both appear clean and much better than before surgery.    · I will change inguinal dressing again tomorrow.  I will likely remove iodoform gauze tomorrow and plan to start using cotton tip applicator soaked in peroxide past as proximally as possible through the perineal wound to try and get it to heal from outside and and avoid recurrence of fluid collection.  I believe he will tolerate this approach much better than trying to pack with iodoform gauze as we did today.  · Continue Unasyn and Cleocin for now.

## 2023-05-13 NOTE — PLAN OF CARE
Goal Outcome Evaluation:  Plan of Care Reviewed With: mother           Outcome Evaluation: VSS, pain meds X1, mother does all pt care, Dr Zhu to do 1st dressing change in am, IV abx, will continue to monitor

## 2023-05-14 PROCEDURE — 99024 POSTOP FOLLOW-UP VISIT: CPT | Performed by: SURGERY

## 2023-05-14 PROCEDURE — 25010000002 AMPICILLIN-SULBACTAM PER 1.5 G: Performed by: SURGERY

## 2023-05-14 RX ADMIN — CLINDAMYCIN PHOSPHATE 300 MG: 300 INJECTION, SOLUTION INTRAVENOUS at 05:56

## 2023-05-14 RX ADMIN — ACETAMINOPHEN 650 MG: 325 SUSPENSION ORAL at 14:27

## 2023-05-14 RX ADMIN — CLINDAMYCIN PHOSPHATE 300 MG: 300 INJECTION, SOLUTION INTRAVENOUS at 21:28

## 2023-05-14 RX ADMIN — PANTOPRAZOLE SODIUM 40 MG: 40 TABLET, DELAYED RELEASE ORAL at 05:57

## 2023-05-14 RX ADMIN — AMPICILLIN SODIUM AND SULBACTAM SODIUM 3 G: 2; 1 INJECTION, POWDER, FOR SOLUTION INTRAMUSCULAR; INTRAVENOUS at 20:27

## 2023-05-14 RX ADMIN — Medication 10 ML: at 20:25

## 2023-05-14 RX ADMIN — AMPICILLIN SODIUM AND SULBACTAM SODIUM 3 G: 2; 1 INJECTION, POWDER, FOR SOLUTION INTRAMUSCULAR; INTRAVENOUS at 15:02

## 2023-05-14 RX ADMIN — ACETAMINOPHEN 650 MG: 325 SUSPENSION ORAL at 22:06

## 2023-05-14 RX ADMIN — Medication 1 TABLET: at 08:05

## 2023-05-14 RX ADMIN — ACETAMINOPHEN 650 MG: 325 SUSPENSION ORAL at 08:05

## 2023-05-14 RX ADMIN — Medication 10 ML: at 08:06

## 2023-05-14 RX ADMIN — AMPICILLIN SODIUM AND SULBACTAM SODIUM 3 G: 2; 1 INJECTION, POWDER, FOR SOLUTION INTRAMUSCULAR; INTRAVENOUS at 02:40

## 2023-05-14 RX ADMIN — CLINDAMYCIN PHOSPHATE 300 MG: 300 INJECTION, SOLUTION INTRAVENOUS at 14:22

## 2023-05-14 RX ADMIN — AMPICILLIN SODIUM AND SULBACTAM SODIUM 3 G: 2; 1 INJECTION, POWDER, FOR SOLUTION INTRAMUSCULAR; INTRAVENOUS at 08:05

## 2023-05-14 NOTE — PLAN OF CARE
Goal Outcome Evaluation:  ABX continue.  Patient much better spirits today, laughing while playing with his toy keyboard. Patient still get tachycardic with activity and pain but otherwise, VSS  Surgeon changed packing and dressings this morning, showing patient's mother how to perform dressing changes.  Plan is for surgeon to come again in the morning and have the mother change the dressings with surgeon at bedside in the morning, educating her to change them once patient is discharged.

## 2023-05-14 NOTE — PROGRESS NOTES
Postoperative day 3    Right inguinal packing removed with some purulent drainage.  Iodoform removed from perineal wound with no significant drainage.  Cotton tip applicator soaked in peroxide placed into perineal wound and passed approximately 10 cm in.    · Continue IV antibiotics  · Instructing mother on dressing changes  · Probably repeat pelvic CT tomorrow to see if the most recent surgery drained the deep pelvic fluid collection  · Probably home in next 2-3 days

## 2023-05-14 NOTE — PLAN OF CARE
Goal Outcome Evaluation:  Plan of Care Reviewed With: mother        Progress: no change  Outcome Evaluation: VSS. Tylenol given x1 for pain. Mother at bedside and does all pt care & turns. IV abx infusing. No acute events this shift

## 2023-05-15 ENCOUNTER — APPOINTMENT (OUTPATIENT)
Dept: CT IMAGING | Facility: HOSPITAL | Age: 33
DRG: 579 | End: 2023-05-15
Payer: COMMERCIAL

## 2023-05-15 LAB
BACTERIA SPEC AEROBE CULT: NORMAL
BACTERIA SPEC AEROBE CULT: NORMAL

## 2023-05-15 PROCEDURE — 72192 CT PELVIS W/O DYE: CPT

## 2023-05-15 PROCEDURE — 99024 POSTOP FOLLOW-UP VISIT: CPT | Performed by: SURGERY

## 2023-05-15 PROCEDURE — 25010000002 AMPICILLIN-SULBACTAM PER 1.5 G: Performed by: SURGERY

## 2023-05-15 RX ADMIN — CLINDAMYCIN PHOSPHATE 300 MG: 300 INJECTION, SOLUTION INTRAVENOUS at 21:58

## 2023-05-15 RX ADMIN — CLINDAMYCIN PHOSPHATE 300 MG: 300 INJECTION, SOLUTION INTRAVENOUS at 15:19

## 2023-05-15 RX ADMIN — AMPICILLIN SODIUM AND SULBACTAM SODIUM 3 G: 2; 1 INJECTION, POWDER, FOR SOLUTION INTRAMUSCULAR; INTRAVENOUS at 20:56

## 2023-05-15 RX ADMIN — AMPICILLIN SODIUM AND SULBACTAM SODIUM 3 G: 2; 1 INJECTION, POWDER, FOR SOLUTION INTRAMUSCULAR; INTRAVENOUS at 08:26

## 2023-05-15 RX ADMIN — ACETAMINOPHEN 650 MG: 325 SUSPENSION ORAL at 12:59

## 2023-05-15 RX ADMIN — ACETAMINOPHEN 650 MG: 325 SUSPENSION ORAL at 06:06

## 2023-05-15 RX ADMIN — ACETAMINOPHEN 650 MG: 325 SUSPENSION ORAL at 20:49

## 2023-05-15 RX ADMIN — AMPICILLIN SODIUM AND SULBACTAM SODIUM 3 G: 2; 1 INJECTION, POWDER, FOR SOLUTION INTRAMUSCULAR; INTRAVENOUS at 03:14

## 2023-05-15 RX ADMIN — CLINDAMYCIN PHOSPHATE 300 MG: 300 INJECTION, SOLUTION INTRAVENOUS at 05:24

## 2023-05-15 RX ADMIN — Medication 1 TABLET: at 08:26

## 2023-05-15 RX ADMIN — Medication 10 ML: at 08:30

## 2023-05-15 RX ADMIN — Medication 10 ML: at 23:12

## 2023-05-15 NOTE — PROGRESS NOTES
"Nutrition Services    Patient Name:  Alex Brown  YOB: 1990  MRN: [id number]  Admit Date:  5/10/2023    FOLLOW UP - CLINICAL NUTRITION    Assessment Date:  05/15/23    Encounter Information         Reason for Encounter RD f/u.     Current Issues RD spoke with pt's mother at bedside. Reports pt is eating very well and tolerating current diet. No issues noted.   POD3 Incision and drainage of right inguinal/inner thigh abscess, drainage of deep pelvic abscess through existing perineal wound     Current Nutrition Orders & Evaluation of Intake       Oral Nutrition     Current PO Diet Diet: Regular/House Diet; Texture: Soft to Chew (NDD 3); Soft to Chew: Ground Meat; Fluid Consistency: Thin (IDDSI 0)   Supplement n/a   PO Evaluation     % PO Intake No intakes documented but family reports pt eating well.    # of Days Evaluated     Factors Affecting Intake  No factors at this time   --  Anthropometrics          Height    Weight Height: 167.6 cm (66\")  Weight: 48.1 kg (106 lb 0.7 oz) (05/10/23 2300)    BMI kg/m2 Body mass index is 17.12 kg/m².  Underweight (18.4 or below)    Weight trend Stable     Labs        Pertinent Labs Reviewed, listed below     Results from last 7 days   Lab Units 05/10/23  1246   SODIUM mmol/L 141   POTASSIUM mmol/L 4.2   CHLORIDE mmol/L 107   CO2 mmol/L 23.9   BUN mg/dL 10   CREATININE mg/dL 0.84   CALCIUM mg/dL 10.2   BILIRUBIN mg/dL <0.2   ALK PHOS U/L 84   ALT (SGPT) U/L 20   AST (SGOT) U/L 12   GLUCOSE mg/dL 106*     Results from last 7 days   Lab Units 05/10/23  1246   HEMOGLOBIN g/dL 12.8*   HEMATOCRIT % 39.5   WBC 10*3/mm3 10.17   ALBUMIN g/dL 3.9     Results from last 7 days   Lab Units 05/10/23  1246   INR  1.05   APTT seconds 29.0   PLATELETS 10*3/mm3 407     COVID19   Date Value Ref Range Status   06/10/2021 Not Detected Not Detected - Ref. Range Final     No results found for: HGBA1C       Medications            Scheduled Medications ampicillin-sulbactam, 3 g, " Intravenous, Q6H  clindamycin, 300 mg, Intravenous, Q8H  multivitamin, 1 tablet, Oral, Daily  pantoprazole, 40 mg, Oral, Q AM  sodium chloride, 10 mL, Intravenous, Q12H        Infusions      PRN Medications •  acetaminophen  •  HYDROcodone-acetaminophen  •  Morphine **AND** naloxone  •  nitroglycerin  •  [COMPLETED] Insert Peripheral IV **AND** sodium chloride  •  sodium chloride  •  sodium chloride     Physical Findings          Physical Appearance alert, nonverbal, room air, underweight   Oral/Mouth Cavity WNL   Edema  no edema   Gastrointestinal colostomy, last bowel movement:5/13   Skin  surgical incision   Tubes/Drains/Lines colostomy   NFPE Date Completed: 5/11   --  NUTRITION INTERVENTION / PLAN OF CARE  Intervention Goal         Intervention Goal(s) Maintain nutrition status, Disease management/therapy, Maintain intake, Maintain weight and Appropriate weight gain     Nutrition Intervention         RD Action Encourage intake and Follow Tx Progress     Nutrition Prescription         Diet Prescription     Supplement Prescription n/a   EN/PN Prescription    New Prescription Ordered? No changes at this time   --  Monitor/Evaluation        Monitor Per protocol   Discharge Needs No discharge needs identified at this time, Pending clinical course   Education Will instruct as appropriate   --    RD to follow up per protocol.    Electronically signed by:  Madison Green RD  05/15/23 15:52 EDT

## 2023-05-15 NOTE — CASE MANAGEMENT/SOCIAL WORK
Continued Stay Note  [facility]     Patient Name: Alex Brown  MRN: [id number]  Today's Date: 5/15/2023    Admit Date: 5/10/2023    Plan: Home with parents.  Referral to [facility] pending.   Discharge Plan     Row Name 05/15/23 1533       Plan    Plan Home with parents.  Referral to [facility] pending.    Plan Comments S/W pt and his parents at bedside.  Pt will have continued dressing changes after DC.  His mother will do these.  [facility] discussed possible HH to monitor wound/ dressing changes and for dressing supplies. They are in agreement and have no preference of HH agency.  Referrla sent to [facility] - eval pending. Pt's parents will provide tansport home upon DC [facility] .........Princess ARELLANO/ BETHANY               Discharge Codes    No documentation.                     Princess Sena RN

## 2023-05-15 NOTE — DISCHARGE PLACEMENT REQUEST
"Stephanie Elroy BEBA (32 y.o. Male)     Date of Birth   1990    Social Security Number       Address   [address] [place], [place] [address] [id number]    Home Phone   [phone]    MRN   [id number]       Yazdanism   Faith    Marital Status   Single                            Admission Date   5/10/23    Admission Type   Emergency    Admitting Provider   Vinh Ballard MD    Attending Provider   Lazaro Moore MD    Department, Room/Bed   [address], S510/1       Discharge Date       Discharge Disposition       Discharge Destination                               Attending Provider: Lazaro Moore MD    Allergies: Benadryl [Diphenhydramine], Vancomycin, Ceclor [Cefaclor], Dilaudid [Hydromorphone Hcl], Reglan [Metoclopramide], Thorazine [Chlorpromazine]    Isolation: Contact   Infection: MRSA (05/10/23)   Code Status: CPR    Ht: 167.6 cm (66\")   Wt: 48.1 kg (106 lb 0.7 oz)    Admission Cmt: None   Principal Problem: Perineal abscess [L02.215]                 Active Insurance as of 5/10/2023     Primary Coverage     Payor Plan Insurance Group Employer/Plan Group    ANTHEM BLUE CROSS ANTHEM BLUE CROSS BLUE SHIELD [facility] [id number]     Payor Plan Address Payor Plan Phone Number Payor Plan Fax Number Effective Dates    [address] [phone]  1/1/2012 - None Entered    [facility] [id number]       Subscriber Name Subscriber Birth Date Member ID       AGUEDA BROWN 10/4/1947 [id number]           Secondary Coverage     Payor Plan Insurance Group Employer/Plan Group    [place]      Payor Plan Address Payor Plan Phone Number Payor Plan Fax Number Effective Dates    [address] [phone]  4/8/2016 - None Entered    [facility] [id number]       Subscriber Name Subscriber Birth Date Member ID       ELROY BROWN 1990 [id number]                 Emergency Contacts      (Rel.) Home Phone Work Phone Mobile Phone    Rosy Bronw (LEGAL GUARDIN) " (Mother) [phone] -- [phone]    Jung Brown (Father) [phone] -- [phone]

## 2023-05-15 NOTE — PLAN OF CARE
Goal Outcome Evaluation:  Dr. Ballard did dressing change this morning with pateint's mom and dad.  Mom and dad both participated.  Mom to do dressing change tomorrow with RN at bedside to assist as needed.  Patient tolerated most of the change very well but suddenly felt the pain but was quickly distracted by RN at bedside.  ABX continue.  IV infiltrated, IV team called to place new IV.  CT ABD completed today.  VSS

## 2023-05-15 NOTE — PROGRESS NOTES
Postop day 4    His mother did the dressing change and Q-tip insertion today without difficulty.  Will obtain CT pelvis to evaluate fluid collection.  Continue antibiotics as is.

## 2023-05-15 NOTE — PAYOR COMM NOTE
"Alex Castaneda (32 y.o. Male)   PLEASE SEE ATTACHED FOR CONTINUED INPT STAY DAYS    REF #  [id number]    PLEASE CALL OMID QIU RN/ DEPT @ [phone]   OR [phone]    THANK YOU  OMID QIU RN  [facility]       Date of Birth   1990    Social Security Number       Address   [address] [place], [place] [address] [id number]    Home Phone   [phone]    MRN   [id number]       Episcopalian   Pentecostal    Marital Status   Single                            Admission Date   5/10/23    Admission Type   Emergency    Admitting Provider   Vinh Ballard MD    Attending Provider   Lazaro Moore MD    Department, Room/Bed   [address], S510/1       Discharge Date       Discharge Disposition       Discharge Destination                               Attending Provider: Lazaro Moore MD    Allergies: Benadryl [Diphenhydramine], Vancomycin, Ceclor [Cefaclor], Dilaudid [Hydromorphone Hcl], Reglan [Metoclopramide], Thorazine [Chlorpromazine]    Isolation: Contact   Infection: MRSA (05/10/23)   Code Status: CPR    Ht: 167.6 cm (66\")   Wt: 48.1 kg (106 lb 0.7 oz)    Admission Cmt: None   Principal Problem: Perineal abscess [L02.215]                 Active Insurance as of 5/10/2023     Primary Coverage     Payor Plan Insurance Group Employer/Plan Group    [employer]HEM BLUE CROSS ANTHEM BLUE CROSS BLUE SHIELD [facility] [id number]     Payor Plan Address Payor Plan Phone Number Payor Plan Fax Number Effective Dates    [address] [phone]  1/1/2012 - None Entered    [facility] [id number]       Subscriber Name Subscriber Birth Date Member ID       AGUEDA CASTANEDA 10/4/1949 [id number]           Secondary Coverage     Payor Plan Insurance Group Employer/Plan Group    [place]      Payor Plan Address Payor Plan Phone Number Payor Plan Fax Number Effective Dates    [address] [phone]  4/8/2016 - None Entered    [facility] [id number]       Subscriber Name " Subscriber Birth Date Member ID       ELROY BROWN 1990 [id number]                 Emergency Contacts      (Rel.) Home Phone Work Phone Mobile Phone    Rosy Brown (LEGAL GUARDIN) (Mother) [phone] -- [phone]    Jung Brown (Father) [phone] -- [phone]            [place]: NPI [id number]  Tax ID [id number]       Physician Progress Notes (last 72 hours)      Vinh Ballard MD at 05/14/23 1013        Postoperative day 3    Right inguinal packing removed with some purulent drainage.  Iodoform removed from perineal wound with no significant drainage.  Cotton tip applicator soaked in peroxide placed into perineal wound and passed approximately 10 cm in.    · Continue IV antibiotics  · Instructing mother on dressing changes  · Probably repeat pelvic CT tomorrow to see if the most recent surgery drained the deep pelvic fluid collection  · Probably home in next 2-3 days    Electronically signed by Vinh Ballard MD at 05/14/23 1015     Vinh Ballard MD at 05/13/23 1214        Postoperative day 2    Right inguinal and perineal packing removed.  Inguinal region repacked with saline soaked gauze.  Perineal wound repacked with iodoform gauze.  There was no significant purulent drainage and no significant bloody discharge from either wound.  Both appear clean and much better than before surgery.    · I will change inguinal dressing again tomorrow.  I will likely remove iodoform gauze tomorrow and plan to start using cotton tip applicator soaked in peroxide past as proximally as possible through the perineal wound to try and get it to heal from outside and and avoid recurrence of fluid collection.  I believe he will tolerate this approach much better than trying to pack with iodoform gauze as we did today.  · Continue Unasyn and Cleocin for now.    Electronically signed by Vinh Ballard MD at 05/13/23 1215     Vinh Ballard MD at 05/12/23 1345        Postoperative  day 1    The right inguinal and perineal wounds are clean with gauze packing.  Discharge is not bloody.  No fever and vital signs stable.    · Continue IV Unasyn and Cleocin based on prior culture, current cultures showing rare normal skin sepideh  · Leave wounds packed as is today, remove packing and start dressing changes tomorrow    Electronically signed by Vinh Ballard MD at 05/12/23 5373

## 2023-05-16 PROCEDURE — 99024 POSTOP FOLLOW-UP VISIT: CPT | Performed by: SURGERY

## 2023-05-16 PROCEDURE — 25010000002 AMPICILLIN-SULBACTAM PER 1.5 G: Performed by: SURGERY

## 2023-05-16 RX ADMIN — Medication 1 TABLET: at 10:56

## 2023-05-16 RX ADMIN — ACETAMINOPHEN 650 MG: 325 SUSPENSION ORAL at 05:04

## 2023-05-16 RX ADMIN — AMPICILLIN SODIUM AND SULBACTAM SODIUM 3 G: 2; 1 INJECTION, POWDER, FOR SOLUTION INTRAMUSCULAR; INTRAVENOUS at 03:02

## 2023-05-16 RX ADMIN — CLINDAMYCIN PHOSPHATE 300 MG: 300 INJECTION, SOLUTION INTRAVENOUS at 05:04

## 2023-05-16 RX ADMIN — AMPICILLIN SODIUM AND SULBACTAM SODIUM 3 G: 2; 1 INJECTION, POWDER, FOR SOLUTION INTRAMUSCULAR; INTRAVENOUS at 10:56

## 2023-05-16 RX ADMIN — Medication 10 ML: at 10:59

## 2023-05-16 RX ADMIN — ACETAMINOPHEN 650 MG: 325 SUSPENSION ORAL at 13:58

## 2023-05-16 RX ADMIN — PANTOPRAZOLE SODIUM 40 MG: 40 TABLET, DELAYED RELEASE ORAL at 05:04

## 2023-05-16 RX ADMIN — AMPICILLIN SODIUM AND SULBACTAM SODIUM 3 G: 2; 1 INJECTION, POWDER, FOR SOLUTION INTRAMUSCULAR; INTRAVENOUS at 15:39

## 2023-05-16 RX ADMIN — CLINDAMYCIN PHOSPHATE 300 MG: 300 INJECTION, SOLUTION INTRAVENOUS at 21:45

## 2023-05-16 RX ADMIN — CLINDAMYCIN PHOSPHATE 300 MG: 300 INJECTION, SOLUTION INTRAVENOUS at 13:58

## 2023-05-16 RX ADMIN — AMPICILLIN SODIUM AND SULBACTAM SODIUM 3 G: 2; 1 INJECTION, POWDER, FOR SOLUTION INTRAMUSCULAR; INTRAVENOUS at 20:20

## 2023-05-16 RX ADMIN — Medication 10 ML: at 20:20

## 2023-05-16 RX ADMIN — ACETAMINOPHEN 650 MG: 325 SUSPENSION ORAL at 20:34

## 2023-05-16 NOTE — PROGRESS NOTES
Postoperative day 4    Mother is getting more comfortable with dressing changes.  Continue IV antibiotics as is.  I will do dressing change with her tomorrow and likely discharge home tomorrow.

## 2023-05-16 NOTE — CASE MANAGEMENT/SOCIAL WORK
Continued Stay Note  [facility]     Patient Name: Alex Brown  MRN: [id number]  Today's Date: 5/16/2023    Admit Date: 5/10/2023    Plan: Home with parents and [facility].   Discharge Plan     Row Name 05/16/23 1156       Plan    Plan Home with parents and [facility].    Plan Comments S/W Nataliya/ [facility] - they have accepted for HH services and will follow. ..........Princess ARELLANO/ BETHANY               Discharge Codes    No documentation.               Expected Discharge Date and Time     Expected Discharge Date Expected Discharge Time    May 17, 2023             Princess Sena RN

## 2023-05-16 NOTE — PLAN OF CARE
Goal Outcome Evaluation:      Pt alert to self, with mother at bedside participating in patient total care. Pt in pain during shift, see MAR. This RN educated and demonstrated how to preform wound care. Pt vital signs stable. Pt in bed resting with family at bedside, this rn will continue to monitor  Problem: Adult Inpatient Plan of Care  Goal: Plan of Care Review  Outcome: Ongoing, Progressing

## 2023-05-17 ENCOUNTER — TRANSCRIBE ORDERS (OUTPATIENT)
Dept: HOME HEALTH SERVICES | Facility: HOME HEALTHCARE | Age: 33
End: 2023-05-17
Payer: MEDICARE

## 2023-05-17 ENCOUNTER — HOME HEALTH ADMISSION (OUTPATIENT)
Dept: HOME HEALTH SERVICES | Facility: HOME HEALTHCARE | Age: 33
End: 2023-05-17
Payer: MEDICARE

## 2023-05-17 ENCOUNTER — READMISSION MANAGEMENT (OUTPATIENT)
Dept: CALL CENTER | Facility: HOSPITAL | Age: 33
End: 2023-05-17
Payer: COMMERCIAL

## 2023-05-17 VITALS
SYSTOLIC BLOOD PRESSURE: 108 MMHG | WEIGHT: 106.04 LBS | RESPIRATION RATE: 22 BRPM | TEMPERATURE: 98.6 F | HEART RATE: 100 BPM | BODY MASS INDEX: 17.04 KG/M2 | DIASTOLIC BLOOD PRESSURE: 76 MMHG | OXYGEN SATURATION: 95 % | HEIGHT: 66 IN

## 2023-05-17 DIAGNOSIS — K61.2 ABSCESS OF ANAL AND RECTAL REGIONS: Primary | ICD-10-CM

## 2023-05-17 DIAGNOSIS — Z51.89 ENCOUNTER FOR WOUND CARE: ICD-10-CM

## 2023-05-17 PROCEDURE — 25010000002 AMPICILLIN-SULBACTAM PER 1.5 G: Performed by: SURGERY

## 2023-05-17 RX ORDER — SULFAMETHOXAZOLE AND TRIMETHOPRIM 800; 160 MG/1; MG/1
1 TABLET ORAL 2 TIMES DAILY
Qty: 28 TABLET | Refills: 0 | Status: SHIPPED | OUTPATIENT
Start: 2023-05-17 | End: 2023-05-31

## 2023-05-17 RX ADMIN — ACETAMINOPHEN 650 MG: 325 SUSPENSION ORAL at 06:10

## 2023-05-17 RX ADMIN — CLINDAMYCIN PHOSPHATE 300 MG: 300 INJECTION, SOLUTION INTRAVENOUS at 06:09

## 2023-05-17 RX ADMIN — AMPICILLIN SODIUM AND SULBACTAM SODIUM 3 G: 2; 1 INJECTION, POWDER, FOR SOLUTION INTRAMUSCULAR; INTRAVENOUS at 09:38

## 2023-05-17 RX ADMIN — PANTOPRAZOLE SODIUM 40 MG: 40 TABLET, DELAYED RELEASE ORAL at 06:09

## 2023-05-17 RX ADMIN — AMPICILLIN SODIUM AND SULBACTAM SODIUM 3 G: 2; 1 INJECTION, POWDER, FOR SOLUTION INTRAMUSCULAR; INTRAVENOUS at 03:57

## 2023-05-17 RX ADMIN — Medication 1 TABLET: at 09:37

## 2023-05-17 NOTE — CASE MANAGEMENT/SOCIAL WORK
Case Management Discharge Note      Final Note: DC home with Robert . .........Princess ARELLANO/ BETHANY    Provided Post Acute Provider List?: N/A    Selected Continued Care - Discharged on 5/17/2023 Admission date: 5/10/2023 - Discharge disposition: Home or Self Care    Destination    No services have been selected for the patient.              Durable Medical Equipment    No services have been selected for the patient.              Dialysis/Infusion    No services have been selected for the patient.              Home Medical Care     Service Provider Selected Services Address Phone Fax Patient Preferred     [facility] [address] [address] [phone] --          Therapy    No services have been selected for the patient.              Community Resources    No services have been selected for the patient.              Community & DME    No services have been selected for the patient.                  Transportation Services  Private: Car    Final Discharge Disposition Code: 06 - home with home health care (Robert )

## 2023-05-17 NOTE — PLAN OF CARE
Goal Outcome Evaluation:  Plan of Care Reviewed With: patient        Progress: improving  Outcome Evaluation: Anticipate discharge. Awaiting MD rounds. Pts mother to do dressing change with MD supervision and teach back prior to discharge. Pts mother is primary caregiver and will be transporting pt home with her. Prn tylenol given as needed. IV abx continued per orders.

## 2023-05-17 NOTE — PROGRESS NOTES
[facility] to provide Home Care services. Patient and family agreeable and deny recent HH services. PCP and contact information confirmed. Mom is primary Caregiver. VO received from Priya for Dr Martin to [facility] for HH.

## 2023-05-18 ENCOUNTER — HOME CARE VISIT (OUTPATIENT)
Dept: HOME HEALTH SERVICES | Facility: HOME HEALTHCARE | Age: 33
End: 2023-05-18
Payer: MEDICARE

## 2023-05-18 ENCOUNTER — TELEPHONE (OUTPATIENT)
Dept: SURGERY | Facility: CLINIC | Age: 33
End: 2023-05-18
Payer: COMMERCIAL

## 2023-05-18 PROCEDURE — G0299 HHS/HOSPICE OF RN EA 15 MIN: HCPCS

## 2023-05-18 NOTE — TELEPHONE ENCOUNTER
Rosy Brown called on behalf of patient asking about Ativan that was supposed to be prescribe to help with the pain during dressing changes.

## 2023-05-18 NOTE — OUTREACH NOTE
Prep Survey      Flowsheet Row Responses   [facility] patient discharged from? [place]   Is LACE score < 7 ? No   Eligibility Readm Mgmt   Discharge diagnosis Perineal abscess   Does the patient have one of the following disease processes/diagnoses(primary or secondary)? Other   Does the patient have Home health ordered? Yes   What is the Home health agency?  [facility]    Is there a DME ordered? No   Prep survey completed? Yes            Lolis VILLEGAS - Registered Nurse

## 2023-05-18 NOTE — PAYOR COMM NOTE
"Alex Brown (33 y.o. Male)     PLEASE SEE ATTACHED FOR DC NOTICE  And INPT AUTH FOR 5/16    REF #[id number]    THANK YOU  OMID QIU RN/ DEPT  [facility]   [phone]  [phone]      Date of Birth   1990    Social Security Number       Address   [address] [place], [place] [address]    Home Phone   [phone]    MRN   [id number]       Anglican   Oriental orthodox    Marital Status   Single                            Admission Date   5/10/23    Admission Type   Emergency    Admitting Provider   Vinh Ballard MD    Attending Provider       Department, Room/Bed   [facility], S510/1       Discharge Date   5/17/2023    Discharge Disposition   Home or Self Care    Discharge Destination                               Attending Provider: (none)   Allergies: Benadryl [Diphenhydramine], Vancomycin, Ceclor [Cefaclor], Dilaudid [Hydromorphone Hcl], Reglan [Metoclopramide], Thorazine [Chlorpromazine]    Isolation: None   Infection: MRSA (05/10/23)   Code Status: Prior    Ht: 167.6 cm (66\")   Wt: 48.1 kg (106 lb 0.7 oz)    Admission Cmt: None   Principal Problem: Perineal abscess [L02.215]                 Active Insurance as of 5/10/2023     Primary Coverage     Payor Plan Insurance Group Employer/Plan Group    [employer]HE[employer] BLUE CROSS ANTHEM BLUE CROSS BLUE SHIELD PPO [id number]     Payor Plan Address Payor Plan Phone Number Payor Plan Fax Number Effective Dates    [address] [phone]  1/1/2012 - None Entered    [facility] [id number]       Subscriber Name Subscriber Birth Date Member ID       AGUEDA BROWN 10/4/1949 [id number]           Secondary Coverage     Payor Plan Insurance Group Employer/Plan Group    [place]      Payor Plan Address Payor Plan Phone Number Payor Plan Fax Number Effective Dates    [address] [phone]  4/8/2016 - None Entered    [facility] [id number]       Subscriber Name Subscriber Birth Date Member ID       " MÓNICADIXIEELROY THURSTON BEBA 1990 [id number]                 Emergency Contacts      (Rel.) Home Phone Work Phone Mobile Phone    Rosy Brown (LEGAL GUARDIN) (Mother) [phone] -- [phone]    Jung Brown (Father) [phone] -- [phone]            [place]: [facility] [id number]  Tax ID [id number]       Physician Progress Notes (last 72 hours)      Vinh Ballard MD at 05/16/23 1659        Postoperative day 4    Mother is getting more comfortable with dressing changes.  Continue IV antibiotics as is.  I will do dressing change with her tomorrow and likely discharge home tomorrow.    Electronically signed by Vinh Ballard MD at 05/16/23 1700     Vinh Ballard MD at 05/15/23 1717        Postop day 4    His mother did the dressing change and Q-tip insertion today without difficulty.  Will obtain CT pelvis to evaluate fluid collection.  Continue antibiotics as is.    Electronically signed by Vinh Ballard MD at 05/15/23 1718           Vinh Ballard MD   Physician  Ambulatory Surgery  Discharge Summary     Signed  Date of Service:  05/17/23 1328  Creation Time:  05/22/23 1449     Signed          DATE OF ADMIT:    • 5/10/2023     DATE OF DISCHARGE:    • 5/17/2023     DIAGNOSIS:    • Chronic perineal wound with acute right perineal abscess     PROCEDURES:    • Incision and drainage of right inguinal/inner thigh abscess and drainage of deep pelvic abscess 5/11/2023     SUMMARY OF HOSPITAL COURSE:     33-year-old gentleman with chronic perineal wound following previous total proctocolectomy for severe ulcerative colitis.  The area has been more irritated of the light and culminated in [place] on 5/10/2023 for increasing pain and tenderness.  CT scan demonstrated phlegmon along the right perianal region extending along the perineum and this was consistent with physical exam findings of abscess.  He underwent incision and drainage on 5/11/2023 and was then maintained on intravenous  antibiotics based on previous culture results.  Current cultures never grew more than skin sepideh.  He improved with drainage and antibiotics and his mother became comfortable with dressing changes.  He was discharged home with twice daily wet-to-dry normal saline dressing changes and continuation of twice daily Bactrim based on prior culture results.  Follow-up in office in 2 weeks.     DIET: Regular     ACTIVITY: Ad monik.     MEDICATIONS: Refer to MAR     FOLLOW-UP: To call office and schedule to week follow-up appointment     Vinh Ballard M.D.              Routing History

## 2023-05-19 ENCOUNTER — HOME CARE VISIT (OUTPATIENT)
Dept: HOME HEALTH SERVICES | Facility: HOME HEALTHCARE | Age: 33
End: 2023-05-19
Payer: COMMERCIAL

## 2023-05-19 DIAGNOSIS — K65.1 PELVIC ABSCESS IN MALE: Primary | ICD-10-CM

## 2023-05-19 DIAGNOSIS — F41.9 ANXIETY: ICD-10-CM

## 2023-05-19 PROCEDURE — G0299 HHS/HOSPICE OF RN EA 15 MIN: HCPCS

## 2023-05-19 RX ORDER — LORAZEPAM 0.5 MG/1
0.5 TABLET ORAL EVERY 8 HOURS PRN
Qty: 30 TABLET | Refills: 0 | Status: SHIPPED | OUTPATIENT
Start: 2023-05-19 | End: 2023-06-18

## 2023-05-19 NOTE — PROGRESS NOTES
"Enter Query Response Below      Query Response: Limited mobility only             If applicable, please update the problem list.   Patient: Alex Brown        : 1990  Account: [id number]           Admit Date: 5/10/2023        How to Respond to this query:       a. Click New Note     b. Answer query within the yellow box.                c. Update the Problem List, if applicable.      If you have any questions about this query contact me at: [phone]     Dr. 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 Per review of nursing documentation on \"Functional Screen\", patient is \"completely dependent\" for ambulating, transfers, toileting, bathing, dressing, and eating. Per Nursing Note  \"Patient's mother does most of care\".   After study, can the condition be further specified as:    Functional quadriplegia  Limited mobility only  Other, please specify: __________  Unable to clinically determine     By submitting this query, we are merely seeking further clarification of documentation to accurately reflect all conditions that you are monitoring, evaluating, treating or that extend the hospitalization or utilize additional resources of care. Please utilize your independent clinical judgment when addressing the question(s) above.     This query and your response, once completed, will be entered into the legal medical record.    Sincerely,  Rebecca Siegel, RN, BSN, CCDS  Clinical Documentation Integrity Program   [email or web address]   "

## 2023-05-21 VITALS
TEMPERATURE: 97.7 F | HEART RATE: 80 BPM | RESPIRATION RATE: 18 BRPM | DIASTOLIC BLOOD PRESSURE: 68 MMHG | BODY MASS INDEX: 16.55 KG/M2 | OXYGEN SATURATION: 98 % | SYSTOLIC BLOOD PRESSURE: 108 MMHG | HEIGHT: 66 IN | WEIGHT: 103 LBS

## 2023-05-21 VITALS
RESPIRATION RATE: 18 BRPM | DIASTOLIC BLOOD PRESSURE: 68 MMHG | HEART RATE: 110 BPM | OXYGEN SATURATION: 98 % | TEMPERATURE: 97.6 F | SYSTOLIC BLOOD PRESSURE: 112 MMHG

## 2023-05-21 NOTE — HOME HEALTH
Routine Visit Note:    Skill/education provided: Dressing chnaged to periteneal wound, edu caregiver on dressing changes    Patient/caregiver response: Caregiver states understanding of wound care    Plan for next visit: Wound dressing changes and pic to monitoring wound healing

## 2023-05-21 NOTE — HOME HEALTH
SOC Note: Reviewed hospital DC instructions with caregiver. Instructed on med changes and compliance with times and doses of med reg with theraputic effect. Instructed to turn patinet every 2 hrs to prevent skin breakdown. Instructed to continue to use incentive spirometer, cough and deep breathing exercises 4 times daily for 5-7 days. Patient able to voice back understanding on instructions provided.    Home Health ordered for: SN    Reason for Hosp/Primary Dx/Co-morbidities: Adm to [facility] 5/10/23 - 5/17/23 with Incision and drainage of right inguinal/perineal abscess done on 5/11/23 per Dr. Ballard. History related to prior total proctocolectomy with end ileostomy for ulcerative colitis in 2012.      Focus of Care: Inguinal/perineal abscess    Patient's goal(s):To heal wound    Current Functional status/mobility/DME: Patient confined to , non amb    HB status/Living Arrangements: Lives at home with mother and father who are PCG    Skin Integrity/wound status: Wound to groin healing with red granulating tissue    Code Status: Full    Fall Risk/Safety concerns: High    Medication issues/Concerns:Sulfamethoxazole-Trimethoprim Changed: sulfamethoxazole-trimethoprim (Bactrim DS) 800-160 MG per tablet Take 1 tablet by mouth 2 (Two) Times a Day.     Additional Problems/Concerns: None    SDOH Barriers (i.e. caregiver concerns, social isolation, transportation, food insecurity, environment, income etc.)/Need for MSW: No    Plan for next visit: Wound healing monitoring to right inguinal/perineal abscess, med compliance with abx tx, falls prevention    SN 2w1, 1w3

## 2023-05-22 ENCOUNTER — READMISSION MANAGEMENT (OUTPATIENT)
Dept: CALL CENTER | Facility: HOSPITAL | Age: 33
End: 2023-05-22
Payer: COMMERCIAL

## 2023-05-22 NOTE — OUTREACH NOTE
Medical Week 1 Survey    Flowsheet Row Responses   [facility] patient discharged from? [place]   Does the patient have one of the following disease processes/diagnoses(primary or secondary)? Other   Week 1 attempt successful? Yes   Call start time 1529   Call end time 1534   Discharge diagnosis Perineal abscess   Person spoke with today (if not patient) and relationship Mother   Meds reviewed with patient/caregiver? Yes   Is the patient having any side effects they believe may be caused by any medication additions or changes? No   Does the patient have all medications ordered at discharge? Yes   Is the patient taking all medications as directed (includes completed medication regime)? Yes   Does the patient have a primary care provider?  Yes   Does the patient have an appointment with their PCP within 7 days of discharge? Greater than 7 days   Comments regarding PCP 6/1/23   Nursing Interventions Verified appointment date/time/provider   Has the patient kept scheduled appointments due by today? N/A   What is the Home health agency?  [facility]   Has home health visited the patient within 72 hours of discharge? Yes   Psychosocial issues? No   Did the patient receive a copy of their discharge instructions? Yes   What is the patient's perception of their health status since discharge? Improving   Is the patient/caregiver able to teach back the hierarchy of who to call/visit for symptoms/problems? PCP, Specialist, Home health nurse, Urgent Care, ED, 911 Yes   Week 1 call completed? Yes   Graduated Yes   Is the patient interested in additional calls from an ambulatory ?  NOTE:  applies to high risk patients requiring additional follow-up. No   Graduated/Revoked comments mother reports pt is doing well, no concerns at this time          Loraine BLANC - Registered Nurse

## 2023-05-26 ENCOUNTER — HOME CARE VISIT (OUTPATIENT)
Dept: HOME HEALTH SERVICES | Facility: HOME HEALTHCARE | Age: 33
End: 2023-05-26
Payer: MEDICARE

## 2023-05-26 VITALS
OXYGEN SATURATION: 98 % | DIASTOLIC BLOOD PRESSURE: 74 MMHG | HEART RATE: 98 BPM | TEMPERATURE: 97.7 F | RESPIRATION RATE: 18 BRPM | SYSTOLIC BLOOD PRESSURE: 124 MMHG

## 2023-05-26 PROCEDURE — G0299 HHS/HOSPICE OF RN EA 15 MIN: HCPCS

## 2023-06-01 ENCOUNTER — OFFICE VISIT (OUTPATIENT)
Dept: SURGERY | Facility: CLINIC | Age: 33
End: 2023-06-01

## 2023-06-01 VITALS — WEIGHT: 106 LBS | HEIGHT: 66 IN | BODY MASS INDEX: 17.04 KG/M2

## 2023-06-01 DIAGNOSIS — K65.1 PELVIC ABSCESS IN MALE: Primary | ICD-10-CM

## 2023-06-01 PROCEDURE — 99024 POSTOP FOLLOW-UP VISIT: CPT | Performed by: SURGERY

## 2023-06-02 ENCOUNTER — HOME CARE VISIT (OUTPATIENT)
Dept: HOME HEALTH SERVICES | Facility: HOME HEALTHCARE | Age: 33
End: 2023-06-02
Payer: MEDICARE

## 2023-06-02 PROCEDURE — G0299 HHS/HOSPICE OF RN EA 15 MIN: HCPCS

## 2023-06-04 NOTE — PROGRESS NOTES
Postoperative visit    Per his mother, he is not having significant drainage from either wound.    On exam, the right inguinal/perineal wound is granulating well and is not significantly much on the gauze when I removed the dressing.  The peritoneal opening was able to probe with cotton tip applicator to a depth of about 4 inches.    Plan:  Continue wet-to-dry normal saline dressing changes to right inguinal/perineal wound  Continue probing midline perineal wound with cotton tip applicator and hydrogen peroxide  Change Bactrim to once daily  Follow-up in 3 to 4 weeks

## 2023-06-05 VITALS
SYSTOLIC BLOOD PRESSURE: 110 MMHG | TEMPERATURE: 97.7 F | OXYGEN SATURATION: 98 % | RESPIRATION RATE: 18 BRPM | DIASTOLIC BLOOD PRESSURE: 72 MMHG | HEART RATE: 106 BPM

## 2023-06-09 ENCOUNTER — HOME CARE VISIT (OUTPATIENT)
Dept: HOME HEALTH SERVICES | Facility: HOME HEALTHCARE | Age: 33
End: 2023-06-09
Payer: MEDICARE

## 2023-06-09 PROCEDURE — G0299 HHS/HOSPICE OF RN EA 15 MIN: HCPCS

## 2023-06-11 VITALS
DIASTOLIC BLOOD PRESSURE: 52 MMHG | SYSTOLIC BLOOD PRESSURE: 114 MMHG | HEART RATE: 104 BPM | TEMPERATURE: 97.4 F | RESPIRATION RATE: 18 BRPM | OXYGEN SATURATION: 98 %

## 2023-06-12 NOTE — HOME HEALTH
Routine Visit Note:    Skill/education provided: Dressing changed and pic taken to periteneal wound, edu caregiver on dressing changes    Patient/caregiver response: Caregiver states understanding of wound care    Plan for next visit: Wound dressing changes and pic to monitoring wound healing

## 2023-06-16 ENCOUNTER — HOME CARE VISIT (OUTPATIENT)
Dept: HOME HEALTH SERVICES | Facility: HOME HEALTHCARE | Age: 33
End: 2023-06-16
Payer: MEDICARE

## 2023-06-16 PROCEDURE — G0299 HHS/HOSPICE OF RN EA 15 MIN: HCPCS

## 2023-06-18 VITALS
TEMPERATURE: 97.5 F | RESPIRATION RATE: 18 BRPM | OXYGEN SATURATION: 98 % | DIASTOLIC BLOOD PRESSURE: 64 MMHG | HEART RATE: 108 BPM | SYSTOLIC BLOOD PRESSURE: 102 MMHG

## 2023-06-18 NOTE — HOME HEALTH
Discharge Summary/Summary of Care Provided: SN T/I and dressing changes to Inguinal/perineal abscess with healing and CG compitent  Patient received home health for diagnosis: Incision and drainage of right inguinal/perineal abscess done on 5/11/23 per Dr. Ballard. History related to prior total proctocolectomy with end ileostomy for ulcerative colitis in 2012.    Current level of functional ability: Patient non amb in WC  Living arrangements: Lives with mother and father who are PCG  Progress towards goals and/or Were all goals met? Yes  Follow-up appointment plans and community resources provided: 6/29/2023 10:30 AM Vinh Ballard MD

## 2023-07-27 NOTE — PLAN OF CARE
Problem: Patient Care Overview  Goal: Plan of Care Review  Flowsheets (Taken 4/3/2020 0623 by Cassie Machado RN)  Progress: improving  Note:   Pt is non verbal. Pt tolerating full liquid diet, no indicators of pain or discomfort. Pt mother is his caregiver and taking care of his turns & doing incontinence care. IV fluids continued. Will continue to monitor       Hans Parr called back and gave me corrected lab,   I placed order and signed

## 2023-08-03 ENCOUNTER — OFFICE VISIT (OUTPATIENT)
Dept: SURGERY | Facility: CLINIC | Age: 33
End: 2023-08-03
Payer: COMMERCIAL

## 2023-08-03 VITALS — BODY MASS INDEX: 16.55 KG/M2 | WEIGHT: 103 LBS | HEIGHT: 66 IN

## 2023-08-03 DIAGNOSIS — Z48.89 POSTOPERATIVE VISIT: Primary | ICD-10-CM

## 2023-08-03 PROCEDURE — 99024 POSTOP FOLLOW-UP VISIT: CPT | Performed by: SURGERY

## 2023-08-24 ENCOUNTER — OFFICE VISIT (OUTPATIENT)
Dept: SURGERY | Facility: CLINIC | Age: 33
End: 2023-08-24
Payer: COMMERCIAL

## 2023-08-24 VITALS — BODY MASS INDEX: 16.55 KG/M2 | WEIGHT: 103 LBS | HEIGHT: 66 IN

## 2023-08-24 DIAGNOSIS — Z48.89 POSTOPERATIVE VISIT: Primary | ICD-10-CM

## 2023-08-25 NOTE — PROGRESS NOTES
His right inguinal wound continues to granulate and I think is contracted some since his last visit.  I did treat the wound with silver nitrate and requested follow-up in 3 to 4 weeks with consideration of further treatment with silver nitrate until the wound heals completely.  They are to continue daily dry gauze dressing changes.

## 2023-09-14 ENCOUNTER — OFFICE VISIT (OUTPATIENT)
Dept: SURGERY | Facility: CLINIC | Age: 33
End: 2023-09-14
Payer: COMMERCIAL

## 2023-09-14 VITALS — HEIGHT: 65 IN | BODY MASS INDEX: 17.16 KG/M2 | WEIGHT: 103 LBS

## 2023-09-14 DIAGNOSIS — Z48.89 POSTOPERATIVE VISIT: Primary | ICD-10-CM

## 2023-09-14 PROCEDURE — 99024 POSTOP FOLLOW-UP VISIT: CPT | Performed by: SURGERY

## 2023-09-15 NOTE — PROGRESS NOTES
The right inguinal wound is significantly zen and granulating nicely.  It was treated again with silver nitrate.  At this point I do not feel that packing is necessary.  I sent his mother home with silver nitrate sticks to continue to treat this every third day or so and this should expedite full healing.

## 2023-12-31 NOTE — OUTREACH NOTE
General Surgery Week 1 Survey      Responses   [facility] patient discharged from?  [place]   Does the patient have one of the following disease processes/diagnoses(primary or secondary)?  General Surgery   Week 1 attempt successful?  No   Revoke  Readmitted          Val Goins RN   Take medication as prescribed  Follow up with ortho as discussed  Return to ED for any worsening pain symptoms or concerns.

## 2024-11-11 ENCOUNTER — HOSPITAL ENCOUNTER (OUTPATIENT)
Facility: HOSPITAL | Age: 34
Setting detail: OBSERVATION
Discharge: HOME | End: 2024-11-14
Attending: EMERGENCY MEDICINE
Payer: COMMERCIAL

## 2024-11-11 ENCOUNTER — APPOINTMENT (OUTPATIENT)
Dept: CT IMAGING | Facility: HOSPITAL | Age: 34
End: 2024-11-11
Payer: COMMERCIAL

## 2024-11-11 DIAGNOSIS — K61.1 PERIRECTAL ABSCESS: ICD-10-CM

## 2024-11-11 DIAGNOSIS — K65.1 PELVIC ABSCESS IN MALE: Primary | ICD-10-CM

## 2024-11-11 DIAGNOSIS — A41.9 SEPSIS WITHOUT ACUTE ORGAN DYSFUNCTION, DUE TO UNSPECIFIED ORGANISM: ICD-10-CM

## 2024-11-11 LAB
ALBUMIN SERPL-MCNC: 3.7 G/DL (ref 3.5–5.2)
ALBUMIN/GLOB SERPL: 0.9 G/DL
ALP SERPL-CCNC: 92 U/L (ref 39–117)
ALT SERPL W P-5'-P-CCNC: 23 U/L (ref 1–41)
ANION GAP SERPL CALCULATED.3IONS-SCNC: 11.6 MMOL/L (ref 5–15)
AST SERPL-CCNC: 14 U/L (ref 1–40)
BASOPHILS # BLD AUTO: 0.06 10*3/MM3 (ref 0–0.2)
BASOPHILS NFR BLD AUTO: 0.4 % (ref 0–1.5)
BILIRUB SERPL-MCNC: 0.2 MG/DL (ref 0–1.2)
BUN SERPL-MCNC: 9 MG/DL (ref 6–20)
BUN/CREAT SERPL: 10.7 (ref 7–25)
CALCIUM SPEC-SCNC: 9.7 MG/DL (ref 8.6–10.5)
CHLORIDE SERPL-SCNC: 103 MMOL/L (ref 98–107)
CO2 SERPL-SCNC: 22.4 MMOL/L (ref 22–29)
CREAT SERPL-MCNC: 0.84 MG/DL (ref 0.76–1.27)
D-LACTATE SERPL-SCNC: 1.9 MMOL/L (ref 0.5–2)
DEPRECATED RDW RBC AUTO: 43.5 FL (ref 37–54)
EGFRCR SERPLBLD CKD-EPI 2021: 115.9 ML/MIN/1.73
EOSINOPHIL # BLD AUTO: 0.23 10*3/MM3 (ref 0–0.4)
EOSINOPHIL NFR BLD AUTO: 1.6 % (ref 0.3–6.2)
ERYTHROCYTE [DISTWIDTH] IN BLOOD BY AUTOMATED COUNT: 13.4 % (ref 12.3–15.4)
GLOBULIN UR ELPH-MCNC: 4 GM/DL
GLUCOSE SERPL-MCNC: 89 MG/DL (ref 65–99)
HCT VFR BLD AUTO: 43.3 % (ref 37.5–51)
HGB BLD-MCNC: 13.3 G/DL (ref 13–17.7)
IMM GRANULOCYTES # BLD AUTO: 0.05 10*3/MM3 (ref 0–0.05)
IMM GRANULOCYTES NFR BLD AUTO: 0.4 % (ref 0–0.5)
LYMPHOCYTES # BLD AUTO: 2.06 10*3/MM3 (ref 0.7–3.1)
LYMPHOCYTES NFR BLD AUTO: 14.7 % (ref 19.6–45.3)
MCH RBC QN AUTO: 27.3 PG (ref 26.6–33)
MCHC RBC AUTO-ENTMCNC: 30.7 G/DL (ref 31.5–35.7)
MCV RBC AUTO: 88.7 FL (ref 79–97)
MONOCYTES # BLD AUTO: 1.08 10*3/MM3 (ref 0.1–0.9)
MONOCYTES NFR BLD AUTO: 7.7 % (ref 5–12)
NEUTROPHILS NFR BLD AUTO: 10.53 10*3/MM3 (ref 1.7–7)
NEUTROPHILS NFR BLD AUTO: 75.2 % (ref 42.7–76)
NRBC BLD AUTO-RTO: 0 /100 WBC (ref 0–0.2)
PLATELET # BLD AUTO: 414 10*3/MM3 (ref 140–450)
PMV BLD AUTO: 9.6 FL (ref 6–12)
POTASSIUM SERPL-SCNC: 3.6 MMOL/L (ref 3.5–5.2)
PROT SERPL-MCNC: 7.7 G/DL (ref 6–8.5)
RBC # BLD AUTO: 4.88 10*6/MM3 (ref 4.14–5.8)
SODIUM SERPL-SCNC: 137 MMOL/L (ref 136–145)
WBC NRBC COR # BLD AUTO: 14.01 10*3/MM3 (ref 3.4–10.8)

## 2024-11-11 PROCEDURE — 83605 ASSAY OF LACTIC ACID: CPT | Performed by: PHYSICIAN ASSISTANT

## 2024-11-11 PROCEDURE — 96365 THER/PROPH/DIAG IV INF INIT: CPT

## 2024-11-11 PROCEDURE — 25010000002 MORPHINE PER 10 MG: Performed by: EMERGENCY MEDICINE

## 2024-11-11 PROCEDURE — 87077 CULTURE AEROBIC IDENTIFY: CPT | Performed by: PHYSICIAN ASSISTANT

## 2024-11-11 PROCEDURE — 99291 CRITICAL CARE FIRST HOUR: CPT

## 2024-11-11 PROCEDURE — 25010000002 PIPERACILLIN SOD-TAZOBACTAM PER 1 G: Performed by: PHYSICIAN ASSISTANT

## 2024-11-11 PROCEDURE — 85025 COMPLETE CBC W/AUTO DIFF WBC: CPT | Performed by: PHYSICIAN ASSISTANT

## 2024-11-11 PROCEDURE — 36415 COLL VENOUS BLD VENIPUNCTURE: CPT | Performed by: PHYSICIAN ASSISTANT

## 2024-11-11 PROCEDURE — 74177 CT ABD & PELVIS W/CONTRAST: CPT

## 2024-11-11 PROCEDURE — 87205 SMEAR GRAM STAIN: CPT | Performed by: PHYSICIAN ASSISTANT

## 2024-11-11 PROCEDURE — 87070 CULTURE OTHR SPECIMN AEROBIC: CPT | Performed by: PHYSICIAN ASSISTANT

## 2024-11-11 PROCEDURE — G0378 HOSPITAL OBSERVATION PER HR: HCPCS

## 2024-11-11 PROCEDURE — 87040 BLOOD CULTURE FOR BACTERIA: CPT | Performed by: PHYSICIAN ASSISTANT

## 2024-11-11 PROCEDURE — 80053 COMPREHEN METABOLIC PANEL: CPT | Performed by: PHYSICIAN ASSISTANT

## 2024-11-11 PROCEDURE — 25510000001 IOPAMIDOL 61 % SOLUTION: Performed by: EMERGENCY MEDICINE

## 2024-11-11 PROCEDURE — 87186 SC STD MICRODIL/AGAR DIL: CPT | Performed by: PHYSICIAN ASSISTANT

## 2024-11-11 PROCEDURE — 96375 TX/PRO/DX INJ NEW DRUG ADDON: CPT

## 2024-11-11 RX ORDER — SODIUM CHLORIDE 0.9 % (FLUSH) 0.9 %
10 SYRINGE (ML) INJECTION EVERY 12 HOURS SCHEDULED
Status: DISCONTINUED | OUTPATIENT
Start: 2024-11-11 | End: 2024-11-12

## 2024-11-11 RX ORDER — IOPAMIDOL 612 MG/ML
100 INJECTION, SOLUTION INTRAVASCULAR
Status: COMPLETED | OUTPATIENT
Start: 2024-11-11 | End: 2024-11-11

## 2024-11-11 RX ORDER — ONDANSETRON 2 MG/ML
4 INJECTION INTRAMUSCULAR; INTRAVENOUS EVERY 6 HOURS PRN
Status: DISCONTINUED | OUTPATIENT
Start: 2024-11-11 | End: 2024-11-12

## 2024-11-11 RX ORDER — POLYETHYLENE GLYCOL 3350 17 G/17G
17 POWDER, FOR SOLUTION ORAL DAILY PRN
Status: DISCONTINUED | OUTPATIENT
Start: 2024-11-11 | End: 2024-11-12

## 2024-11-11 RX ORDER — BISACODYL 10 MG
10 SUPPOSITORY, RECTAL RECTAL DAILY PRN
Status: DISCONTINUED | OUTPATIENT
Start: 2024-11-11 | End: 2024-11-12

## 2024-11-11 RX ORDER — SODIUM CHLORIDE 9 MG/ML
40 INJECTION, SOLUTION INTRAVENOUS AS NEEDED
Status: DISCONTINUED | OUTPATIENT
Start: 2024-11-11 | End: 2024-11-12

## 2024-11-11 RX ORDER — MORPHINE SULFATE 2 MG/ML
2 INJECTION, SOLUTION INTRAMUSCULAR; INTRAVENOUS EVERY 4 HOURS PRN
Status: DISCONTINUED | OUTPATIENT
Start: 2024-11-11 | End: 2024-11-12

## 2024-11-11 RX ORDER — AMOXICILLIN 250 MG
2 CAPSULE ORAL 2 TIMES DAILY PRN
Status: DISCONTINUED | OUTPATIENT
Start: 2024-11-11 | End: 2024-11-12

## 2024-11-11 RX ORDER — SODIUM CHLORIDE 0.9 % (FLUSH) 0.9 %
10 SYRINGE (ML) INJECTION AS NEEDED
Status: DISCONTINUED | OUTPATIENT
Start: 2024-11-11 | End: 2024-11-12

## 2024-11-11 RX ORDER — MORPHINE SULFATE 2 MG/ML
2 INJECTION, SOLUTION INTRAMUSCULAR; INTRAVENOUS ONCE
Status: COMPLETED | OUTPATIENT
Start: 2024-11-11 | End: 2024-11-11

## 2024-11-11 RX ORDER — BISACODYL 5 MG/1
5 TABLET, DELAYED RELEASE ORAL DAILY PRN
Status: DISCONTINUED | OUTPATIENT
Start: 2024-11-11 | End: 2024-11-12

## 2024-11-11 RX ORDER — ONDANSETRON 4 MG/1
4 TABLET, ORALLY DISINTEGRATING ORAL EVERY 6 HOURS PRN
Status: DISCONTINUED | OUTPATIENT
Start: 2024-11-11 | End: 2024-11-12

## 2024-11-11 RX ADMIN — MORPHINE SULFATE 2 MG: 2 INJECTION, SOLUTION INTRAMUSCULAR; INTRAVENOUS at 12:02

## 2024-11-11 RX ADMIN — PIPERACILLIN AND TAZOBACTAM 3.38 G: 3; .375 INJECTION, POWDER, FOR SOLUTION INTRAVENOUS at 23:51

## 2024-11-11 RX ADMIN — IOPAMIDOL 85 ML: 612 INJECTION, SOLUTION INTRAVENOUS at 13:48

## 2024-11-11 RX ADMIN — Medication 10 ML: at 20:22

## 2024-11-11 RX ADMIN — PIPERACILLIN AND TAZOBACTAM 4.5 G: 4; .5 INJECTION, POWDER, FOR SOLUTION INTRAVENOUS at 17:50

## 2024-11-11 NOTE — ED PROVIDER NOTES
" EMERGENCY DEPARTMENT ENCOUNTER      Room Number:  42/42  PCP: Kira Almaraz MD  Independent Historians: Mother  Patient Care Team:  Kira Almaraz MD as PCP - General  Kira Almaraz MD as PCP - Family Medicine  Vinh Ballard MD as Surgeon (General Surgery)       HPI:  Chief Complaint: Groin pain    A complete HPI/ROS/PMH/PSH/SH/FH are unobtainable due to: Nonverbal    Chronic or social conditions impacting patient care (Social Determinants of Health): None      Context: Alex Brown is a 34 y.o. male with a PMH significant for perirectal cellulitis, seizure disorder, pelvic abscess, cerebral palsy, small bowel obstruction, and anoxic encephalopathy who presents to the ED c/o acute groin pain and swelling.  The patient has a history of multiple pelvic infections and manage is the general surgery group here in this hospital.  He has an open wound to the right buttock from previous pelvic infections which has been draining for \"many months\" and the family presumes this will be his chronic baseline.  She was bathing him last night and when she cleaned his right groin the patient screamed and became very uncomfortable and tremulous.  She noticed erythema to the right side of the groin at that time as well as the right side of the scrotum.  There has been some increased drainage from his right gluteal wound as well.  No fever, chills.      Upon review of prior external notes (non-ED) -and- Review of prior external test results outside of this encounter it appears the patient was evaluated in the office with family medicine for routine general medical examination on July 22, 2024.  The patient had a normal CMP and CBC on 5/10/2023.      PAST MEDICAL HISTORY  Active Ambulatory Problems     Diagnosis Date Noted    Perirectal cellulitis 04/08/2016    Attack, epileptiform 11/07/2016    Generalized abdominal pain 02/18/2018    Anoxic brain damage 02/19/2018    Seizure disorder " 02/19/2018    Pelvic abscess in male 02/19/2018    Postprocedural intraabdominal abscess 06/27/2019    CP (cerebral palsy) 03/26/2020    DAYDAY (iron deficiency anemia) 03/26/2020    Partial small bowel obstruction 03/30/2020    SBO (small bowel obstruction) 05/13/2021    H/O ulcerative colitis 12/01/2011    Anoxic encephalopathy     Vasovagal syncope     Aspiration pneumonia of left lung due to vomit 05/31/2021    Pneumonia of left lower lobe due to infectious organism 06/10/2021    Severe malnutrition 06/12/2021    Pelvic abscess in male 02/03/2023    Perineal abscess 05/10/2023     Resolved Ambulatory Problems     Diagnosis Date Noted    Leukocytosis 02/19/2018    SBO (small bowel obstruction) 03/25/2020    Other specified injury of superior mesenteric vein, initial encounter 03/26/2020    Hypoxia 06/01/2021    Perirectal abscess 02/02/2023     Past Medical History:   Diagnosis Date    Anemia 2011    Epilepsy     GERD (gastroesophageal reflux disease)     History of aspiration pneumonia     History of MRSA infection 2012    Iron deficiency anemia     PONV (postoperative nausea and vomiting)     Rectal bleeding Ulcerative Colitis    Recurrent sinus infections     Seizures     Seizures 2009    Small bowel obstruction 05/26/2021         PAST SURGICAL HISTORY  Past Surgical History:   Procedure Laterality Date    ABDOMINOPERINEAL PROCTOCOLECTOMY N/A 01/03/2012    Laparoscopic total proctocolectomy with end ileostomy-Dr. Vinh Ballard, [facility]    CIRCUMCISION N/A     COLON RESECTION SMALL BOWEL N/A 07/17/2015    Dr. Vinh Ballard, [facility]    COLONOSCOPY N/A 12/01/2011    Severe ulcerative colitits to the mid tranverse colon-Dr. Pop Baez, [facility]    CYSTIC HYGROMA EXCISION      ENDOSCOPY N/A 02/27/2015    No gross lesions in esophagus, erythematous mucosa in the stomach: biopsied, no gross lesions in the duodenum: biopsied-Dr. José Manuel Mayberry, [facility]    EXPLORATORY LAPAROTOMY N/A 7/9/2019    Procedure: LAPAROTOMY EXPLORATORY  WITH DRAINAGE OF PELVIC ABSCESS;  Surgeon: Vinh Ballard MD;  [facility];  Service: General    EXPLORATORY LAPAROTOMY N/A 3/25/2020    Procedure: EXPLORATORY LAPAROTOMY,OPEN ADHESIOLYSIS,REPAIR OF PERINEUM;  Surgeon: Kali Wilcox MD;  [facility];  Service: General;  Laterality: N/A;    EXPLORATORY LAPAROTOMY N/A 5/28/2021    Procedure: LAPAROTOMY EXPLORATORY with lysis of adhesions;  Surgeon: Vinh Ballard MD;  [facility];  Service: General;  Laterality: N/A;    ILEOSCOPY N/A 02/27/2015    Normal ileoscopy with the exception of some erosions at the distal ileum: biopsied-Dr. José Manuel Mayberry, [facility]    INCISION AND DRAINAGE LEG Right 5/11/2023    Procedure: INCISION AND DRAINAGE RIGHT THIGH;  Surgeon: Vinh Ballard MD;  Location: [facility];  Service: General;  Laterality: Right;    MOLE REMOVAL      PELVIC ABCESS DRAINAGE N/A 07/11/2015    Irrigation and drainage of a pelvic abscess-Dr. Kali Wilcox, [facility]    TESTICLE UNDESCENDED REPAIR           FAMILY HISTORY  Family History   Problem Relation Age of Onset    Prostate cancer Father     Cancer Father         Prostate cancer    Diabetes Father     Hypertension Father     Malig Hyperthermia Neg Hx          SOCIAL HISTORY  Social History     Socioeconomic History    Marital status: Single   Tobacco Use    Smoking status: Never     Passive exposure: Never    Smokeless tobacco: Never   Vaping Use    Vaping status: Never Used   Substance and Sexual Activity    Alcohol use: Never    Drug use: Never    Sexual activity: Never         ALLERGIES  Benadryl [diphenhydramine], Vancomycin, Ceclor [cefaclor], Dilaudid [hydromorphone hcl], Reglan [metoclopramide], and Thorazine [chlorpromazine]      REVIEW OF SYSTEMS  Included in HPI  All systems reviewed and negative except for those discussed in HPI.      PHYSICAL EXAM    I have reviewed the triage vital signs and nursing notes.    ED Triage Vitals   Temp Heart Rate  Resp BP SpO2   11/11/24 1120 11/11/24 1120 -- 11/11/24 1135 11/11/24 1120   98.6 °F (37 °C) (!) 124  (!) 114/101 96 %      Temp src Heart Rate Source Patient Position BP Location FiO2 (%)   -- -- -- -- --              Physical Exam  Constitutional:       General: He is not in acute distress.     Appearance: He is well-developed.   HENT:      Head: Normocephalic and atraumatic.   Eyes:      General: No scleral icterus.     Conjunctiva/sclera: Conjunctivae normal.   Neck:      Trachea: No tracheal deviation.   Cardiovascular:      Rate and Rhythm: Normal rate and regular rhythm.   Pulmonary:      Effort: Pulmonary effort is normal.      Breath sounds: Normal breath sounds.   Abdominal:      Palpations: Abdomen is soft.      Tenderness: There is no abdominal tenderness. There is no guarding.   Genitourinary:         Comments: Right scrotal erythema, induration, tenderness.  Musculoskeletal:         General: No deformity.      Cervical back: Normal range of motion.   Lymphadenopathy:      Cervical: No cervical adenopathy.   Skin:     General: Skin is warm and dry.   Neurological:      Mental Status: He is alert and oriented to person, place, and time.      Comments: Some spasticity to all extremities consistent with chronic baseline related to anoxic brain injury   Psychiatric:         Speech: He is noncommunicative.         Behavior: Behavior is agitated.         Vital signs and nursing notes reviewed.      PPE: I wore a surgical mask throughout my encounters with the pt. I performed hand hygiene on entry into the pt room and upon exit.     LAB RESULTS  Recent Results (from the past 24 hours)   Comprehensive Metabolic Panel    Collection Time: 11/11/24 12:01 PM    Specimen: Blood   Result Value Ref Range    Glucose 89 65 - 99 mg/dL    BUN 9 6 - 20 mg/dL    Creatinine 0.84 0.76 - 1.27 mg/dL    Sodium 137 136 - 145 mmol/L    Potassium 3.6 3.5 - 5.2 mmol/L    Chloride 103 98 - 107 mmol/L    CO2 22.4 22.0 - 29.0 mmol/L     Calcium 9.7 8.6 - 10.5 mg/dL    Total Protein 7.7 6.0 - 8.5 g/dL    Albumin 3.7 3.5 - 5.2 g/dL    ALT (SGPT) 23 1 - 41 U/L    AST (SGOT) 14 1 - 40 U/L    Alkaline Phosphatase 92 39 - 117 U/L    Total Bilirubin 0.2 0.0 - 1.2 mg/dL    Globulin 4.0 gm/dL    A/G Ratio 0.9 g/dL    BUN/Creatinine Ratio 10.7 7.0 - 25.0    Anion Gap 11.6 5.0 - 15.0 mmol/L    eGFR 115.9 >60.0 mL/min/1.73   Lactic Acid, Plasma    Collection Time: 11/11/24 12:01 PM    Specimen: Blood   Result Value Ref Range    Lactate 1.9 0.5 - 2.0 mmol/L   CBC Auto Differential    Collection Time: 11/11/24 12:01 PM    Specimen: Blood   Result Value Ref Range    WBC 14.01 (H) 3.40 - 10.80 10*3/mm3    RBC 4.88 4.14 - 5.80 10*6/mm3    Hemoglobin 13.3 13.0 - 17.7 g/dL    Hematocrit 43.3 37.5 - 51.0 %    MCV 88.7 79.0 - 97.0 fL    MCH 27.3 26.6 - 33.0 pg    MCHC 30.7 (L) 31.5 - 35.7 g/dL    RDW 13.4 12.3 - 15.4 %    RDW-SD 43.5 37.0 - 54.0 fl    MPV 9.6 6.0 - 12.0 fL    Platelets 414 140 - 450 10*3/mm3    Neutrophil % 75.2 42.7 - 76.0 %    Lymphocyte % 14.7 (L) 19.6 - 45.3 %    Monocyte % 7.7 5.0 - 12.0 %    Eosinophil % 1.6 0.3 - 6.2 %    Basophil % 0.4 0.0 - 1.5 %    Immature Grans % 0.4 0.0 - 0.5 %    Neutrophils, Absolute 10.53 (H) 1.70 - 7.00 10*3/mm3    Lymphocytes, Absolute 2.06 0.70 - 3.10 10*3/mm3    Monocytes, Absolute 1.08 (H) 0.10 - 0.90 10*3/mm3    Eosinophils, Absolute 0.23 0.00 - 0.40 10*3/mm3    Basophils, Absolute 0.06 0.00 - 0.20 10*3/mm3    Immature Grans, Absolute 0.05 0.00 - 0.05 10*3/mm3    nRBC 0.0 0.0 - 0.2 /100 WBC   Wound Culture - Wound, Groin    Collection Time: 11/11/24 12:39 PM    Specimen: Groin; Wound   Result Value Ref Range    Gram Stain Many (4+) WBCs seen     Gram Stain Occasional Gram positive cocci in pairs          RADIOLOGY  CT Abdomen Pelvis With Contrast    Result Date: 11/11/2024  CT ABDOMEN AND PELVIS WITH CONTRAST  HISTORY: 34 years of age, male.  Right groin pain and history of pelvic abscesses.  TECHNIQUE:  CT  includes axial imaging from the lung bases to the trochanters with intravenous contrast and without use of oral contrast. Data reconstructed in coronal and sagittal planes. Radiation dose reduction techniques were utilized, including automated exposure control and exposure modulation based on body size.  COMPARISON: CT pelvis 05/15/2023, CT abdomen and pelvis 05/10/2023, 05/04/2023.  FINDINGS: Lung bases appear clear. The liver, spleen, adrenal glands, pancreas, and kidneys appear normal. No hydronephrosis.  Previous colectomy with left-sided ileostomy.  Within the central pelvis there is a thick-walled tubular collection that has been present on previous exams and is without significant change. This measures approximately 2.5 x 1.6 cm.  There is right perineal/perianal collection that extends from the right perianal region to the base of the scrotum and measures approximately 6 cm in AP dimension by 0.8 cm in transverse dimension and extends 1.4 cm in height as demonstrated on axial image 142. There is mild adjacent inflammation within the fat.  No further evidence for fluid collection.      1. Thin, linear right perineal/perianal fluid collection extends just deep to the skin surface from the perianal region to the base of the scrotum measuring approximately 6 cm in AP dimension by 0.8 cm in length (axial mage 142). Consistent with a small perianal/perineal abscess. 2. Chronic posterocentral lower pelvic collection with hyperemic enhancing wall without interval change.  Radiation dose reduction techniques were utilized, including automated exposure control and exposure modulation based on body size.   This report was finalized on 11/11/2024 4:34 PM by Beau Espinoza M.D on Workstation: [id number]         MEDICATIONS GIVEN IN ER  Medications   piperacillin-tazobactam (ZOSYN) 4.5 g IVPB in 100 mL NS MBP (CD) (has no administration in time range)   morphine injection 2 mg (2 mg Intravenous Given 11/11/24 1202)    iopamidol (ISOVUE-300) 61 % injection 100 mL (85 mL Intravenous Given by Other 11/11/24 7300)         ORDERS PLACED DURING THIS VISIT:  Orders Placed This Encounter   Procedures    Blood Culture - Blood,    Blood Culture - Blood,    Wound Culture - Wound, Groin    CT Abdomen Pelvis With Contrast    Comprehensive Metabolic Panel    Lactic Acid, Plasma    CBC Auto Differential    Surgery (on-call MD unless specified)    CBC & Differential         OUTPATIENT MEDICATION MANAGEMENT:  Current Facility-Administered Medications Ordered in Epic   Medication Dose Route Frequency Provider Last Rate Last Admin    piperacillin-tazobactam (ZOSYN) 4.5 g IVPB in 100 mL NS MBP (CD)  4.5 g Intravenous Once Juancarlos Esteves PA         Current Outpatient Medications Ordered in Epic   Medication Sig Dispense Refill    lansoprazole (PREVACID) 15 MG capsule Take 1 capsule by mouth Daily. Indications: Gastroesophageal Reflux Disease, Heartburn      multivitamin (THERAGRAN) tablet tablet Take 1 tablet by mouth Daily. Indications: Vitamin Deficiency               Critical care provider statement:    Critical care time (minutes): 38.   Critical care time was exclusive of:  Separately billable procedures and treating other patients   Critical care was necessary to treat or prevent imminent or life-threatening deterioration of the following conditions:  Sepsis   Critical care was time spent personally by me on the following activities:  Development of treatment plan with patient or surrogate, discussions with consultants, evaluation of patient's response to treatment, examination of patient, obtaining history from patient or surrogate, ordering and performing treatments and interventions, ordering and review of laboratory studies, ordering and review of radiographic studies, pulse oximetry, re-evaluation of patient's condition and review of old charts. Critical Care indicators: Sepsis / septicemia Others: aminophylline, diazepam, glucagon,  heparin, lovenox, morphine, sodium bicarbonate, et al.      PROGRESS, DATA ANALYSIS, CONSULTS, AND MEDICAL DECISION MAKING  All labs have been independently interpreted by me.  All radiology studies have been reviewed by me. All EKG's have been independently viewed and interpreted by me.  Discussion below represents my analysis of pertinent findings related to patient's condition, differential diagnosis, treatment plan and final disposition.    DIFFERENTIAL DIAGNOSIS INCLUDE BUT NOT LIMITED TO:     Perineal cellulitis, perineal abscess, pelvic abscess, sepsis    Clinical Scores: N/A      ED Course as of 11/11/24 1713   Mon Nov 11, 2024   1305 WBC(!): 14.01 [DC]   1712 I discussed the case with MD Jose Raul with Gen Surgery at this time regarding the patient.  I discussed work-up, results, concerns.  I discussed the consulting provider's desire for IV antibiotics and admission to the observation unit for general surgical consultation in the morning.   [DC]   1713 Patient presentation and evaluation consistent with new infectious process in the pelvis with development of abscess and requiring admission for IV antibiotics and surgical consultation.  Family agreeable. [DC]      ED Course User Index  [DC] Juancarlos Esteves, PA          1714 I rechecked the patient.  I discussed the patient's labs, radiology findings (including all incidental findings), diagnosis, and plan for admission. The patient understands and agrees with the plan.      AS OF 17:13 EST VITALS:    BP - 115/61  HR - 108  TEMP - 98.6 °F (37 °C)  O2 SATS - 95%    COMPLEXITY OF CARE  The patient requires admission.      DIAGNOSIS  Final diagnoses:   Pelvic abscess in male   Sepsis without acute organ dysfunction, due to unspecified organism         DISPOSITION  ED Disposition       ED Disposition   Decision to Admit    Condition   --    Comment   --                Please note that portions of this document were completed with a voice recognition  program.    Note Disclaimer: At [facility], we believe that sharing information builds trust and better relationships. You are receiving this note because you recently visited [facility]. It is possible you will see health information before a provider has talked with you about it. This kind of information can be easy to misunderstand. To help you fully understand what it means for your health, we urge you to discuss this note with your provider.         Juancarlos Esteves PA  11/11/24 3492

## 2024-11-11 NOTE — ED NOTES
Nursing report ED to floor  Alex Brown  34 y.o.  male    HPI :  HPI  Stated Reason for Visit: testicle swelling  History Obtained From: family    Chief Complaint  Chief Complaint   Patient presents with    Groin Swelling       Admitting doctor:   Lazaro Moore MD    Admitting diagnosis:   The primary encounter diagnosis was Pelvic abscess in male. A diagnosis of Sepsis without acute organ dysfunction, due to unspecified organism was also pertinent to this visit.    Code status:   Current Code Status       Date Active Code Status Order ID Comments User Context       11/11/2024 1719 CPR (Attempt to Resuscitate) [id number]  Coy Schaeffer III, PA ED        Question Answer    Code Status (Patient has no pulse and is not breathing) CPR (Attempt to Resuscitate)    Medical Interventions (Patient has pulse or is breathing) Full Support    Level Of Support Discussed With Patient                    Allergies:   Benadryl [diphenhydramine], Vancomycin, Ceclor [cefaclor], Dilaudid [hydromorphone hcl], Reglan [metoclopramide], and Thorazine [chlorpromazine]    Isolation:   Contact    Intake and Output  No intake or output data in the 24 hours ending 11/11/24 1726    Weight:       11/11/24  1135   Weight: 46.7 kg (102 lb 15.3 oz)       Most recent vitals:   Vitals:    11/11/24 1336 11/11/24 1337 11/11/24 1341 11/11/24 1451   BP:   115/61    Pulse: 119 116  108   Resp:       Temp:       SpO2: 95% 96%  95%   Weight:       Height:           Active LDAs/IV Access:   Lines, Drains & Airways       Active LDAs       Name Placement date Placement time Site Days    Peripheral IV 11/11/24 1201 Anterior;Right Wrist 11/11/24  1201  Wrist  less than 1    Colostomy LUQ 03/25/20  2245  LUQ  1691                    Labs (abnormal labs have a star):   Labs Reviewed   CBC WITH AUTO DIFFERENTIAL - Abnormal; Notable for the following components:       Result Value    WBC 14.01 (*)     MCHC 30.7 (*)     Lymphocyte % 14.7 (*)      Neutrophils, Absolute 10.53 (*)     Monocytes, Absolute 1.08 (*)     All other components within normal limits   LACTIC ACID, PLASMA - Normal   WOUND CULTURE   BLOOD CULTURE   BLOOD CULTURE   COMPREHENSIVE METABOLIC PANEL    Narrative:     GFR Normal >60  Chronic Kidney Disease <60  Kidney Failure <15     CBC AND DIFFERENTIAL    Narrative:     The following orders were created for panel order CBC & Differential.  Procedure                               Abnormality         Status                     ---------                               -----------         ------                     CBC Auto Differential[[id number]]        Abnormal            Final result                 Please view results for these tests on the individual orders.       EKG:   No orders to display       Meds given in ED:   Medications   piperacillin-tazobactam (ZOSYN) 4.5 g IVPB in 100 mL NS MBP (CD) (has no administration in time range)   sodium chloride 0.9 % flush 10 mL (has no administration in time range)   sodium chloride 0.9 % flush 10 mL (has no administration in time range)   sodium chloride 0.9 % infusion 40 mL (has no administration in time range)   ondansetron ODT (ZOFRAN-ODT) disintegrating tablet 4 mg (has no administration in time range)     Or   ondansetron (ZOFRAN) injection 4 mg (has no administration in time range)   sennosides-docusate (PERICOLACE) 8.6-50 MG per tablet 2 tablet (has no administration in time range)     And   polyethylene glycol (MIRALAX) packet 17 g (has no administration in time range)     And   bisacodyl (DULCOLAX) EC tablet 5 mg (has no administration in time range)     And   bisacodyl (DULCOLAX) suppository 10 mg (has no administration in time range)   Pharmacy to Dose Zosyn (has no administration in time range)   morphine injection 2 mg (2 mg Intravenous Given 11/11/24 1202)   iopamidol (ISOVUE-300) 61 % injection 100 mL (85 mL Intravenous Given by Other 11/11/24 8560)       Imaging results:  CT Abdomen  Pelvis With Contrast    Result Date: 11/11/2024  1. Thin, linear right perineal/perianal fluid collection extends just deep to the skin surface from the perianal region to the base of the scrotum measuring approximately 6 cm in AP dimension by 0.8 cm in length (axial mage 142). Consistent with a small perianal/perineal abscess. 2. Chronic posterocentral lower pelvic collection with hyperemic enhancing wall without interval change.  Radiation dose reduction techniques were utilized, including automated exposure control and exposure modulation based on body size.   This report was finalized on 11/11/2024 4:34 PM by Beau Espinoza M.D on Workstation: [id number]       Ambulatory status:   Wheelchair     Social issues:   Social History     Socioeconomic History    Marital status: Single   Tobacco Use    Smoking status: Never     Passive exposure: Never    Smokeless tobacco: Never   Vaping Use    Vaping status: Never Used   Substance and Sexual Activity    Alcohol use: Never    Drug use: Never    Sexual activity: Never       Peripheral Neurovascular  Peripheral Neurovascular (Adult)  Peripheral Neurovascular WDL: WDL    Neuro Cognitive  Neuro Cognitive (Adult)  Cognitive/Neuro/Behavioral WDL: WDL    Learning       Respiratory  Respiratory WDL  Respiratory WDL: WDL    Abdominal Pain       Pain Assessments  Pain (Adult)  Preferred Pain Scale: rFLACC (Revised Face, Legs, Activity, Crying, and Consolability Scale)  rFLACC Pain Rating: Face: 1-->occasional grimace or frown, withdrawn, disinterested, appears sad or worried  rFLACC Pain Rating: Legs: 1-->uneasy, restless, tense, occasional tremors  rFLACC Pain Rating: Activity: 1-->squirming, shifting back and forth, tense, mildly agitated, shallow, splinting respirations, intermittent signs  rFLACC Pain Rating: Cry: 1-->moans or whimpers, occasional complaint, occasional verbal outburst or grunt  rFLACC Pain Rating: Consolability: 1-->reassured by occasional touching,  hugging, or being talked to, distractible  rFLACC Score:: 5    NIH Stroke Scale       Yuliana Mullen RN  11/11/24 17:26 EST

## 2024-11-11 NOTE — H&P
[facility]   HISTORY AND PHYSICAL    Patient Name: Alex Brown  : 1990  MRN: [id number]  Primary Care Physician:  Kira Almaraz MD  Date of admission: 2024    Subjective   Subjective     Chief Complaint:   Chief Complaint   Patient presents with    Groin Swelling         HPI:    Alex Brown is a 34 y.o. male with significant past medical history of seizure disorder, perirectal cellulitis, pelvic abscess, CP, SBO, anoxic cephalopathy who was admitted to the ED observation unit for recurrent pelvic abscess.  Patient has had multiple pelvic infections in the past that have been manage by [facility] general surgery.  Mother was bathing the patient last night when she cleaned the right side of his groin the patient screamed and became very uncomfortable tremulous.  She noticed redness and cellulitis to the right side of the groin at that time as well as the right side of the scrotum.  There has been some increased drainage from his right gluteal wound as well.  No fever or chills.  In the ED the provider spoke with Dr. Lula Blunt who recommended the patient be placed in the observation unit general surgery to eval in the morning.  Patient was given IV Zosyn in the ED.    In the ED patient CBC did reveal leukocytosis with a WBC of 14.1 with a left shift.  Lactate was 1.9.  Chemistry baseline for patient.  Blood cultures were obtained.    CT abdomen pelvis showed a linear right perineal perianal fluid collection extending just deep to the skin surface from the perianal region to the base of the scrotum measuring roughly 6 cm in AP dimension by 0.8 cm in length.  In the central pelvis, there was also thick-walled tubular collection that has been present on previous exams and with without significant change.      Review of Systems   All systems were reviewed and negative except for: That listed above    Personal History     Past Medical History:   Diagnosis Date    2011     Anoxic encephalopathy     @Birth    CP (cerebral palsy) 02/18/2018    Epilepsy     GERD (gastroesophageal reflux disease)     H/O ulcerative colitis 12/01/2011    Transverse colon Severe ulcerative colitis-Dr. Pop Baez    History of aspiration pneumonia     History of MRSA infection 2012    TREATED BY DR. BALLARD    Iron deficiency anemia     PONV (postoperative nausea and vomiting)     Rectal bleeding Ulcerative Colitis    Recurrent sinus infections     Seizures     Type of tonic clonic seizure disorder    Seizures 2009    Small bowel obstruction 05/26/2021       Past Surgical History:   Procedure Laterality Date    ABDOMINOPERINEAL PROCTOCOLECTOMY N/A 01/03/2012    Laparoscopic total proctocolectomy with end ileostomy-Dr. Vinh Ballard, [facility]    CIRCUMCISION N/A     COLON RESECTION SMALL BOWEL N/A 07/17/2015    Dr. Vinh Ballard, [facility]    COLONOSCOPY N/A 12/01/2011    Severe ulcerative colitits to the mid tranverse colon-Dr. Pop Baez, [facility]    CYSTIC HYGROMA EXCISION      ENDOSCOPY N/A 02/27/2015    No gross lesions in esophagus, erythematous mucosa in the stomach: biopsied, no gross lesions in the duodenum: biopsied-Dr. José Manuel Mayberry, [facility]    EXPLORATORY LAPAROTOMY N/A 7/9/2019    Procedure: LAPAROTOMY EXPLORATORY WITH DRAINAGE OF PELVIC ABSCESS;  Surgeon: Vinh Ballard MD;  Location: [facility] OR;  Service: General    EXPLORATORY LAPAROTOMY N/A 3/25/2020    Procedure: EXPLORATORY LAPAROTOMY,OPEN ADHESIOLYSIS,REPAIR OF PERINEUM;  Surgeon: Kali Wilcox MD;  Location: [facility] OR;  Service: General;  Laterality: N/A;    EXPLORATORY LAPAROTOMY N/A 5/28/2021    Procedure: LAPAROTOMY EXPLORATORY with lysis of adhesions;  Surgeon: Vinh Ballard MD;  Location: [facility] OR;  Service: General;  Laterality: N/A;    ILEOSCOPY N/A 02/27/2015    Normal ileoscopy with the exception of some erosions at the distal ileum: biopsied-Dr. José Manuel Mayberry, [facility]    INCISION AND DRAINAGE LEG Right 5/11/2023     Procedure: INCISION AND DRAINAGE RIGHT THIGH;  Surgeon: Vinh Ballard MD;  Location: [facility];  Service: General;  Laterality: Right;    MOLE REMOVAL      PELVIC ABCESS DRAINAGE N/A 07/11/2015    Irrigation and drainage of a pelvic abscess-Dr. Kali Wilcox, [facility]    TESTICLE UNDESCENDED REPAIR         Family History: family history includes Cancer in his father; Diabetes in his father; Hypertension in his father; Prostate cancer in his father. Otherwise pertinent FHx was reviewed and not pertinent to current issue.    Social History:  reports that he has never smoked. He has never been exposed to tobacco smoke. He has never used smokeless tobacco. He reports that he does not drink alcohol and does not use drugs.    Home Medications:  lansoprazole and multivitamin    Allergies:  Allergies   Allergen Reactions    Benadryl [Diphenhydramine] Shortness Of Breath and Other (See Comments)     Increased heart rate & extreme irritability    Vancomycin Other (See Comments)     Possible vancomycin infusion reaction syndrome reported by family.  Medication needs to be run at half rate    Ceclor [Cefaclor] Rash     Tolerated ceftriaxone on 6/10/21    Dilaudid [Hydromorphone Hcl] Itching    Reglan [Metoclopramide] Other (See Comments)     Physical restless distress    Thorazine [Chlorpromazine] Other (See Comments)     Physical restless distress       Objective   Objective     Vitals:   Temp:  [98.6 °F (37 °C)] 98.6 °F (37 °C)  Heart Rate:  [104-124] 104  Resp:  [20] 20  BP: (114-116)/() 115/61  Physical Exam    Constitutional: Awake, alert   Eyes: PERRLA, sclerae anicteric, no conjunctival injection   HENT: NCAT, mucous membranes moist   Neck: Supple, no thyromegaly, no lymphadenopathy, trachea midline   Respiratory: Clear to auscultation bilaterally, nonlabored respirations    Cardiovascular: RRR, no murmurs, rubs, or gallops, palpable pedal pulses bilaterally   Gastrointestinal: Positive bowel sounds,  soft, nontender, nondistended   Musculoskeletal: No bilateral ankle edema, no clubbing or cyanosis to extremities   Psychiatric: Appropriate affect, cooperative   Neurologic: Oriented x 3, strength symmetric in all extremities, Cranial Nerves grossly intact to confrontation, speech clear   Skin: No rashes     Result Review    Result Review:  I have personally reviewed the results from the time of this admission to 11/11/2024 18:46 EST and agree with these findings:  [x]  Laboratory list / accordion  []  Microbiology  [x]  Radiology  []  EKG/Telemetry   []  Cardiology/Vascular   []  Pathology  []  Old records  []  Other:        Assessment & Plan   Assessment / Plan     Brief Patient Summary:  Alxe Brown is a 34 y.o. male who was admitted to the ED observation unit for further evaluation of pelvic abscess. IV Zosyn was started in the ED.  General surgery was consulted and there is a plan for the patient to be evaluated in the morning.  Patient currently afebrile and resting quietly and does not appear to be in distress.    Active Hospital Problems:  Active Hospital Problems    Diagnosis     **Pelvic abscess in male      Plan:     Pelvic abscess  -Continue IV Zosyn/pharmacy to dose  -General Surgery consultation  -Morphine 2 mg IV every 4 hours as needed pain  -Zofran 4 mg IV every 6 hours as needed nausea vomiting  -Regular diet  -N.p.o. at midnight      VTE Prophylaxis:  Mechanical VTE prophylaxis orders are present.        CODE STATUS:    Level Of Support Discussed With: Patient  Code Status (Patient has no pulse and is not breathing): CPR (Attempt to Resuscitate)  Medical Interventions (Patient has pulse or is breathing): Full Support    Admission Status:  I believe this patient meets observation status.    Electronically signed by Coy Schaeffer III, PA, 11/11/24, 6:37 PM EST.        75 minutes has been spent by [facility] providers in the care of this patient while  under observation status      I have worn appropriate PPE during this patient encounter, sanitized my hands both with entering and exiting patient's room.    I have discussed plan of care with patient including advance care plan and/or surrogate decision maker.  Patient advises that their mother/Rosy Kirkpatrickow will be their primary surrogate decision maker

## 2024-11-11 NOTE — ED NOTES
Patient arrives via pv from home for complaints of right lower testicle swelling that started last night. Patient has tenderness to the right testicle. Patient's legal guardian states that a lump was present on the right lower testicle.

## 2024-11-11 NOTE — ED PROVIDER NOTES
MD ATTESTATION NOTE    SHARED VISIT: This visit was performed by BOTH a physician and an APC. The substantive portion of the medical decision making was performed by this attesting physician who made or approved the management plan and takes responsibility for patient management. All studies in the APC note (if performed) were independently interpreted by me.     The ELENA and I have discussed this patient's history, physical exam, and treatment plan.  I have reviewed the documentation and affirm the documentation and agree with the treatment and plan.  The attached note describes my personal findings.      Independent Historians: Parents    A complete HPI/ROS/PMH/PSH/SH/FH are unobtainable due to: Nonverbal    Chronic or social conditions impacting patient care (social determinants of health): None    Alex Brown is a 34 y.o. male who presents to the ED c/o acute right groin/scrotal pain and swelling at site of chronic wound from prior pelvic infections followed by Dr. Ballard.  Pt with wound x several months.  Pt with new pain and increased drainage from site that involves right groin, right scrotum, and right perineum/perianal area. No fever.          On exam:  GENERAL: nonverbal M, alert, no acute distress  SKIN: Warm, dry  RESPIRATORY: relaxed breathing  ABDOMEN: soft, nontender, nondistended  : right perineal and perianal erythema and induration including right scrotum  NEURO: alert, moves all extremities                                                           Medical Decision Making:  ED Course as of 11/13/24 0049   Mon Nov 11, 2024   1305 WBC(!): 14.01 [DC]   1996 I discussed the case with MD Jose Raul with Gen Surgery at this time regarding the patient.  I discussed work-up, results, concerns.  I discussed the consulting provider's desire for IV antibiotics and admission to the observation unit for general surgical consultation in the morning.   [DC]   1719 Patient presentation and evaluation  consistent with new infectious process in the pelvis with development of abscess and requiring admission for IV antibiotics and surgical consultation.  Family agreeable. [DC]      ED Course User Index  [DC] Juancarlos Esteves, PA       MDM: scrotal abscess, scrotal cellulitis, perianal abscess, perirectal abscess, perineal cellulitis among other possibilities    Procedures:  Procedures        PPE: I followed hospital protocols for proper PPE based on patient presentation including use of N95 mask for suspected infectious respiratory conditions.  Proper hand hygiene was performed both before and after the patient encounter.          Diagnosis  Final diagnoses:   None   Pelvic abscess, sepsis    Note Disclaimer: At [facility], we believe that sharing information builds trust and better relationships. You are receiving this note because you recently visited [facility]. It is possible you will see health information before a provider has talked with you about it. This kind of information can be easy to misunderstand. To help you fully understand what it means for your health, we urge you to discuss this note with your provider.       Linda Gomez MD  11/13/24 0053

## 2024-11-11 NOTE — CASE MANAGEMENT/SOCIAL WORK
Discharge Planning Assessment  [facility]     Patient Name: Alex Brown  MRN: [id number]  Today's Date: 11/11/2024    Admit Date: 11/11/2024        Discharge Needs Assessment    No documentation.                  Discharge Plan       Row Name 11/11/24 1667       Plan    Plan Comments Patient with legal gaurdian on file, listed as his mother and father, Jung and Rosy. Registration obtained consent to treat from guardian at bedside. Paperwork has been scanned into Epic. [facility] is present upon chart review. Disposition from ER pending. Yuliana MARROQUIN RN CCP manager udpated, per protocol. ROSANGELA Terrell RN                  Continued Care and Services - Admitted Since 11/11/2024    No active coordination exists for this encounter.          Demographic Summary    No documentation.                  Functional Status    No documentation.                  Psychosocial    No documentation.                  Abuse/Neglect    No documentation.                  Legal    No documentation.                  Substance Abuse    No documentation.                  Patient Forms    No documentation.                     Yash Del Rio, RN

## 2024-11-12 ENCOUNTER — ANESTHESIA EVENT (OUTPATIENT)
Dept: PERIOP | Facility: HOSPITAL | Age: 34
End: 2024-11-12
Payer: COMMERCIAL

## 2024-11-12 ENCOUNTER — ANESTHESIA (OUTPATIENT)
Dept: PERIOP | Facility: HOSPITAL | Age: 34
End: 2024-11-12
Payer: COMMERCIAL

## 2024-11-12 LAB
ANION GAP SERPL CALCULATED.3IONS-SCNC: 10.8 MMOL/L (ref 5–15)
BUN SERPL-MCNC: 10 MG/DL (ref 6–20)
BUN/CREAT SERPL: 12.3 (ref 7–25)
CALCIUM SPEC-SCNC: 9.2 MG/DL (ref 8.6–10.5)
CHLORIDE SERPL-SCNC: 104 MMOL/L (ref 98–107)
CO2 SERPL-SCNC: 23.2 MMOL/L (ref 22–29)
CREAT SERPL-MCNC: 0.81 MG/DL (ref 0.76–1.27)
DEPRECATED RDW RBC AUTO: 40.6 FL (ref 37–54)
EGFRCR SERPLBLD CKD-EPI 2021: 117.2 ML/MIN/1.73
ERYTHROCYTE [DISTWIDTH] IN BLOOD BY AUTOMATED COUNT: 13.2 % (ref 12.3–15.4)
GLUCOSE SERPL-MCNC: 87 MG/DL (ref 65–99)
HCT VFR BLD AUTO: 39.4 % (ref 37.5–51)
HGB BLD-MCNC: 12.8 G/DL (ref 13–17.7)
MCH RBC QN AUTO: 27.7 PG (ref 26.6–33)
MCHC RBC AUTO-ENTMCNC: 32.5 G/DL (ref 31.5–35.7)
MCV RBC AUTO: 85.3 FL (ref 79–97)
PLATELET # BLD AUTO: 387 10*3/MM3 (ref 140–450)
PMV BLD AUTO: 9.5 FL (ref 6–12)
POTASSIUM SERPL-SCNC: 3.8 MMOL/L (ref 3.5–5.2)
RBC # BLD AUTO: 4.62 10*6/MM3 (ref 4.14–5.8)
SODIUM SERPL-SCNC: 138 MMOL/L (ref 136–145)
WBC NRBC COR # BLD AUTO: 13.86 10*3/MM3 (ref 3.4–10.8)

## 2024-11-12 PROCEDURE — 25010000002 KETOROLAC TROMETHAMINE PER 15 MG: Performed by: STUDENT IN AN ORGANIZED HEALTH CARE EDUCATION/TRAINING PROGRAM

## 2024-11-12 PROCEDURE — 80048 BASIC METABOLIC PNL TOTAL CA: CPT | Performed by: NURSE PRACTITIONER

## 2024-11-12 PROCEDURE — 87015 SPECIMEN INFECT AGNT CONCNTJ: CPT | Performed by: SURGERY

## 2024-11-12 PROCEDURE — 25010000002 PIPERACILLIN SOD-TAZOBACTAM PER 1 G: Performed by: PHYSICIAN ASSISTANT

## 2024-11-12 PROCEDURE — 25010000002 ONDANSETRON PER 1 MG: Performed by: STUDENT IN AN ORGANIZED HEALTH CARE EDUCATION/TRAINING PROGRAM

## 2024-11-12 PROCEDURE — G0378 HOSPITAL OBSERVATION PER HR: HCPCS

## 2024-11-12 PROCEDURE — 46050 I&D PERIANAL ABSCESS SUPFC: CPT | Performed by: SURGERY

## 2024-11-12 PROCEDURE — 25810000003 SODIUM CHLORIDE 0.9 % SOLUTION: Performed by: SURGERY

## 2024-11-12 PROCEDURE — 25010000002 SUGAMMADEX 200 MG/2ML SOLUTION: Performed by: STUDENT IN AN ORGANIZED HEALTH CARE EDUCATION/TRAINING PROGRAM

## 2024-11-12 PROCEDURE — 25010000002 LIDOCAINE 2% SOLUTION: Performed by: STUDENT IN AN ORGANIZED HEALTH CARE EDUCATION/TRAINING PROGRAM

## 2024-11-12 PROCEDURE — 87205 SMEAR GRAM STAIN: CPT | Performed by: SURGERY

## 2024-11-12 PROCEDURE — 25010000002 PROPOFOL 200 MG/20ML EMULSION: Performed by: STUDENT IN AN ORGANIZED HEALTH CARE EDUCATION/TRAINING PROGRAM

## 2024-11-12 PROCEDURE — 87186 SC STD MICRODIL/AGAR DIL: CPT | Performed by: SURGERY

## 2024-11-12 PROCEDURE — 87075 CULTR BACTERIA EXCEPT BLOOD: CPT | Performed by: SURGERY

## 2024-11-12 PROCEDURE — 99222 1ST HOSP IP/OBS MODERATE 55: CPT | Performed by: SURGERY

## 2024-11-12 PROCEDURE — 46050 I&D PERIANAL ABSCESS SUPFC: CPT | Performed by: SPECIALIST/TECHNOLOGIST, OTHER

## 2024-11-12 PROCEDURE — 25810000003 LACTATED RINGERS PER 1000 ML: Performed by: STUDENT IN AN ORGANIZED HEALTH CARE EDUCATION/TRAINING PROGRAM

## 2024-11-12 PROCEDURE — 87070 CULTURE OTHR SPECIMN AEROBIC: CPT | Performed by: SURGERY

## 2024-11-12 PROCEDURE — 25010000002 FENTANYL CITRATE (PF) 50 MCG/ML SOLUTION: Performed by: STUDENT IN AN ORGANIZED HEALTH CARE EDUCATION/TRAINING PROGRAM

## 2024-11-12 PROCEDURE — 25010000002 DEXAMETHASONE SODIUM PHOSPHATE 20 MG/5ML SOLUTION: Performed by: STUDENT IN AN ORGANIZED HEALTH CARE EDUCATION/TRAINING PROGRAM

## 2024-11-12 PROCEDURE — 87076 CULTURE ANAEROBE IDENT EACH: CPT | Performed by: SURGERY

## 2024-11-12 PROCEDURE — 85027 COMPLETE CBC AUTOMATED: CPT | Performed by: NURSE PRACTITIONER

## 2024-11-12 DEVICE — AVITENE ULTRAFOAM, 8 CM X 12.5 CM (3-1/8" X 5"), 100 SQ CM
Type: IMPLANTABLE DEVICE | Site: RECTUM | Status: FUNCTIONAL
Brand: AVITENE

## 2024-11-12 RX ORDER — OXYCODONE AND ACETAMINOPHEN 5; 325 MG/1; MG/1
1 TABLET ORAL ONCE AS NEEDED
Status: DISCONTINUED | OUTPATIENT
Start: 2024-11-12 | End: 2024-11-12 | Stop reason: HOSPADM

## 2024-11-12 RX ORDER — LABETALOL HYDROCHLORIDE 5 MG/ML
5 INJECTION, SOLUTION INTRAVENOUS
Status: DISCONTINUED | OUTPATIENT
Start: 2024-11-12 | End: 2024-11-12 | Stop reason: HOSPADM

## 2024-11-12 RX ORDER — HYDROCODONE BITARTRATE AND ACETAMINOPHEN 5; 325 MG/1; MG/1
2 TABLET ORAL EVERY 4 HOURS PRN
Status: DISCONTINUED | OUTPATIENT
Start: 2024-11-12 | End: 2024-11-14 | Stop reason: HOSPADM

## 2024-11-12 RX ORDER — SODIUM CHLORIDE 0.9 % (FLUSH) 0.9 %
3 SYRINGE (ML) INJECTION EVERY 12 HOURS SCHEDULED
Status: DISCONTINUED | OUTPATIENT
Start: 2024-11-12 | End: 2024-11-12 | Stop reason: HOSPADM

## 2024-11-12 RX ORDER — IPRATROPIUM BROMIDE AND ALBUTEROL SULFATE 2.5; .5 MG/3ML; MG/3ML
3 SOLUTION RESPIRATORY (INHALATION) ONCE AS NEEDED
Status: DISCONTINUED | OUTPATIENT
Start: 2024-11-12 | End: 2024-11-12 | Stop reason: HOSPADM

## 2024-11-12 RX ORDER — KETOROLAC TROMETHAMINE 30 MG/ML
INJECTION, SOLUTION INTRAMUSCULAR; INTRAVENOUS AS NEEDED
Status: DISCONTINUED | OUTPATIENT
Start: 2024-11-12 | End: 2024-11-12 | Stop reason: SURG

## 2024-11-12 RX ORDER — LIDOCAINE HYDROCHLORIDE 20 MG/ML
INJECTION, SOLUTION INFILTRATION; PERINEURAL AS NEEDED
Status: DISCONTINUED | OUTPATIENT
Start: 2024-11-12 | End: 2024-11-12 | Stop reason: SURG

## 2024-11-12 RX ORDER — LIDOCAINE HYDROCHLORIDE 10 MG/ML
0.5 INJECTION, SOLUTION INFILTRATION; PERINEURAL ONCE AS NEEDED
Status: DISCONTINUED | OUTPATIENT
Start: 2024-11-12 | End: 2024-11-12 | Stop reason: HOSPADM

## 2024-11-12 RX ORDER — FAMOTIDINE 10 MG/ML
20 INJECTION, SOLUTION INTRAVENOUS ONCE
Status: COMPLETED | OUTPATIENT
Start: 2024-11-12 | End: 2024-11-12

## 2024-11-12 RX ORDER — SODIUM CHLORIDE, SODIUM LACTATE, POTASSIUM CHLORIDE, CALCIUM CHLORIDE 600; 310; 30; 20 MG/100ML; MG/100ML; MG/100ML; MG/100ML
9 INJECTION, SOLUTION INTRAVENOUS CONTINUOUS
Status: DISCONTINUED | OUTPATIENT
Start: 2024-11-12 | End: 2024-11-12

## 2024-11-12 RX ORDER — FLUMAZENIL 0.1 MG/ML
0.2 INJECTION INTRAVENOUS AS NEEDED
Status: DISCONTINUED | OUTPATIENT
Start: 2024-11-12 | End: 2024-11-12 | Stop reason: HOSPADM

## 2024-11-12 RX ORDER — EPHEDRINE SULFATE 50 MG/ML
5 INJECTION, SOLUTION INTRAVENOUS ONCE AS NEEDED
Status: DISCONTINUED | OUTPATIENT
Start: 2024-11-12 | End: 2024-11-12 | Stop reason: HOSPADM

## 2024-11-12 RX ORDER — SULFAMETHOXAZOLE AND TRIMETHOPRIM 800; 160 MG/1; MG/1
1 TABLET ORAL EVERY 12 HOURS SCHEDULED
Status: DISCONTINUED | OUTPATIENT
Start: 2024-11-12 | End: 2024-11-14 | Stop reason: HOSPADM

## 2024-11-12 RX ORDER — NALOXONE HCL 0.4 MG/ML
0.2 VIAL (ML) INJECTION AS NEEDED
Status: DISCONTINUED | OUTPATIENT
Start: 2024-11-12 | End: 2024-11-12 | Stop reason: HOSPADM

## 2024-11-12 RX ORDER — ONDANSETRON 2 MG/ML
INJECTION INTRAMUSCULAR; INTRAVENOUS AS NEEDED
Status: DISCONTINUED | OUTPATIENT
Start: 2024-11-12 | End: 2024-11-12 | Stop reason: SURG

## 2024-11-12 RX ORDER — ONDANSETRON 2 MG/ML
4 INJECTION INTRAMUSCULAR; INTRAVENOUS ONCE AS NEEDED
Status: DISCONTINUED | OUTPATIENT
Start: 2024-11-12 | End: 2024-11-12 | Stop reason: HOSPADM

## 2024-11-12 RX ORDER — SODIUM CHLORIDE 0.9 % (FLUSH) 0.9 %
3-10 SYRINGE (ML) INJECTION AS NEEDED
Status: DISCONTINUED | OUTPATIENT
Start: 2024-11-12 | End: 2024-11-12 | Stop reason: HOSPADM

## 2024-11-12 RX ORDER — DEXAMETHASONE SODIUM PHOSPHATE 4 MG/ML
INJECTION, SOLUTION INTRA-ARTICULAR; INTRALESIONAL; INTRAMUSCULAR; INTRAVENOUS; SOFT TISSUE AS NEEDED
Status: DISCONTINUED | OUTPATIENT
Start: 2024-11-12 | End: 2024-11-12 | Stop reason: SURG

## 2024-11-12 RX ORDER — SODIUM CHLORIDE 9 MG/ML
100 INJECTION, SOLUTION INTRAVENOUS CONTINUOUS
Status: DISCONTINUED | OUTPATIENT
Start: 2024-11-12 | End: 2024-11-12

## 2024-11-12 RX ORDER — ROCURONIUM BROMIDE 10 MG/ML
INJECTION, SOLUTION INTRAVENOUS AS NEEDED
Status: DISCONTINUED | OUTPATIENT
Start: 2024-11-12 | End: 2024-11-12 | Stop reason: SURG

## 2024-11-12 RX ORDER — FENTANYL CITRATE 50 UG/ML
25 INJECTION, SOLUTION INTRAMUSCULAR; INTRAVENOUS
Status: DISCONTINUED | OUTPATIENT
Start: 2024-11-12 | End: 2024-11-12 | Stop reason: HOSPADM

## 2024-11-12 RX ORDER — FENTANYL CITRATE 50 UG/ML
50 INJECTION, SOLUTION INTRAMUSCULAR; INTRAVENOUS
Status: DISCONTINUED | OUTPATIENT
Start: 2024-11-12 | End: 2024-11-12 | Stop reason: HOSPADM

## 2024-11-12 RX ORDER — NALOXONE HCL 0.4 MG/ML
0.4 VIAL (ML) INJECTION
Status: DISCONTINUED | OUTPATIENT
Start: 2024-11-12 | End: 2024-11-14 | Stop reason: HOSPADM

## 2024-11-12 RX ORDER — PROPOFOL 10 MG/ML
INJECTION, EMULSION INTRAVENOUS AS NEEDED
Status: DISCONTINUED | OUTPATIENT
Start: 2024-11-12 | End: 2024-11-12 | Stop reason: SURG

## 2024-11-12 RX ORDER — HYDRALAZINE HYDROCHLORIDE 20 MG/ML
5 INJECTION INTRAMUSCULAR; INTRAVENOUS
Status: DISCONTINUED | OUTPATIENT
Start: 2024-11-12 | End: 2024-11-12 | Stop reason: HOSPADM

## 2024-11-12 RX ORDER — OXYCODONE AND ACETAMINOPHEN 7.5; 325 MG/1; MG/1
1 TABLET ORAL EVERY 4 HOURS PRN
Status: DISCONTINUED | OUTPATIENT
Start: 2024-11-12 | End: 2024-11-12 | Stop reason: HOSPADM

## 2024-11-12 RX ORDER — MORPHINE SULFATE 2 MG/ML
2 INJECTION, SOLUTION INTRAMUSCULAR; INTRAVENOUS
Status: DISCONTINUED | OUTPATIENT
Start: 2024-11-12 | End: 2024-11-14 | Stop reason: HOSPADM

## 2024-11-12 RX ORDER — MAGNESIUM HYDROXIDE 1200 MG/15ML
LIQUID ORAL AS NEEDED
Status: DISCONTINUED | OUTPATIENT
Start: 2024-11-12 | End: 2024-11-12 | Stop reason: HOSPADM

## 2024-11-12 RX ADMIN — PIPERACILLIN AND TAZOBACTAM 3.38 G: 3; .375 INJECTION, POWDER, FOR SOLUTION INTRAVENOUS at 17:38

## 2024-11-12 RX ADMIN — FAMOTIDINE 20 MG: 10 INJECTION INTRAVENOUS at 16:55

## 2024-11-12 RX ADMIN — ROCURONIUM BROMIDE 25 MG: 10 INJECTION, SOLUTION INTRAVENOUS at 17:43

## 2024-11-12 RX ADMIN — ONDANSETRON 4 MG: 2 INJECTION INTRAMUSCULAR; INTRAVENOUS at 18:03

## 2024-11-12 RX ADMIN — DEXAMETHASONE SODIUM PHOSPHATE 4 MG: 4 INJECTION, SOLUTION INTRAMUSCULAR; INTRAVENOUS at 17:57

## 2024-11-12 RX ADMIN — PROPOFOL 100 MG: 10 INJECTION, EMULSION INTRAVENOUS at 17:43

## 2024-11-12 RX ADMIN — SUGAMMADEX 100 MG: 100 INJECTION, SOLUTION INTRAVENOUS at 18:05

## 2024-11-12 RX ADMIN — FENTANYL CITRATE 50 MCG: 50 INJECTION, SOLUTION INTRAMUSCULAR; INTRAVENOUS at 18:54

## 2024-11-12 RX ADMIN — LIDOCAINE HYDROCHLORIDE 40 MG: 20 INJECTION, SOLUTION INFILTRATION; PERINEURAL at 17:43

## 2024-11-12 RX ADMIN — KETOROLAC TROMETHAMINE 15 MG: 30 INJECTION, SOLUTION INTRAMUSCULAR at 18:02

## 2024-11-12 RX ADMIN — SODIUM CHLORIDE, POTASSIUM CHLORIDE, SODIUM LACTATE AND CALCIUM CHLORIDE 9 ML/HR: 600; 310; 30; 20 INJECTION, SOLUTION INTRAVENOUS at 16:55

## 2024-11-12 RX ADMIN — SULFAMETHOXAZOLE AND TRIMETHOPRIM 1 TABLET: 800; 160 TABLET ORAL at 22:56

## 2024-11-12 RX ADMIN — Medication 10 ML: at 08:40

## 2024-11-12 RX ADMIN — PIPERACILLIN AND TAZOBACTAM 3.38 G: 3; .375 INJECTION, POWDER, FOR SOLUTION INTRAVENOUS at 08:46

## 2024-11-12 RX ADMIN — HYDROCODONE BITARTRATE AND ACETAMINOPHEN 2 TABLET: 5; 325 TABLET ORAL at 23:07

## 2024-11-12 RX ADMIN — SODIUM CHLORIDE 100 ML/HR: 9 INJECTION, SOLUTION INTRAVENOUS at 13:13

## 2024-11-12 RX ADMIN — PROPOFOL 40 MG: 10 INJECTION, EMULSION INTRAVENOUS at 17:51

## 2024-11-12 NOTE — PLAN OF CARE
Goal Outcome Evaluation:         Pt admitted for abscess near his right testicle, the abscess does ooze yellow foul smelling puss and also is infected with MRSA. Patient is non-verbal and non-ambulatory, mother is at bedside and is the main caregiver. No signs of pain. VSS, RA. General surgery consult in the morning.

## 2024-11-12 NOTE — ANESTHESIA PREPROCEDURE EVALUATION
Anesthesia Evaluation     Patient summary reviewed and Nursing notes reviewed   history of anesthetic complications:  PONV  NPO Solid Status: > 8 hours  NPO Liquid Status: > 2 hours           Airway   Comment: Difficult to assess airway due to patient cooperation. Per his mom - no loose teeth. Good ROM of neck  Dental      Pulmonary - negative pulmonary ROS   Cardiovascular - negative cardio ROS        Neuro/Psych  (+) seizures well controlled  GI/Hepatic/Renal/Endo    (+) GERD well controlled    Musculoskeletal (-) negative ROS    Abdominal    Substance History - negative use     OB/GYN          Other - negative ROS       ROS/Med Hx Other: Cerebral Palsy                  Anesthesia Plan    ASA 3     general     (CMAC D blade used in past)  intravenous induction     Anesthetic plan, risks, benefits, and alternatives have been provided, discussed and informed consent has been obtained with: patient, mother and healthcare power of .        CODE STATUS:    Level Of Support Discussed With: Patient  Code Status (Patient has no pulse and is not breathing): CPR (Attempt to Resuscitate)  Medical Interventions (Patient has pulse or is breathing): Full Support

## 2024-11-12 NOTE — ANESTHESIA PROCEDURE NOTES
Airway  Urgency: elective    Date/Time: 11/12/2024 5:47 PM  Airway not difficult    General Information and Staff    Patient location during procedure: OR  Anesthesiologist: Gali Rayo MD    Indications and Patient Condition  Indications for airway management: airway protection    Preoxygenated: yes  Mask difficulty assessment: 1 - vent by mask    Final Airway Details  Final airway type: endotracheal airway      Successful airway: ETT  Cuffed: yes   Facilitating devices/methods: intubating stylet and anterior pressure/BURP  Endotracheal tube insertion site: oral  Blade: Heidy  Blade size: 3  ETT size (mm): 7.0  Cormack-Lehane Classification: grade IIb - view of arytenoids or posterior of glottis only (Large hanging epiglottis, likely easier airway with newsome blade)  Placement verified by: chest auscultation and capnometry   Measured from: lips  ETT/EBT  to lips (cm): 21  Number of attempts at approach: 1  Assessment: lips, teeth, and gum same as pre-op and atraumatic intubation

## 2024-11-12 NOTE — OP NOTE
PREOPERATIVE DIAGNOSIS:  Recurrent pelvic/perineal abscess/infection    POSTOPERATIVE DIAGNOSIS (FINDINGS):  Same    PROCEDURE:  Drainage of recurrent pelvic/perineal abscess    SURGEON:  Vinh Ballard MD    ASSISTANT:  Sushila Lorenzo was responsible for performing the following activities: suction, irrigation, retraction, and placing dressing, and their skilled assistance was necessary for the success of this case.    ANESTHESIA:  General    EBL:  Minimal    SPECIMEN(S):  Wound culture    DESCRIPTION:  Under general anesthetic in dorsal lithotomy position prepped and draped.  There was a right groin wound measuring approximately 4 cm and an opening secondary to prior proctectomy.  Purulent fluid was located in both and irrigated out.  The groin wound tunneled posteriorly but did not communicate with the main wound.  This was irrigated and seemed to have no additional loculations.  The main wound from the proctectomy was probed with a Linnea clamp to try to identify any loculations or deep pockets.  I was able to break into several areas which were all irrigated and once I felt that the wound had been adequately explored and drained as much as possible with blind blunt dissection, I irrigated the wound and packed it with 4 x 4 gauze and held pressure.  The gauze was then removed and the wound was packed with Gelfoam followed by Betadine soaked gauze x 2.  Good hemostasis was apparent.  Tolerated well.    Vinh Ballard M.D.

## 2024-11-12 NOTE — PROGRESS NOTES
DAYSI HARDIN Attestation Note    I supervised care provided by the midlevel provider.    The ELENA and I have discussed this patient's history, physical exam, and treatment plan. I have reviewed the documentation and personally had a face to face interaction with the patient  I affirm the documentation and agree with the treatment and plan. I provided a substantive portion of the care of this patient.  I personally performed the physical exam, in its entirety.  My personal findings are documented in below:    History:  Patient with history of anoxic encephalopathy, seizure disorder and recurrent pelvic/perirectal infections is admitted observation unit for management of new drainage and tenderness from the right groin and hemiscrotum area.  Mother reports that patient was acting uncomfortable over the past week and sitting abnormally in his chair in order to avoid pressure on the perineum area.  She noticed some redness and thickness of the skin at the right groin and scrotum area as well as some increasing drainage from his right gluteal wound.  She says he did have 1 elevated temperature but no recurrent fevers observed.  He has otherwise been acting well.  His ostomy output has been normal.  There is been no vomiting.    Physical Exam:  General: No sign of acute distress.  HENT: NCAT, PERRL, Nares patent.  Eyes: no scleral icterus.  Neck: trachea midline, no ROM limitations.  CV: Pink warm and well-perfused throughout  Respiratory: No distress or increased work of breathing  Abdomen: soft, no focal tenderness or rigidity on palpation.  Ostomy bag shows stool collection without any evidence of blood.  Genitourinary: Penis is normal.  The right hemiscrotum and right perineum demonstrate some area of induration and mild erythema and mild tenderness on palpation.  There is some purulent drainage noted in the perineum as well.  Musculoskeletal: no deformity.  Neuro: alert, moves all extremities, follows commands.  Skin:  warm, dry.    Assessment and Plan:  Pelvic abscess: IV Zosyn initiated in the ED and we will continue per pharmacy recommendations.  N.p.o. after midnight.  General surgery consult in the morning.

## 2024-11-12 NOTE — PROGRESS NOTES
[facility] Clinical [facility] Services: Piperacillin-Tazobactam Consult    Pt Name: Alex Brown   : 1990    Indication: Intra-Abdominal Infection    Relevant clinical data and objective history reviewed:    Past Medical History:   Diagnosis Date    Anemia     Anoxic encephalopathy     @Birth    CP (cerebral palsy) 2018    Epilepsy     GERD (gastroesophageal reflux disease)     H/O ulcerative colitis 2011    Transverse colon Severe ulcerative colitis-Dr. Pop Baez    History of aspiration pneumonia     History of MRSA infection     TREATED BY DR. COATS    Iron deficiency anemia     PONV (postoperative nausea and vomiting)     Rectal bleeding Ulcerative Colitis    Recurrent sinus infections     Seizures     Type of tonic clonic seizure disorder    Seizures 2009    Small bowel obstruction 2021     Creatinine   Date Value Ref Range Status   2024 0.84 0.76 - 1.27 mg/dL Final   05/10/2023 0.84 0.76 - 1.27 mg/dL Final   2023 0.89 0.76 - 1.27 mg/dL Final   2018 0.72 0.60 - 1.35 mg/dL Final     BUN   Date Value Ref Range Status   2024 9 6 - 20 mg/dL Final   2018 11 7 - 25 mg/dL Final     Estimated Creatinine Clearance: 82.7 mL/min (by C-G formula based on SCr of 0.84 mg/dL).    Lab Results   Component Value Date    WBC 14.01 (H) 2024     Temp Readings from Last 3 Encounters:   24 99.5 °F (37.5 °C) (Axillary)   23 97.5 °F (36.4 °C) (Temporal)   23 97.4 °F (36.3 °C) (Temporal)      Assessment/Plan  Estimated CrCl >20 mL/min at this time; BMI 17.11 kg/m2  Will start piperacillin-tazobactam 3.375 g IV every 8 hours     Pharmacy will continue to follow daily while on piperacillin-tazobactam and adjust as needed. Thank you for this consult.    Quyen Wheeler [facility]  Clinical Pharmacist

## 2024-11-12 NOTE — PROGRESS NOTES
ED OBSERVATION PROGRESS/DISCHARGE SUMMARY    Date of Admission: 11/11/2024   LOS: 0 days   PCP: Kira Almaraz MD        Subjective     Hospital Outcome:   Alex Brown is a 34 y.o. male with Cerebral Palsy and non-verbal who was admitted to the ED observation unit for a pelvic abscess.  Patient has had multiple pelvic abscesses in the past and chronic wound that have been managed by [facility] surgery.  Patient's primary caregiver, his mother,  noticed redness and cellulitis to the right side of the groin as well as the right side of the scrotum.  There has been some increased drainage from a persistent right gluteal wound as well.  No fever or chills.  WBC of 14.1 with a left shift.  Lactate was 1.9.  Blood cultures were obtained.  CT abdomen pelvis showed a linear right perineal perianal fluid collection extending just deep to the skin surface from the perianal region to the base of the scrotum measuring roughly 6 cm in AP dimension by 0.8 cm in length.  In the central pelvis, there was also thick-walled tubular collection that has been present on previous exams and with without significant change.  Patient was started on IV Zosyn and a General Surgery evaluated patient this morning and plans for perineal abscess I&D sometime this afternoon.     ROS:  General: no fevers, chills  Respiratory: no cough, dyspnea  Cardiovascular: no chest pain, palpitations  Abdomen: No abdominal pain, nausea, vomiting, or diarrhea  Neurologic: No focal weakness    Objective   Physical Exam:  I have reviewed the vital signs.  Temp:  [98.6 °F (37 °C)-99.5 °F (37.5 °C)] 99 °F (37.2 °C)  Heart Rate:  [] 108  Resp:  [18-20] 18  BP: (100-117)/() 117/79  General Appearance:    Alert, cooperative, no distress  Head:    Normocephalic, atraumatic  Eyes:    Sclerae anicteric  Neck:   Supple, no mass  Lungs: Clear to auscultation bilaterally, respirations unlabored  Heart: Regular rate and rhythm, S1 and S2  normal, no murmur, rub or gallop  Abdomen:  Soft, nontender, bowel sounds active, nondistended  Extremities: No clubbing, cyanosis, or edema to lower extremities  Pulses:  2+ and symmetric in distal lower extremities  Skin: No rashes   Neurologic: Oriented x3, Normal strength to extremities    Results Review:    I have reviewed the labs, radiology results and diagnostic studies.    Results from last 7 days   Lab Units 11/11/24  1201   WBC 10*3/mm3 14.01*   HEMOGLOBIN g/dL 13.3   HEMATOCRIT % 43.3   PLATELETS 10*3/mm3 414     Results from last 7 days   Lab Units 11/11/24  1201   SODIUM mmol/L 137   POTASSIUM mmol/L 3.6   CHLORIDE mmol/L 103   CO2 mmol/L 22.4   BUN mg/dL 9   CREATININE mg/dL 0.84   CALCIUM mg/dL 9.7   BILIRUBIN mg/dL 0.2   ALK PHOS U/L 92   ALT (SGPT) U/L 23   AST (SGOT) U/L 14   GLUCOSE mg/dL 89     Imaging Results (Last 24 Hours)       Procedure Component Value Units Date/Time    CT Abdomen Pelvis With Contrast [[id number]] Collected: 11/11/24 1501     Updated: 11/11/24 1637    Narrative:      CT ABDOMEN AND PELVIS WITH CONTRAST     HISTORY: 34 years of age, male.  Right groin pain and history of pelvic  abscesses.     TECHNIQUE:  CT includes axial imaging from the lung bases to the  trochanters with intravenous contrast and without use of oral contrast.  Data reconstructed in coronal and sagittal planes. Radiation dose  reduction techniques were utilized, including automated exposure control  and exposure modulation based on body size.     COMPARISON: CT pelvis 05/15/2023, CT abdomen and pelvis 05/10/2023,  05/04/2023.     FINDINGS: Lung bases appear clear. The liver, spleen, adrenal glands,  pancreas, and kidneys appear normal. No hydronephrosis.  Previous  colectomy with left-sided ileostomy.     Within the central pelvis there is a thick-walled tubular collection  that has been present on previous exams and is without significant  change. This measures approximately 2.5 x 1.6 cm.     There is  right perineal/perianal collection that extends from the right  perianal region to the base of the scrotum and measures approximately 6  cm in AP dimension by 0.8 cm in transverse dimension and extends 1.4 cm  in height as demonstrated on axial image 142. There is mild adjacent  inflammation within the fat.     No further evidence for fluid collection.       Impression:      1. Thin, linear right perineal/perianal fluid collection extends just  deep to the skin surface from the perianal region to the base of the  scrotum measuring approximately 6 cm in AP dimension by 0.8 cm in length  (axial mage 142). Consistent with a small perianal/perineal abscess.  2. Chronic posterocentral lower pelvic collection with hyperemic  enhancing wall without interval change.      Radiation dose reduction techniques were utilized, including automated  exposure control and exposure modulation based on body size.        This report was finalized on 11/11/2024 4:34 PM by Beau Espinoza M.D  on Workstation: [id number]               I have reviewed the medications.     Discharge Medications        ASK your doctor about these medications        Instructions Start Date   lansoprazole 15 MG capsule  Commonly known as: PREVACID   1 capsule, Daily      multivitamin tablet tablet   1 tablet, Daily              ---------------------------------------------------------------------------------------------  Assessment & Plan   Assessment/Problem List    Pelvic abscess in male      Plan:  Pelvic abscess  -Continue IV Zosyn/pharmacy to dose  -General Surgery following and plan on perineal abscess I&D  -Morphine 2 mg IV every 4 hours as needed pain  -Zofran 4 mg IV every 6 hours as needed nausea vomiting  -N.p.o. at midnight      This note will serve as a progress note    Liz Wallace, APRN 11/12/24 04:50 EST    I have worn appropriate PPE during this patient encounter, sanitized my hands both with entering and exiting patient's room.      36  minutes has been spent by [facility] providers in the care of this patient while under observation status

## 2024-11-12 NOTE — CONSULTS
ASSESSMENT/PLAN:    34-year-old gentleman with recurrent abscess in the perineal region from a nonhealing wound from perineal resection secondary to ulcerative colitis.  He has had multiple extensive drainages of this area.  I have recommended with the ongoing purulent drainage and CT findings to perform drainage of this area to better allow antibiotics to work.  Ultimately, this area obviously will not heal and would require some sort of muscular rotation flap to do so.  Given his overall circumstances we have been trying to avoid this but it may be necessary to reconsider at this point.    CC:     Perineal discomfort and drainage    HPI:    34-year-old gentleman who over the last several months has had progressive evidence of discomfort in the perineal region where he is struggled with nonhealing wound from perineal resection secondary to ulcerative colitis.  Relevant review of systems negative other than presenting complaints.    RADIOLOGY:   CT abdomen pelvis shows small abscess in the perianal region    LABS:    WBC 14  Hemoglobin 12.8  Platelets 387    Blood cultures negative  Wound culture pending    SOCIAL HISTORY:   No tobacco use  No alcohol use    FAMILY HISTORY:    Colorectal cancer: Negative    PREVIOUS SURGERY    Incision and drainage of right inguinal/inner thigh abscess and drainage of deep pelvic abscess through existing perineal wound 5/11/2023  Exploratory laparotomy for lysis of adhesions for small bowel obstruction 5/28/2021  Exploratory laparotomy with lysis of adhesions 3/25/2020  Open drainage pelvic abscess 7/9/2019  Laparoscopic small bowel resection for intrapelvic fistula with pelvic abscess 7/17/2015  Total proctocolectomy with end ileostomy 1/3/2012 for severe chronic ulcerative colitis    PAST MEDICAL HISTORY:    Ulcerative colitis  Anoxic brain injury at birth  Seizure disorder  Cerebral palsy    MEDICATIONS:   Prevacid  Multivitamin    ALLERGIES:  "  Vancomycin  Cefaclor  Dilaudid  [facility]    PHYSICAL EXAM:   Constitutional: No acute distress  Vital signs:   Weight: 106 pounds  Height: 66\"  BMI: 17.1  Blood pressure 106/78  Heart rate 111  Respiratory rate 18  Temperature 98.8  Respiratory: Normal nonlabored inspiratory effort  Cardiovascular: Regular rate, no jugular venous distention  Gastrointestinal: Soft  In the perineum at the site of anal resection is a small cutaneous opening with purulent fluid.  There is no cellulitis.    YOGI COATS M.D.  "

## 2024-11-12 NOTE — PROGRESS NOTES
DAYSI HARDIN Attestation Note    I supervised care provided by the midlevel provider.    The ELENA and I have discussed this patient's history, physical exam, and treatment plan. I have reviewed the documentation and personally had a face to face interaction with the patient  I affirm the documentation and agree with the treatment and plan. I provided a substantive portion of the care of this patient.  I personally performed the physical exam, in its entirety.  My personal findings are documented in below:    History:  No new complaints this morning.  Mother says patient rested on and off throughout the night.  No signs of increasing pain that she has noticed.    Physical Exam:  General: No acute distress.  HENT: NCAT, PERRL, Nares patent.  Eyes: no scleral icterus.  Neck: trachea midline, no ROM limitations.  CV: Pink warm and well-perfused throughout  Respiratory: No distress or increased work of breathing  Abdomen: soft, no focal tenderness or rigidity  Musculoskeletal: no deformity.  Neuro: alert, moves all extremities  Skin: warm, dry.    Assessment and Plan:  Pelvic abscess: Continuing IV Zosyn.  General surgery consult this morning.

## 2024-11-13 PROCEDURE — 99024 POSTOP FOLLOW-UP VISIT: CPT | Performed by: SURGERY

## 2024-11-13 PROCEDURE — G0378 HOSPITAL OBSERVATION PER HR: HCPCS

## 2024-11-13 RX ADMIN — HYDROCODONE BITARTRATE AND ACETAMINOPHEN 2 TABLET: 5; 325 TABLET ORAL at 21:10

## 2024-11-13 RX ADMIN — HYDROCODONE BITARTRATE AND ACETAMINOPHEN 2 TABLET: 5; 325 TABLET ORAL at 17:01

## 2024-11-13 RX ADMIN — SULFAMETHOXAZOLE AND TRIMETHOPRIM 1 TABLET: 800; 160 TABLET ORAL at 08:18

## 2024-11-13 RX ADMIN — HYDROCODONE BITARTRATE AND ACETAMINOPHEN 2 TABLET: 5; 325 TABLET ORAL at 08:18

## 2024-11-13 RX ADMIN — SULFAMETHOXAZOLE AND TRIMETHOPRIM 1 TABLET: 800; 160 TABLET ORAL at 20:54

## 2024-11-13 NOTE — ANESTHESIA POSTPROCEDURE EVALUATION
Patient: Alex Brown    Procedure Summary       Date: 11/12/24 Room / Location: [facility] OR 12 / [facility] MAIN OR    Anesthesia Start: 1738 Anesthesia Stop: 1828    Procedure: PERINEAL ABSCESS INCISION AND DRAINAGE (Perineum) Diagnosis:     Surgeons: Vinh Ballard MD Provider: Gali Rayo MD    Anesthesia Type: general ASA Status: 3            Anesthesia Type: general    Vitals  Vitals Value Taken Time   /80 11/12/24 2000   Temp 36.8 °C (98.2 °F) 11/12/24 1830   Pulse 112 11/12/24 2006   Resp 22 11/12/24 1930   SpO2 95 % 11/12/24 2006   Vitals shown include unfiled device data.        Anesthesia Post Evaluation

## 2024-11-13 NOTE — NURSING NOTE
Colostomy draining well intact, brownish soft/semi liquid about 100 ml noted, per patient's mom will empty later on and prefers to do colostomy care herself. Per my nursing assessment the pt mom is knowledgeable will monitor closely.

## 2024-11-13 NOTE — PROGRESS NOTES
Has remained afebrile with stable vital signs since surgery.  Wound inspected.  Two 4 x 4 gauze removed from the perineal wound, one 4 x 4 gauze removed from the right groin wound.  Some dried blood in Betadine on the gauze, no pus.  Continue Bactrim for now.  Hopefully will have final culture and sensitivity back tomorrow.  Likely home tomorrow.

## 2024-11-13 NOTE — PLAN OF CARE
Goal Outcome Evaluation:      Patient arrived to unit close to 2200 last night post op I &D surgery of recurrent small abscesses of the perineum/rectum  secondary to his DX of ulcerative colitis. Patient is alert, calm, but non-verbal due to infantile history of anoxic brain injury that led to cerebral palsey. His parents are very supportive of Alex, and his mother does the majority of his daily care. VSS, he is NSR on the monitor, stioned areas.

## 2024-11-13 NOTE — PLAN OF CARE
Goal Outcome Evaluation:  Plan of Care Reviewed With: patient        Progress: improving  Outcome Evaluation: No acute distress noted, vital signs stable, prn meds for pain was given per MAR, meds and lab reviewed, continue plan of care.

## 2024-11-14 VITALS
TEMPERATURE: 99.3 F | BODY MASS INDEX: 17.04 KG/M2 | OXYGEN SATURATION: 95 % | RESPIRATION RATE: 18 BRPM | SYSTOLIC BLOOD PRESSURE: 133 MMHG | HEART RATE: 120 BPM | HEIGHT: 66 IN | WEIGHT: 106 LBS | DIASTOLIC BLOOD PRESSURE: 80 MMHG

## 2024-11-14 PROCEDURE — G0378 HOSPITAL OBSERVATION PER HR: HCPCS

## 2024-11-14 RX ORDER — HYDROCODONE BITARTRATE AND ACETAMINOPHEN 5; 325 MG/1; MG/1
1 TABLET ORAL EVERY 4 HOURS PRN
Qty: 20 TABLET | Refills: 0 | Status: SHIPPED | OUTPATIENT
Start: 2024-11-14

## 2024-11-14 RX ORDER — SULFAMETHOXAZOLE AND TRIMETHOPRIM 800; 160 MG/1; MG/1
1 TABLET ORAL 2 TIMES DAILY
Qty: 28 TABLET | Refills: 0 | Status: SHIPPED | OUTPATIENT
Start: 2024-11-14 | End: 2024-11-28

## 2024-11-14 RX ADMIN — HYDROCODONE BITARTRATE AND ACETAMINOPHEN 2 TABLET: 5; 325 TABLET ORAL at 04:51

## 2024-11-14 RX ADMIN — HYDROCODONE BITARTRATE AND ACETAMINOPHEN 2 TABLET: 5; 325 TABLET ORAL at 12:31

## 2024-11-14 RX ADMIN — SULFAMETHOXAZOLE AND TRIMETHOPRIM 1 TABLET: 800; 160 TABLET ORAL at 09:49

## 2024-11-14 NOTE — CASE MANAGEMENT/SOCIAL WORK
Case Management Discharge Note      Final Note: dc home w/ family         Selected Continued Care - Discharged on 11/14/2024 Admission date: 11/11/2024 - Discharge disposition: Home or Self Care      Destination    No services have been selected for the patient.                Durable Medical Equipment    No services have been selected for the patient.                Dialysis/Infusion    No services have been selected for the patient.                Home Medical Care    No services have been selected for the patient.                Therapy    No services have been selected for the patient.                Community Resources    No services have been selected for the patient.                Community & DME    No services have been selected for the patient.                         Final Discharge Disposition Code: 01 - home or self-care

## 2024-11-14 NOTE — PLAN OF CARE
Goal Outcome Evaluation:  Plan of Care Reviewed With: patient        Progress: improving        VSS. Wounds on glenis-rectum area draining serosanguinous OP-SANTIAGO with brief in place. PW and brief changed with mother's assistance last night. Mother states pt shifts weight and turns himself throughout the night or that she will assist him to turn if needed. Ostomy emptied last night-brown liquid stool. Pain pill given at bedtime with PO bactrim. MD note says probable DC today.

## 2024-11-14 NOTE — PLAN OF CARE
Goal Outcome Evaluation:  Plan of Care Reviewed With: caregiver, parent        Progress: improving  Outcome Evaluation: Alert but RJ because nonverbal. Incision SANTIAGO, minimal drainage. Discharge home today of PO abx. Family to transport.

## 2024-11-16 LAB
BACTERIA SPEC AEROBE CULT: NORMAL
BACTERIA SPEC AEROBE CULT: NORMAL

## 2024-11-17 LAB — BACTERIA SPEC ANAEROBE CULT: ABNORMAL

## 2024-11-17 NOTE — DISCHARGE SUMMARY
DATE OF ADMIT:    11/11/2024    DATE OF DISCHARGE:    11/14/2024    DIAGNOSIS:    Recurrent pelvic/perineal abscess  Nonhealing inguinal and perineal wounds    PROCEDURES:    Drainage of recurrent pelvic/perineal abscess 11/12/2024    RADIOGRAPHIC STUDIES SUMMARY:  CT abdomen pelvis 11/11/2024 showed recurrent perineal/perianal/pelvic fluid collection    LABORATORY SUMMARY:  Wound culture 11/11/2024 grew Klebsiella and Proteus.  Both were sensitive to Bactrim and Levaquin.    SUMMARY OF HOSPITAL COURSE:     Admitted due to above stated diagnosis.  Placed on intravenous antibiotics and underwent surgical drainage.  Remained afebrile with stable vital signs postoperatively.  All surgical packing removed on postop day 1.  Exam on day of discharge showed no significant purulent drainage or bleeding.    DIET:  Regular    ACTIVITY:  Does not ambulate due to severe cerebral palsy    MEDICATIONS:  No changes were made to preadmission medication list  Prescriptions given for:  Norco as needed  Bactrim x 14 days    FOLLOW-UP:   To call office and schedule two week follow-up appointment    Vinh Ballard M.D.

## 2024-11-18 LAB
BACTERIA SPEC AEROBE CULT: ABNORMAL
GRAM STN SPEC: ABNORMAL
GRAM STN SPEC: ABNORMAL

## 2024-11-19 LAB
BACTERIA SPEC AEROBE CULT: ABNORMAL
BACTERIA SPEC AEROBE CULT: ABNORMAL
GRAM STN SPEC: ABNORMAL
GRAM STN SPEC: ABNORMAL

## 2024-11-21 ENCOUNTER — TELEPHONE (OUTPATIENT)
Dept: SURGERY | Facility: CLINIC | Age: 34
End: 2024-11-21
Payer: COMMERCIAL

## 2024-11-21 NOTE — TELEPHONE ENCOUNTER
Spoke with patient's mom, informing her of waiting until after 12/05 appointment for a repeat CT. She voiced understanding

## 2024-11-21 NOTE — TELEPHONE ENCOUNTER
Patient's mom called wanting to know if you wanted him to have a repeat CT scan? Stated he was doing better, the abscess still is draining. Patient is scheduled for a follow up on 12/05.    Tranexamic Acid Pregnancy And Lactation Text: It is unknown if this medication is safe during pregnancy or breast feeding.

## 2024-12-05 ENCOUNTER — OFFICE VISIT (OUTPATIENT)
Dept: SURGERY | Facility: CLINIC | Age: 34
End: 2024-12-05
Payer: COMMERCIAL

## 2024-12-05 VITALS — HEIGHT: 66 IN | BODY MASS INDEX: 17.04 KG/M2 | WEIGHT: 106 LBS

## 2024-12-05 DIAGNOSIS — L02.215 PERINEAL ABSCESS: Primary | ICD-10-CM

## 2024-12-05 DIAGNOSIS — K61.1 PERIRECTAL CELLULITIS: ICD-10-CM

## 2024-12-05 RX ORDER — SULFAMETHOXAZOLE AND TRIMETHOPRIM 800; 160 MG/1; MG/1
1 TABLET ORAL DAILY
Qty: 30 TABLET | Refills: 5 | Status: SHIPPED | OUTPATIENT
Start: 2024-12-05 | End: 2025-06-03

## 2024-12-05 RX ORDER — SULFAMETHOXAZOLE AND TRIMETHOPRIM 800; 160 MG/1; MG/1
1 TABLET ORAL 2 TIMES DAILY
Qty: 14 TABLET | Refills: 0 | Status: SHIPPED | OUTPATIENT
Start: 2024-12-05 | End: 2024-12-12

## 2024-12-07 NOTE — PROGRESS NOTES
ASSESSMENT/PLAN:    He has had substantial improvement since hospitalization.  The wounds are draining less and per his parents he is far more comfortable.  As we discussed in hospital and many times before, the perineal and groin wounds will not heal with current management and would only have a chance of healing with extensive debridement and myocutaneous flap.  Given his situation and severity of cerebral palsy, they obviously are not enthusiastic about putting him through that extensive surgery and I certainly understand that.  Given his improvement with what little I was able to do surgically and more importantly putting him on antibiotics, I have given him 1 more week of Bactrim twice daily and then we will put him on Bactrim daily for prophylactic reasons and see if this prevents him from having the types of flares that he has had before.    CC:     Follow-up nonhealing wounds    INTERVAL HISTORY:    He was recently hospitalized with increased drainage from the perineal wound and I took him to surgery and performed wound exploration and evacuation.  His pelvis is markedly scarred and there is really not much that can be done other than attempting to make sure the low pelvic collection is still communicating with the open wound and can with blunt dissection to be somewhat further opened.    PHYSICAL EXAM:   Constitutional: No acute distress, quite agitated  Given his agitation, his mom showed me a picture of the 2 wounds in the perineum and groin region today.  Both improved significantly.    YOGI COATS M.D.

## 2025-03-28 ENCOUNTER — OFFICE VISIT (OUTPATIENT)
Dept: WOUND CARE | Facility: HOSPITAL | Age: 35
End: 2025-03-28
Payer: COMMERCIAL

## 2025-03-28 PROCEDURE — 97602 WOUND(S) CARE NON-SELECTIVE: CPT

## 2025-03-28 PROCEDURE — G0463 HOSPITAL OUTPT CLINIC VISIT: HCPCS

## 2025-04-04 ENCOUNTER — OFFICE VISIT (OUTPATIENT)
Dept: WOUND CARE | Facility: HOSPITAL | Age: 35
End: 2025-04-04
Payer: COMMERCIAL

## 2025-04-04 PROCEDURE — G0463 HOSPITAL OUTPT CLINIC VISIT: HCPCS

## 2025-04-11 ENCOUNTER — OFFICE VISIT (OUTPATIENT)
Dept: WOUND CARE | Facility: HOSPITAL | Age: 35
End: 2025-04-11
Payer: COMMERCIAL

## 2025-04-11 PROCEDURE — G0463 HOSPITAL OUTPT CLINIC VISIT: HCPCS

## 2025-04-17 ENCOUNTER — OFFICE VISIT (OUTPATIENT)
Dept: SURGERY | Facility: CLINIC | Age: 35
End: 2025-04-17
Payer: COMMERCIAL

## 2025-04-17 VITALS
HEIGHT: 66 IN | WEIGHT: 115 LBS | OXYGEN SATURATION: 98 % | SYSTOLIC BLOOD PRESSURE: 135 MMHG | HEART RATE: 110 BPM | DIASTOLIC BLOOD PRESSURE: 78 MMHG | BODY MASS INDEX: 18.48 KG/M2

## 2025-04-17 DIAGNOSIS — L08.9 SKIN INFECTION: Primary | ICD-10-CM

## 2025-04-17 PROCEDURE — 99213 OFFICE O/P EST LOW 20 MIN: CPT | Performed by: SURGERY

## 2025-04-19 NOTE — PROGRESS NOTES
ASSESSMENT/PLAN:    34-year-old gentleman with new abscess/skin infection along the right scrotum.  This has already been drained and currently is manifest as to granulating wound  by a skin bridge.  There is no evidence of ongoing infection at this time.  Given the other larger wounds that he has which have failed to heal, I do not feel that further surgical debridement of the current wound is going to accomplish anything useful.  I would simply continue current wound care as with the other wounds and address only if further infection were to set in.    CC:     Scrotal infection    HPI:    See above  Relevant review of systems negative other than presenting complaints.    SOCIAL HISTORY:   No tobacco use  No alcohol use    FAMILY HISTORY:    Colorectal cancer: Negative    PREVIOUS SURGERY    Incision and drainage of right inguinal/inner thigh abscess and drainage of deep pelvic abscess through existing perineal wound 5/11/2023  Exploratory laparotomy for lysis of adhesions for small bowel obstruction 5/28/2021  Exploratory laparotomy with lysis of adhesions 3/25/2020  Open drainage pelvic abscess 7/9/2019  Laparoscopic small bowel resection for intrapelvic fistula with pelvic abscess 7/17/2015  Total proctocolectomy with end ileostomy 1/3/2012 for severe chronic ulcerative colitis     PAST MEDICAL HISTORY:    Ulcerative colitis  Anoxic brain injury at birth  Seizure disorder  Cerebral palsy     MEDICATIONS:   Prevacid  Bactrim  Multivitamin     ALLERGIES:   Vancomycin  Dilaudid  Reglan  Thorazine     PHYSICAL EXAM:   The chronic right inguinal wound remains granulating without evidence of infection.  The chronic post proctectomy wound remains open without evidence of infection.  There is a new wound on the right scrotum with a skin bridge between 2 separate granulating areas which represents the current issue     YOGI COATS M.D.

## 2025-04-25 ENCOUNTER — OFFICE VISIT (OUTPATIENT)
Dept: WOUND CARE | Facility: HOSPITAL | Age: 35
End: 2025-04-25
Payer: COMMERCIAL

## 2025-04-25 PROCEDURE — G0463 HOSPITAL OUTPT CLINIC VISIT: HCPCS

## 2025-05-23 ENCOUNTER — OFFICE VISIT (OUTPATIENT)
Dept: WOUND CARE | Facility: HOSPITAL | Age: 35
End: 2025-05-23
Payer: COMMERCIAL

## 2025-05-23 PROCEDURE — G0463 HOSPITAL OUTPT CLINIC VISIT: HCPCS

## 2025-06-03 RX ORDER — SULFAMETHOXAZOLE AND TRIMETHOPRIM 800; 160 MG/1; MG/1
1 TABLET ORAL DAILY
Qty: 21 TABLET | Refills: 2 | Status: SHIPPED | OUTPATIENT
Start: 2025-06-03

## 2025-08-04 DIAGNOSIS — L08.9 SKIN INFECTION: Primary | ICD-10-CM

## 2025-08-05 RX ORDER — SULFAMETHOXAZOLE AND TRIMETHOPRIM 800; 160 MG/1; MG/1
1 TABLET ORAL DAILY
Qty: 21 TABLET | Refills: 2 | Status: SHIPPED | OUTPATIENT
Start: 2025-08-05

## (undated) DEVICE — SUT SILK 0 TIES 18IN SA66G

## (undated) DEVICE — NDL HYPO PRECISIONGLIDE REG 25G 1 1/2

## (undated) DEVICE — COLOSTOMY/ILEOSTOMY KIT, FLEXWEAR: Brand: NEW IMAGE

## (undated) DEVICE — DRSNG WND GZ PAD BORDERED 4X8IN STRL

## (undated) DEVICE — MEDI-VAC YANKAUER SUCTION HANDLE W/BULBOUS TIP: Brand: CARDINAL HEALTH

## (undated) DEVICE — ANTIBACTERIAL UNDYED BRAIDED (POLYGLACTIN 910), SYNTHETIC ABSORBABLE SUTURE: Brand: COATED VICRYL

## (undated) DEVICE — SUT SILK 3/0 TIES 18IN A184H

## (undated) DEVICE — PATIENT RETURN ELECTRODE, SINGLE-USE, CONTACT QUALITY MONITORING, ADULT, WITH 9FT CORD, FOR PATIENTS WEIGING OVER 33LBS. (15KG): Brand: MEGADYNE

## (undated) DEVICE — GLV SURG PREMIERPRO ORTHO LTX PF SZ7.5 BRN

## (undated) DEVICE — PK PROC MAJ 40

## (undated) DEVICE — 1842 FOAM BLOCK NEEDLE COUNTER: Brand: DEVON

## (undated) DEVICE — STPLR SKIN VISISTAT WD 35CT

## (undated) DEVICE — PENCL ES MEGADINE EZ/CLEAN BUTN W/HOLSTR 10FT

## (undated) DEVICE — APPL CHLORAPREP HI/LITE 26ML ORNG

## (undated) DEVICE — SUT SILK 2/0 TIES 18IN A185H

## (undated) DEVICE — SUT VIC 3/0 TIES 18IN J110T

## (undated) DEVICE — SKIN PREP TRAY W/CHG: Brand: MEDLINE INDUSTRIES, INC.

## (undated) DEVICE — SKIN PREP TRAY 4 COMPARTM TRAY: Brand: MEDLINE INDUSTRIES, INC.

## (undated) DEVICE — ELECTRD BLD EXT EDGE 1P COAT 6.5IN STRL

## (undated) DEVICE — HORIZON TI LG 6 CLIPS/CART
Type: IMPLANTABLE DEVICE | Status: NON-FUNCTIONAL
Brand: WECK

## (undated) DEVICE — LOU MINOR PROCEDURE: Brand: MEDLINE INDUSTRIES, INC.

## (undated) DEVICE — ELECTRD BLD EZ CLN MOD 6.5IN

## (undated) DEVICE — Device

## (undated) DEVICE — WOUND RETRACTOR AND PROTECTOR: Brand: ALEXIS WOUND PROTECTOR-RETRACTOR

## (undated) DEVICE — POOLE SUCTION HANDLE: Brand: CARDINAL HEALTH

## (undated) DEVICE — PAD,ABDOMINAL,8"X10",ST,LF: Brand: MEDLINE

## (undated) DEVICE — SUT PROLN 1 CT1 30IN 8425H

## (undated) DEVICE — SUT PDS 0 CT1 36IN Z346H

## (undated) DEVICE — SPONGE,LAP,18"X18",DLX,XR,ST,5/PK,40/PK: Brand: MEDLINE

## (undated) DEVICE — PROXIMATE RH ROTATING HEAD SKIN STAPLERS (35 WIDE) CONTAINS 35 STAINLESS STEEL STAPLES: Brand: PROXIMATE

## (undated) DEVICE — DRAPE,LITHOTOMY,ATTACHED,STERILE: Brand: MEDLINE

## (undated) DEVICE — SPNG LAP 18X18IN LF STRL PK/5

## (undated) DEVICE — SYR LL TP 10ML STRL

## (undated) DEVICE — BNDR ABD PREMIUM/UNIV 10IN 27TO48IN

## (undated) DEVICE — TRAP FLD MINIVAC MEGADYNE 100ML

## (undated) DEVICE — JACKSON-PRATT 100CC BULB RESERVOIR: Brand: CARDINAL HEALTH

## (undated) DEVICE — TOTAL TRAY, 16FR 10ML SIL FOLEY, URN: Brand: MEDLINE

## (undated) DEVICE — GOWN ,SIRUS,NONREINFORCED SMALL: Brand: MEDLINE

## (undated) DEVICE — SUT SILK 3/0 SH CR5 18IN C0135

## (undated) DEVICE — PANTY KNIT MATERN L/XL

## (undated) DEVICE — DRSNG WND BORDR/ADHS NONADHR/GZ LF 4X4IN STRL

## (undated) DEVICE — VIOLET BRAIDED (POLYGLACTIN 910), SYNTHETIC ABSORBABLE SUTURE: Brand: COATED VICRYL

## (undated) DEVICE — BARRIER, ABSORBABLE, ADHESION: Brand: SEPRAFILM® PROCEDURE PACK

## (undated) DEVICE — SUT VIC 2/0 TIES 18IN J111T

## (undated) DEVICE — GLV SURG BIOGEL LTX PF 6

## (undated) DEVICE — SUT SILK 2/0 SH CR8 18IN CR8 C012D

## (undated) DEVICE — BANDAGE,GAUZE,BULKEE II,4.5"X4.1YD,STRL: Brand: MEDLINE

## (undated) DEVICE — ELECTRD BLD EXT EDGE/INSUL 6IN

## (undated) DEVICE — SEALANT HEMOS FLOSEAL MATRX W/MALL TP 10ML EA/6

## (undated) DEVICE — GLV SURG BIOGEL LTX PF 8